# Patient Record
Sex: FEMALE | Race: WHITE | NOT HISPANIC OR LATINO | Employment: FULL TIME | URBAN - METROPOLITAN AREA
[De-identification: names, ages, dates, MRNs, and addresses within clinical notes are randomized per-mention and may not be internally consistent; named-entity substitution may affect disease eponyms.]

---

## 2017-02-08 ENCOUNTER — OFFICE VISIT (OUTPATIENT)
Dept: PEDIATRICS | Facility: CLINIC | Age: 22
End: 2017-02-08
Payer: COMMERCIAL

## 2017-02-08 VITALS
SYSTOLIC BLOOD PRESSURE: 128 MMHG | HEART RATE: 99 BPM | OXYGEN SATURATION: 99 % | TEMPERATURE: 99 F | DIASTOLIC BLOOD PRESSURE: 70 MMHG | RESPIRATION RATE: 14 BRPM | WEIGHT: 188.06 LBS | BODY MASS INDEX: 30.22 KG/M2 | HEIGHT: 66 IN

## 2017-02-08 DIAGNOSIS — G47.9 SLEEP DISTURBANCE: ICD-10-CM

## 2017-02-08 DIAGNOSIS — G44.229 CHRONIC TENSION-TYPE HEADACHE, NOT INTRACTABLE: ICD-10-CM

## 2017-02-08 DIAGNOSIS — F43.23 ADJUSTMENT DISORDER WITH MIXED ANXIETY AND DEPRESSED MOOD: ICD-10-CM

## 2017-02-08 DIAGNOSIS — F90.2 ATTENTION DEFICIT HYPERACTIVITY DISORDER (ADHD), COMBINED TYPE: Primary | ICD-10-CM

## 2017-02-08 DIAGNOSIS — G43.909 MIGRAINE WITHOUT STATUS MIGRAINOSUS, NOT INTRACTABLE, UNSPECIFIED MIGRAINE TYPE: ICD-10-CM

## 2017-02-08 PROCEDURE — 99214 OFFICE O/P EST MOD 30 MIN: CPT | Performed by: SPECIALIST

## 2017-02-08 RX ORDER — DEXTROAMPHETAMINE SACCHARATE, AMPHETAMINE ASPARTATE MONOHYDRATE, DEXTROAMPHETAMINE SULFATE AND AMPHETAMINE SULFATE 3.75; 3.75; 3.75; 3.75 MG/1; MG/1; MG/1; MG/1
15 CAPSULE, EXTENDED RELEASE ORAL DAILY
Qty: 30 CAPSULE | Refills: 0 | Status: SHIPPED | OUTPATIENT
Start: 2017-03-11 | End: 2017-04-10

## 2017-02-08 RX ORDER — ONDANSETRON 4 MG/1
4 TABLET, FILM COATED ORAL EVERY 8 HOURS PRN
Qty: 20 TABLET | Refills: 3 | Status: SHIPPED | OUTPATIENT
Start: 2017-02-08 | End: 2017-05-30

## 2017-02-08 RX ORDER — DEXTROAMPHETAMINE SACCHARATE, AMPHETAMINE ASPARTATE MONOHYDRATE, DEXTROAMPHETAMINE SULFATE AND AMPHETAMINE SULFATE 3.75; 3.75; 3.75; 3.75 MG/1; MG/1; MG/1; MG/1
15 CAPSULE, EXTENDED RELEASE ORAL DAILY
Qty: 30 CAPSULE | Refills: 0 | Status: SHIPPED | OUTPATIENT
Start: 2017-02-08 | End: 2017-03-10

## 2017-02-08 RX ORDER — DEXTROAMPHETAMINE SACCHARATE, AMPHETAMINE ASPARTATE MONOHYDRATE, DEXTROAMPHETAMINE SULFATE AND AMPHETAMINE SULFATE 3.75; 3.75; 3.75; 3.75 MG/1; MG/1; MG/1; MG/1
15 CAPSULE, EXTENDED RELEASE ORAL DAILY
Qty: 30 CAPSULE | Refills: 0 | Status: SHIPPED | OUTPATIENT
Start: 2017-04-11 | End: 2017-05-11

## 2017-02-08 RX ORDER — TRAZODONE HYDROCHLORIDE 50 MG/1
50 TABLET, FILM COATED ORAL
Qty: 30 TABLET | Refills: 5 | Status: SHIPPED | OUTPATIENT
Start: 2017-02-08 | End: 2017-10-24

## 2017-02-08 RX ORDER — SUMATRIPTAN 25 MG/1
25-50 TABLET, FILM COATED ORAL
Qty: 9 TABLET | Refills: 1 | Status: SHIPPED | OUTPATIENT
Start: 2017-02-08 | End: 2018-11-21

## 2017-02-08 NOTE — NURSING NOTE
"Chief Complaint   Patient presents with     Recheck Medication     A.D.H.D       Initial /70 mmHg  Pulse 99  Temp(Src) 99  F (37.2  C) (Tympanic)  Resp 14  Ht 5' 5.75\" (1.67 m)  Wt 188 lb 1 oz (85.305 kg)  BMI 30.59 kg/m2  SpO2 99% Estimated body mass index is 30.59 kg/(m^2) as calculated from the following:    Height as of this encounter: 5' 5.75\" (1.67 m).    Weight as of this encounter: 188 lb 1 oz (85.305 kg).  Medication Reconciliation: complete     Myranda Fraser CMA      "

## 2017-02-08 NOTE — PATIENT INSTRUCTIONS
Imitrex- can try taking one at onset of migraine. Ok to take with Zofran. If headache not improving, can take 2. If 2 is better, when need refill can increase dosage to 50 mg.   If headaches persisting, might consider other meds to help prevent them.   Might be helpful to keep a log of headache, meds and time of day to see if any relation.

## 2017-02-08 NOTE — PROGRESS NOTES
SUBJECTIVE:                                                    Lucero Collier is a 21 year old female who presents to clinic today with herself because of:    Chief Complaint   Patient presents with     Recheck Medication     A.D.H.D        HPI:  ADHD/ medication Follow-Up    Date of last ADHD office visit: 7/6/2016  Status since last visit: Stable  Taking controlled (daily) medications as prescribed: Yes      Current Outpatient Prescriptions   Medication     ranitidine (ZANTAC) 150 MG tablet     meloxicam (MOBIC) 7.5 MG tablet     traZODone (DESYREL) 50 MG tablet     pimecrolimus (ELIDEL) 1 % cream     fluocinonide (LIDEX) 0.05 % cream     ondansetron (ZOFRAN) 4 MG tablet     No current facility-administered medications for this visit.     Social History:                                                                        Patient is no longer pursuing school (did not pass test to apply to MA program) and is now working at [employer] in [place]. She states she enjoys her job. She has been engaged since Oct 2016 but her fiance is currently living in [place] for flight school and will be moving back to [place] in April. Probably will not  for awhile.     Abdominal Pain:   She was seen in clinic for abdominal pain in July 2016. The CT scan at the time revealed a 1 mm kidney stone and she states she was able to pass it. She still occasionally experiences abdominal pain but is not concerned about this.     Menstrual Cycle:  IUD was placed June 2016. She occasionally will have menses but otherwise has been doing okay with no concerns. Her STD screening is due in June.     ADHD:  Continuing to take Adderall XR 15 mg. She states that she still needs it to help her concentrate at work. She reports feeling very hyper if she misses a dose. We had increased this and she feels this dosage is working better.     GERD:   Continuing to take Zantac for reflux. She reports that she experiences reflux after eating  spicy foods but otherwise is well controlled.     Migraines:  Patient is still experiencing 2-3 migraines/month typically in the afternoon. She states her migraines make her feel nauseous, light sensitive and needs to lie down and take Zofran. She has not been drinking caffeine often and reports she increased her water intake.   No exercise    Mood:   Patient states she is still struggling with depression and also gets anxious at times.  Her mood has been stable but she has been stressed since she was recently informed she and her parents will need to move since her landlord is selling the current property they are living on. Would like a note stating she needs dog for companionship. She is not currently in counseling but thinks that might be helpful.     School:  Name of SCHOOL: None  Grade: None   School Concerns/Teacher Feedback: None  School services/Modifications: none  Homework: None  Grades: None    Sleep: continuing to take Trazodone a few times a week for sleep. States Trazodone has been effective. Does not always need it.   Home/Family Concerns: Stable  Peer Concerns: None    Co-Morbid Diagnosis: Depressed mood    Currently in counseling: No but expresses interest in seeing a therapist.     ROS:  Negative for constitutional, eye, ear, nose, throat, skin, respiratory, cardiac, and gastrointestinal other than those outlined in the HPI.    PROBLEM LIST:  Patient Active Problem List    Diagnosis Date Noted     Sleep disturbance 05/18/2016     Priority: Medium     Trazodone 5/16       Overweight 05/15/2015     Learning problem 05/07/2013     Normal Chromosomes, amino acids, organic acids 1/99; repeat metabolic labs normal 2004; Neuropsych eval 11/04, 1/05, 4/06-mild expressive/ receptive lang impairments; Verbal IQ 83, Performance IQ 79         Care Plan- Health Care Home 06/06/2012     See Care Plan with complete history -encounter 11/10/14       ADHD (attention deficit hyperactivity disorder) 11/08/2011      Followed by Kathleen Nash CNP at [facility]; 11/01; Concerta (summer2009- present), Intuniv (3/11) Metadate, Tenex, Addrall, Ritalin, Vyvanse, Clonidine, Daytrana (11/06-7/09)   5/17/16- switch to Adderall due to cost of Concerta       Constipation 11/08/2011     Since 2002- Colace       Myopia      Eyeglasses; Dr. Gregoria Wynn       Spells      Paroxysmal spells felt not to be seizures; have not been able to capture with EEG monitoring       Chronic headaches      Felt to be mixed tension HA and some migraine variants; Zofran for nausea       GERD (gastroesophageal reflux disease)      Numerous antacids; Nissen fundoplication 6/07; Omeprazole 4/09; Zantac at  4/10; Followed by Dr. Torres [facility]; UGI 3/07; Esophogram & UGI normal 7/10       Adverse reaction to pertussis vaccine      Complex partial seizures (H)      Occasionally seizures would generalize, last 2006; Did not respond to numerous meds. Multiple hospitalizations, 4/00, 11/00, 7/01- several days of EEG monitoring could not capture seizure; has seen several neurologists: Dr. Gonzales at [facility], Elle Parra/ Elyssa Concepcion at [facility], Dr. Elizabeth Juarez at [facility]; PET scan 2004, MRI Brain 6/99, 7/01, 2/06- cerebellar tonsillar ectopia at foramen magnum but no Chiari 1; EEG normal 4/09; Ambulatory EEG for one week normal 4/10        MEDICATIONS:  Current Outpatient Prescriptions   Medication Sig Dispense Refill     ranitidine (ZANTAC) 150 MG tablet Take 1 tablet (150 mg) by mouth At Bedtime 90 tablet 1     meloxicam (MOBIC) 7.5 MG tablet Take 1 tablet (7.5 mg) by mouth daily 30 tablet 0     traZODone (DESYREL) 50 MG tablet Take 1 tablet (50 mg) by mouth nightly as needed for sleep 30 tablet 5     pimecrolimus (ELIDEL) 1 % cream Apply topically 2 times daily Ok to use on face 60 g 3     fluocinonide (LIDEX) 0.05 % cream Apply sparingly to affected area twice daily as needed.  Do not apply to face. 60 g 0     ondansetron (ZOFRAN) 4 MG  "tablet Take 1 tablet by mouth every 8 hours as needed. As needed for headache 20 tablet 3      ALLERGIES:  No Known Allergies    Problem list and histories reviewed & adjusted, as indicated.    This document serves as a record of the services and decisions personally performed and made by Yakeiln Sen MD. It was created on his/her behalf by Aster Azevedo, a trained medical scribe. The creation of this document is based the provider's statements to the medical scribe.  Scribe Aster Azevedo 4:21 PM, 2017    OBJECTIVE:                                                      /70 mmHg  Pulse 99  Temp(Src) 99  F (37.2  C) (Tympanic)  Resp 14  Ht 5' 5.75\" (1.67 m)  Wt 188 lb 1 oz (85.305 kg)  BMI 30.59 kg/m2  SpO2 99%   Blood pressure percentiles are 95% systolic and 71% diastolic based on 2000 NHANES data. Blood pressure percentile targets: 90: 124/78, 95: 128/82, 99 + 5 mmH/95.    GENERAL:  Alert and interactive  EYES:  Normal extra-ocular movements.  PERRLA  EARS: Normal  PHARYNX: Normal  NECK: No adenopathy, no thyroid enlargement.   LUNGS:  Clear  HEART:  Normal rate and rhythm.  Normal S1 and S2.  No murmurs.  NEURO:  No tics or tremor.  Normal tone and strength. Normal gait and balance.    DIAGNOSTICS: None    ASSESSMENT/PLAN:                                                    1. Attention deficit hyperactivity disorder (ADHD), combined type  Doing ok on current dosing.   - amphetamine-dextroamphetamine (ADDERALL XR) 15 MG per 24 hr capsule; Take 1 capsule (15 mg) by mouth daily  Dispense: 30 capsule; Refill: 0  - amphetamine-dextroamphetamine (ADDERALL XR) 15 MG per 24 hr capsule; Take 1 capsule (15 mg) by mouth daily  Dispense: 30 capsule; Refill: 0  - amphetamine-dextroamphetamine (ADDERALL XR) 15 MG per 24 hr capsule; Take 1 capsule (15 mg) by mouth daily  Dispense: 30 capsule; Refill: 0    2. Sleep disturbance  - traZODone (DESYREL) 50 MG tablet; Take 1 tablet (50 mg) by " mouth nightly as needed for sleep  Dispense: 30 tablet; Refill: 5    3. Chronic tension-type headache, not intractable  Use when having nausea with headaches  - ondansetron (ZOFRAN) 4 MG tablet; Take 1 tablet (4 mg) by mouth every 8 hours as needed As needed for headache  Dispense: 20 tablet; Refill: 3    4. Migraine without status migrainosus, not intractable, unspecified migraine type  Discussed migraines. Not clear trigger but suggested tracking them to see if might be related to when she does or does not take meds (both Adderall and Trazodone). Has had long history of neuro symptoms and neuro evals.   Suggested trial of Imitrex for abortive therapy. Might consider something like Amitriptyline if persisting this frequently.    - SUMAtriptan (IMITREX) 25 MG tablet; Take 1-2 tablets (25-50 mg) by mouth at onset of headache for migraine May repeat in 2 hours. Max 8 tablets/24 hours.  Dispense: 9 tablet; Refill: 1    5. Adjustment disorder with mixed anxiety and depressed mood  Would be helpful to see counselor and address via CBT first. Note written for  dog.   - MENTAL HEALTH REFERRAL    FOLLOW UP: 6 mos needs f/u visit/ annual check up/ STD screening. Discussed transition to adult provider Has seen Cristal previously.     The information in this document, created by the medical scribe for me, accurately reflects the services I personally performed and the decisions made by me. I have reviewed and approved this document for accuracy prior to leaving the patient care area.  Yakelin Sen MD  4:21 PM, 02/08/2017    Yakelin Sen MD

## 2017-02-08 NOTE — MR AVS SNAPSHOT
After Visit Summary   2/8/2017    Lucero Collier    MRN: [id number]           Patient Information     Date Of Birth          1995        Visit Information        Provider Department      2/8/2017 4:00 PM Yakelin Combs MD [facility]        Today's Diagnoses     Attention deficit hyperactivity disorder (ADHD), combined type    -  1     Sleep disturbance         Chronic tension-type headache, not intractable         Migraine without status migrainosus, not intractable, unspecified migraine type           Care Instructions    Imitrex- can try taking one at onset of migraine. Ok to take with Zofran. If headache not improving, can take 2. If 2 is better, when need refill can increase dosage to 50 mg.   If headaches persisting, might consider other meds to help prevent them.   Might be helpful to keep a log of headache, meds and time of day to see if any relation.         Follow-ups after your visit        Who to contact     If you have questions or need follow up information about today's clinic visit or your schedule please contact [facility] directly at [phone].  Normal or non-critical lab and imaging results will be communicated to you by [employer], letter or phone within 4 business days after the clinic has received the results. If you do not hear from us within 7 days, please contact the clinic through [employer] or phone. If you have a critical or abnormal lab result, we will notify you by phone as soon as possible.  Submit refill requests through [employer] or call your pharmacy and they will forward the refill request to us. Please allow 3 business days for your refill to be completed.          Additional Information About Your Visit        MyChart Information     [employer] gives you secure access to your electronic health record. If you see a primary care provider, you can also send messages to your care team and make appointments. If you have questions,  "please call your primary care clinic.  If you do not have a primary care provider, please call [phone] and they will assist you.        Care EveryWhere ID     This is your Care EveryWhere ID. This could be used by other organizations to access your [place] medical records  [id number]        Your Vitals Were     Pulse Temperature Respirations Height BMI (Body Mass Index) Pulse Oximetry    99 99  F (37.2  C) (Tympanic) 14 5' 5.75\" (1.67 m) 30.59 kg/m2 99%       Blood Pressure from Last 3 Encounters:   02/08/17 128/70   07/22/16 110/68   07/11/16 102/70    Weight from Last 3 Encounters:   02/08/17 188 lb 1 oz (85.305 kg) (96.00 %*)   07/22/16 197 lb 1.6 oz (89.404 kg) (97.20 %*)   07/11/16 196 lb 6.4 oz (89.086 kg) (97.14 %*)     * Growth percentiles are based on [facility] 2-20 Years data.              Today, you had the following     No orders found for display         Today's Medication Changes          These changes are accurate as of: 2/8/17  4:35 PM.  If you have any questions, ask your nurse or doctor.               Start taking these medicines.        Dose/Directions    * amphetamine-dextroamphetamine 15 MG per 24 hr capsule   Commonly known as:  ADDERALL XR   Used for:  Attention deficit hyperactivity disorder (ADHD), combined type   Started by:  Yakelin Combs MD        Dose:  15 mg   Take 1 capsule (15 mg) by mouth daily   Quantity:  30 capsule   Refills:  0       * amphetamine-dextroamphetamine 15 MG per 24 hr capsule   Commonly known as:  ADDERALL XR   Used for:  Attention deficit hyperactivity disorder (ADHD), combined type   Started by:  Yakelin Combs MD        Dose:  15 mg   Start taking on:  3/11/2017   Take 1 capsule (15 mg) by mouth daily   Quantity:  30 capsule   Refills:  0       * amphetamine-dextroamphetamine 15 MG per 24 hr capsule   Commonly known as:  ADDERALL XR   Used for:  Attention deficit hyperactivity disorder (ADHD), combined type   Started by:  Yakelin Combs" MD Amy        Dose:  15 mg   Start taking on:  4/11/2017   Take 1 capsule (15 mg) by mouth daily   Quantity:  30 capsule   Refills:  0       SUMAtriptan 25 MG tablet   Commonly known as:  IMITREX   Used for:  Migraine without status migrainosus, not intractable, unspecified migraine type   Started by:  Yakelin Combs MD        Dose:  25-50 mg   Take 1-2 tablets (25-50 mg) by mouth at onset of headache for migraine May repeat in 2 hours. Max 8 tablets/24 hours.   Quantity:  9 tablet   Refills:  1       * Notice:  This list has 3 medication(s) that are the same as other medications prescribed for you. Read the directions carefully, and ask your doctor or other care provider to review them with you.         Where to get your medicines      These medications were sent to [facility]/pharmacy #[id number] - [place] Zarate - [address]  [address] [place] [address]     Phone:  [phone]    - ondansetron 4 MG tablet  - SUMAtriptan 25 MG tablet  - traZODone 50 MG tablet      Some of these will need a paper prescription and others can be bought over the counter.  Ask your nurse if you have questions.     Bring a paper prescription for each of these medications    - amphetamine-dextroamphetamine 15 MG per 24 hr capsule  - amphetamine-dextroamphetamine 15 MG per 24 hr capsule  - amphetamine-dextroamphetamine 15 MG per 24 hr capsule             Primary Care Provider Office Phone # Fax #    Yakelin Sen [phone]       [facility] [address]  [facility] [address]        Thank you!     Thank you for choosing [facility]  for your care. Our goal is always to provide you with excellent care. Hearing back from our patients is one way we can continue to improve our services. Please take a few minutes to complete the written survey that you may receive in the mail after your visit with us. Thank you!             Your Updated Medication List - Protect others  around you: Learn how to safely use, store and throw away your medicines at www.disposemymeds.org.          This list is accurate as of: 2/8/17  4:35 PM.  Always use your most recent med list.                   Brand Name Dispense Instructions for use    * amphetamine-dextroamphetamine 15 MG per 24 hr capsule    ADDERALL XR    30 capsule    Take 1 capsule (15 mg) by mouth daily       * amphetamine-dextroamphetamine 15 MG per 24 hr capsule   Start taking on:  3/11/2017    ADDERALL XR    30 capsule    Take 1 capsule (15 mg) by mouth daily       * amphetamine-dextroamphetamine 15 MG per 24 hr capsule   Start taking on:  4/11/2017    ADDERALL XR    30 capsule    Take 1 capsule (15 mg) by mouth daily       fluocinonide 0.05 % cream    LIDEX    60 g    Apply sparingly to affected area twice daily as needed.  Do not apply to face.       meloxicam 7.5 MG tablet    MOBIC    30 tablet    Take 1 tablet (7.5 mg) by mouth daily       ondansetron 4 MG tablet    ZOFRAN    20 tablet    Take 1 tablet (4 mg) by mouth every 8 hours as needed As needed for headache       pimecrolimus 1 % cream    ELIDEL    60 g    Apply topically 2 times daily Ok to use on face       ranitidine 150 MG tablet    ZANTAC    90 tablet    Take 1 tablet (150 mg) by mouth At Bedtime       SUMAtriptan 25 MG tablet    IMITREX    9 tablet    Take 1-2 tablets (25-50 mg) by mouth at onset of headache for migraine May repeat in 2 hours. Max 8 tablets/24 hours.       traZODone 50 MG tablet    DESYREL    30 tablet    Take 1 tablet (50 mg) by mouth nightly as needed for sleep       * Notice:  This list has 3 medication(s) that are the same as other medications prescribed for you. Read the directions carefully, and ask your doctor or other care provider to review them with you.

## 2017-02-08 NOTE — Clinical Note
[facility]  [address] [address] [phone]      February 8, 2017      Lucero Collier  [address]  [place] [place] [facility] [place] [address]        To Whom it May Concern:    It is medically necessary for Lucero Collier to have a  dog to help with her depression and anxiety. Please allow her to have dog live with her.     If further information is needed to let me know.       Sincerely,      Yakelin Sen MD

## 2017-02-09 PROBLEM — F43.23 ADJUSTMENT DISORDER WITH MIXED ANXIETY AND DEPRESSED MOOD: Status: ACTIVE | Noted: 2017-02-09

## 2017-02-13 PROBLEM — G43.909 MIGRAINE WITHOUT STATUS MIGRAINOSUS, NOT INTRACTABLE, UNSPECIFIED MIGRAINE TYPE: Status: ACTIVE | Noted: 2017-02-08

## 2017-02-13 PROBLEM — G43.909 MIGRAINE WITHOUT STATUS MIGRAINOSUS, NOT INTRACTABLE, UNSPECIFIED MIGRAINE TYPE: Status: ACTIVE | Noted: 2017-02-13

## 2017-02-16 ENCOUNTER — TELEPHONE (OUTPATIENT)
Dept: PEDIATRICS | Facility: CLINIC | Age: 22
End: 2017-02-16

## 2017-02-16 NOTE — TELEPHONE ENCOUNTER
This is first I have seen about needing PA. Please initiate. Request for ADDERALL XR 15 #30. Needs long acting formulation.   Med history:   Concerta (summer 2009- 5/16), Intuniv (3/11) Metadate CD, Guanfacine short acting and ER forms, Adderall short acting,  Ritalin, Vyvanse, Clonidine, Daytrana (11/06-7/09)   5/17/16- switch to Adderall XR  due to cost of Concerta- doing well currently and requesting continuation of care.

## 2017-02-16 NOTE — TELEPHONE ENCOUNTER
Needs PA for the Adderall prescription, [facility] pharmacy is calling to see if this has been done yet? Says they sent a fax to us for it, no message in chart noted.   Ok to begin this process?  Myrna Presley, RN  Triage Nurse

## 2017-02-24 NOTE — TELEPHONE ENCOUNTER
Called insurance to check the status of PA. Informed that they did not see a PA on file, initiated PA over the phone. PA was approved during phone call, informed that an approval letter will be faxed to the clinic, once received, will send to be abstracted.     Called pt and LM informing that PA has been approved.   Lidia Queen MA

## 2017-04-17 ENCOUNTER — OFFICE VISIT (OUTPATIENT)
Dept: PSYCHOLOGY | Facility: CLINIC | Age: 22
End: 2017-04-17
Attending: SPECIALIST
Payer: COMMERCIAL

## 2017-04-17 DIAGNOSIS — F32.A DEPRESSION: Primary | ICD-10-CM

## 2017-04-17 PROCEDURE — 90791 PSYCH DIAGNOSTIC EVALUATION: CPT | Performed by: COUNSELOR

## 2017-04-17 NOTE — Clinical Note
Hello - I am routing the initial diagnostic assessment completed on M. Stone for depressive and anxiety symptoms she reports as currently experiencing.  Please let me know if you have any questions.

## 2017-04-24 ENCOUNTER — OFFICE VISIT (OUTPATIENT)
Dept: PSYCHOLOGY | Facility: CLINIC | Age: 22
End: 2017-04-24
Attending: SPECIALIST
Payer: COMMERCIAL

## 2017-04-24 DIAGNOSIS — F32.A DEPRESSION: Primary | ICD-10-CM

## 2017-04-24 PROCEDURE — 90837 PSYTX W PT 60 MINUTES: CPT | Performed by: COUNSELOR

## 2017-04-24 ASSESSMENT — ANXIETY QUESTIONNAIRES
5. BEING SO RESTLESS THAT IT IS HARD TO SIT STILL: MORE THAN HALF THE DAYS
1. FEELING NERVOUS, ANXIOUS, OR ON EDGE: NEARLY EVERY DAY
3. WORRYING TOO MUCH ABOUT DIFFERENT THINGS: MORE THAN HALF THE DAYS
2. NOT BEING ABLE TO STOP OR CONTROL WORRYING: NEARLY EVERY DAY
6. BECOMING EASILY ANNOYED OR IRRITABLE: NEARLY EVERY DAY
7. FEELING AFRAID AS IF SOMETHING AWFUL MIGHT HAPPEN: NEARLY EVERY DAY
GAD7 TOTAL SCORE: 19
IF YOU CHECKED OFF ANY PROBLEMS ON THIS QUESTIONNAIRE, HOW DIFFICULT HAVE THESE PROBLEMS MADE IT FOR YOU TO DO YOUR WORK, TAKE CARE OF THINGS AT HOME, OR GET ALONG WITH OTHER PEOPLE: EXTREMELY DIFFICULT

## 2017-04-24 ASSESSMENT — PATIENT HEALTH QUESTIONNAIRE - PHQ9: 5. POOR APPETITE OR OVEREATING: NEARLY EVERY DAY
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
## 2017-04-25 ASSESSMENT — ANXIETY QUESTIONNAIRES: GAD7 TOTAL SCORE: 19

## 2017-04-25 ASSESSMENT — PATIENT HEALTH QUESTIONNAIRE - PHQ9: SUM OF ALL RESPONSES TO PHQ QUESTIONS 1-9: 27

## 2017-05-13 ASSESSMENT — ANXIETY QUESTIONNAIRES
GAD7 TOTAL SCORE: 19
6. BECOMING EASILY ANNOYED OR IRRITABLE: NEARLY EVERY DAY
5. BEING SO RESTLESS THAT IT IS HARD TO SIT STILL: MORE THAN HALF THE DAYS
7. FEELING AFRAID AS IF SOMETHING AWFUL MIGHT HAPPEN: NEARLY EVERY DAY
3. WORRYING TOO MUCH ABOUT DIFFERENT THINGS: MORE THAN HALF THE DAYS
2. NOT BEING ABLE TO STOP OR CONTROL WORRYING: NEARLY EVERY DAY
1. FEELING NERVOUS, ANXIOUS, OR ON EDGE: NEARLY EVERY DAY
IF YOU CHECKED OFF ANY PROBLEMS ON THIS QUESTIONNAIRE, HOW DIFFICULT HAVE THESE PROBLEMS MADE IT FOR YOU TO DO YOUR WORK, TAKE CARE OF THINGS AT HOME, OR GET ALONG WITH OTHER PEOPLE: EXTREMELY DIFFICULT

## 2017-05-13 ASSESSMENT — PATIENT HEALTH QUESTIONNAIRE - PHQ9: 5. POOR APPETITE OR OVEREATING: NEARLY EVERY DAY

## 2017-05-30 ENCOUNTER — MYC REFILL (OUTPATIENT)
Dept: PEDIATRICS | Facility: CLINIC | Age: 22
End: 2017-05-30

## 2017-05-30 DIAGNOSIS — G44.229 CHRONIC TENSION-TYPE HEADACHE, NOT INTRACTABLE: ICD-10-CM

## 2017-05-31 ENCOUNTER — OFFICE VISIT (OUTPATIENT)
Dept: PSYCHOLOGY | Facility: CLINIC | Age: 22
End: 2017-05-31
Payer: COMMERCIAL

## 2017-05-31 DIAGNOSIS — F32.A DEPRESSION: Primary | ICD-10-CM

## 2017-05-31 PROCEDURE — 90837 PSYTX W PT 60 MINUTES: CPT | Performed by: COUNSELOR

## 2017-05-31 RX ORDER — ONDANSETRON 4 MG/1
4 TABLET, FILM COATED ORAL EVERY 8 HOURS PRN
Qty: 20 TABLET | Refills: 3 | Status: SHIPPED | OUTPATIENT
Start: 2017-05-31 | End: 2019-06-10

## 2017-05-31 ASSESSMENT — ANXIETY QUESTIONNAIRES
IF YOU CHECKED OFF ANY PROBLEMS ON THIS QUESTIONNAIRE, HOW DIFFICULT HAVE THESE PROBLEMS MADE IT FOR YOU TO DO YOUR WORK, TAKE CARE OF THINGS AT HOME, OR GET ALONG WITH OTHER PEOPLE: VERY DIFFICULT
GAD7 TOTAL SCORE: 20
6. BECOMING EASILY ANNOYED OR IRRITABLE: NEARLY EVERY DAY
1. FEELING NERVOUS, ANXIOUS, OR ON EDGE: NEARLY EVERY DAY
2. NOT BEING ABLE TO STOP OR CONTROL WORRYING: NEARLY EVERY DAY
7. FEELING AFRAID AS IF SOMETHING AWFUL MIGHT HAPPEN: NEARLY EVERY DAY
3. WORRYING TOO MUCH ABOUT DIFFERENT THINGS: NEARLY EVERY DAY
5. BEING SO RESTLESS THAT IT IS HARD TO SIT STILL: MORE THAN HALF THE DAYS

## 2017-05-31 ASSESSMENT — PATIENT HEALTH QUESTIONNAIRE - PHQ9: 5. POOR APPETITE OR OVEREATING: NEARLY EVERY DAY

## 2017-05-31 NOTE — TELEPHONE ENCOUNTER
This was sent to a different pharmacy at last request, so resent to [facility] today.  Myrna Presley, RN  Triage Nurse
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
## 2017-06-16 ENCOUNTER — OFFICE VISIT (OUTPATIENT)
Dept: PSYCHOLOGY | Facility: CLINIC | Age: 22
End: 2017-06-16
Payer: COMMERCIAL

## 2017-06-16 DIAGNOSIS — F32.A DEPRESSION: Primary | ICD-10-CM

## 2017-06-16 PROCEDURE — 90837 PSYTX W PT 60 MINUTES: CPT | Performed by: COUNSELOR

## 2017-06-16 ASSESSMENT — ANXIETY QUESTIONNAIRES
GAD7 TOTAL SCORE: 19
7. FEELING AFRAID AS IF SOMETHING AWFUL MIGHT HAPPEN: MORE THAN HALF THE DAYS
2. NOT BEING ABLE TO STOP OR CONTROL WORRYING: NEARLY EVERY DAY
IF YOU CHECKED OFF ANY PROBLEMS ON THIS QUESTIONNAIRE, HOW DIFFICULT HAVE THESE PROBLEMS MADE IT FOR YOU TO DO YOUR WORK, TAKE CARE OF THINGS AT HOME, OR GET ALONG WITH OTHER PEOPLE: VERY DIFFICULT
3. WORRYING TOO MUCH ABOUT DIFFERENT THINGS: NEARLY EVERY DAY
5. BEING SO RESTLESS THAT IT IS HARD TO SIT STILL: NEARLY EVERY DAY
1. FEELING NERVOUS, ANXIOUS, OR ON EDGE: NEARLY EVERY DAY
6. BECOMING EASILY ANNOYED OR IRRITABLE: NEARLY EVERY DAY

## 2017-06-16 ASSESSMENT — PATIENT HEALTH QUESTIONNAIRE - PHQ9: 5. POOR APPETITE OR OVEREATING: MORE THAN HALF THE DAYS

## 2017-06-23 ENCOUNTER — OFFICE VISIT (OUTPATIENT)
Dept: PSYCHOLOGY | Facility: CLINIC | Age: 22
End: 2017-06-23
Payer: COMMERCIAL

## 2017-06-23 DIAGNOSIS — F41.9 ANXIETY: Primary | ICD-10-CM

## 2017-06-23 DIAGNOSIS — F32.A DEPRESSION: ICD-10-CM

## 2017-06-23 PROCEDURE — 90837 PSYTX W PT 60 MINUTES: CPT | Performed by: COUNSELOR

## 2017-06-23 ASSESSMENT — ANXIETY QUESTIONNAIRES
3. WORRYING TOO MUCH ABOUT DIFFERENT THINGS: NEARLY EVERY DAY
2. NOT BEING ABLE TO STOP OR CONTROL WORRYING: NEARLY EVERY DAY
IF YOU CHECKED OFF ANY PROBLEMS ON THIS QUESTIONNAIRE, HOW DIFFICULT HAVE THESE PROBLEMS MADE IT FOR YOU TO DO YOUR WORK, TAKE CARE OF THINGS AT HOME, OR GET ALONG WITH OTHER PEOPLE: EXTREMELY DIFFICULT
1. FEELING NERVOUS, ANXIOUS, OR ON EDGE: NEARLY EVERY DAY
5. BEING SO RESTLESS THAT IT IS HARD TO SIT STILL: NEARLY EVERY DAY
6. BECOMING EASILY ANNOYED OR IRRITABLE: NEARLY EVERY DAY
GAD7 TOTAL SCORE: 21
7. FEELING AFRAID AS IF SOMETHING AWFUL MIGHT HAPPEN: NEARLY EVERY DAY

## 2017-06-23 ASSESSMENT — PATIENT HEALTH QUESTIONNAIRE - PHQ9: 5. POOR APPETITE OR OVEREATING: NEARLY EVERY DAY

## 2017-06-26 ASSESSMENT — PATIENT HEALTH QUESTIONNAIRE - PHQ9: SUM OF ALL RESPONSES TO PHQ QUESTIONS 1-9: 23

## 2017-06-26 ASSESSMENT — ANXIETY QUESTIONNAIRES: GAD7 TOTAL SCORE: 20
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
## 2017-07-01 ASSESSMENT — PATIENT HEALTH QUESTIONNAIRE - PHQ9
SUM OF ALL RESPONSES TO PHQ QUESTIONS 1-9: 24
SUM OF ALL RESPONSES TO PHQ QUESTIONS 1-9: 24

## 2017-07-01 ASSESSMENT — ANXIETY QUESTIONNAIRES
GAD7 TOTAL SCORE: 19
GAD7 TOTAL SCORE: 21

## 2017-07-03 ENCOUNTER — TELEPHONE (OUTPATIENT)
Dept: PSYCHOLOGY | Facility: CLINIC | Age: 22
End: 2017-07-03
Payer: COMMERCIAL

## 2017-07-03 PROCEDURE — 99207 ZZC NO CHARGE LOS: CPT | Performed by: COUNSELOR

## 2017-07-03 NOTE — TELEPHONE ENCOUNTER
Left message for client on 6/30/2017 regarding missed appointment. Requested call back to ascertain if client remains interested in receiving therapy service due to no further appointments scheduled at this time.

## 2017-07-12 ENCOUNTER — MYC MEDICAL ADVICE (OUTPATIENT)
Dept: FAMILY MEDICINE | Facility: CLINIC | Age: 22
End: 2017-07-12

## 2017-07-12 DIAGNOSIS — Z11.3 SCREEN FOR STD (SEXUALLY TRANSMITTED DISEASE): Primary | ICD-10-CM

## 2017-07-12 NOTE — TELEPHONE ENCOUNTER
Pediatric Panel Management Review      Patient has the following on her problem list: None    Summary:    Patient is due/failing the following:   GC/Chlamydia.    Action needed:   Needs a lab only appointment.    Type of outreach:    Sent [email or web address] message    Questions for provider review:    None.                                                                                                                                    Myranda Fraser CMA     Chart routed to Care Team .

## 2017-07-18 DIAGNOSIS — Z11.3 SCREEN FOR STD (SEXUALLY TRANSMITTED DISEASE): ICD-10-CM

## 2017-07-18 PROCEDURE — 87591 N.GONORRHOEAE DNA AMP PROB: CPT | Performed by: SPECIALIST

## 2017-07-18 PROCEDURE — 87491 CHLMYD TRACH DNA AMP PROBE: CPT | Performed by: SPECIALIST

## 2017-07-20 ENCOUNTER — TELEPHONE (OUTPATIENT)
Dept: PEDIATRICS | Facility: CLINIC | Age: 22
End: 2017-07-20

## 2017-07-20 DIAGNOSIS — A74.9 CHLAMYDIA INFECTION: Primary | ICD-10-CM

## 2017-07-20 LAB
C TRACH DNA SPEC QL NAA+PROBE: ABNORMAL
N GONORRHOEA DNA SPEC QL NAA+PROBE: NORMAL
SPECIMEN SOURCE: ABNORMAL
SPECIMEN SOURCE: NORMAL

## 2017-07-20 RX ORDER — AZITHROMYCIN 500 MG/1
1000 TABLET, FILM COATED ORAL ONCE
Qty: 2 TABLET | Refills: 0 | Status: SHIPPED | OUTPATIENT
Start: 2017-07-20 | End: 2017-07-20

## 2017-07-20 NOTE — TELEPHONE ENCOUNTER
Patient had lab done per panel management encounter 7/12/2017.   Positive for Chlamydia.    Huddled with GLADYS Rosario. Verbal order for prescription zithromax 1 gm x 1 to be sent to pharmacy.    Advised patient of positive result. No sexual contact for 1 week after treatment. Inform partners of result and need for tx.     Prescription sent to pharmacy.    Patient is changing to [facility] for pcp. Discussed need to follow up with [facility] as LOV with provider was 7/2016.    Patient will call to schedule.    Regina Gerardo RN

## 2017-07-21 ENCOUNTER — TELEPHONE (OUTPATIENT)
Dept: PEDIATRICS | Facility: CLINIC | Age: 22
End: 2017-07-21

## 2017-07-21 DIAGNOSIS — A74.9 CHLAMYDIA: Primary | ICD-10-CM

## 2017-07-21 RX ORDER — AZITHROMYCIN 1 G/1
1 POWDER, FOR SUSPENSION ORAL ONCE
Qty: 1 EACH | Refills: 0 | Status: CANCELLED | OUTPATIENT
Start: 2017-07-21 | End: 2017-07-21

## 2017-07-21 NOTE — TELEPHONE ENCOUNTER
"I sent Nina note- need to know which pharmacy and I need to speak with her more about this.  \"You have a positive test for Chlamydia which is a sexually transmitted disease. You need to take a medication (Zithromax) and any sexual contacts also need to be treated. You should not have sexual intercourse for one week after treatment and until your partner is also treated. Would recommend we recheck this in 3 mos to be sure you are clear and have not been re-infected. Please contact me to discuss this further at [phone]. \"  "

## 2017-08-08 ENCOUNTER — OFFICE VISIT (OUTPATIENT)
Dept: PSYCHOLOGY | Facility: CLINIC | Age: 22
End: 2017-08-08
Payer: COMMERCIAL

## 2017-08-08 DIAGNOSIS — F41.9 ANXIETY: ICD-10-CM

## 2017-08-08 DIAGNOSIS — F32.A DEPRESSION: Primary | ICD-10-CM

## 2017-08-08 PROCEDURE — 90837 PSYTX W PT 60 MINUTES: CPT | Performed by: COUNSELOR

## 2017-08-08 ASSESSMENT — ANXIETY QUESTIONNAIRES
2. NOT BEING ABLE TO STOP OR CONTROL WORRYING: SEVERAL DAYS
5. BEING SO RESTLESS THAT IT IS HARD TO SIT STILL: SEVERAL DAYS
6. BECOMING EASILY ANNOYED OR IRRITABLE: SEVERAL DAYS
1. FEELING NERVOUS, ANXIOUS, OR ON EDGE: MORE THAN HALF THE DAYS
GAD7 TOTAL SCORE: 10
7. FEELING AFRAID AS IF SOMETHING AWFUL MIGHT HAPPEN: SEVERAL DAYS
IF YOU CHECKED OFF ANY PROBLEMS ON THIS QUESTIONNAIRE, HOW DIFFICULT HAVE THESE PROBLEMS MADE IT FOR YOU TO DO YOUR WORK, TAKE CARE OF THINGS AT HOME, OR GET ALONG WITH OTHER PEOPLE: SOMEWHAT DIFFICULT
3. WORRYING TOO MUCH ABOUT DIFFERENT THINGS: MORE THAN HALF THE DAYS

## 2017-08-08 ASSESSMENT — PATIENT HEALTH QUESTIONNAIRE - PHQ9
5. POOR APPETITE OR OVEREATING: MORE THAN HALF THE DAYS
SUM OF ALL RESPONSES TO PHQ QUESTIONS 1-9: 14

## 2017-08-09 ASSESSMENT — ANXIETY QUESTIONNAIRES: GAD7 TOTAL SCORE: 10
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
## 2017-09-13 ENCOUNTER — OFFICE VISIT (OUTPATIENT)
Dept: FAMILY MEDICINE | Facility: CLINIC | Age: 22
End: 2017-09-13
Payer: COMMERCIAL

## 2017-09-13 VITALS
TEMPERATURE: 97.9 F | OXYGEN SATURATION: 98 % | WEIGHT: 201.3 LBS | DIASTOLIC BLOOD PRESSURE: 70 MMHG | HEART RATE: 72 BPM | BODY MASS INDEX: 32.74 KG/M2 | SYSTOLIC BLOOD PRESSURE: 102 MMHG

## 2017-09-13 DIAGNOSIS — R10.2 PELVIC PAIN IN FEMALE: Primary | ICD-10-CM

## 2017-09-13 LAB
SPECIMEN SOURCE: NORMAL
WET PREP SPEC: NORMAL

## 2017-09-13 PROCEDURE — 87491 CHLMYD TRACH DNA AMP PROBE: CPT | Performed by: NURSE PRACTITIONER

## 2017-09-13 PROCEDURE — 87591 N.GONORRHOEAE DNA AMP PROB: CPT | Performed by: NURSE PRACTITIONER

## 2017-09-13 PROCEDURE — 99213 OFFICE O/P EST LOW 20 MIN: CPT | Performed by: NURSE PRACTITIONER

## 2017-09-13 PROCEDURE — 87210 SMEAR WET MOUNT SALINE/INK: CPT | Performed by: NURSE PRACTITIONER

## 2017-09-13 NOTE — MR AVS SNAPSHOT
After Visit Summary   9/13/2017    Lucero Collier    MRN: [id number]           Patient Information     Date Of Birth          1995        Visit Information        Provider Department      9/13/2017 10:40 AM Cristal Beltran Ra, APRN CNP [facility]        Today's Diagnoses     Pelvic pain in female    -  1       Follow-ups after your visit        Who to contact     If you have questions or need follow up information about today's clinic visit or your schedule please contact [facility] directly at [phone].  Normal or non-critical lab and imaging results will be communicated to you by [employer], letter or phone within 4 business days after the clinic has received the results. If you do not hear from us within 7 days, please contact the clinic through Floor64t or phone. If you have a critical or abnormal lab result, we will notify you by phone as soon as possible.  Submit refill requests through Texifter or call your pharmacy and they will forward the refill request to us. Please allow 3 business days for your refill to be completed.          Additional Information About Your Visit        MyChart Information     Texifter gives you secure access to your electronic health record. If you see a primary care provider, you can also send messages to your care team and make appointments. If you have questions, please call your primary care clinic.  If you do not have a primary care provider, please call [phone] and they will assist you.        Care EveryWhere ID     This is your Care EveryWhere ID. This could be used by other organizations to access your [facility] medical records  [id number]        Your Vitals Were     Pulse Temperature Pulse Oximetry BMI (Body Mass Index)          72 97.9  F (36.6  C) (Oral) 98% 32.74 kg/m2         Blood Pressure from Last 3 Encounters:   09/13/17 102/70   02/08/17 128/70   07/22/16 110/68    Weight from Last 3 Encounters:   09/13/17  201 lb 4.8 oz (91.3 kg)   02/08/17 188 lb 1 oz (85.3 kg) (96 %)*   07/22/16 197 lb 1.6 oz (89.4 kg) (97 %)*     * Growth percentiles are based on [facility] 2-20 Years data.              We Performed the Following     Chlamydia trachomatis PCR     Neisseria gonorrhoeae PCR     Wet prep        Primary Care Provider Office Phone # Fax #    Yakelin Reyes [place] Sen [phone] [address] [facility] [address]        Equal Access to Services     [facility]: Hadii aad ku hadasho Soomaali, waaxda luqadaha, qaybta kaalmada adeegyaaarti, gomez cole . So [facility] [phone].    ATENCIÓN: Si habla español, tiene a joy disposición servicios gratuitos de asistencia lingüística. [facility] [phone].    We comply with applicable federal civil rights laws and [place] laws. We do not discriminate on the basis of race, color, national origin, age, disability sex, sexual orientation or gender identity.            Thank you!     Thank you for choosing [facility]  for your care. Our goal is always to provide you with excellent care. Hearing back from our patients is one way we can continue to improve our services. Please take a few minutes to complete the written survey that you may receive in the mail after your visit with us. Thank you!             Your Updated Medication List - Protect others around you: Learn how to safely use, store and throw away your medicines at www.disposemymeds.org.          This list is accurate as of: 9/13/17 12:28 PM.  Always use your most recent med list.                   Brand Name Dispense Instructions for use Diagnosis    fluocinonide 0.05 % cream    LIDEX    60 g    Apply sparingly to affected area twice daily as needed.  Do not apply to face.    Atopic dermatitis       meloxicam 7.5 MG tablet    MOBIC    30 tablet    Take 1 tablet (7.5 mg) by mouth daily    Calculus of kidney       ondansetron 4 MG tablet    ZOFRAN    20 tablet     Take 1 tablet (4 mg) by mouth every 8 hours as needed As needed for headache    Chronic tension-type headache, not intractable       pimecrolimus 1 % cream    ELIDEL    60 g    Apply topically 2 times daily Ok to use on face    Atopic dermatitis       ranitidine 150 MG tablet    ZANTAC    90 tablet    Take 1 tablet (150 mg) by mouth At Bedtime    Gastroesophageal reflux disease without esophagitis       SUMAtriptan 25 MG tablet    IMITREX    9 tablet    Take 1-2 tablets (25-50 mg) by mouth at onset of headache for migraine May repeat in 2 hours. Max 8 tablets/24 hours.    Migraine without status migrainosus, not intractable, unspecified migraine type       traZODone 50 MG tablet    DESYREL    30 tablet    Take 1 tablet (50 mg) by mouth nightly as needed for sleep    Sleep disturbance

## 2017-09-13 NOTE — PROGRESS NOTES
SUBJECTIVE:   Lucero Collier is a 22 year old female who presents to clinic today for the following health issues:      Abdominal Pain      Duration: 1 month    Description (location/character/radiation): Upper and lower pain with intercourse, wondering if from IUD       Associated flank pain: Sometimes    Intensity:  moderate    Accompanying signs and symptoms:        Fever/Chills: no        Gas/Bloating: no        Nausea/vomitting: YES-nausea       Diarrhea: no        Dysuria or Hematuria: no     History (previous similar pain/trauma/previous testing): None    Precipitating or alleviating factors:       Pain worse with eating/BM/urination: NA       Pain relieved by BM: no     Therapies tried and outcome: None    LMP:  not applicable      Pt presents with c/o pelvic pain x1 month.  No change in discharge.  Has IUD in place.  Pain is with intercourse, sharp.  At times she will have a very brief discomfort without intercourse, but not common.  She denies fever.  Normal intake, no abdominal pain.  Normal urinary and bowel habits.  She did have an US and CT in July, symptoms were not present at this time, but both of these were normal showing normal IUD placement.    Problem list and histories reviewed & adjusted, as indicated.  Additional history: as documented    Patient Active Problem List   Diagnosis     ADHD (attention deficit hyperactivity disorder)     Constipation     Myopia     Spells     Chronic headaches     GERD (gastroesophageal reflux disease)     Adverse reaction to pertussis vaccine     Complex partial seizures (H)     Care Plan- Health Care Home     Learning problem     Overweight     Sleep disturbance     Adjustment disorder with mixed anxiety and depressed mood     Migraine without status migrainosus, not intractable, unspecified migraine type     Past Surgical History:   Procedure Laterality Date     ENDOSCOPY UPPER WITH PANCREATIC STIMULATION  4/06    Esophagitis 4/06     NISSEN FUNDOPLICATION   6/07    Dr. Meneses     PE TUBES  3/98     TONSILLECTOMY & ADENOIDECTOMY  4/02       Social History   Substance Use Topics     Smoking status: Never Smoker     Smokeless tobacco: Never Used     Alcohol use No     Family History   Problem Relation Age of Onset     DIABETES Maternal Grandmother      Hypertension Maternal Grandmother      DIABETES Paternal Grandmother      Cardiovascular Paternal Grandmother      Breast Cancer Paternal Grandmother 61     right side breast cancer     Genetic Disorder Other      nephew.-genetic defects      Obesity Mother      Family History Negative Father              Reviewed and updated as needed this visit by clinical staff     Reviewed and updated as needed this visit by Provider         ROS:  SEE HPI.    OBJECTIVE:     /70  Pulse 72  Temp 97.9  F (36.6  C) (Oral)  Wt 201 lb 4.8 oz (91.3 kg)  SpO2 98%  BMI 32.74 kg/m2  Body mass index is 32.74 kg/(m^2).  GENERAL: healthy, alert and no distress  RESP: lungs clear to auscultation - no rales, rhonchi or wheezes  CV: regular rate and rhythm, normal S1 S2, no S3 or S4, no murmur, click or rub, no peripheral edema and peripheral pulses strong  ABDOMEN: soft, nontender, no hepatosplenomegaly, no masses and bowel sounds normal   (female): normal female external genitalia, normal urethral meatus , vaginal mucosa pink, moist, well rugated, vaginal discharge - brown and thick and normal cervix, adnexae, and uterus without masses.  No CMT, some adnexal discomfort bilaterally.  PSYCH: mentation appears normal, affect normal/bright    Diagnostic Test Results:  Results for orders placed or performed in visit on 09/13/17 (from the past 24 hour(s))   Wet prep   Result Value Ref Range    Specimen Description Vagina     Wet Prep No yeast seen     Wet Prep No clue cells seen     Wet Prep No Trichomonas seen        ASSESSMENT/PLAN:   1. Pelvic pain in female  22 y.o. Female, here today with pain with intercourse.  Wet prep  negative.  Gonorrhea and chlamydia screening pending.  No CMT.  Symptoms stable.  IUD showing appropriate placement 2 months prior and strings visualized on exam.  Will wait for additional lab results, if symptoms persist she will call and we will repeat the US.  Pt agrees with plan and verbalized understanding.  - Wet prep  - Neisseria gonorrhoeae PCR  - Chlamydia trachomatis PCR    SHELBY Summers Ra, CNP  [facility]

## 2017-09-13 NOTE — NURSING NOTE
"Chief Complaint   Patient presents with     Abdominal Pain       Initial /70  Pulse 72  Temp 97.9  F (36.6  C) (Oral)  Wt 201 lb 4.8 oz (91.3 kg)  SpO2 98%  BMI 32.74 kg/m2 Estimated body mass index is 32.74 kg/(m^2) as calculated from the following:    Height as of 2/8/17: 5' 5.75\" (1.67 m).    Weight as of this encounter: 201 lb 4.8 oz (91.3 kg).  Medication Reconciliation: complete   Alissa VALENZUELA M.A.      "

## 2017-09-14 ENCOUNTER — MYC MEDICAL ADVICE (OUTPATIENT)
Dept: FAMILY MEDICINE | Facility: CLINIC | Age: 22
End: 2017-09-14

## 2017-09-14 DIAGNOSIS — R10.2 PELVIC PAIN IN FEMALE: Primary | ICD-10-CM

## 2017-09-14 LAB
C TRACH DNA SPEC QL NAA+PROBE: NEGATIVE
N GONORRHOEA DNA SPEC QL NAA+PROBE: NEGATIVE
SPECIMEN SOURCE: NORMAL
SPECIMEN SOURCE: NORMAL

## 2017-10-24 ENCOUNTER — OFFICE VISIT (OUTPATIENT)
Dept: PSYCHOLOGY | Facility: CLINIC | Age: 22
End: 2017-10-24
Payer: COMMERCIAL

## 2017-10-24 ENCOUNTER — OFFICE VISIT (OUTPATIENT)
Dept: FAMILY MEDICINE | Facility: CLINIC | Age: 22
End: 2017-10-24
Payer: COMMERCIAL

## 2017-10-24 VITALS
TEMPERATURE: 98.2 F | OXYGEN SATURATION: 98 % | HEART RATE: 94 BPM | BODY MASS INDEX: 32.92 KG/M2 | HEIGHT: 66 IN | DIASTOLIC BLOOD PRESSURE: 70 MMHG | RESPIRATION RATE: 18 BRPM | SYSTOLIC BLOOD PRESSURE: 112 MMHG | WEIGHT: 204.8 LBS

## 2017-10-24 DIAGNOSIS — F43.23 ADJUSTMENT DISORDER WITH MIXED ANXIETY AND DEPRESSED MOOD: Primary | ICD-10-CM

## 2017-10-24 DIAGNOSIS — G47.9 SLEEP DISTURBANCE: ICD-10-CM

## 2017-10-24 DIAGNOSIS — R51.9 CHRONIC NONINTRACTABLE HEADACHE, UNSPECIFIED HEADACHE TYPE: ICD-10-CM

## 2017-10-24 DIAGNOSIS — Z30.432 ENCOUNTER FOR IUD REMOVAL: Primary | ICD-10-CM

## 2017-10-24 DIAGNOSIS — Z30.011 ENCOUNTER FOR INITIAL PRESCRIPTION OF CONTRACEPTIVE PILLS: ICD-10-CM

## 2017-10-24 DIAGNOSIS — G89.29 CHRONIC NONINTRACTABLE HEADACHE, UNSPECIFIED HEADACHE TYPE: ICD-10-CM

## 2017-10-24 PROCEDURE — 99213 OFFICE O/P EST LOW 20 MIN: CPT | Mod: 25 | Performed by: PHYSICIAN ASSISTANT

## 2017-10-24 PROCEDURE — 58301 REMOVE INTRAUTERINE DEVICE: CPT | Performed by: PHYSICIAN ASSISTANT

## 2017-10-24 PROCEDURE — 90834 PSYTX W PT 45 MINUTES: CPT | Performed by: COUNSELOR

## 2017-10-24 RX ORDER — FLUCONAZOLE 150 MG/1
150 TABLET ORAL
Refills: 0 | COMMUNITY
Start: 2017-09-22 | End: 2017-12-18

## 2017-10-24 RX ORDER — NORETHINDRONE ACETATE AND ETHINYL ESTRADIOL 1MG-20(21)
1 KIT ORAL DAILY
Qty: 84 TABLET | Refills: 3 | Status: SHIPPED | OUTPATIENT
Start: 2017-10-24 | End: 2018-01-17

## 2017-10-24 RX ORDER — TRAZODONE HYDROCHLORIDE 50 MG/1
50 TABLET, FILM COATED ORAL
Qty: 30 TABLET | Refills: 5 | Status: SHIPPED | OUTPATIENT
Start: 2017-10-24 | End: 2017-12-18

## 2017-10-24 ASSESSMENT — ANXIETY QUESTIONNAIRES
6. BECOMING EASILY ANNOYED OR IRRITABLE: NEARLY EVERY DAY
GAD7 TOTAL SCORE: 18
5. BEING SO RESTLESS THAT IT IS HARD TO SIT STILL: MORE THAN HALF THE DAYS
2. NOT BEING ABLE TO STOP OR CONTROL WORRYING: NEARLY EVERY DAY
1. FEELING NERVOUS, ANXIOUS, OR ON EDGE: MORE THAN HALF THE DAYS
7. FEELING AFRAID AS IF SOMETHING AWFUL MIGHT HAPPEN: MORE THAN HALF THE DAYS
IF YOU CHECKED OFF ANY PROBLEMS ON THIS QUESTIONNAIRE, HOW DIFFICULT HAVE THESE PROBLEMS MADE IT FOR YOU TO DO YOUR WORK, TAKE CARE OF THINGS AT HOME, OR GET ALONG WITH OTHER PEOPLE: VERY DIFFICULT
3. WORRYING TOO MUCH ABOUT DIFFERENT THINGS: NEARLY EVERY DAY

## 2017-10-24 ASSESSMENT — PATIENT HEALTH QUESTIONNAIRE - PHQ9: 5. POOR APPETITE OR OVEREATING: NEARLY EVERY DAY

## 2017-10-24 NOTE — MR AVS SNAPSHOT
After Visit Summary   10/24/2017    Lucero Collier    MRN: [id number]           Patient Information     Date Of Birth          1995        Visit Information        Provider Department      10/24/2017 10:30 AM Iris Rand PA-C [facility]        Today's Diagnoses     Encounter for IUD removal    -  1    Encounter for initial prescription of contraceptive pills        Sleep disturbance        Chronic nonintractable headache, unspecified headache type           Follow-ups after your visit        Your next 10 appointments already scheduled     Oct 24, 2017  1:00 PM CDT   Return Visit with Gina Gao [facility] ([facility])    [address] [place] [address] [phone]            Nov 07, 2017  1:00 PM CST   Return Visit with Gina Gao [facility] ([facility]) [address] [place] [id number] [phone]              Who to contact     If you have questions or need follow up information about today's clinic visit or your schedule please contact [facility] directly at [phone].  Normal or non-critical lab and imaging results will be communicated to you by MyChart, letter or phone within 4 business days after the clinic has received the results. If you do not hear from us within 7 days, please contact the clinic through MyChart or phone. If you have a critical or abnormal lab result, we will notify you by phone as soon as possible.  Submit refill requests through Kannact or call your pharmacy and they will forward the refill request to us. Please allow 3 business days for your refill to be completed.          Additional Information About Your Visit        MyChart Information     Kann[employer] gives you secure access to your electronic health record. If you see a primary care provider, you can also send messages to your care team and  "make appointments. If you have questions, please call your primary care clinic.  If you do not have a primary care provider, please call [phone] and they will assist you.        Care EveryWhere ID     This is your Care EveryWhere ID. This could be used by other organizations to access your [facility] medical records  [id number]        Your Vitals Were     Pulse Temperature Respirations Height Last Period Pulse Oximetry    94 98.2  F (36.8  C) (Oral) 18 5' 5.75\" (1.67 m) (LMP Unknown) 98%    Breastfeeding? BMI (Body Mass Index)                No 33.31 kg/m2           Blood Pressure from Last 3 Encounters:   10/24/17 112/70   09/13/17 102/70   02/08/17 128/70    Weight from Last 3 Encounters:   10/24/17 204 lb 12.8 oz (92.9 kg)   09/13/17 201 lb 4.8 oz (91.3 kg)   02/08/17 188 lb 1 oz (85.3 kg) (96 %)*     * Growth percentiles are based on [facility] 2-20 Years data.              We Performed the Following     MIGRAINE ACTION PLAN          Today's Medication Changes          These changes are accurate as of: 10/24/17 10:36 AM.  If you have any questions, ask your nurse or doctor.               Start taking these medicines.        Dose/Directions    norethindrone-ethinyl estradiol 1-20 MG-MCG per tablet   Commonly known as:  JUNEL FE 1/20   Used for:  Encounter for initial prescription of contraceptive pills, Encounter for IUD removal   Started by:  Iris Rand PA-C        Dose:  1 tablet   Take 1 tablet by mouth daily   Quantity:  84 tablet   Refills:  3            Where to get your medicines      These medications [facility] to [facility] [address]  [address] [facility] [address]     Phone:  [phone]     norethindrone-ethinyl estradiol 1-20 MG-MCG per tablet    traZODone 50 MG tablet                Primary Care Provider Office Phone # Fax #    SHELBY Summers Ra, [phone]       [address] [place] [address]        Equal Access to Services     [facility] JAMIE : Hadii cecelia ku hadlore Socharisseali, waaxda luqadaha, qaybta karosyaarti reddralphaarti, gomez lomaxbrijanelle orozco. So [facility] [phone].    ATENCIÓN: Si habla español, tiene a joy disposición servicios gratuitos de asistencia lingüística. [facility] [phone].    We comply with applicable federal civil rights laws and [place] laws. We do not discriminate on the basis of race, color, national origin, age, disability, sex, sexual orientation, or gender identity.            Thank you!     Thank you for choosing [facility]  for your care. Our goal is always to provide you with excellent care. Hearing back from our patients is one way we can continue to improve our services. Please take a few minutes to complete the written survey that you may receive in the mail after your visit with us. Thank you!             Your Updated Medication List - Protect others around you: Learn how to safely use, store and throw away your medicines at www.disposemymeds.org.          This list is accurate as of: 10/24/17 10:36 AM.  Always use your most recent med list.                   Brand Name Dispense Instructions for use Diagnosis    fluconazole 150 MG tablet    DIFLUCAN     150 mg        fluocinonide 0.05 % cream    LIDEX    60 g    Apply sparingly to affected area twice daily as needed.  Do not apply to face.    Atopic dermatitis       meloxicam 7.5 MG tablet    MOBIC    30 tablet    Take 1 tablet (7.5 mg) by mouth daily    Calculus of kidney       norethindrone-ethinyl estradiol 1-20 MG-MCG per tablet    JUNEL FE 1/20    84 tablet    Take 1 tablet by mouth daily    Encounter for initial prescription of contraceptive pills, Encounter for IUD removal       ondansetron 4 MG tablet    ZOFRAN    20 tablet    Take 1 tablet (4 mg) by mouth every 8 hours as needed As needed for headache    Chronic tension-type headache, not intractable        pimecrolimus 1 % cream    ELIDEL    60 g    Apply topically 2 times daily Ok to use on face    Atopic dermatitis       ranitidine 150 MG tablet    ZANTAC    90 tablet    Take 1 tablet (150 mg) by mouth At Bedtime    Gastroesophageal reflux disease without esophagitis       SUMAtriptan 25 MG tablet    IMITREX    9 tablet    Take 1-2 tablets (25-50 mg) by mouth at onset of headache for migraine May repeat in 2 hours. Max 8 tablets/24 hours.    Migraine without status migrainosus, not intractable, unspecified migraine type       traZODone 50 MG tablet    DESYREL    30 tablet    Take 1 tablet (50 mg) by mouth nightly as needed for sleep    Sleep disturbance

## 2017-10-24 NOTE — LETTER
My Migraine Action Plan      Date: 10/24/2017     My Name: Lucero Collier   YOB: 1995  My Pharmacy:    [facility] [place] [facility] [place] - [address]  [id number] - [place], [place] - [address]  WRITTEN PRESCRIPTION REQUESTED       My (Preventative) Control Medicine: ***        My Rescue Medicine: ***   My Doctor: Cristal Beltran Ra     My Clinic: [address] [address] [phone]        GREEN ZONE = Good Control    My headache plan is working.   I can do what I need to do.           I WILL:     ? Keep managing my triggers.  ? Write in my migraine diary each time I have a headache.  ? Keep taking my preventive (controller) medicine daily.  ? Take my relief and rescue medicine as needed.             YELLOW ZONE = Not Enough Control    My headache plan isn t always working.   My headaches keep me from doing   some of the things I need to do.       I WILL:     ? Set goals to control my triggers and act on them.  ? Write in my migraine diary each time I have a headache and review it for                      patterns or new triggers.  ? Keep taking my preventive (controller) medicine daily.  ? Take my relief and rescue medicine as needed.  ? Call my doctor or clinic at if I stay in the Yellow Zone.             RED ZONE = Poor or No Control    My headache plan has  failed. I can t do anything  when I have one. My  medicines aren t working.           I WILL:   ? Set goals to control my triggers and act on them.  ? Write in my migraine diary each time I have a headache and review it for                      patterns or new triggers.  ? Keep taking my preventive (controller) medicine daily.  ? Take my relief and rescue medicine as needed.  ? Call my doctor or clinic or go to urgent care or an ER if I m having the worst                  headache of my life.  ? Call my doctor or clinic or go to urgent care or an ER if my  medicine doesn t work.  ? Let my doctor or clinic know within 2 weeks if I have gone to an urgent care or             emergency department.          Provider specific instructions:  ***

## 2017-10-24 NOTE — NURSING NOTE
"Chief Complaint   Patient presents with     IUD     removal       Initial /70 (BP Location: Right arm, Patient Position: Chair, Cuff Size: Adult Regular)  Pulse 94  Temp 98.2  F (36.8  C) (Oral)  Resp 18  Ht 5' 5.75\" (1.67 m)  Wt 204 lb 12.8 oz (92.9 kg)  LMP  (LMP Unknown)  SpO2 98%  Breastfeeding? No  BMI 33.31 kg/m2 Estimated body mass index is 33.31 kg/(m^2) as calculated from the following:    Height as of this encounter: 5' 5.75\" (1.67 m).    Weight as of this encounter: 204 lb 12.8 oz (92.9 kg).  Medication Reconciliation: complete   Sundeep Givens MA      "

## 2017-10-24 NOTE — PROGRESS NOTES
SUBJECTIVE:   Lucero Collier is a 22 year old female who presents to clinic today for the following health issues:      Pt is here for IUD removal  She states she has a Theresa in, has had this in place for about 1 year  Continues to have pain with the IUD  No menses on IUD    Is interested in other form of birth control  Would like to trial the pill  Non smoker, no personal history of blood clot- unsure, thinks her grandma may have had one, also no history of migraine with aura  Is sexually active  G/C up to date.    Lastly, patient is requesting refill of Trazodone  Having an issue with sleep, ongoing for years  Taking 1 tab at night without issue.    Problem list and histories reviewed & adjusted, as indicated.  Additional history: as documented    Patient Active Problem List   Diagnosis     ADHD (attention deficit hyperactivity disorder)     Constipation     Myopia     Spells     Chronic headaches     GERD (gastroesophageal reflux disease)     Adverse reaction to pertussis vaccine     Complex partial seizures (H)     Care Plan- Health Care Home     Learning problem     Overweight     Sleep disturbance     Adjustment disorder with mixed anxiety and depressed mood     Migraine without status migrainosus, not intractable, unspecified migraine type     Past Surgical History:   Procedure Laterality Date     ENDOSCOPY UPPER WITH PANCREATIC STIMULATION  4/06    Esophagitis 4/06     NISSEN FUNDOPLICATION  6/07    Dr. Meneses     PE TUBES  3/98     TONSILLECTOMY & ADENOIDECTOMY  4/02       Social History   Substance Use Topics     Smoking status: Never Smoker     Smokeless tobacco: Never Used     Alcohol use No     Family History   Problem Relation Age of Onset     DIABETES Maternal Grandmother      Hypertension Maternal Grandmother      DIABETES Paternal Grandmother      Cardiovascular Paternal Grandmother      Breast Cancer Paternal Grandmother 61     right side breast cancer     Genetic Disorder Other       "nephew.-genetic defects      Obesity Mother      Family History Negative Father          Current Outpatient Prescriptions   Medication Sig Dispense Refill     norethindrone-ethinyl estradiol (JUNEL FE 1/20) 1-20 MG-MCG per tablet Take 1 tablet by mouth daily 84 tablet 3     traZODone (DESYREL) 50 MG tablet Take 1 tablet (50 mg) by mouth nightly as needed for sleep 30 tablet 5     fluconazole (DIFLUCAN) 150 MG tablet 150 mg  0     ondansetron (ZOFRAN) 4 MG tablet Take 1 tablet (4 mg) by mouth every 8 hours as needed As needed for headache 20 tablet 3     SUMAtriptan (IMITREX) 25 MG tablet Take 1-2 tablets (25-50 mg) by mouth at onset of headache for migraine May repeat in 2 hours. Max 8 tablets/24 hours. 9 tablet 1     [DISCONTINUED] traZODone (DESYREL) 50 MG tablet Take 1 tablet (50 mg) by mouth nightly as needed for sleep 30 tablet 5     ranitidine (ZANTAC) 150 MG tablet Take 1 tablet (150 mg) by mouth At Bedtime 90 tablet 1     meloxicam (MOBIC) 7.5 MG tablet Take 1 tablet (7.5 mg) by mouth daily 30 tablet 0     pimecrolimus (ELIDEL) 1 % cream Apply topically 2 times daily Ok to use on face 60 g 3     fluocinonide (LIDEX) 0.05 % cream Apply sparingly to affected area twice daily as needed.  Do not apply to face. 60 g 0     No Known Allergies      Reviewed and updated as needed this visit by clinical staffTobacco  Allergies  Med Hx  Surg Hx  Fam Hx  Soc Hx      Reviewed and updated as needed this visit by Provider         ROS:  Constitutional, HEENT, cardiovascular, pulmonary, gi and gu systems are negative, except as otherwise noted.      OBJECTIVE:   /70 (BP Location: Right arm, Patient Position: Chair, Cuff Size: Adult Regular)  Pulse 94  Temp 98.2  F (36.8  C) (Oral)  Resp 18  Ht 5' 5.75\" (1.67 m)  Wt 204 lb 12.8 oz (92.9 kg)  LMP  (LMP Unknown)  SpO2 98%  Breastfeeding? No  BMI 33.31 kg/m2  Body mass index is 33.31 kg/(m^2).  GENERAL: healthy, alert and no distress  NECK: no adenopathy, no " asymmetry, masses, or scars and thyroid normal to palpation  RESP: lungs clear to auscultation - no rales, rhonchi or wheezes  CV: regular rate and rhythm, normal S1 S2, no S3 or S4, no murmur, click or rub, no peripheral edema and peripheral pulses strong   (female): normal female external genitalia, normal urethral meatus , vaginal mucosa pink, moist, well rugated and IUD strings visible. IUD strings were grasped with forceps, patient took deep breath in and IUD was pulled out of uterus without complication.  MS: no gross musculoskeletal defects noted, no edema    Diagnostic Test Results:  none     ASSESSMENT/PLAN:     1. Encounter for IUD removal  New concern, IUD removed without complication.  New OCP prescribed, recommend back up method for at least 1 week.  - norethindrone-ethinyl estradiol (JUNEL FE 1/20) 1-20 MG-MCG per tablet; Take 1 tablet by mouth daily  Dispense: 84 tablet; Refill: 3  - REMOVE INTRAUTERINE DEVICE    2. Encounter for initial prescription of contraceptive pills  New problem, will initiate new OCP today, Risks, benefits and alternatives were discussed with patient. Agreeable to the plan of care.  Discussed use of back up method for 1 week after starting OCP, recommend that she take at same time every day, do not skip medication for best effectiveness. Discussed OCP does not protect against STDs.   - norethindrone-ethinyl estradiol (JUNEL FE 1/20) 1-20 MG-MCG per tablet; Take 1 tablet by mouth daily  Dispense: 84 tablet; Refill: 3    3. Sleep disturbance  Chronic issue, stable, meds refilled.  - traZODone (DESYREL) 50 MG tablet; Take 1 tablet (50 mg) by mouth nightly as needed for sleep  Dispense: 30 tablet; Refill: 5    4. Chronic nonintractable headache, unspecified headache type  - MIGRAINE ACTION PLAN    Risks, benefits and alternatives were discussed with patient. Agreeable to the plan of care.      Iris Rand PA-C  [facility]

## 2017-10-29 ASSESSMENT — PATIENT HEALTH QUESTIONNAIRE - PHQ9: SUM OF ALL RESPONSES TO PHQ QUESTIONS 1-9: 22
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
## 2017-10-30 ASSESSMENT — ANXIETY QUESTIONNAIRES: GAD7 TOTAL SCORE: 18

## 2017-11-07 ENCOUNTER — OFFICE VISIT (OUTPATIENT)
Dept: PSYCHOLOGY | Facility: CLINIC | Age: 22
End: 2017-11-07
Payer: COMMERCIAL

## 2017-11-07 DIAGNOSIS — F32.A DEPRESSION: Primary | ICD-10-CM

## 2017-11-07 DIAGNOSIS — F41.9 ANXIETY: ICD-10-CM

## 2017-11-07 PROCEDURE — 90834 PSYTX W PT 45 MINUTES: CPT | Performed by: COUNSELOR

## 2017-11-07 ASSESSMENT — PATIENT HEALTH QUESTIONNAIRE - PHQ9: 5. POOR APPETITE OR OVEREATING: MORE THAN HALF THE DAYS

## 2017-11-07 ASSESSMENT — ANXIETY QUESTIONNAIRES
3. WORRYING TOO MUCH ABOUT DIFFERENT THINGS: NEARLY EVERY DAY
6. BECOMING EASILY ANNOYED OR IRRITABLE: NEARLY EVERY DAY
1. FEELING NERVOUS, ANXIOUS, OR ON EDGE: MORE THAN HALF THE DAYS
IF YOU CHECKED OFF ANY PROBLEMS ON THIS QUESTIONNAIRE, HOW DIFFICULT HAVE THESE PROBLEMS MADE IT FOR YOU TO DO YOUR WORK, TAKE CARE OF THINGS AT HOME, OR GET ALONG WITH OTHER PEOPLE: VERY DIFFICULT
GAD7 TOTAL SCORE: 18
7. FEELING AFRAID AS IF SOMETHING AWFUL MIGHT HAPPEN: NEARLY EVERY DAY
2. NOT BEING ABLE TO STOP OR CONTROL WORRYING: NEARLY EVERY DAY
5. BEING SO RESTLESS THAT IT IS HARD TO SIT STILL: MORE THAN HALF THE DAYS

## 2017-11-13 DIAGNOSIS — K21.9 GASTROESOPHAGEAL REFLUX DISEASE WITHOUT ESOPHAGITIS: ICD-10-CM
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
## 2017-11-14 ASSESSMENT — PATIENT HEALTH QUESTIONNAIRE - PHQ9: SUM OF ALL RESPONSES TO PHQ QUESTIONS 1-9: 22

## 2017-11-15 ASSESSMENT — ANXIETY QUESTIONNAIRES: GAD7 TOTAL SCORE: 18

## 2017-11-16 NOTE — TELEPHONE ENCOUNTER
Requested Prescriptions   Pending Prescriptions Disp Refills     ranitidine (ZANTAC) 150 MG tablet 90 tablet 1     Sig: Take 1 tablet (150 mg) by mouth At Bedtime    H2 Blockers Protocol Passed    11/14/2017  2:06 PM       Passed - Patient is age 12 or older       Passed - Recent or future visit with authorizing provider's specialty    Patient had office visit in the last year or has a visit in the next 30 days with authorizing provider.  See chart review.           Routing refill request to provider for review/approval because:  New PCP, would you be ok ordering this for her now?  Myrna Presley, RN  Triage Nurse

## 2017-11-30 ENCOUNTER — OFFICE VISIT (OUTPATIENT)
Dept: FAMILY MEDICINE | Facility: CLINIC | Age: 22
End: 2017-11-30
Payer: COMMERCIAL

## 2017-11-30 VITALS
HEIGHT: 66 IN | RESPIRATION RATE: 18 BRPM | SYSTOLIC BLOOD PRESSURE: 108 MMHG | DIASTOLIC BLOOD PRESSURE: 60 MMHG | HEART RATE: 102 BPM | BODY MASS INDEX: 33.35 KG/M2 | OXYGEN SATURATION: 98 % | TEMPERATURE: 97.9 F | WEIGHT: 207.5 LBS

## 2017-11-30 DIAGNOSIS — F43.23 ADJUSTMENT DISORDER WITH MIXED ANXIETY AND DEPRESSED MOOD: ICD-10-CM

## 2017-11-30 DIAGNOSIS — G43.009 MIGRAINE WITHOUT AURA AND WITHOUT STATUS MIGRAINOSUS, NOT INTRACTABLE: Primary | ICD-10-CM

## 2017-11-30 PROCEDURE — 99213 OFFICE O/P EST LOW 20 MIN: CPT | Performed by: NURSE PRACTITIONER

## 2017-11-30 RX ORDER — PREDNISONE 20 MG/1
TABLET ORAL
Qty: 11 TABLET | Refills: 0 | Status: SHIPPED | OUTPATIENT
Start: 2017-11-30 | End: 2017-12-18

## 2017-11-30 NOTE — LETTER
My Depression Action Plan  Name: Lucero Collier   Date of Birth 1995  Date: 11/30/2017    My doctor: Cristal Beltran Ra   My clinic: [facility]  [id number] [place] [address] [phone]          GREEN    ZONE   Good Control    What it looks like:     Things are going generally well. You have normal up s and down s. You may even feel depressed from time to time, but bad moods usually last less than a day.   What you need to do:  1. Continue to care for yourself (see self care plan)  2. Check your depression survival kit and update it as needed  3. Follow your physician s recommendations including any medication.  4. Do not stop taking medication unless you consult with your physician first.           YELLOW         ZONE Getting Worse    What it looks like:     Depression is starting to interfere with your life.     It may be hard to get out of bed; you may be starting to isolate yourself from others.    Symptoms of depression are starting to last most all day and this has happened for several days.     You may have suicidal thoughts but they are not constant.   What you need to do:     1. Call your care team, your response to treatment will improve if you keep your care team informed of your progress. Yellow periods are signs an adjustment may need to be made.     2. Continue your self-care, even if you have to fake it!    3. Talk to someone in your support network    4. Open up your depression survival kit           RED    ZONE Medical Alert - Get Help    What it looks like:     Depression is seriously interfering with your life.     You may experience these or other symptoms: You can t get out of bed most days, can t work or engage in other necessary activities, you have trouble taking care of basic hygiene, or basic responsibilities, thoughts of suicide or death that will not go away, self-injurious behavior.     What you need to do:  1. Call your care team  and request a same-day appointment. If they are not available (weekends or after hours) call your local crisis line, emergency room or 911.      Electronically signed by: Lidia Queen, November 30, 2017    Depression Self Care Plan / Survival Kit    Self-Care for Depression  Here s the deal. Your body and mind are really not as separate as most people think.  What you do and think affects how you feel and how you feel influences what you do and think. This means if you do things that people who feel good do, it will help you feel better.  Sometimes this is all it takes.  There is also a place for medication and therapy depending on how severe your depression is, so be sure to consult with your medical provider and/ or Behavioral Health Consultant if your symptoms are worsening or not improving.     In order to better manage my stress, I will:    Exercise  Get some form of exercise, every day. This will help reduce pain and release endorphins, the  feel good  chemicals in your brain. This is almost as good as taking antidepressants!  This is not the same as joining a gym and then never going! (they count on that by the way ) It can be as simple as just going for a walk or doing some gardening, anything that will get you moving.      Hygiene   Maintain good hygiene (Get out of bed in the morning, Make your bed, Brush your teeth, Take a shower, and Get dressed like you were going to work, even if you are unemployed).  If your clothes don't fit try to get ones that do.    Diet  I will strive to eat foods that are good for me, drink plenty of water, and avoid excessive sugar, caffeine, alcohol, and other mood-altering substances.  Some foods that are helpful in depression are: complex carbohydrates, B vitamins, flaxseed, fish or fish oil, fresh fruits and vegetables.    Psychotherapy  I agree to participate in Individual Therapy (if recommended).    Medication  If prescribed medications, I agree to take them.   Missing doses can result in serious side effects.  I understand that drinking alcohol, or other illicit drug use, may cause potential side effects.  I will not stop my medication abruptly without first discussing it with my provider.    Staying Connected With Others  I will stay in touch with my friends, family members, and my primary care provider/team.    Use your imagination  Be creative.  We all have a creative side; it doesn t matter if it s oil painting, sand castles, or mud pies! This will also kick up the endorphins.    Witness Beauty  (AKA stop and smell the roses) Take a look outside, even in mid-winter. Notice colors, textures. Watch the squirrels and birds.     Service to others  Be of service to others.  There is always someone else in need.  By helping others we can  get out of ourselves  and remember the really important things.  This also provides opportunities for practicing all the other parts of the program.    Humor  Laugh and be silly!  Adjust your TV habits for less news and crime-drama and more comedy.    Control your stress  Try breathing deep, massage therapy, biofeedback, and meditation. Find time to relax each day.     My support system    Clinic Contact:  Phone number:    Contact 1:  Phone number:    Contact 2:  Phone number:    Druze/:  Phone number:    Therapist:  Phone number:    Local crisis center:    Phone number:    Other community support:  Phone number:

## 2017-11-30 NOTE — PATIENT INSTRUCTIONS
Monitor your symptoms closely.  Start the prednisone and restart the ibuprofen.  If you have any change or worsening please seek care.

## 2017-11-30 NOTE — MR AVS SNAPSHOT
After Visit Summary   11/30/2017    Lucero Collier    MRN: [id number]           Patient Information     Date Of Birth          1995        Visit Information        Provider Department      11/30/2017 1:40 PM Cristal Beltran Ra, APRN CNP [facility]        Today's Diagnoses     Adjustment disorder with mixed anxiety and depressed mood    -  1    Migraine without aura and without status migrainosus, not intractable          Care Instructions    Monitor your symptoms closely.  Start the prednisone and restart the ibuprofen.  If you have any change or worsening please seek care.          Follow-ups after your visit        Who to contact     If you have questions or need follow up information about today's clinic visit or your schedule please contact [facility] directly at [phone].  Normal or non-critical lab and imaging results will be communicated to you by [employer], letter or phone within 4 business days after the clinic has received the results. If you do not hear from us within 7 days, please contact the clinic through [employer] or phone. If you have a critical or abnormal lab result, we will notify you by phone as soon as possible.  Submit refill requests through [employer] or call your pharmacy and they will forward the refill request to us. Please allow 3 business days for your refill to be completed.          Additional Information About Your Visit        [employer] Information     [employer] gives you secure access to your electronic health record. If you see a primary care provider, you can also send messages to your care team and make appointments. If you have questions, please call your primary care clinic.  If you do not have a primary care provider, please call [phone] and they will assist you.        Care EveryWhere ID     This is your Care EveryWhere ID. This could be used by other organizations to access your [place] medical records  [id number]       "  Your Vitals Were     Pulse Temperature Respirations Height Pulse Oximetry BMI (Body Mass Index)    102 97.9  F (36.6  C) (Oral) 18 5' 5.75\" (1.67 m) 98% 33.75 kg/m2       Blood Pressure from Last 3 Encounters:   11/30/17 108/60   10/24/17 112/70   09/13/17 102/70    Weight from Last 3 Encounters:   11/30/17 207 lb 8 oz (94.1 kg)   10/24/17 204 lb 12.8 oz (92.9 kg)   09/13/17 201 lb 4.8 oz (91.3 kg)              We Performed the Following     DEPRESSION ACTION PLAN (DAP)          Today's Medication Changes          These changes are accurate as of: 11/30/17  2:18 PM.  If you have any questions, ask your nurse or doctor.               Start taking these medicines.        Dose/Directions    predniSONE 20 MG tablet   Commonly known as:  DELTASONE   Used for:  Migraine without aura and without status migrainosus, not intractable   Started by:  Cristal Beltran Ra, SHELBY CNP        Take 2 tablets by mouth for 3 days, then 1 tablet by mouth for 3 days, then 1/2 tablet by mouth for 3 days.   Quantity:  11 tablet   Refills:  0            Where to get your medicines      These medications were sent to [facility] [address] [place] [address] AT [facility] [address]  [facility] [facility] [address]     Phone:  [phone]     predniSONE 20 MG tablet                Primary Care Provider Office Phone # Fax #    SHELBY Summers Ra [facility] [phone]       [address] [facility] [address]        Equal Access to Services     [facility] Hospital[facility]: Hadii cecelia madrigal Somarquita, waaxda luqadaha, qaybta kaalmada adestephanie, gomez orozco. So [facility] [phone].    ATENCIÓN: Si habla español, tiene a joy disposición servicios gratuitos de asistencia lingüística. Brooke al [phone].    We comply with applicable federal civil rights laws and [place] laws. We do not discriminate on the basis of race, color, national origin, age, disability, sex, sexual " orientation, or gender identity.            Thank you!     Thank you for choosing [facility]3] [facility]  for your [facility]. Our goal is always to provide you with excellent care. Hearing back from our patients is one way we can continue to improve our services. Please take a few minutes to complete the written survey that you may receive in the mail after your visit with us. Thank you!             Your Updated Medication List - Protect others around you: Learn how to safely use, store and throw away your medicines at www.disposemymeds.org.          This list is accurate as of: 11/30/17  2:18 PM.  Always use your most recent med list.                   Brand Name Dispense Instructions for use Diagnosis    fluconazole 150 MG tablet    DIFLUCAN     150 mg        fluocinonide 0.05 % cream    LIDEX    60 g    Apply sparingly to affected area twice daily as needed.  Do not apply to face.    Atopic dermatitis       norethindrone-ethinyl estradiol 1-20 MG-MCG per tablet    JUNEL FE 1/20    84 tablet    Take 1 tablet by mouth daily    Encounter for initial prescription of contraceptive pills, Encounter for IUD removal       ondansetron 4 MG tablet    ZOFRAN    20 tablet    Take 1 tablet (4 mg) by mouth every 8 hours as needed As needed for headache    Chronic tension-type headache, not intractable       pimecrolimus 1 % cream    ELIDEL    60 g    Apply topically 2 times daily Ok to use on face    Atopic dermatitis       predniSONE 20 MG tablet    DELTASONE    11 tablet    Take 2 tablets by mouth for 3 days, then 1 tablet by mouth for 3 days, then 1/2 tablet by mouth for 3 days.    Migraine without aura and without status migrainosus, not intractable       ranitidine 150 MG tablet    ZANTAC    90 tablet    Take 1 tablet (150 mg) by mouth At Bedtime    Gastroesophageal reflux disease without esophagitis       SUMAtriptan 25 MG tablet    IMITREX    9 tablet    Take 1-2 tablets (25-50 mg) by mouth at onset of headache for  migraine May repeat in 2 hours. Max 8 tablets/24 hours.    Migraine without status migrainosus, not intractable, unspecified migraine type       traZODone 50 MG tablet    DESYREL    30 tablet    Take 1 tablet (50 mg) by mouth nightly as needed for sleep    Sleep disturbance

## 2017-11-30 NOTE — NURSING NOTE
"Chief Complaint   Patient presents with     Headache       Initial /60 (BP Location: Right arm, Patient Position: Chair, Cuff Size: Adult Large)  Pulse 102  Temp 97.9  F (36.6  C) (Oral)  Resp 18  Ht 5' 5.75\" (1.67 m)  Wt 207 lb 8 oz (94.1 kg)  SpO2 98%  BMI 33.75 kg/m2 Estimated body mass index is 33.75 kg/(m^2) as calculated from the following:    Height as of this encounter: 5' 5.75\" (1.67 m).    Weight as of this encounter: 207 lb 8 oz (94.1 kg).  Medication Reconciliation: complete   Lidia Queen MA       "

## 2017-11-30 NOTE — PROGRESS NOTES
SUBJECTIVE:   Lucero Collier is a 22 year old female who presents to clinic today for the following health issues:    Headaches      Duration: Migraines for 5 days     Description  Location: bilateral in the temporal area   Character: throbbing pain, sharp pain  Frequency:  5 days   Duration:  5 days, all day     Intensity:  moderate, severe    Accompanying signs and symptoms:    Precipitating or Alleviating factors:  Nausea/vomiting: Nausea when eating   Dizziness: occasionally  Weakness or numbness: no  Visual changes: blurry vision   Fever: no   Sinus or URI symptoms no     History  Head trauma: YES- concussion in the past  Family history of migraines: no   Previous tests for headaches: no   Neurologist evaluations: no   Able to do daily activities when headache present: Has been difficult   Wake with headaches: YES- has not went away in 5 days   Daily pain medication use: no   Any changes in: No     Precipitating or Alleviating factors (light/sound/sleep/caffeine): None     Therapies tried and outcome: Decongestant, Ibuprofen (Advil, Motrin) and Tylenol    Outcome - not effective  Frequent/daily pain medication use: no     Pt presents with concerns regarding headache x5 days.  Has a long hx of migraines and headaches, but reports that after visit in February 2017, her migraines stopped without intervention.  She did not use the imitrex.  Would have occasional mild headaches but not migraines.  5 days ago she noted a headache, bilateral temporal.  At times seems a bit better, but then worsens again.  Tried ibuprofen and tylenol briefly without success.  No aura.  Nausea but no vomiting.  She is taking in food and fluids.  Normal output.  No chest pain, palpitations, sob.    Problem list and histories reviewed & adjusted, as indicated.  Additional history: as documented    Patient Active Problem List   Diagnosis     ADHD (attention deficit hyperactivity disorder)     Constipation     Myopia     Spells      "Chronic headaches     GERD (gastroesophageal reflux disease)     Adverse reaction to pertussis vaccine     Complex partial seizures (H)     Care Plan- Health Care Home     Learning problem     Overweight     Sleep disturbance     Adjustment disorder with mixed anxiety and depressed mood     Migraine without status migrainosus, not intractable, unspecified migraine type     Past Surgical History:   Procedure Laterality Date     ENDOSCOPY UPPER WITH PANCREATIC STIMULATION  4/06    Esophagitis 4/06     NISSEN FUNDOPLICATION  6/07    Dr. Meneses     PE TUBES  3/98     TONSILLECTOMY & ADENOIDECTOMY  4/02       Social History   Substance Use Topics     Smoking status: Never Smoker     Smokeless tobacco: Never Used     Alcohol use No     Family History   Problem Relation Age of Onset     DIABETES Maternal Grandmother      Hypertension Maternal Grandmother      DIABETES Paternal Grandmother      Cardiovascular Paternal Grandmother      Breast Cancer Paternal Grandmother 61     right side breast cancer     Genetic Disorder Other      nephew.-genetic defects      Obesity Mother      Family History Negative Father              Reviewed and updated as needed this visit by clinical staff       Reviewed and updated as needed this visit by Provider         ROS:  SEE HPI.    OBJECTIVE:     /60 (BP Location: Right arm, Patient Position: Chair, Cuff Size: Adult Large)  Pulse 102  Temp 97.9  F (36.6  C) (Oral)  Resp 18  Ht 5' 5.75\" (1.67 m)  Wt 207 lb 8 oz (94.1 kg)  SpO2 98%  BMI 33.75 kg/m2  Body mass index is 33.75 kg/(m^2).  GENERAL: healthy, alert and no distress  EYES: Eyes grossly normal to inspection, PERRL and conjunctivae and sclerae normal  HENT: ear canals and TM's normal, nose and mouth without ulcers or lesions  NECK: no adenopathy, no asymmetry, masses, or scars and thyroid normal to palpation  RESP: lungs clear to auscultation - no rales, rhonchi or wheezes  CV: regular rate and rhythm, normal S1 S2, no S3 " or S4, no murmur, click or rub, no peripheral edema and peripheral pulses strong  NEURO: Normal strength and tone, sensory exam grossly normal, mentation intact, cranial nerves 2-12 intact and rapid alternating movements normal  PSYCH: mentation appears normal, affect normal/bright    Diagnostic Test Results:  none     ASSESSMENT/PLAN:   1. Migraine without aura and without status migrainosus, not intractable  22 y.o. Female, hx of headaches, migraine headaches, here with concerns regarding recent onset headache/migraine.  Imitrex not likely helpful now.  Will trial a short course of prednisone, restart ibuprofen.  Return to clinic if symptoms persist or worsen.    Pt agrees with plan and verbalized understanding.  - predniSONE (DELTASONE) 20 MG tablet; Take 2 tablets by mouth for 3 days, then 1 tablet by mouth for 3 days, then 1/2 tablet by mouth for 3 days.  Dispense: 11 tablet; Refill: 0    2. Adjustment disorder with mixed anxiety and depressed mood  - DEPRESSION ACTION PLAN (DAP)    SHELBY Summers Ra, CNP  [facility]

## 2017-12-18 ENCOUNTER — OFFICE VISIT (OUTPATIENT)
Dept: FAMILY MEDICINE | Facility: CLINIC | Age: 22
End: 2017-12-18
Payer: COMMERCIAL

## 2017-12-18 VITALS
HEART RATE: 90 BPM | BODY MASS INDEX: 33.96 KG/M2 | WEIGHT: 208.8 LBS | TEMPERATURE: 98.1 F | DIASTOLIC BLOOD PRESSURE: 68 MMHG | SYSTOLIC BLOOD PRESSURE: 102 MMHG | OXYGEN SATURATION: 97 %

## 2017-12-18 DIAGNOSIS — G47.9 SLEEP DISTURBANCE: ICD-10-CM

## 2017-12-18 DIAGNOSIS — Z11.3 SCREEN FOR STD (SEXUALLY TRANSMITTED DISEASE): ICD-10-CM

## 2017-12-18 DIAGNOSIS — Z30.09 ENCOUNTER FOR OTHER GENERAL COUNSELING OR ADVICE ON CONTRACEPTION: Primary | ICD-10-CM

## 2017-12-18 PROCEDURE — 87491 CHLMYD TRACH DNA AMP PROBE: CPT | Performed by: NURSE PRACTITIONER

## 2017-12-18 PROCEDURE — 99213 OFFICE O/P EST LOW 20 MIN: CPT | Performed by: NURSE PRACTITIONER

## 2017-12-18 PROCEDURE — 87591 N.GONORRHOEAE DNA AMP PROB: CPT | Performed by: NURSE PRACTITIONER

## 2017-12-18 RX ORDER — MISOPROSTOL 200 UG/1
200 TABLET ORAL ONCE
Qty: 1 TABLET | Refills: 0 | Status: SHIPPED | OUTPATIENT
Start: 2017-12-18 | End: 2017-12-18

## 2017-12-18 RX ORDER — TRAZODONE HYDROCHLORIDE 100 MG/1
100 TABLET ORAL
Qty: 90 TABLET | Refills: 0 | Status: SHIPPED | OUTPATIENT
Start: 2017-12-18 | End: 2018-07-09

## 2017-12-18 NOTE — PATIENT INSTRUCTIONS
Take the pill I have sent in the night before your appointment on Thursday.    Take some ibuprofen before you come.

## 2017-12-18 NOTE — NURSING NOTE
"Chief Complaint   Patient presents with     Consult       Initial /68  Pulse 90  Temp 98.1  F (36.7  C) (Oral)  Wt 208 lb 12.8 oz (94.7 kg)  SpO2 97%  BMI 33.96 kg/m2 Estimated body mass index is 33.96 kg/(m^2) as calculated from the following:    Height as of 11/30/17: 5' 5.75\" (1.67 m).    Weight as of this encounter: 208 lb 12.8 oz (94.7 kg).  Medication Reconciliation: complete   Alissa VALENZUELA M.A.      "

## 2017-12-18 NOTE — PROGRESS NOTES
SUBJECTIVE:   Lucero Collier is a 22 year old female who presents to clinic today for the following health issues:      Consult for IUD    Pt had trinh placed 7/2016.  Then removed 10/2017 due to continued pelvic pain despite imaging that confirmed IUD in good placement.  She has had trouble remembering to take her OCP.  She has also had the pain continued, she had hoped it would stop once the IUD was removed.  Also has had additional pain with cramping.  Denies chance of pregnancy.  Menstruating now.    Problem list and histories reviewed & adjusted, as indicated.  Additional history: as documented    Patient Active Problem List   Diagnosis     ADHD (attention deficit hyperactivity disorder)     Constipation     Myopia     Spells     Chronic headaches     GERD (gastroesophageal reflux disease)     Adverse reaction to pertussis vaccine     Complex partial seizures (H)     Care Plan- Health Care Home     Learning problem     Overweight     Sleep disturbance     Adjustment disorder with mixed anxiety and depressed mood     Migraine without status migrainosus, not intractable, unspecified migraine type     Past Surgical History:   Procedure Laterality Date     ENDOSCOPY UPPER WITH PANCREATIC STIMULATION  4/06    Esophagitis 4/06     NISSEN FUNDOPLICATION  6/07    Dr. Meneses     PE TUBES  3/98     TONSILLECTOMY & ADENOIDECTOMY  4/02       Social History   Substance Use Topics     Smoking status: Never Smoker     Smokeless tobacco: Never Used     Alcohol use No     Family History   Problem Relation Age of Onset     DIABETES Maternal Grandmother      Hypertension Maternal Grandmother      DIABETES Paternal Grandmother      Cardiovascular Paternal Grandmother      Breast Cancer Paternal Grandmother 61     right side breast cancer     Genetic Disorder Other      nephew.-genetic defects      Obesity Mother      Family History Negative Father              Reviewed and updated as needed this visit by clinical staffTobaccobdulio   Allergies  Meds  Med Hx  Surg Hx  Fam Hx  Soc Hx      Reviewed and updated as needed this visit by Provider         ROS:  SEE HPI.    OBJECTIVE:     /68  Pulse 90  Temp 98.1  F (36.7  C) (Oral)  Wt 208 lb 12.8 oz (94.7 kg)  SpO2 97%  BMI 33.96 kg/m2  Body mass index is 33.96 kg/(m^2).  GENERAL: healthy, alert and no distress  PSYCH: mentation appears normal, affect normal/bright    Diagnostic Test Results:  none     ASSESSMENT/PLAN:   1. Encounter for other general counseling or advice on contraception  We discussed benefits, risks, side effects of mirena IUD and placement including pain, cramping, expulsion, uterine perforation and increased risk of PID with chlamydia/gonorrhea exposure. We discussed risk of PID with insertion if she does have an active chlamydia/gonorrhea infection and the risk of miscarriage if she should be pregnant. I have sent over a prescription for cytotec to take the night before a morning appointment.    She WILL have G/C screening prior to IUD placement appointment and she WILL have pregnancy test day of placement.- misoprostol   - misoprostol (CYTOTEC) 200 MCG tablet; Take 1 tablet (200 mcg) by mouth once for 1 dose  Dispense: 1 tablet; Refill: 0    2. Sleep disturbance  Requests higher dose.  50mg without change.  - traZODone (DESYREL) 100 MG tablet; Take 1 tablet (100 mg) by mouth nightly as needed for sleep  Dispense: 90 tablet; Refill: 0    3. Screen for STD (sexually transmitted disease)  - Neisseria gonorrhoeae PCR  - Chlamydia trachomatis PCR    SHELBY Summers Ra, CNP  [facility]

## 2017-12-21 ENCOUNTER — OFFICE VISIT (OUTPATIENT)
Dept: FAMILY MEDICINE | Facility: CLINIC | Age: 22
End: 2017-12-21
Payer: COMMERCIAL

## 2017-12-21 VITALS
SYSTOLIC BLOOD PRESSURE: 102 MMHG | WEIGHT: 208 LBS | TEMPERATURE: 98.1 F | HEART RATE: 92 BPM | BODY MASS INDEX: 33.83 KG/M2 | OXYGEN SATURATION: 98 % | DIASTOLIC BLOOD PRESSURE: 70 MMHG

## 2017-12-21 DIAGNOSIS — Z30.430 ENCOUNTER FOR IUD INSERTION: Primary | ICD-10-CM

## 2017-12-21 LAB — BETA HCG QUAL IFA URINE: NEGATIVE

## 2017-12-21 PROCEDURE — 58300 INSERT INTRAUTERINE DEVICE: CPT | Performed by: NURSE PRACTITIONER

## 2017-12-21 PROCEDURE — 84703 CHORIONIC GONADOTROPIN ASSAY: CPT | Performed by: NURSE PRACTITIONER

## 2017-12-21 RX ORDER — TRAZODONE HYDROCHLORIDE 50 MG/1
TABLET, FILM COATED ORAL
COMMUNITY
Start: 2017-11-25 | End: 2017-12-21

## 2017-12-21 NOTE — PROGRESS NOTES
SUBJECTIVE:   Lucero Collier is a 22 year old female who presents to clinic today for the following health issues:      IUD insert  Lot-:[id number]  Exp: 06/18  NDC: 89802-924-13    Previous consult.  No questions or concerns.  Would like to proceed.    Problem list and histories reviewed & adjusted, as indicated.  Additional history: as documented    Patient Active Problem List   Diagnosis     ADHD (attention deficit hyperactivity disorder)     Constipation     Myopia     Spells     Chronic headaches     GERD (gastroesophageal reflux disease)     Adverse reaction to pertussis vaccine     Complex partial seizures (H)     Care Plan- Health Care Home     Learning problem     Overweight     Sleep disturbance     Adjustment disorder with mixed anxiety and depressed mood     Migraine without status migrainosus, not intractable, unspecified migraine type     Past Surgical History:   Procedure Laterality Date     ENDOSCOPY UPPER WITH PANCREATIC STIMULATION  4/06    Esophagitis 4/06     NISSEN FUNDOPLICATION  6/07    Dr. Meneses     PE TUBES  3/98     TONSILLECTOMY & ADENOIDECTOMY  4/02       Social History   Substance Use Topics     Smoking status: Never Smoker     Smokeless tobacco: Never Used     Alcohol use No     Family History   Problem Relation Age of Onset     DIABETES Maternal Grandmother      Hypertension Maternal Grandmother      DIABETES Paternal Grandmother      Cardiovascular Paternal Grandmother      Breast Cancer Paternal Grandmother 61     right side breast cancer     Genetic Disorder Other      nephew.-genetic defects      Obesity Mother      Family History Negative Father              Reviewed and updated as needed this visit by clinical staffTobacco  Allergies  Meds  Med Hx  Surg Hx  Fam Hx  Soc Hx      Reviewed and updated as needed this visit by Provider         ROS:  SEE HPI.    OBJECTIVE:     /70  Pulse 92  Temp 98.1  F (36.7  C) (Oral)  Wt 208 lb (94.3 kg)  LMP 12/15/2017  SpO2 98%   BMI 33.83 kg/m2  Body mass index is 33.83 kg/(m^2).  GENERAL: healthy, alert and no distress  PSYCH: mentation appears normal, affect normal/bright    Diagnostic Test Results:  Results for orders placed or performed in visit on 12/21/17 (from the past 24 hour(s))   Beta HCG qual IFA urine   Result Value Ref Range    Beta HCG Qual IFA Urine Negative NEG^Negative          ASSESSMENT/PLAN:   1. Encounter for IUD insertion  Tolerated well.  See below.  - Beta HCG qual IFA urine  - INSERTION INTRAUTERINE DEVICE  - LEVONORGESTREL-RELEASE INTRAUTERINE CONTRACEPTIVE (AUGUSTO), 13.5 MG  - Levonorgestrel 13.5 MG IUD; 1 each (13.5 mg) by Intrauterine route once for 1 dose  Dispense: 1 each; Refill: 0    Lucero Collier, 22 year old, female  who presents today requesting placement of an intrauterine device.    The patient meets and is agreeable to the following conditions:  She is not interested in conception in the near future.  There is no previous history of pelvic inflammatory disease.  There is no previous history of ectopic pregnancy.  She is willing to check monthly for the IUD string.  She is at least 8 weeks post-partum.  There is no history of unresolved abnormal uterine bleeding.  There is no history of an unresolved abnormal PAP smear.  She has no history of Beka's disease or an allergy to copper (for ParaGard).  She has no history of diabetes, AIDS, leukemia, IV drug use or chronic steroid use.  She denies the possibility of pregnancy.  Pregnancy test today is negative.    The following risks were discussed with the patient:  Possibility of pregnancy and ectopic pregnancy.  Possibility of pelvic inflammatory disease, particularly with new partners.  Risk of uterine perforation or IUD expulsion.  Possibility of difficult removal.  Spotting or heavy bleeding.  Cramping, pain or infection during or after insertion.     The patient has given consent to proceed with placement of the IUD.  A written consent form is  present in the chart.  She wishes to proceed.    PROCEDURE:    Type of IUD: trinh    She has been pre-treated with misoprostol.   She is placed in a dorsal lithotomy position.  The cervix is identified and cleaned with betadine three times.  A single tooth tenaculum is applied to the anterior lip of the cervix for stabilization.  The uterus is sounded to 6.0 cm and removed. (Target sound depth is 6.5 cm to 8.5 cm.)  The IUD insertion tube is prepared to manufacturers recommendations and inserted into the uterus under sterile conditions to the sounding depth.   The arms of the IUD are then opened by withdrawing the insertion tube 2.0 cm while stabilizing the solid insertion michelle without difficulty.  The IUD string is then cut to 4.0 cm.    The patient tolerated this procedure without immediate complication.  The patient is to return or call immediately for any unexplained fever, abdominal or pelvic pain, excessive bleeding, possibility of pregnancy, foul-smelling discharge, sense that the IUD has been expelled.    SHELBY Summers Ra, CNP  [facility]

## 2017-12-21 NOTE — NURSING NOTE
"Chief Complaint   Patient presents with     IUD     IUD insert       Initial /70  Pulse 92  Temp 98.1  F (36.7  C) (Oral)  Wt 208 lb (94.3 kg)  LMP 12/15/2017  SpO2 98%  BMI 33.83 kg/m2 Estimated body mass index is 33.83 kg/(m^2) as calculated from the following:    Height as of 11/30/17: 5' 5.75\" (1.67 m).    Weight as of this encounter: 208 lb (94.3 kg).  Medication Reconciliation: complete   Alissa VALENZUELA M.A.      "

## 2017-12-21 NOTE — MR AVS SNAPSHOT
After Visit Summary   12/21/2017    Lucero Collier    MRN: [id number]           Patient Information     Date Of Birth          1995        Visit Information        Provider Department      12/21/2017 8:40 AM Cristal Beltran Ra, APRN CNP [facility]        Today's Diagnoses     Contraception    -  1      Care Instructions    Return or call immediately for any unexplained fever, abdominal or pelvic pain, excessive bleeding, possibility of pregnancy, foul-smelling discharge, sense that the IUD has been expelled.                Follow-ups after your visit        Who to contact     If you have questions or need follow up information about today's clinic visit or your schedule please contact [facility] directly at [phone].  Normal or non-critical lab and imaging results will be communicated to you by MyChart, letter or phone within 4 business days after the clinic has received the results. If you do not hear from us within 7 days, please contact the clinic through [facility] or phone. If you have a critical or abnormal lab result, we will notify you by phone as soon as possible.  Submit refill requests through [facility] or call your pharmacy and they will forward the refill request to us. Please allow 3 business days for your refill to be completed.          Additional Information About Your Visit        MyChart Information     [facility] gives you secure access to your electronic health record. If you see a primary care provider, you can also send messages to your care team and make appointments. If you have questions, please call your primary care clinic.  If you do not have a primary care provider, please call [phone] and they will assist you.        Care EveryWhere ID     This is your Care EveryWhere ID. This could be used by other organizations to access your [facility] medical records  [id number]        Your Vitals Were     Pulse Temperature Last Period Pulse  Oximetry BMI (Body Mass Index)       92 98.1  F (36.7  C) (Oral) 12/15/2017 98% 33.83 kg/m2        Blood Pressure from Last 3 Encounters:   12/21/17 102/70   12/18/17 102/68   11/30/17 108/60    Weight from Last 3 Encounters:   12/21/17 208 lb (94.3 kg)   12/18/17 208 lb 12.8 oz (94.7 kg)   11/30/17 207 lb 8 oz (94.1 kg)              We Performed the Following     Beta HCG qual IFA urine        Primary Care Provider Office Phone # Fax #    Cristal SHELBY [facility] [phone] [address] [facility] [address]        Equal Access to Services     SILVIANO AYALA : Hadii cecelia ku hadasho Soomaali, waaxda luqadaha, qaybta kaalmada adeegyada, waxkarina sellersin iker cole . So [facility] [phone].    ATENCIÓN: Si habla español, tiene a joy disposición servicios gratuitos de asistencia lingüística. Llame al [phone].    We comply with applicable federal civil rights laws and [place] laws. We do not discriminate on the basis of race, color, national origin, age, disability, sex, sexual orientation, or gender identity.            Thank you!     Thank you for choosing [facility]  for your care. Our goal is always to provide you with excellent care. Hearing back from our patients is one way we can continue to improve our services. Please take a few minutes to complete the written survey that you may receive in the mail after your visit with us. Thank you!             Your Updated Medication List - Protect others around you: Learn how to safely use, store and throw away your medicines at www.disposemymeds.org.          This list is accurate as of: 12/21/17  9:13 AM.  Always use your most recent med list.                   Brand Name Dispense Instructions for use Diagnosis    fluocinonide 0.05 % cream    LIDEX    60 g    Apply sparingly to affected area twice daily as needed.  Do not apply to face.    Atopic dermatitis       norethindrone-ethinyl estradiol 1-20 MG-MCG per  tablet    JUNEL FE 1/20    84 tablet    Take 1 tablet by mouth daily    Encounter for initial prescription of contraceptive pills, Encounter for IUD removal       ondansetron 4 MG tablet    ZOFRAN    20 tablet    Take 1 tablet (4 mg) by mouth every 8 hours as needed As needed for headache    Chronic tension-type headache, not intractable       pimecrolimus 1 % cream    ELIDEL    60 g    Apply topically 2 times daily Ok to use on face    Atopic dermatitis       ranitidine 150 MG tablet    ZANTAC    90 tablet    Take 1 tablet (150 mg) by mouth At Bedtime    Gastroesophageal reflux disease without esophagitis       SUMAtriptan 25 MG tablet    IMITREX    9 tablet    Take 1-2 tablets (25-50 mg) by mouth at onset of headache for migraine May repeat in 2 hours. Max 8 tablets/24 hours.    Migraine without status migrainosus, not intractable, unspecified migraine type       traZODone 100 MG tablet    DESYREL    90 tablet    Take 1 tablet (100 mg) by mouth nightly as needed for sleep    Sleep disturbance

## 2018-01-16 ENCOUNTER — OFFICE VISIT (OUTPATIENT)
Dept: PSYCHOLOGY | Facility: CLINIC | Age: 23
End: 2018-01-16
Payer: COMMERCIAL

## 2018-01-16 DIAGNOSIS — F32.A DEPRESSION: Primary | ICD-10-CM

## 2018-01-16 DIAGNOSIS — F41.9 ANXIETY: ICD-10-CM

## 2018-01-16 PROCEDURE — 90834 PSYTX W PT 45 MINUTES: CPT | Performed by: COUNSELOR

## 2018-01-16 ASSESSMENT — PATIENT HEALTH QUESTIONNAIRE - PHQ9: 5. POOR APPETITE OR OVEREATING: NEARLY EVERY DAY

## 2018-01-16 ASSESSMENT — ANXIETY QUESTIONNAIRES
2. NOT BEING ABLE TO STOP OR CONTROL WORRYING: NEARLY EVERY DAY
3. WORRYING TOO MUCH ABOUT DIFFERENT THINGS: NEARLY EVERY DAY
IF YOU CHECKED OFF ANY PROBLEMS ON THIS QUESTIONNAIRE, HOW DIFFICULT HAVE THESE PROBLEMS MADE IT FOR YOU TO DO YOUR WORK, TAKE CARE OF THINGS AT HOME, OR GET ALONG WITH OTHER PEOPLE: EXTREMELY DIFFICULT
6. BECOMING EASILY ANNOYED OR IRRITABLE: NEARLY EVERY DAY
5. BEING SO RESTLESS THAT IT IS HARD TO SIT STILL: NEARLY EVERY DAY
1. FEELING NERVOUS, ANXIOUS, OR ON EDGE: NEARLY EVERY DAY
GAD7 TOTAL SCORE: 21
7. FEELING AFRAID AS IF SOMETHING AWFUL MIGHT HAPPEN: NEARLY EVERY DAY

## 2018-01-17 ENCOUNTER — OFFICE VISIT (OUTPATIENT)
Dept: FAMILY MEDICINE | Facility: CLINIC | Age: 23
End: 2018-01-17
Payer: COMMERCIAL

## 2018-01-17 VITALS
SYSTOLIC BLOOD PRESSURE: 108 MMHG | OXYGEN SATURATION: 98 % | HEART RATE: 99 BPM | TEMPERATURE: 97.9 F | WEIGHT: 204.6 LBS | BODY MASS INDEX: 32.88 KG/M2 | DIASTOLIC BLOOD PRESSURE: 78 MMHG | HEIGHT: 66 IN

## 2018-01-17 DIAGNOSIS — F43.23 ADJUSTMENT DISORDER WITH MIXED ANXIETY AND DEPRESSED MOOD: Primary | ICD-10-CM

## 2018-01-17 DIAGNOSIS — Z30.431 IUD CHECK UP: ICD-10-CM

## 2018-01-17 PROCEDURE — 99213 OFFICE O/P EST LOW 20 MIN: CPT | Performed by: NURSE PRACTITIONER

## 2018-01-17 NOTE — PATIENT INSTRUCTIONS
Start the prozac in the morning.  Remember, if you have some mild side effects, these should resolve after the first week.  You should start to feel some sort of an improvement over the first 2 weeks.  Let me know if you do not.  See me in 6-8 weeks for a recheck, sooner if necessary.

## 2018-01-17 NOTE — MR AVS SNAPSHOT
After Visit Summary   1/17/2018    Lucero Collier    MRN: [id number]           Patient Information     Date Of Birth          1995        Visit Information        Provider Department      1/17/2018 3:20 PM Cristal Beltran Ra, APRN CNP [facility]        Today's Diagnoses     Adjustment disorder with mixed anxiety and depressed mood    -  1      Care Instructions    Start the prozac in the morning.  Remember, if you have some mild side effects, these should resolve after the first week.  You should start to feel some sort of an improvement over the first 2 weeks.  Let me know if you do not.  See me in 6-8 weeks for a recheck, sooner if necessary.            Follow-ups after your visit        Who to contact     If you have questions or need follow up information about today's clinic visit or your schedule please contact [facility] directly at [phone].  Normal or non-critical lab and imaging results will be communicated to you by MyChart, letter or phone within 4 business days after the clinic has received the results. If you do not hear from us within 7 days, please contact the clinic through [employer] or phone. If you have a critical or abnormal lab result, we will notify you by phone as soon as possible.  Submit refill requests through [employer] or call your pharmacy and they will forward the refill request to us. Please allow 3 business days for your refill to be completed.          Additional Information About Your Visit        MyChart Information     [employer] gives you secure access to your electronic health record. If you see a primary care provider, you can also send messages to your care team and make appointments. If you have questions, please call your primary care clinic.  If you do not have a primary care provider, please call [phone] and they will assist you.        Care EveryWhere ID     This is your Care EveryWhere ID. This could be used by other  "organizations to access your [place] medical records  [id number]        Your Vitals Were     Pulse Temperature Height Last Period Pulse Oximetry BMI (Body Mass Index)    99 97.9  F (36.6  C) (Oral) 5' 5.75\" (1.67 m) 12/15/2017 98% 33.27 kg/m2       Blood Pressure from Last 3 Encounters:   01/17/18 108/78   12/21/17 102/70   12/18/17 102/68    Weight from Last 3 Encounters:   01/17/18 204 lb 9.6 oz (92.8 kg)   12/21/17 208 lb (94.3 kg)   12/18/17 208 lb 12.8 oz (94.7 kg)              Today, you had the following     No orders found for display         Today's Medication Changes          These changes are accurate as of: 1/17/18  3:41 PM.  If you have any questions, ask your nurse or doctor.               Start taking these medicines.        Dose/Directions    FLUoxetine 20 MG capsule   Commonly known as:  PROzac   Used for:  Adjustment disorder with mixed anxiety and depressed mood   Started by:  Cristal Beltran Ra, SHELBY SONG        Dose:  20 mg   Take 1 capsule (20 mg) by mouth daily   Quantity:  30 capsule   Refills:  1            Where to get your medicines      These medications were sent to [facility] [address] [place] [address] AT [facility] [address]  [facility] [address]     Phone:  [phone]     FLUoxetine 20 MG capsule                Primary Care Provider Office Phone # Fax #    SHELBY Summers Ra [phone]       [address] [facility] [address]        Equal Access to Services     EMANUEL AYALA AH: Hadii cecelia don hadasho Soomaali, waaxda luqadaha, qaybta kaalmada adeegyada, gomez orozco. So [facility] [phone].    ATENCIÓN: Si habla español, tiene a joy disposición servicios gratuitos de asistencia lingüística. Llame al [phone].    We comply with applicable federal civil rights laws and [place] laws. We do not discriminate on the basis of race, color, national origin, age, disability, sex, " sexual orientation, or gender identity.            Thank you!     Thank you for choosing [facility] [place]  for your care. Our goal is always to provide you with excellent care. Hearing back from our patients is one way we can continue to improve our services. Please take a few minutes to complete the written survey that you may receive in the mail after your visit with us. Thank you!             Your Updated Medication List - Protect others around you: Learn how to safely use, store and throw away your medicines at www.disposemymeds.org.          This list is accurate as of: 1/17/18  3:41 PM.  Always use your most recent med list.                   Brand Name Dispense Instructions for use Diagnosis    fluocinonide 0.05 % cream    LIDEX    60 g    Apply sparingly to affected area twice daily as needed.  Do not apply to face.    Atopic dermatitis       FLUoxetine 20 MG capsule    PROzac    30 capsule    Take 1 capsule (20 mg) by mouth daily    Adjustment disorder with mixed anxiety and depressed mood       ondansetron 4 MG tablet    ZOFRAN    20 tablet    Take 1 tablet (4 mg) by mouth every 8 hours as needed As needed for headache    Chronic tension-type headache, not intractable       pimecrolimus 1 % cream    ELIDEL    60 g    Apply topically 2 times daily Ok to use on face    Atopic dermatitis       ranitidine 150 MG tablet    ZANTAC    90 tablet    Take 1 tablet (150 mg) by mouth At Bedtime    Gastroesophageal reflux disease without esophagitis       SUMAtriptan 25 MG tablet    IMITREX    9 tablet    Take 1-2 tablets (25-50 mg) by mouth at onset of headache for migraine May repeat in 2 hours. Max 8 tablets/24 hours.    Migraine without status migrainosus, not intractable, unspecified migraine type       traZODone 100 MG tablet    DESYREL    90 tablet    Take 1 tablet (100 mg) by mouth nightly as needed for sleep    Sleep disturbance

## 2018-01-17 NOTE — PROGRESS NOTES
SUBJECTIVE:   Lucero Collier is a 22 year old female who presents to clinic today for the following health issues:      IUD check    Pt presents with requests for IUD check.  She has noticed the strings, would like them trimmed.  Had some bleeding after placement but minimal and now resolved.  No cramping.  Tolerating well.    Also has concerns with anxiety and depression.  Low mood, low motivation.  No self harm.  Seeing counselor.  Interested in starting med.  Other than trazodone for sleep has not taken anything for mood in the past.    Problem list and histories reviewed & adjusted, as indicated.  Additional history: as documented    Patient Active Problem List   Diagnosis     ADHD (attention deficit hyperactivity disorder)     Constipation     Myopia     Spells     Chronic headaches     GERD (gastroesophageal reflux disease)     Adverse reaction to pertussis vaccine     Complex partial seizures (H)     Care Plan- Health Care Home     Learning problem     Overweight     Sleep disturbance     Adjustment disorder with mixed anxiety and depressed mood     Migraine without status migrainosus, not intractable, unspecified migraine type     Past Surgical History:   Procedure Laterality Date     ENDOSCOPY UPPER WITH PANCREATIC STIMULATION  4/06    Esophagitis 4/06     NISSEN FUNDOPLICATION  6/07    Dr. Meneses     PE TUBES  3/98     TONSILLECTOMY & ADENOIDECTOMY  4/02       Social History   Substance Use Topics     Smoking status: Current Every Day Smoker     Types: Cigarettes     Smokeless tobacco: Never Used      Comment: 5 qd     Alcohol use No     Family History   Problem Relation Age of Onset     DIABETES Maternal Grandmother      Hypertension Maternal Grandmother      DIABETES Paternal Grandmother      Cardiovascular Paternal Grandmother      Breast Cancer Paternal Grandmother 61     right side breast cancer     Genetic Disorder Other      nephew.-genetic defects      Obesity Mother      Family History  "Negative Father              Reviewed and updated as needed this visit by clinical staffTobacco  Allergies  Meds  Med Hx  Surg Hx  Fam Hx  Soc Hx      Reviewed and updated as needed this visit by Provider         ROS:  SEE HPI.    OBJECTIVE:     /78  Pulse 99  Temp 97.9  F (36.6  C) (Oral)  Ht 5' 5.75\" (1.67 m)  Wt 204 lb 9.6 oz (92.8 kg)  LMP 12/15/2017  SpO2 98%  BMI 33.27 kg/m2  Body mass index is 33.27 kg/(m^2).  GENERAL: healthy, alert and no distress   (female): normal female external genitalia, normal urethral meatus , vaginal mucosa pink, moist, well rugated and normal cervix, IUD strings trimmed to 3cm.    PSYCH: mentation appears normal, affect normal/bright    Diagnostic Test Results:  none     ASSESSMENT/PLAN:   1. Adjustment disorder with mixed anxiety and depressed mood  Discussed options.  Continue counseling.  Add prozac.  Reviewed r/b/se.  Return to clinic in 6-8 weeks.  Pt agrees with plan and verbalized understanding.  - FLUoxetine (PROZAC) 20 MG capsule; Take 1 capsule (20 mg) by mouth daily  Dispense: 30 capsule; Refill: 1    2. IUD check up  Strings trimmed.  Tolerating well.  Monitor.    SHELBY Summers Ra [facility] [place]  "

## 2018-01-19 ASSESSMENT — PATIENT HEALTH QUESTIONNAIRE - PHQ9: SUM OF ALL RESPONSES TO PHQ QUESTIONS 1-9: 26
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
## 2018-01-23 ASSESSMENT — ANXIETY QUESTIONNAIRES: GAD7 TOTAL SCORE: 21

## 2018-02-28 ENCOUNTER — OFFICE VISIT (OUTPATIENT)
Dept: FAMILY MEDICINE | Facility: CLINIC | Age: 23
End: 2018-02-28
Payer: COMMERCIAL

## 2018-02-28 VITALS
DIASTOLIC BLOOD PRESSURE: 60 MMHG | WEIGHT: 206.2 LBS | RESPIRATION RATE: 16 BRPM | OXYGEN SATURATION: 98 % | SYSTOLIC BLOOD PRESSURE: 102 MMHG | TEMPERATURE: 98.3 F | BODY MASS INDEX: 33.54 KG/M2 | HEART RATE: 105 BPM

## 2018-02-28 DIAGNOSIS — R11.2 NAUSEA AND VOMITING, INTRACTABILITY OF VOMITING NOT SPECIFIED, UNSPECIFIED VOMITING TYPE: Primary | ICD-10-CM

## 2018-02-28 LAB
BASOPHILS # BLD AUTO: 0 10E9/L (ref 0–0.2)
BASOPHILS NFR BLD AUTO: 0.2 %
DIFFERENTIAL METHOD BLD: ABNORMAL
EOSINOPHIL # BLD AUTO: 0.1 10E9/L (ref 0–0.7)
EOSINOPHIL NFR BLD AUTO: 0.4 %
ERYTHROCYTE [DISTWIDTH] IN BLOOD BY AUTOMATED COUNT: 12.5 % (ref 10–15)
HCT VFR BLD AUTO: 41.6 % (ref 35–47)
HGB BLD-MCNC: 13.9 G/DL (ref 11.7–15.7)
LYMPHOCYTES # BLD AUTO: 0.9 10E9/L (ref 0.8–5.3)
LYMPHOCYTES NFR BLD AUTO: 7.1 %
MCH RBC QN AUTO: 28.2 PG (ref 26.5–33)
MCHC RBC AUTO-ENTMCNC: 33.4 G/DL (ref 31.5–36.5)
MCV RBC AUTO: 84 FL (ref 78–100)
MONOCYTES # BLD AUTO: 0.9 10E9/L (ref 0–1.3)
MONOCYTES NFR BLD AUTO: 7.2 %
NEUTROPHILS # BLD AUTO: 10.9 10E9/L (ref 1.6–8.3)
NEUTROPHILS NFR BLD AUTO: 85.1 %
PLATELET # BLD AUTO: 312 10E9/L (ref 150–450)
RBC # BLD AUTO: 4.93 10E12/L (ref 3.8–5.2)
WBC # BLD AUTO: 12.8 10E9/L (ref 4–11)

## 2018-02-28 PROCEDURE — 80048 BASIC METABOLIC PNL TOTAL CA: CPT | Performed by: INTERNAL MEDICINE

## 2018-02-28 PROCEDURE — 99213 OFFICE O/P EST LOW 20 MIN: CPT | Performed by: INTERNAL MEDICINE

## 2018-02-28 PROCEDURE — 85025 COMPLETE CBC W/AUTO DIFF WBC: CPT | Performed by: INTERNAL MEDICINE

## 2018-02-28 PROCEDURE — 36415 COLL VENOUS BLD VENIPUNCTURE: CPT | Performed by: INTERNAL MEDICINE

## 2018-02-28 RX ORDER — PROCHLORPERAZINE MALEATE 10 MG
10 TABLET ORAL EVERY 6 HOURS PRN
Qty: 10 TABLET | Refills: 1 | Status: SHIPPED | OUTPATIENT
Start: 2018-02-28 | End: 2018-11-21

## 2018-02-28 NOTE — PROGRESS NOTES
SUBJECTIVE:   Lucero Collier is a 22 year old female who presents to clinic today for the following health issues:      Patient presents with:  Vomiting: for the past 24 hours.  unable to keep food and fluids down since 3 yesterday afternoon.    Chills: body aches.      Patient reports that for about the past 24 hours she has been experiencing nausea, vomiting, chills, diarrhea, and decreased appetite. She expresses that she hasn't been able to keep any food or fluids down since 3 PM yesterday. She notes that the last time she vomited was 7 AM this morning. Patient states that she is prescribed Zofran for her migraines which she has been taking for her nausea but has found no relief. Patient denies trying Compazine in the past.     Duration: since yesterday afternoon    Description (location/character/radiation): decreased appetite, nausea, vomiting, chills, diarrhea. Slight cough and sinus congestion     Intensity:  moderate, severe    Accompanying signs and symptoms: as noted    History (similar episodes/previous evaluation): None    Precipitating or alleviating factors: None    Therapies tried and outcome: has been taking Zofran that she has for headaches but it is not helping much.      Problem list and histories reviewed & adjusted, as indicated.  Additional history: as documented    Labs reviewed in EPIC    Reviewed and updated as needed this visit by clinical staff  Tobacco  Allergies  Meds  Med Hx  Surg Hx  Fam Hx  Soc Hx      Reviewed and updated as needed this visit by Provider       ROS:  CONSTITUTIONAL: POSITIVE for chills; NEGATIVE for fever or change in weight  INTEGUMENTARY/SKIN: Hx of atopic dermatitis - use of pimecrolimus cream and fluocinonide cream; NEGATIVE for worrisome moles or lesions  RESP: NEGATIVE for significant cough or SOB  CV: NEGATIVE for chest pain, palpitations or peripheral edema  GI: POSITIVE for nausea, vomiting, and diarrhea; Hx of gastroesophageal reflux disease, s/p  nissen fundoplication (6/2007) - use of zantac; Obesity, BMI ~33; NEGATIVE for abdominal pain   NEURO: Hx of migraine - use of imitrex and zofran; NEGATIVE for weakness, dizziness or paresthesias  PSYCHIATRIC: Anxiety/depression - use of prozac; Hx of sleep disturbance - use of trazodone; Tobacco use disorder; NEGATIVE for changes in mood or affect    This document serves as a record of the services and decisions personally performed and made by Taylor Rosario MD. It was created on her behalf by Ana Lilia Driscoll, a trained medical scribe. The creation of this document is based on the provider's statements to the medical scribe.   Ana Lilia Driscoll, 11:18 AM, February 28, 2018    OBJECTIVE:     /60  Pulse 105  Temp 98.3  F (36.8  C) (Oral)  Resp 16  Wt 206 lb 3.2 oz (93.5 kg)  LMP 02/27/2018 (Exact Date)  SpO2 98%  BMI 33.54 kg/m2  Body mass index is 33.54 kg/(m^2).     EXAM:  GENERAL: healthy, alert and no distress  NECK: no adenopathy, no asymmetry, masses, or scars, thyroid normal to palpation and no carotid bruits  RESP: lungs clear to auscultation - no rales, rhonchi or wheezes  CV: regular rates and rhythm, normal S1 S2, no murmur, peripheral pulses strong and no peripheral edema  ABDOMEN: soft, nontender, without hepatosplenomegaly or masses and bowel sounds normal  MS: extremities normal- no gross deformities noted  SKIN: no suspicious lesions or rashes  NEURO: Normal strength and tone, sensory exam grossly normal and mentation intact  PSYCH: mentation appears normal and affect normal/bright    Diagnostic Test Results:  none     ASSESSMENT/PLAN:     (R11.2) Nausea and vomiting, intractability of vomiting not specified, unspecified vomiting type  (primary encounter diagnosis)  Comment: pt reports nausea and vomiting for the past 24 hours; not able to keep food or fluids down; has been taking Zofran with no relief; no signs of dehydration noted during exam; prescribed Compazine today  Plan: CBC  with platelets differential, Basic         metabolic panel, prochlorperazine (COMPAZINE)         10 MG tablet            MEDICATIONS:   Orders Placed This Encounter   Medications     prochlorperazine (COMPAZINE) 10 MG tablet     Sig: Take 1 tablet (10 mg) by mouth every 6 hours as needed for nausea or vomiting     Dispense:  10 tablet     Refill:  1     There are no discontinued medications.    Taylor Rosario MD  Internal Medicine  [facility] [place]    The information in this document, created by a medical scribe for me, accurately reflects the services I personally performed and the decisions made by me. I have reviewed and approved this document for accuracy.  Dr. Taylor Rosario, 11:38 PM, February 28, 2018

## 2018-02-28 NOTE — MR AVS SNAPSHOT
After Visit Summary   2/28/2018    Lucero Collier    MRN: [id number]           Patient Information     Date Of Birth          1995        Visit Information        Provider Department      2/28/2018 10:00 AM Taylor Rosario MD [facility]        Today's Diagnoses     Nausea and vomiting, intractability of vomiting not specified, unspecified vomiting type    -  1       Follow-ups after your visit        Your next 10 appointments already scheduled     Mar 27, 2018  3:00 PM CDT   Return Visit with Gian Gao [facility] ([facility]) [address] [place] [address]   [phone]            Apr 10, 2018  4:00 PM CDT   Return Visit with Gina Gao [facility] ([facility]) [address] [place] [address]   [phone]            Apr 25, 2018  4:00 PM CDT   Return Visit with Gina Gao [facility] ([facility]) [address] [place] [address]   [phone]              Who to contact     If you have questions or need follow up information about today's clinic visit or your schedule please contact [facility] directly at [phone].  Normal or non-critical lab and imaging results will be communicated to you by MyChart, letter or phone within 4 business days after the clinic has received the results. If you do not hear from us within 7 days, please contact the clinic through MyChart or phone. If you have a critical or abnormal lab result, we will notify you by phone as soon as possible.  Submit refill requests through [email or web address] or call your pharmacy and they will forward the refill request to us. Please allow 3 business days for your refill to be completed.          Additional Information About Your Visit        [employer] Information     [email or web address] gives you secure access to your  electronic health record. If you see a primary care provider, you can also send messages to your care team and make appointments. If you have questions, please call your primary care clinic.  If you do not have a primary care provider, please call [phone] and they will assist you.        Care EveryWhere ID     This is your Care EveryWhere ID. This could be used by other organizations to access your [place] medical records  [id number]        Your Vitals Were     Pulse Temperature Respirations Last Period Pulse Oximetry BMI (Body Mass Index)    105 98.3  F (36.8  C) (Oral) 16 02/27/2018 (Exact Date) 98% 33.54 kg/m2       Blood Pressure from Last 3 Encounters:   02/28/18 102/60   01/17/18 108/78   12/21/17 102/70    Weight from Last 3 Encounters:   02/28/18 206 lb 3.2 oz (93.5 kg)   01/17/18 204 lb 9.6 oz (92.8 kg)   12/21/17 208 lb (94.3 kg)              We Performed the Following     Basic metabolic panel     CBC with platelets differential          Today's Medication Changes          These changes are accurate as of 2/28/18 11:28 AM.  If you have any questions, ask your nurse or doctor.               Start taking these medicines.        Dose/Directions    prochlorperazine 10 MG tablet   Commonly known as:  COMPAZINE   Used for:  Nausea and vomiting, intractability of vomiting not specified, unspecified vomiting type   Started by:  Taylor Rosario MD        Dose:  10 mg   Take 1 tablet (10 mg) by mouth every 6 hours as needed for nausea or vomiting   Quantity:  10 tablet   Refills:  1            Where to get your medicines      These medications were sent to [facility] [address] [address] AT [facility] [address]  [address] [facility] [address]     Phone:  [phone]     prochlorperazine 10 MG tablet                Primary Care Provider Office Phone # Fax #    Cristal SHELBY Walters [facility] [phone]       [address] [place] [place]  [address]        Equal Access to Services     : Hadii cecelia don kaitlin Tello, warainada luqbailee, qaybta dwaynerosyaarti salgado, gomez lomaxbrijanelle orozco. So [facility] [phone].    ATENCIÓN: Si habla español, tiene a joy disposición servicios gratuitos de asistencia lingüística. Brooke al [phone].    We comply with applicable federal civil rights laws and [place] laws. We do not discriminate on the basis of race, color, national origin, age, disability, sex, sexual orientation, or gender identity.            Thank you!     Thank you for choosing [facility]  for your care. Our goal is always to provide you with excellent care. Hearing back from our patients is one way we can continue to improve our services. Please take a few minutes to complete the written survey that you may receive in the mail after your visit with us. Thank you!             Your Updated Medication List - Protect others around you: Learn how to safely use, store and throw away your medicines at www.disposemymeds.org.          This list is accurate as of 2/28/18 11:28 AM.  Always use your most recent med list.                   Brand Name Dispense Instructions for use Diagnosis    fluocinonide 0.05 % cream    LIDEX    60 g    Apply sparingly to affected area twice daily as needed.  Do not apply to face.    Atopic dermatitis       FLUoxetine 20 MG capsule    PROzac    30 capsule    Take 1 capsule (20 mg) by mouth daily    Adjustment disorder with mixed anxiety and depressed mood       ondansetron 4 MG tablet    ZOFRAN    20 tablet    Take 1 tablet (4 mg) by mouth every 8 hours as needed As needed for headache    Chronic tension-type headache, not intractable       pimecrolimus 1 % cream    ELIDEL    60 g    Apply topically 2 times daily Ok to use on face    Atopic dermatitis       prochlorperazine 10 MG tablet    COMPAZINE    10 tablet    Take 1 tablet (10 mg) by mouth every 6 hours as needed for nausea or  vomiting    Nausea and vomiting, intractability of vomiting not specified, unspecified vomiting type       ranitidine 150 MG tablet    ZANTAC    90 tablet    Take 1 tablet (150 mg) by mouth At Bedtime    Gastroesophageal reflux disease without esophagitis       SUMAtriptan 25 MG tablet    IMITREX    9 tablet    Take 1-2 tablets (25-50 mg) by mouth at onset of headache for migraine May repeat in 2 hours. Max 8 tablets/24 hours.    Migraine without status migrainosus, not intractable, unspecified migraine type       traZODone 100 MG tablet    DESYREL    90 tablet    Take 1 tablet (100 mg) by mouth nightly as needed for sleep    Sleep disturbance

## 2018-02-28 NOTE — LETTER
February 28, 2018        Lucero Su Stone  [address]  [facility] [address]          To whom it may concern:    RE: Lucero Su Stone    Patient was seen and treated today at our clinic and missed work. Treatment for next 48 hours anticipated.    Please contact me for questions or concerns.      Sincerely,        Taylor Rosario MD  Internal Medicine

## 2018-03-01 LAB
ANION GAP SERPL CALCULATED.3IONS-SCNC: 9 MMOL/L (ref 3–14)
BUN SERPL-MCNC: 14 MG/DL (ref 7–30)
CALCIUM SERPL-MCNC: 8.7 MG/DL (ref 8.5–10.1)
CHLORIDE SERPL-SCNC: 109 MMOL/L (ref 94–109)
CO2 SERPL-SCNC: 22 MMOL/L (ref 20–32)
CREAT SERPL-MCNC: 0.62 MG/DL (ref 0.52–1.04)
GFR SERPL CREATININE-BSD FRML MDRD: >90 ML/MIN/1.7M2
GLUCOSE SERPL-MCNC: 89 MG/DL (ref 70–99)
POTASSIUM SERPL-SCNC: 3.7 MMOL/L (ref 3.4–5.3)
SODIUM SERPL-SCNC: 140 MMOL/L (ref 133–144)

## 2018-03-01 NOTE — PROCEDURES
Partial labs resulted. WBC slightly elevated. Can see with illness and after several bouts of vomiting ( demargination); anticipate you will feel better soon, if you continue to not feel well, then call clinic back.  Additional results should be back tomorrow; will keep you posted.    Taylor Rosario MD  Internal Medicine  electronically signed

## 2018-03-01 NOTE — PROGRESS NOTES
Partial labs resulted. WBC slightly elevated. Can see with illness and after several bouts of vomiting ( demargination); anticipate you will feel better soon, if you continue to not feel well, then call clinic back.  Additional results should be back tomorrow; will keep you posted.    Taylor Rosario MD  Internal Medicine  electronically signed  Pt advised via My Chart.

## 2018-03-02 NOTE — PROGRESS NOTES
Given your history of vomiting, you are keeping well hydrated. Anticipate you well be feeling better in a few days; follow up if not feeling better.  Pt advised via My Chart.

## 2018-03-21 ENCOUNTER — OFFICE VISIT (OUTPATIENT)
Dept: FAMILY MEDICINE | Facility: CLINIC | Age: 23
End: 2018-03-21
Payer: COMMERCIAL

## 2018-03-21 VITALS
WEIGHT: 202.6 LBS | OXYGEN SATURATION: 99 % | SYSTOLIC BLOOD PRESSURE: 102 MMHG | TEMPERATURE: 98.1 F | HEART RATE: 92 BPM | RESPIRATION RATE: 18 BRPM | BODY MASS INDEX: 32.56 KG/M2 | HEIGHT: 66 IN | DIASTOLIC BLOOD PRESSURE: 68 MMHG

## 2018-03-21 DIAGNOSIS — L08.9 LOCAL INFECTION OF SKIN AND SUBCUTANEOUS TISSUE: Primary | ICD-10-CM

## 2018-03-21 DIAGNOSIS — F43.23 ADJUSTMENT DISORDER WITH MIXED ANXIETY AND DEPRESSED MOOD: ICD-10-CM

## 2018-03-21 PROCEDURE — 99214 OFFICE O/P EST MOD 30 MIN: CPT | Performed by: PHYSICIAN ASSISTANT

## 2018-03-21 RX ORDER — CEPHALEXIN 500 MG/1
500 CAPSULE ORAL 2 TIMES DAILY
Qty: 20 CAPSULE | Refills: 0 | Status: SHIPPED | OUTPATIENT
Start: 2018-03-21 | End: 2018-04-16

## 2018-03-21 RX ORDER — FLUOXETINE 40 MG/1
40 CAPSULE ORAL DAILY
Qty: 30 CAPSULE | Refills: 1 | Status: SHIPPED | OUTPATIENT
Start: 2018-03-21 | End: 2018-04-16

## 2018-03-21 ASSESSMENT — ANXIETY QUESTIONNAIRES
1. FEELING NERVOUS, ANXIOUS, OR ON EDGE: SEVERAL DAYS
6. BECOMING EASILY ANNOYED OR IRRITABLE: NEARLY EVERY DAY
3. WORRYING TOO MUCH ABOUT DIFFERENT THINGS: MORE THAN HALF THE DAYS
GAD7 TOTAL SCORE: 16
IF YOU CHECKED OFF ANY PROBLEMS ON THIS QUESTIONNAIRE, HOW DIFFICULT HAVE THESE PROBLEMS MADE IT FOR YOU TO DO YOUR WORK, TAKE CARE OF THINGS AT HOME, OR GET ALONG WITH OTHER PEOPLE: SOMEWHAT DIFFICULT
7. FEELING AFRAID AS IF SOMETHING AWFUL MIGHT HAPPEN: MORE THAN HALF THE DAYS
5. BEING SO RESTLESS THAT IT IS HARD TO SIT STILL: NEARLY EVERY DAY
2. NOT BEING ABLE TO STOP OR CONTROL WORRYING: MORE THAN HALF THE DAYS

## 2018-03-21 ASSESSMENT — PATIENT HEALTH QUESTIONNAIRE - PHQ9: 5. POOR APPETITE OR OVEREATING: NEARLY EVERY DAY

## 2018-03-21 NOTE — PROGRESS NOTES
SUBJECTIVE:   Lucero Collier is a 22 year old female who presents to clinic today for the following health issues:      Skin infection      Duration: 1 month    Description (location/character/radiation): Belly button infection    Intensity:  mild    Accompanying signs and symptoms: bleeds intermittently     History (similar episodes/previous evaluation): Has happened in the past- due to the piercing     Precipitating or alleviating factors: None    Therapies tried and outcome: Over the counter antibiotics     Patient is here today complaining of infected belly button  Has been bleeding and itchy for 1 month  She has been treating with H202 without relief  She also has tried OTC topical creams- numerous without relief  Is red as well  No fevers    She also was due for mood recheck  Started on Prozac about 2 months ago  Continues to struggle with lack of self worth, intermittent fleeting thoughts of suicide  No plan for suicide  No drug or alcohol use  She complains of trouble sleeping because she can't calm down, she often fidgets      Problem list and histories reviewed & adjusted, as indicated.  Additional history: as documented    Patient Active Problem List   Diagnosis     ADHD (attention deficit hyperactivity disorder)     Constipation     Myopia     Spells     Chronic headaches     GERD (gastroesophageal reflux disease)     Adverse reaction to pertussis vaccine     Complex partial seizures (H)     Care Plan- Health Care Home     Learning problem     Overweight     Sleep disturbance     Adjustment disorder with mixed anxiety and depressed mood     Migraine without status migrainosus, not intractable, unspecified migraine type     Past Surgical History:   Procedure Laterality Date     ENDOSCOPY UPPER WITH PANCREATIC STIMULATION  4/06    Esophagitis 4/06     NISSEN FUNDOPLICATION  6/07    Dr. Meneses     PE TUBES  3/98     TONSILLECTOMY & ADENOIDECTOMY  4/02       Social History   Substance Use Topics      Smoking status: Current Every Day Smoker     Types: Cigarettes     Smokeless tobacco: Never Used      Comment: 5 qd     Alcohol use No     Family History   Problem Relation Age of Onset     DIABETES Maternal Grandmother      Hypertension Maternal Grandmother      DIABETES Paternal Grandmother      Cardiovascular Paternal Grandmother      Breast Cancer Paternal Grandmother 61     right side breast cancer     Genetic Disorder Other      nephew.-genetic defects      Obesity Mother      Family History Negative Father          Current Outpatient Prescriptions   Medication Sig Dispense Refill     cephALEXin (KEFLEX) 500 MG capsule Take 1 capsule (500 mg) by mouth 2 times daily 20 capsule 0     FLUoxetine (PROZAC) 40 MG capsule Take 1 capsule (40 mg) by mouth daily 30 capsule 1     prochlorperazine (COMPAZINE) 10 MG tablet Take 1 tablet (10 mg) by mouth every 6 hours as needed for nausea or vomiting 10 tablet 1     traZODone (DESYREL) 100 MG tablet Take 1 tablet (100 mg) by mouth nightly as needed for sleep 90 tablet 0     ranitidine (ZANTAC) 150 MG tablet Take 1 tablet (150 mg) by mouth At Bedtime 90 tablet 1     ondansetron (ZOFRAN) 4 MG tablet Take 1 tablet (4 mg) by mouth every 8 hours as needed As needed for headache 20 tablet 3     SUMAtriptan (IMITREX) 25 MG tablet Take 1-2 tablets (25-50 mg) by mouth at onset of headache for migraine May repeat in 2 hours. Max 8 tablets/24 hours. 9 tablet 1     pimecrolimus (ELIDEL) 1 % cream Apply topically 2 times daily Ok to use on face 60 g 3     fluocinonide (LIDEX) 0.05 % cream Apply sparingly to affected area twice daily as needed.  Do not apply to face. 60 g 0     [DISCONTINUED] FLUoxetine (PROZAC) 20 MG capsule Take 1 capsule (20 mg) by mouth daily 30 capsule 1     No Known Allergies    Reviewed and updated as needed this visit by clinical staff       Reviewed and updated as needed this visit by Provider         ROS:  Constitutional, HEENT, cardiovascular, pulmonary, gi  "and gu systems are negative, except as otherwise noted.    OBJECTIVE:     /68 (BP Location: Right arm, Patient Position: Chair, Cuff Size: Adult Large)  Pulse 92  Temp 98.1  F (36.7  C) (Tympanic)  Resp 18  Ht 5' 5.75\" (1.67 m)  Wt 202 lb 9.6 oz (91.9 kg)  LMP 02/27/2018 (Exact Date)  SpO2 99%  BMI 32.95 kg/m2  Body mass index is 32.95 kg/(m^2).  GENERAL: healthy, alert and no distress  NECK: no adenopathy, no asymmetry, masses, or scars and thyroid normal to palpation  RESP: lungs clear to auscultation - no rales, rhonchi or wheezes  CV: regular rate and rhythm, normal S1 S2, no S3 or S4, no murmur, click or rub, no peripheral edema and peripheral pulses strong  MS: no gross musculoskeletal defects noted, no edema  SKIN: above umbilicus there is enlarged reddened tissue from previous piercing with a scabbed area    Diagnostic Test Results:  none     ASSESSMENT/PLAN:             1. Local infection of skin and subcutaneous tissue  Ongoing issue, recommend treatment with Keflex orally, also recommend cleaning area with warm soapy water, apply abx ointment and bandage. Recheck if symptoms worsen or do not improve.  - cephALEXin (KEFLEX) 500 MG capsule; Take 1 capsule (500 mg) by mouth 2 times daily  Dispense: 20 capsule; Refill: 0    2. Adjustment disorder with mixed anxiety and depressed mood  Chronic issue, PHQ9/GAD7 updated today, will plan to increase Prozac to 40mg daily.  Recheck in 1 month.  - FLUoxetine (PROZAC) 40 MG capsule; Take 1 capsule (40 mg) by mouth daily  Dispense: 30 capsule; Refill: 1    Risks, benefits and alternatives were discussed with patient. Agreeable to the plan of care.      Iris Rand PA-C  [facility]"

## 2018-03-21 NOTE — MR AVS SNAPSHOT
After Visit Summary   3/21/2018    Lucero Collier    MRN: [id number]           Patient Information     Date Of Birth          1995        Visit Information        Provider Department      3/21/2018 9:10 AM Iris Rand PA-C [facility]        Today's Diagnoses     Local infection of skin and subcutaneous tissue    -  1    Adjustment disorder with mixed anxiety and depressed mood           Follow-ups after your visit        Follow-up notes from your care team     Return in about 4 weeks (around 4/18/2018) for Mood Recheck.      Your next 10 appointments already scheduled     Mar 27, 2018  3:00 PM CDT   Return Visit with Gina Gao [facility] ([facility]) [address] [place] [address] [phone]            Apr 10, 2018  4:00 PM CDT   Return Visit with Gina Gao [facility] ([facility]) [address] [place] [address] [phone]            Apr 16, 2018  3:50 PM CDT   Office Visit with Iris Rand PA-C   [facility] ([facility])    [address] [facility] [address] [phone]           Bring a current list of meds and any records pertaining to this visit. For Physicals, please bring immunization records and any forms needing to be filled out. Please arrive 10 minutes early to complete paperwork.            Apr 25, 2018  4:00 PM CDT   Return Visit with Gina Gao [facility] ([facility])    [address] [place] [address] [phone]              Who to contact     If you have questions or need follow up information about today's clinic visit or your schedule please contact [facility] directly at [phone].  Normal or non-critical lab and imaging results will be communicated to you by Nina  "letter or phone within 4 business days after the clinic has received the results. If you do not hear from us within 7 days, please contact the clinic through Tribzi or phone. If you have a critical or abnormal lab result, we will notify you by phone as soon as possible.  Submit refill requests through Tribzi or call your pharmacy and they will forward the refill request to us. Please allow 3 business days for your refill to be completed.          Additional Information About Your Visit        [employer] Information     [employer]zi gives you secure access to your electronic health record. If you see a primary care provider, you can also send messages to your care team and make appointments. If you have questions, please call your primary care clinic.  If you do not have a primary care provider, please call [phone] and they will assist you.        Care EveryWhere ID     This is your Care EveryWhere ID. This could be used by other organizations to access your [place] medical records  [id number]        Your Vitals Were     Pulse Temperature Respirations Height Last Period Pulse Oximetry    92 98.1  F (36.7  C) (Tympanic) 18 5' 5.75\" (1.67 m) 02/27/2018 (Exact Date) 99%    BMI (Body Mass Index)                   32.95 kg/m2            Blood Pressure from Last 3 Encounters:   03/21/18 102/68   02/28/18 102/60   01/17/18 108/78    Weight from Last 3 Encounters:   03/21/18 202 lb 9.6 oz (91.9 kg)   02/28/18 206 lb 3.2 oz (93.5 kg)   01/17/18 204 lb 9.6 oz (92.8 kg)              Today, you had the following     No orders found for display         Today's Medication Changes          These changes are accurate as of 3/21/18  9:14 AM.  If you have any questions, ask your nurse or doctor.               Start taking these medicines.        Dose/Directions    cephALEXin 500 MG capsule   Commonly known as:  KEFLEX   Used for:  Local infection of skin and subcutaneous tissue   Started by:  Iris Rand PA-C        " Dose:  500 mg   Take 1 capsule (500 mg) by mouth 2 times daily   Quantity:  20 capsule   Refills:  0         These medicines have changed or have updated prescriptions.        Dose/Directions    FLUoxetine 40 MG capsule   Commonly known as:  PROzac   This may have changed:    - medication strength  - how much to take   Used for:  Adjustment disorder with mixed anxiety and depressed mood   Changed by:  Iris Rand PA-C        Dose:  40 mg   Take 1 capsule (40 mg) by mouth daily   Quantity:  30 capsule   Refills:  1            Where to get your medicines      These medications were sent to [facility] [address] [address]  [address] [facility] [address]     Phone:  [phone]     cephALEXin 500 MG capsule    FLUoxetine 40 MG capsule                Primary Care Provider Office Phone # Fax #    Cristal Mckeon Devin, APRN [facility] [phone] [address] [facility] [address]        Equal Access to Services     [facility] : Hadii aad ku hadasho Soomaali, waaxda luqadaha, qaybta kaalmada adeegyada, waxay idiin hayaan adeeg kharajanelle la'shaun . So [facility] [phone].    ATENCIÓN: Si habla español, tiene a joy disposición servicios gratuitos de asistencia lingüística. [facility] [phone].    We comply with applicable federal civil rights laws and [place] laws. We do not discriminate on the basis of race, color, national origin, age, disability, sex, sexual orientation, or gender identity.            Thank you!     Thank you for choosing [facility]  for your care. Our goal is always to provide you with excellent care. Hearing back from our patients is one way we can continue to improve our services. Please take a few minutes to complete the written survey that you may receive in the mail after your visit with us. Thank you!             Your Updated Medication List - Protect others around you: Learn how to  safely use, store and throw away your medicines at www.disposemymeds.org.          This list is accurate as of 3/21/18  9:14 AM.  Always use your most recent med list.                   Brand Name Dispense Instructions for use Diagnosis    cephALEXin 500 MG capsule    KEFLEX    20 capsule    Take 1 capsule (500 mg) by mouth 2 times daily    Local infection of skin and subcutaneous tissue       fluocinonide 0.05 % cream    LIDEX    60 g    Apply sparingly to affected area twice daily as needed.  Do not apply to face.    Atopic dermatitis       FLUoxetine 40 MG capsule    PROzac    30 capsule    Take 1 capsule (40 mg) by mouth daily    Adjustment disorder with mixed anxiety and depressed mood       ondansetron 4 MG tablet    ZOFRAN    20 tablet    Take 1 tablet (4 mg) by mouth every 8 hours as needed As needed for headache    Chronic tension-type headache, not intractable       pimecrolimus 1 % cream    ELIDEL    60 g    Apply topically 2 times daily Ok to use on face    Atopic dermatitis       prochlorperazine 10 MG tablet    COMPAZINE    10 tablet    Take 1 tablet (10 mg) by mouth every 6 hours as needed for nausea or vomiting    Nausea and vomiting, intractability of vomiting not specified, unspecified vomiting type       ranitidine 150 MG tablet    ZANTAC    90 tablet    Take 1 tablet (150 mg) by mouth At Bedtime    Gastroesophageal reflux disease without esophagitis       SUMAtriptan 25 MG tablet    IMITREX    9 tablet    Take 1-2 tablets (25-50 mg) by mouth at onset of headache for migraine May repeat in 2 hours. Max 8 tablets/24 hours.    Migraine without status migrainosus, not intractable, unspecified migraine type       traZODone 100 MG tablet    DESYREL    90 tablet    Take 1 tablet (100 mg) by mouth nightly as needed for sleep    Sleep disturbance

## 2018-03-22 ASSESSMENT — PATIENT HEALTH QUESTIONNAIRE - PHQ9: SUM OF ALL RESPONSES TO PHQ QUESTIONS 1-9: 18

## 2018-03-22 ASSESSMENT — ANXIETY QUESTIONNAIRES: GAD7 TOTAL SCORE: 16

## 2018-04-10 ENCOUNTER — OFFICE VISIT (OUTPATIENT)
Dept: PSYCHOLOGY | Facility: CLINIC | Age: 23
End: 2018-04-10
Payer: COMMERCIAL

## 2018-04-10 DIAGNOSIS — F32.1 DEPRESSION, MAJOR, SINGLE EPISODE, MODERATE (H): Primary | ICD-10-CM

## 2018-04-10 PROCEDURE — 90834 PSYTX W PT 45 MINUTES: CPT | Performed by: COUNSELOR

## 2018-04-10 ASSESSMENT — ANXIETY QUESTIONNAIRES
5. BEING SO RESTLESS THAT IT IS HARD TO SIT STILL: NEARLY EVERY DAY
3. WORRYING TOO MUCH ABOUT DIFFERENT THINGS: NEARLY EVERY DAY
GAD7 TOTAL SCORE: 21
2. NOT BEING ABLE TO STOP OR CONTROL WORRYING: NEARLY EVERY DAY
IF YOU CHECKED OFF ANY PROBLEMS ON THIS QUESTIONNAIRE, HOW DIFFICULT HAVE THESE PROBLEMS MADE IT FOR YOU TO DO YOUR WORK, TAKE CARE OF THINGS AT HOME, OR GET ALONG WITH OTHER PEOPLE: EXTREMELY DIFFICULT
6. BECOMING EASILY ANNOYED OR IRRITABLE: NEARLY EVERY DAY
1. FEELING NERVOUS, ANXIOUS, OR ON EDGE: NEARLY EVERY DAY
7. FEELING AFRAID AS IF SOMETHING AWFUL MIGHT HAPPEN: NEARLY EVERY DAY

## 2018-04-10 ASSESSMENT — PATIENT HEALTH QUESTIONNAIRE - PHQ9: 5. POOR APPETITE OR OVEREATING: NEARLY EVERY DAY

## 2018-04-16 ENCOUNTER — OFFICE VISIT (OUTPATIENT)
Dept: FAMILY MEDICINE | Facility: CLINIC | Age: 23
End: 2018-04-16
Payer: COMMERCIAL

## 2018-04-16 VITALS
HEIGHT: 66 IN | RESPIRATION RATE: 18 BRPM | DIASTOLIC BLOOD PRESSURE: 72 MMHG | OXYGEN SATURATION: 99 % | HEART RATE: 95 BPM | BODY MASS INDEX: 32.92 KG/M2 | WEIGHT: 204.8 LBS | SYSTOLIC BLOOD PRESSURE: 124 MMHG | TEMPERATURE: 98.3 F

## 2018-04-16 DIAGNOSIS — F43.23 ADJUSTMENT DISORDER WITH MIXED ANXIETY AND DEPRESSED MOOD: Primary | ICD-10-CM

## 2018-04-16 PROCEDURE — 99213 OFFICE O/P EST LOW 20 MIN: CPT | Performed by: PHYSICIAN ASSISTANT

## 2018-04-16 ASSESSMENT — ANXIETY QUESTIONNAIRES
GAD7 TOTAL SCORE: 21
IF YOU CHECKED OFF ANY PROBLEMS ON THIS QUESTIONNAIRE, HOW DIFFICULT HAVE THESE PROBLEMS MADE IT FOR YOU TO DO YOUR WORK, TAKE CARE OF THINGS AT HOME, OR GET ALONG WITH OTHER PEOPLE: VERY DIFFICULT
7. FEELING AFRAID AS IF SOMETHING AWFUL MIGHT HAPPEN: NEARLY EVERY DAY
5. BEING SO RESTLESS THAT IT IS HARD TO SIT STILL: NEARLY EVERY DAY
2. NOT BEING ABLE TO STOP OR CONTROL WORRYING: NEARLY EVERY DAY
1. FEELING NERVOUS, ANXIOUS, OR ON EDGE: NEARLY EVERY DAY
6. BECOMING EASILY ANNOYED OR IRRITABLE: NEARLY EVERY DAY
3. WORRYING TOO MUCH ABOUT DIFFERENT THINGS: NEARLY EVERY DAY

## 2018-04-16 ASSESSMENT — PATIENT HEALTH QUESTIONNAIRE - PHQ9: 5. POOR APPETITE OR OVEREATING: NEARLY EVERY DAY

## 2018-04-16 NOTE — PROGRESS NOTES
SUBJECTIVE:   Lucero Collier is a 22 year old female who presents to clinic today for the following health issues:    Depression and Anxiety Follow-Up    Status since last visit: Worsened     Other associated symptoms:None    Complicating factors: none    Significant life event: Yes-  Would like to talk to Iris about it     Current substance abuse: None    PHQ-9 11/7/2017 1/16/2018 3/21/2018   Total Score 22 26 18   Q9: Suicide Ideation Several days More than half the days Several days     CLARY-7 SCORE 11/7/2017 1/16/2018 3/21/2018   Total Score 18 21 16       PHQ-9  English  PHQ-9   Any Language  CLARY-7  Suicide Assessment Five-step Evaluation and Treatment (SAFE-T)    Amount of exercise or physical activity: 2-3 days/week for an average of 45-60 minutes    Problems taking medications regularly: No    Medication side effects: none    Diet: regular (no restrictions)    Patient is here today to follow up on mood  She states she is still really tired and still having issues with not wanting to do anything and depressed mood  Still wanting to sleep all the time  She admits that things with her parents are really stressful  + suicidal thoughts at times, no plan  She denies homicidal thoughts  No drug or alcohol use  No hallucinations or delusions  She is in counseling, going twice per month  Admits that her boyfriend is very supportive of her  No s/e from medication    Problem list and histories reviewed & adjusted, as indicated.  Additional history: as documented    Patient Active Problem List   Diagnosis     ADHD (attention deficit hyperactivity disorder)     Constipation     Myopia     Spells     Chronic headaches     GERD (gastroesophageal reflux disease)     Adverse reaction to pertussis vaccine     Complex partial seizures (H)     Care Plan- Health Care Home     Learning problem     Overweight     Sleep disturbance     Adjustment disorder with mixed anxiety and depressed mood     Migraine without status  migrainosus, not intractable, unspecified migraine type     Past Surgical History:   Procedure Laterality Date     ENDOSCOPY UPPER WITH PANCREATIC STIMULATION  4/06    Esophagitis 4/06     NISSEN FUNDOPLICATION  6/07    Dr. Meneses     PE TUBES  3/98     TONSILLECTOMY & ADENOIDECTOMY  4/02       Social History   Substance Use Topics     Smoking status: Current Every Day Smoker     Types: Cigarettes     Smokeless tobacco: Never Used      Comment: 5 qd     Alcohol use No     Family History   Problem Relation Age of Onset     DIABETES Maternal Grandmother      Hypertension Maternal Grandmother      DIABETES Paternal Grandmother      Cardiovascular Paternal Grandmother      Breast Cancer Paternal Grandmother 61     right side breast cancer     Genetic Disorder Other      nephew.-genetic defects      Obesity Mother      Family History Negative Father          Current Outpatient Prescriptions   Medication Sig Dispense Refill     FLUoxetine (PROZAC) 20 MG capsule Take 3 capsules (60 mg) by mouth daily 30 capsule 1     prochlorperazine (COMPAZINE) 10 MG tablet Take 1 tablet (10 mg) by mouth every 6 hours as needed for nausea or vomiting 10 tablet 1     traZODone (DESYREL) 100 MG tablet Take 1 tablet (100 mg) by mouth nightly as needed for sleep 90 tablet 0     ranitidine (ZANTAC) 150 MG tablet Take 1 tablet (150 mg) by mouth At Bedtime 90 tablet 1     ondansetron (ZOFRAN) 4 MG tablet Take 1 tablet (4 mg) by mouth every 8 hours as needed As needed for headache 20 tablet 3     SUMAtriptan (IMITREX) 25 MG tablet Take 1-2 tablets (25-50 mg) by mouth at onset of headache for migraine May repeat in 2 hours. Max 8 tablets/24 hours. 9 tablet 1     pimecrolimus (ELIDEL) 1 % cream Apply topically 2 times daily Ok to use on face 60 g 3     fluocinonide (LIDEX) 0.05 % cream Apply sparingly to affected area twice daily as needed.  Do not apply to face. 60 g 0     No Known Allergies    Reviewed and updated as needed this visit by clinical  "staff  Tobacco  Allergies  Meds  Med Hx  Surg Hx  Fam Hx  Soc Hx      Reviewed and updated as needed this visit by Provider         ROS:  Constitutional, HEENT, cardiovascular, pulmonary, gi and gu systems are negative, except as otherwise noted.    OBJECTIVE:     /72 (BP Location: Right arm, Patient Position: Chair, Cuff Size: Adult Regular)  Pulse 95  Temp 98.3  F (36.8  C) (Oral)  Resp 18  Ht 5' 5.75\" (1.67 m)  Wt 204 lb 12.8 oz (92.9 kg)  LMP 04/14/2018 (Exact Date)  SpO2 99%  Breastfeeding? No  BMI 33.31 kg/m2  Body mass index is 33.31 kg/(m^2).  GENERAL: healthy, alert and no distress  NECK: no adenopathy, no asymmetry, masses, or scars and thyroid normal to palpation  RESP: lungs clear to auscultation - no rales, rhonchi or wheezes  CV: regular rate and rhythm, normal S1 S2, no S3 or S4, no murmur, click or rub, no peripheral edema and peripheral pulses strong  MS: no gross musculoskeletal defects noted, no edema  PSYCH: mentation appears normal and agitated    Diagnostic Test Results:  none     ASSESSMENT/PLAN:             1. Adjustment disorder with mixed anxiety and depressed mood  Chronic issue, will increase Prozac to 60mg daily.  Recheck in 1 month in office.  If not improving, may consider increased dose or changing medication.  - FLUoxetine (PROZAC) 20 MG capsule; Take 3 capsules (60 mg) by mouth daily  Dispense: 30 capsule; Refill: 1    Risks, benefits and alternatives were discussed with patient. Agreeable to the plan of care.      Iris Rand PA-C  [facility]  "

## 2018-04-16 NOTE — MR AVS SNAPSHOT
After Visit Summary   4/16/2018    Lucero Collier    MRN: [id number]           Patient Information     Date Of Birth          1995        Visit Information        Provider Department      4/16/2018 3:50 PM Iris Rand PA-C [facility]        Today's Diagnoses     Adjustment disorder with mixed anxiety and depressed mood    -  1       Follow-ups after your visit        Follow-up notes from your care team     Return in about 1 month (around 5/16/2018) for Mood Recheck.      Your next 10 appointments already scheduled     Apr 25, 2018  4:00 PM CDT   Return Visit with Gina Gao [facility] ([facility])    [address] [place] [address] [phone]            May 16, 2018  2:50 PM CDT   SHORT with Iris Rand PA-C   [facility] ([facility]    [address] [facility] [address] [phone]              Who to contact     If you have questions or need follow up information about today's clinic visit or your schedule please contact [facility] directly at [phone].  Normal or non-critical lab and imaging results will be communicated to you by MyChart, letter or phone within 4 business days after the clinic has received the results. If you do not hear from us within 7 days, please contact the clinic through [employer] or phone. If you have a critical or abnormal lab result, we will notify you by phone as soon as possible.  Submit refill requests through [employer] or call your pharmacy and they will forward the refill request to us. Please allow 3 business days for your refill to be completed.          Additional Information About Your Visit        MyChart Information     [employer] gives you secure access to your electronic health record. If you see a primary care provider, you can also send messages to your care team and make appointments.  "If you have questions, please call your primary care clinic.  If you do not have a primary care provider, please call [phone] and they will assist you.        Care EveryWhere ID     This is your Care EveryWhere ID. This could be used by other organizations to access your [facility] medical records  [id number]        Your Vitals Were     Pulse Temperature Respirations Height Last Period Pulse Oximetry    95 98.3  F (36.8  C) (Oral) 18 5' 5.75\" (1.67 m) 04/14/2018 (Exact Date) 99%    Breastfeeding? BMI (Body Mass Index)                No 33.31 kg/m2           Blood Pressure from Last 3 Encounters:   04/16/18 124/72   03/21/18 102/68   02/28/18 102/60    Weight from Last 3 Encounters:   04/16/18 204 lb 12.8 oz (92.9 kg)   03/21/18 202 lb 9.6 oz (91.9 kg)   02/28/18 206 lb 3.2 oz (93.5 kg)              Today, you had the following     No orders found for display         Today's Medication Changes          These changes are accurate as of 4/16/18  4:22 PM.  If you have any questions, ask your nurse or doctor.               These medicines have changed or have updated prescriptions.        Dose/Directions    FLUoxetine 20 MG capsule   Commonly known as:  PROzac   This may have changed:    - medication strength  - how much to take   Used for:  Adjustment disorder with mixed anxiety and depressed mood   Changed by:  Iris Rand PA-C        Dose:  60 mg   Take 3 capsules (60 mg) by mouth daily   Quantity:  30 capsule   Refills:  1            Where to get your medicines      These medications were [facility] to [facility] [address] [address]  [facility] [address]     Phone:  [phone]     FLUoxetine 20 MG capsule                Primary Care Provider Office Phone # Fax #    Iris Rand PA-C [phone] [address]  [facility] [address]        Equal Access to Services     SILVIANO AYALA AH: Ned aad " arian Tello, warainada luqadaha, qaybta kaaltyrell salgado, gomez marloin hayaanitish reddjacques damiissac laamandanitish pam. So [facility] [phone].    ATENCIÓN: Si leslyla jose, tiene a joy disposición servicios gratuitos de asistencia lingüística. Brooke al [phone].    We comply with applicable federal civil rights laws and [place] laws. We do not discriminate on the basis of race, color, national origin, age, disability, sex, sexual orientation, or gender identity.            Thank you!     Thank you for choosing [facility]] [facility]  for your care. Our goal is always to provide you with excellent care. Hearing back from our patients is one way we can continue to improve our services. Please take a few minutes to complete the written survey that you may receive in the mail after your visit with us. Thank you!             Your Updated Medication List - Protect others around you: Learn how to safely use, store and throw away your medicines at www.disposemymeds.org.          This list is accurate as of 4/16/18  4:22 PM.  Always use your most recent med list.                   Brand Name Dispense Instructions for use Diagnosis    fluocinonide 0.05 % cream    LIDEX    60 g    Apply sparingly to affected area twice daily as needed.  Do not apply to face.    Atopic dermatitis       FLUoxetine 20 MG capsule    PROzac    30 capsule    Take 3 capsules (60 mg) by mouth daily    Adjustment disorder with mixed anxiety and depressed mood       ondansetron 4 MG tablet    ZOFRAN    20 tablet    Take 1 tablet (4 mg) by mouth every 8 hours as needed As needed for headache    Chronic tension-type headache, not intractable       pimecrolimus 1 % cream    ELIDEL    60 g    Apply topically 2 times daily Ok to use on face    Atopic dermatitis       prochlorperazine 10 MG tablet    COMPAZINE    10 tablet    Take 1 tablet (10 mg) by mouth every 6 hours as needed for nausea or vomiting    Nausea and vomiting, intractability of vomiting not  specified, unspecified vomiting type       ranitidine 150 MG tablet    ZANTAC    90 tablet    Take 1 tablet (150 mg) by mouth At Bedtime    Gastroesophageal reflux disease without esophagitis       SUMAtriptan 25 MG tablet    IMITREX    9 tablet    Take 1-2 tablets (25-50 mg) by mouth at onset of headache for migraine May repeat in 2 hours. Max 8 tablets/24 hours.    Migraine without status migrainosus, not intractable, unspecified migraine type       traZODone 100 MG tablet    DESYREL    90 tablet    Take 1 tablet (100 mg) by mouth nightly as needed for sleep    Sleep disturbance

## 2018-04-17 ASSESSMENT — PATIENT HEALTH QUESTIONNAIRE - PHQ9: SUM OF ALL RESPONSES TO PHQ QUESTIONS 1-9: 25

## 2018-04-17 ASSESSMENT — ANXIETY QUESTIONNAIRES: GAD7 TOTAL SCORE: 21

## 2018-04-21 ENCOUNTER — TRANSFERRED RECORDS (OUTPATIENT)
Dept: HEALTH INFORMATION MANAGEMENT | Facility: CLINIC | Age: 23
End: 2018-04-21

## 2018-04-23 ENCOUNTER — TELEPHONE (OUTPATIENT)
Dept: FAMILY MEDICINE | Facility: CLINIC | Age: 23
End: 2018-04-23

## 2018-04-23 ASSESSMENT — ANXIETY QUESTIONNAIRES: GAD7 TOTAL SCORE: 21

## 2018-04-23 ASSESSMENT — PATIENT HEALTH QUESTIONNAIRE - PHQ9: SUM OF ALL RESPONSES TO PHQ QUESTIONS 1-9: 26
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
## 2018-04-23 NOTE — TELEPHONE ENCOUNTER
"Patient called regarding vomiting episode. Requesting letter for work.    Patient reports emesis occurred last night around midnight. Emesis x3 within the last 4 hours. No diarrhea. No fever. Took 1 compazine around 2am. No new foods or recent consumption at local restaurants that she can recall. Not able to keep down water at this time. LMP- \"a couple of days ago\".     Advised ok to take another Compazine (per medication instructions to take q 6 hours PRN), slowly advised fluids; ie: ice chips, sips of water, than advance to bland fluids.    Informed patient she will need to be seen in order to obtain letter for work. Hx of recurrent from 2/28/2018 LOV. Patient has prescription in place to assist with sx at this time.  Advised to f/u in clinic if fever, diarrhea or dizziness present. Not able to keep down water >8-12hours ( would need to be seen in ER for possible IV fluids).  Sx may be r/t mild case of gastroenteritis, continue to monitor.    Pt expressed understanding and acceptance of the plan.  Pt had no further questions at this time.  Advised can call back to clinic at any time with concerns.     Abi SCHNEIDER, RN, BSN, PHN  Es Gonzalez RN      "

## 2018-04-25 ENCOUNTER — OFFICE VISIT (OUTPATIENT)
Dept: PSYCHOLOGY | Facility: CLINIC | Age: 23
End: 2018-04-25
Payer: COMMERCIAL

## 2018-04-25 DIAGNOSIS — F32.1 MAJOR DEPRESSIVE DISORDER, SINGLE EPISODE, MODERATE (H): Primary | ICD-10-CM

## 2018-04-25 PROCEDURE — 90834 PSYTX W PT 45 MINUTES: CPT | Performed by: COUNSELOR

## 2018-04-25 ASSESSMENT — ANXIETY QUESTIONNAIRES
5. BEING SO RESTLESS THAT IT IS HARD TO SIT STILL: NEARLY EVERY DAY
1. FEELING NERVOUS, ANXIOUS, OR ON EDGE: NEARLY EVERY DAY
2. NOT BEING ABLE TO STOP OR CONTROL WORRYING: NEARLY EVERY DAY
IF YOU CHECKED OFF ANY PROBLEMS ON THIS QUESTIONNAIRE, HOW DIFFICULT HAVE THESE PROBLEMS MADE IT FOR YOU TO DO YOUR WORK, TAKE CARE OF THINGS AT HOME, OR GET ALONG WITH OTHER PEOPLE: EXTREMELY DIFFICULT
6. BECOMING EASILY ANNOYED OR IRRITABLE: NEARLY EVERY DAY
3. WORRYING TOO MUCH ABOUT DIFFERENT THINGS: NEARLY EVERY DAY
7. FEELING AFRAID AS IF SOMETHING AWFUL MIGHT HAPPEN: NEARLY EVERY DAY
GAD7 TOTAL SCORE: 21

## 2018-04-25 ASSESSMENT — PATIENT HEALTH QUESTIONNAIRE - PHQ9: 5. POOR APPETITE OR OVEREATING: NEARLY EVERY DAY

## 2018-05-01 ASSESSMENT — PATIENT HEALTH QUESTIONNAIRE - PHQ9: SUM OF ALL RESPONSES TO PHQ QUESTIONS 1-9: 25

## 2018-05-01 ASSESSMENT — ANXIETY QUESTIONNAIRES: GAD7 TOTAL SCORE: 21

## 2018-05-02 ENCOUNTER — OFFICE VISIT (OUTPATIENT)
Dept: FAMILY MEDICINE | Facility: CLINIC | Age: 23
End: 2018-05-02
Payer: COMMERCIAL

## 2018-05-02 VITALS
WEIGHT: 202.8 LBS | SYSTOLIC BLOOD PRESSURE: 108 MMHG | DIASTOLIC BLOOD PRESSURE: 68 MMHG | HEART RATE: 93 BPM | BODY MASS INDEX: 32.59 KG/M2 | HEIGHT: 66 IN | RESPIRATION RATE: 18 BRPM | TEMPERATURE: 98.4 F | OXYGEN SATURATION: 98 %

## 2018-05-02 DIAGNOSIS — L20.9 ATOPIC DERMATITIS, UNSPECIFIED TYPE: ICD-10-CM

## 2018-05-02 DIAGNOSIS — F43.23 ADJUSTMENT DISORDER WITH MIXED ANXIETY AND DEPRESSED MOOD: Primary | ICD-10-CM

## 2018-05-02 PROCEDURE — 99213 OFFICE O/P EST LOW 20 MIN: CPT | Performed by: PHYSICIAN ASSISTANT

## 2018-05-02 RX ORDER — FLUOCINONIDE 0.5 MG/G
CREAM TOPICAL
Qty: 60 G | Refills: 0 | Status: SHIPPED | OUTPATIENT
Start: 2018-05-02 | End: 2019-06-10

## 2018-05-02 RX ORDER — BUSPIRONE HYDROCHLORIDE 5 MG/1
TABLET ORAL
Qty: 150 TABLET | Refills: 0 | Status: SHIPPED | OUTPATIENT
Start: 2018-05-02 | End: 2018-05-21

## 2018-05-02 RX ORDER — FLUOXETINE 40 MG/1
80 CAPSULE ORAL DAILY
Qty: 60 CAPSULE | Refills: 1 | Status: SHIPPED | OUTPATIENT
Start: 2018-05-02 | End: 2018-05-21

## 2018-05-02 ASSESSMENT — ANXIETY QUESTIONNAIRES
6. BECOMING EASILY ANNOYED OR IRRITABLE: NEARLY EVERY DAY
7. FEELING AFRAID AS IF SOMETHING AWFUL MIGHT HAPPEN: NEARLY EVERY DAY
2. NOT BEING ABLE TO STOP OR CONTROL WORRYING: NEARLY EVERY DAY
3. WORRYING TOO MUCH ABOUT DIFFERENT THINGS: NEARLY EVERY DAY
1. FEELING NERVOUS, ANXIOUS, OR ON EDGE: NEARLY EVERY DAY
IF YOU CHECKED OFF ANY PROBLEMS ON THIS QUESTIONNAIRE, HOW DIFFICULT HAVE THESE PROBLEMS MADE IT FOR YOU TO DO YOUR WORK, TAKE CARE OF THINGS AT HOME, OR GET ALONG WITH OTHER PEOPLE: EXTREMELY DIFFICULT
GAD7 TOTAL SCORE: 21
5. BEING SO RESTLESS THAT IT IS HARD TO SIT STILL: NEARLY EVERY DAY

## 2018-05-02 ASSESSMENT — PATIENT HEALTH QUESTIONNAIRE - PHQ9: 5. POOR APPETITE OR OVEREATING: NEARLY EVERY DAY

## 2018-05-02 NOTE — PROGRESS NOTES
SUBJECTIVE:   Lucero Collier is a 22 year old female who presents to clinic today for the following health issues:      Medication Followup of Prozac    Taking Medication as prescribed: yes    Side Effects:  Feeling tired, fatigued    Medication Helping Symptoms:  NO, only helps some days     Patient is here today to follow up on her mood  She feels a little bit better than last visit, however she feels like the medication makes her really tired  Is taking in AM  She admits to poor sleep, difficulty with appetite and also has trouble with mood swings and a lot of anxiety  No SI/HI  No change to home life or work life    Patient also is interested in refill of Lidex cream   Has itchy rash on elbows (inside) and neck  Uses cream periodically which helps    Problem list and histories reviewed & adjusted, as indicated.  Additional history: as documented    Patient Active Problem List   Diagnosis     ADHD (attention deficit hyperactivity disorder)     Constipation     Myopia     Spells     Chronic headaches     GERD (gastroesophageal reflux disease)     Adverse reaction to pertussis vaccine     Complex partial seizures (H)     Care Plan- Health Care Home     Learning problem     Overweight     Sleep disturbance     Adjustment disorder with mixed anxiety and depressed mood     Migraine without status migrainosus, not intractable, unspecified migraine type     Past Surgical History:   Procedure Laterality Date     ENDOSCOPY UPPER WITH PANCREATIC STIMULATION  4/06    Esophagitis 4/06     NISSEN FUNDOPLICATION  6/07    Dr. Meneses     PE TUBES  3/98     TONSILLECTOMY & ADENOIDECTOMY  4/02       Social History   Substance Use Topics     Smoking status: Current Every Day Smoker     Types: Cigarettes     Smokeless tobacco: Never Used      Comment: 5 qd     Alcohol use No     Family History   Problem Relation Age of Onset     DIABETES Maternal Grandmother      Hypertension Maternal Grandmother      DIABETES Paternal  "Grandmother      Cardiovascular Paternal Grandmother      Breast Cancer Paternal Grandmother 61     right side breast cancer     Genetic Disorder Other      nephew.-genetic defects      Obesity Mother      Family History Negative Father          Current Outpatient Prescriptions   Medication Sig Dispense Refill     fluocinonide (LIDEX) 0.05 % cream Apply sparingly to affected area twice daily as needed.  Do not apply to face. 60 g 0     ondansetron (ZOFRAN) 4 MG tablet Take 1 tablet (4 mg) by mouth every 8 hours as needed As needed for headache 20 tablet 3     pimecrolimus (ELIDEL) 1 % cream Apply topically 2 times daily Ok to use on face 60 g 3     prochlorperazine (COMPAZINE) 10 MG tablet Take 1 tablet (10 mg) by mouth every 6 hours as needed for nausea or vomiting 10 tablet 1     ranitidine (ZANTAC) 150 MG tablet Take 1 tablet (150 mg) by mouth At Bedtime 90 tablet 1     SUMAtriptan (IMITREX) 25 MG tablet Take 1-2 tablets (25-50 mg) by mouth at onset of headache for migraine May repeat in 2 hours. Max 8 tablets/24 hours. 9 tablet 1     traZODone (DESYREL) 100 MG tablet Take 1 tablet (100 mg) by mouth nightly as needed for sleep 90 tablet 0     No Known Allergies    Reviewed and updated as needed this visit by clinical staff  Tobacco  Allergies  Meds  Med Hx  Surg Hx  Fam Hx  Soc Hx      Reviewed and updated as needed this visit by Provider         ROS:  Constitutional, HEENT, cardiovascular, pulmonary, gi and gu systems are negative, except as otherwise noted.    OBJECTIVE:     /68 (BP Location: Right arm, Patient Position: Chair, Cuff Size: Adult Regular)  Pulse 93  Temp 98.4  F (36.9  C) (Oral)  Resp 18  Ht 5' 5.75\" (1.67 m)  Wt 202 lb 12.8 oz (92 kg)  LMP 04/14/2018 (Exact Date)  SpO2 98%  Breastfeeding? No  BMI 32.98 kg/m2  Body mass index is 32.98 kg/(m^2).  GENERAL: healthy, alert and no distress  NECK: no adenopathy, no asymmetry, masses, or scars and thyroid normal to palpation  RESP: " lungs clear to auscultation - no rales, rhonchi or wheezes  CV: regular rate and rhythm, normal S1 S2, no S3 or S4, no murmur, click or rub, no peripheral edema and peripheral pulses strong  MS: no gross musculoskeletal defects noted, no edema  PSYCH: mentation appears normal, affect normal/bright  SKIN: faint red, dry rash on inner elbows and small spot on neck  Diagnostic Test Results:  none     ASSESSMENT/PLAN:             1. Adjustment disorder with mixed anxiety and depressed mood  Chronic issue, PHQ9/GAD7 updated today.  Will plan to increase Prozac to max dose, will also add Buspar to see if improves anxiety.  F/U in 1 month to see if symptoms improve.  Discussed if worsening symptoms- be seen sooner.  - FLUoxetine (PROZAC) 40 MG capsule; Take 2 capsules (80 mg) by mouth daily  Dispense: 60 capsule; Refill: 1  - busPIRone (BUSPAR) 5 MG tablet; Start at 5 mg twice daily for 3 days, then 7.5 mg (1.5 tabs) twice daily for 3 days, then 10 mg (2 tabs) twice daily for 3 days, then 12.5 mg (2.5 tabs) twice daily for 3 days, then 15 mg (3 tabs) twice daily and stay at that dose  Dispense: 150 tablet; Refill: 0    2. Atopic dermatitis, unspecified type  Chronic issue, with recent flare, meds refilled.  - fluocinonide (LIDEX) 0.05 % cream; Apply sparingly to affected area twice daily as needed.  Do not apply to face.  Dispense: 60 g; Refill: 0    Risks, benefits and alternatives were discussed with patient. Agreeable to the plan of care.      Iris Rand PA-C  [facility]

## 2018-05-02 NOTE — MR AVS SNAPSHOT
After Visit Summary   5/2/2018    Lucero Collier    MRN: [id number]           Patient Information     Date Of Birth          1995        Visit Information        Provider Department      5/2/2018 2:30 PM Iris Rand PA-C [facility]        Today's Diagnoses     Adjustment disorder with mixed anxiety and depressed mood    -  1    Atopic dermatitis, unspecified type           Follow-ups after your visit        Follow-up notes from your care team     Return in about 4 weeks (around 5/30/2018) for Mood Recheck.      Your next 10 appointments already scheduled     May 30, 2018  2:50 PM CDT   SHORT with Iris Rand PA-C   [facility] ([facility]    [address] [facility] [address] [phone]              Who to contact     If you have questions or need follow up information about today's clinic visit or your schedule please contact [facility] directly at [phone].  Normal or non-critical lab and imaging results will be communicated to you by [employer], letter or phone within 4 business days after the clinic has received the results. If you do not hear from us within 7 days, please contact the clinic through [employer] or phone. If you have a critical or abnormal lab result, we will notify you by phone as soon as possible.  Submit refill requests through [employer] or call your pharmacy and they will forward the refill request to us. Please allow 3 business days for your refill to be completed.          Additional Information About Your Visit        [employer] Information     [employer] gives you secure access to your electronic health record. If you see a primary care provider, you can also send messages to your care team and make appointments. If you have questions, please call your primary care clinic.  If you do not have a primary care provider, please call [phone] and they will assist you.       "  Care EveryWhere ID     This is your Care EveryWhere ID. This could be used by other organizations to access your [place] medical records  [id number]        Your Vitals Were     Pulse Temperature Respirations Height Last Period Pulse Oximetry    93 98.4  F (36.9  C) (Oral) 18 5' 5.75\" (1.67 m) 04/14/2018 (Exact Date) 98%    Breastfeeding? BMI (Body Mass Index)                No 32.98 kg/m2           Blood Pressure from Last 3 Encounters:   05/02/18 108/68   04/16/18 124/72   03/21/18 102/68    Weight from Last 3 Encounters:   05/02/18 202 lb 12.8 oz (92 kg)   04/16/18 204 lb 12.8 oz (92.9 kg)   03/21/18 202 lb 9.6 oz (91.9 kg)              Today, you had the following     No orders found for display         Today's Medication Changes          These changes are accurate as of 5/2/18  2:32 PM.  If you have any questions, ask your nurse or doctor.               Stop taking these medicines if you haven't already. Please contact your care team if you have questions.     FLUoxetine 20 MG capsule   Commonly known as:  PROzac   Stopped by:  Iris Rand PA-C                    Primary Care Provider Office Phone # Fax #    Iris Rand PA-C [phone] [address] [facility] [address]        Equal Access to Services     [facility] Center[facility]UBINA AH: Hadii aad ku hadasho Socharisseali, waaxda luqadaha, qaybta kaalmada adeegyada, gomez cole . So [facility] [phone].    ATENCIÓN: Si habla español, tiene a joy disposición servicios gratuitos de asistencia lingüística. Llame al [phone].    We comply with applicable federal civil rights laws and [place] laws. We do not discriminate on the basis of race, color, national origin, age, disability, sex, sexual orientation, or gender identity.            Thank you!     Thank you for choosing [facility]  for your care. Our goal is always to provide you with excellent care. Hearing back from our patients " is one way we can continue to improve our services. Please take a few minutes to complete the written survey that you may receive in the mail after your visit with us. Thank you!             Your Updated Medication List - Protect others around you: Learn how to safely use, store and throw away your medicines at www.disposemymeds.org.          This list is accurate as of 5/2/18  2:32 PM.  Always use your most recent med list.                   Brand Name Dispense Instructions for use Diagnosis    fluocinonide 0.05 % cream    LIDEX    60 g    Apply sparingly to affected area twice daily as needed.  Do not apply to face.    Atopic dermatitis       ondansetron 4 MG tablet    ZOFRAN    20 tablet    Take 1 tablet (4 mg) by mouth every 8 hours as needed As needed for headache    Chronic tension-type headache, not intractable       pimecrolimus 1 % cream    ELIDEL    60 g    Apply topically 2 times daily Ok to use on face    Atopic dermatitis       prochlorperazine 10 MG tablet    COMPAZINE    10 tablet    Take 1 tablet (10 mg) by mouth every 6 hours as needed for nausea or vomiting    Nausea and vomiting, intractability of vomiting not specified, unspecified vomiting type       ranitidine 150 MG tablet    ZANTAC    90 tablet    Take 1 tablet (150 mg) by mouth At Bedtime    Gastroesophageal reflux disease without esophagitis       SUMAtriptan 25 MG tablet    IMITREX    9 tablet    Take 1-2 tablets (25-50 mg) by mouth at onset of headache for migraine May repeat in 2 hours. Max 8 tablets/24 hours.    Migraine without status migrainosus, not intractable, unspecified migraine type       traZODone 100 MG tablet    DESYREL    90 tablet    Take 1 tablet (100 mg) by mouth nightly as needed for sleep    Sleep disturbance

## 2018-05-03 ASSESSMENT — ANXIETY QUESTIONNAIRES: GAD7 TOTAL SCORE: 21

## 2018-05-03 ASSESSMENT — PATIENT HEALTH QUESTIONNAIRE - PHQ9: SUM OF ALL RESPONSES TO PHQ QUESTIONS 1-9: 21

## 2018-05-16 ENCOUNTER — TRANSFERRED RECORDS (OUTPATIENT)
Dept: HEALTH INFORMATION MANAGEMENT | Facility: CLINIC | Age: 23
End: 2018-05-16

## 2018-05-16 LAB
ALT SERPL-CCNC: 10 U/L (ref 0–45)
AST SERPL-CCNC: 14 U/L (ref 0–40)
CREAT SERPL-MCNC: 0.67 MG/DL (ref 0.6–1.1)
GFR SERPL CREATININE-BSD FRML MDRD: >60 ML/MIN/1.73M2
GLUCOSE SERPL-MCNC: 93 MG/DL (ref 70–125)
POTASSIUM SERPL-SCNC: 3.6 MMOL/L (ref 3.5–5)
TSH SERPL-ACNC: 0.14 UIU/ML (ref 0.3–5)

## 2018-05-21 ENCOUNTER — OFFICE VISIT (OUTPATIENT)
Dept: FAMILY MEDICINE | Facility: CLINIC | Age: 23
End: 2018-05-21
Payer: COMMERCIAL

## 2018-05-21 VITALS
DIASTOLIC BLOOD PRESSURE: 72 MMHG | RESPIRATION RATE: 16 BRPM | SYSTOLIC BLOOD PRESSURE: 120 MMHG | HEART RATE: 85 BPM | HEIGHT: 66 IN | WEIGHT: 197.2 LBS | BODY MASS INDEX: 31.69 KG/M2 | OXYGEN SATURATION: 98 % | TEMPERATURE: 97.9 F

## 2018-05-21 DIAGNOSIS — F32.2 SEVERE SINGLE CURRENT EPISODE OF MAJOR DEPRESSIVE DISORDER, WITHOUT PSYCHOTIC FEATURES (H): Primary | ICD-10-CM

## 2018-05-21 DIAGNOSIS — F41.9 ANXIETY: ICD-10-CM

## 2018-05-21 DIAGNOSIS — G40.209 PARTIAL SYMPTOMATIC EPILEPSY WITH COMPLEX PARTIAL SEIZURES, NOT INTRACTABLE, WITHOUT STATUS EPILEPTICUS (H): ICD-10-CM

## 2018-05-21 PROCEDURE — 99214 OFFICE O/P EST MOD 30 MIN: CPT | Performed by: PHYSICIAN ASSISTANT

## 2018-05-21 RX ORDER — LEVETIRACETAM 500 MG/1
500 TABLET ORAL 2 TIMES DAILY
COMMUNITY
Start: 2018-05-16 | End: 2018-05-30

## 2018-05-21 ASSESSMENT — ANXIETY QUESTIONNAIRES
GAD7 TOTAL SCORE: 14
5. BEING SO RESTLESS THAT IT IS HARD TO SIT STILL: MORE THAN HALF THE DAYS
3. WORRYING TOO MUCH ABOUT DIFFERENT THINGS: MORE THAN HALF THE DAYS
1. FEELING NERVOUS, ANXIOUS, OR ON EDGE: MORE THAN HALF THE DAYS
7. FEELING AFRAID AS IF SOMETHING AWFUL MIGHT HAPPEN: MORE THAN HALF THE DAYS
IF YOU CHECKED OFF ANY PROBLEMS ON THIS QUESTIONNAIRE, HOW DIFFICULT HAVE THESE PROBLEMS MADE IT FOR YOU TO DO YOUR WORK, TAKE CARE OF THINGS AT HOME, OR GET ALONG WITH OTHER PEOPLE: VERY DIFFICULT
2. NOT BEING ABLE TO STOP OR CONTROL WORRYING: MORE THAN HALF THE DAYS
6. BECOMING EASILY ANNOYED OR IRRITABLE: MORE THAN HALF THE DAYS

## 2018-05-21 ASSESSMENT — PATIENT HEALTH QUESTIONNAIRE - PHQ9: 5. POOR APPETITE OR OVEREATING: MORE THAN HALF THE DAYS

## 2018-05-21 NOTE — MR AVS SNAPSHOT
After Visit Summary   5/21/2018    Lucero Collier    MRN: [id number]           Patient Information     Date Of Birth          1995        Visit Information        Provider Department      5/21/2018 3:50 PM Iris Rand PA-C [facility]        Today's Diagnoses     Severe single current episode of major depressive disorder, without psychotic features (H)    -  1    Anxiety           Follow-ups after your visit        Additional Services     MENTAL HEALTH REFERRAL  - Adult; Outpatient Treatment; Individual/Couples/Family/Group Therapy/Health Psychology; [facility] [facility] [phone]; We will contact you to schedule the appointment or please call with any questions       All scheduling is subject to the client's specific insurance plan & benefits, provider/location availability, and provider clinical specialities.  Please arrive 15 minutes early for your first appointment and bring your completed paperwork.    Please be aware that coverage of these services is subject to the terms and limitations of your health insurance plan.  Call member services at your health plan with any benefit or coverage questions.                      MENTAL HEALTH REFERRAL  - Adult; Psychiatry and Medication Management; Psychiatry; [facility] [facility] [phone].  Medication management & future refills will be returned to [facility] PCP upon completion of evaluation; We james...       All scheduling is subject to the client's specific insurance plan & benefits, provider/location availability, and provider clinical specialities.  Please arrive 15 minutes early for your first appointment and bring your completed paperwork.    Please be aware that coverage of these services is subject to the terms and limitations of your health insurance plan.  Call member services at your health plan with any benefit or coverage questions.                            Follow-up  notes from your care team     Return in about 9 days (around 5/30/2018).      Your next 10 appointments already scheduled     May 30, 2018  2:50 PM CDT   SHORT with Iris Rand PA-C   [facility]    [address] [facility] [address] [phone]            Jun 11, 2018  3:00 PM CDT   Return Visit with Gian Gao, [facility] ([facility])    [address] [place] [address] [phone]              Who to contact     If you have questions or need follow up information about today's clinic visit or your schedule please contact [facility] directly at [phone].  Normal or non-critical lab and imaging results will be communicated to you by MyChart, letter or phone within 4 business days after the clinic has received the results. If you do not hear from us within 7 days, please contact the clinic through MyChart or phone. If you have a critical or abnormal lab result, we will notify you by phone as soon as possible.  Submit refill requests through [employer] or call your pharmacy and they will forward the refill request to us. Please allow 3 business days for your refill to be completed.          Additional Information About Your Visit        [employer] Information     [employer] gives you secure access to your electronic health record. If you see a primary care provider, you can also send messages to your care team and make appointments. If you have questions, please call your primary care clinic.  If you do not have a primary care provider, please call [phone] and they will assist you.        Care EveryWhere ID     This is your Care EveryWhere ID. This could be used by other organizations to access your [place] medical records  [id number]        Your Vitals Were     Pulse Temperature Respirations Height Last Period Pulse Oximetry    85 97.9  F (36.6  C) (Oral) 16 5'  "5.75\" (1.67 m) 05/14/2018 (Approximate) 98%    Breastfeeding? BMI (Body Mass Index)                No 32.07 kg/m2           Blood Pressure from Last 3 Encounters:   05/21/18 120/72   05/02/18 108/68   04/16/18 124/72    Weight from Last 3 Encounters:   05/21/18 197 lb 3.2 oz (89.4 kg)   05/02/18 202 lb 12.8 oz (92 kg)   04/16/18 204 lb 12.8 oz (92.9 kg)              We Performed the Following     MENTAL HEALTH REFERRAL  - Adult; Outpatient Treatment; Individual/Couples/Family/Group Therapy/Health Psychology; [facility]: [facility] [phone]; We will contact you to schedule the appointment or please call with any questions     MENTAL HEALTH REFERRAL  - Adult; Psychiatry and Medication Management; Psychiatry; [facility]: [facility] [phone].  Medication management & future refills will be returned to [facility] PCP upon completion of evaluation; We james...          Today's Medication Changes          These changes are accurate as of 5/21/18  4:09 PM.  If you have any questions, ask your nurse or doctor.               Start taking these medicines.        Dose/Directions    sertraline 50 MG tablet   Commonly known as:  ZOLOFT   Used for:  Severe single current episode of major depressive disorder, without psychotic features (H), Anxiety   Started by:  Iris Rand PA-C        Take 1/2 tablet (25 mg) for 1-2 weeks, then increase to 1 tablet orally daily   Quantity:  30 tablet   Refills:  0         Stop taking these medicines if you haven't already. Please contact your care team if you have questions.     busPIRone 5 MG tablet   Commonly known as:  BUSPAR   Stopped by:  Iris Rand PA-C           FLUoxetine 40 MG capsule   Commonly known as:  PROzac   Stopped by:  Iris Rand PA-C                Where to get your medicines      These medications were sent to [facility]/pharmacy #[id number] - [place], [place] - [address] [place] [address]     " Phone:  [phone]     sertraline 50 MG tablet                Primary Care Provider Office Phone # Fax #    Iris Rand PA-C [phone]       [address]  [facility] [address]        Equal Access to Services     SILVIANO AYALA : Hadii aad ku hadasho Soomaali, waaxda luqadaha, qaybta kaalmada adeegyada, waxay marloin hayaan adeeg isaacjanelle labalta . So [facility] [phone].    ATENCIÓN: Si habla español, tiene a joy disposición servicios gratuitos de asistencia lingüística. Llame al [phone].    We comply with applicable federal civil rights laws and [place] laws. We do not discriminate on the basis of race, color, national origin, age, disability, sex, sexual orientation, or gender identity.            Thank you!     Thank you for choosing [facility]  for your care. Our goal is always to provide you with excellent care. Hearing back from our patients is one way we can continue to improve our services. Please take a few minutes to complete the written survey that you may receive in the mail after your visit with us. Thank you!             Your Updated Medication List - Protect others around you: Learn how to safely use, store and throw away your medicines at www.disposemymeds.org.          This list is accurate as of 5/21/18  4:09 PM.  Always use your most recent med list.                   Brand Name Dispense Instructions for use Diagnosis    fluocinonide 0.05 % cream    LIDEX    60 g    Apply sparingly to affected area twice daily as needed.  Do not apply to face.    Atopic dermatitis, unspecified type       levETIRAcetam 500 MG tablet    KEPPRA     Take 500 mg by mouth 2 times daily        ondansetron 4 MG tablet    ZOFRAN    20 tablet    Take 1 tablet (4 mg) by mouth every 8 hours as needed As needed for headache    Chronic tension-type headache, not intractable       pimecrolimus 1 % cream    ELIDEL    60 g    Apply topically 2 times daily Ok to use on face     Atopic dermatitis       prochlorperazine 10 MG tablet    COMPAZINE    10 tablet    Take 1 tablet (10 mg) by mouth every 6 hours as needed for nausea or vomiting    Nausea and vomiting, intractability of vomiting not specified, unspecified vomiting type       ranitidine 150 MG tablet    ZANTAC    90 tablet    Take 1 tablet (150 mg) by mouth At Bedtime    Gastroesophageal reflux disease without esophagitis       sertraline 50 MG tablet    ZOLOFT    30 tablet    Take 1/2 tablet (25 mg) for 1-2 weeks, then increase to 1 tablet orally daily    Severe single current episode of major depressive disorder, without psychotic features (H), Anxiety       SUMAtriptan 25 MG tablet    IMITREX    9 tablet    Take 1-2 tablets (25-50 mg) by mouth at onset of headache for migraine May repeat in 2 hours. Max 8 tablets/24 hours.    Migraine without status migrainosus, not intractable, unspecified migraine type       traZODone 100 MG tablet    DESYREL    90 tablet    Take 1 tablet (100 mg) by mouth nightly as needed for sleep    Sleep disturbance
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
## 2018-05-22 ASSESSMENT — PATIENT HEALTH QUESTIONNAIRE - PHQ9: SUM OF ALL RESPONSES TO PHQ QUESTIONS 1-9: 26

## 2018-05-22 ASSESSMENT — ANXIETY QUESTIONNAIRES: GAD7 TOTAL SCORE: 14

## 2018-05-29 ENCOUNTER — OFFICE VISIT (OUTPATIENT)
Dept: PSYCHOLOGY | Facility: CLINIC | Age: 23
End: 2018-05-29
Payer: COMMERCIAL

## 2018-05-29 DIAGNOSIS — F33.3 SEVERE RECURRENT MAJOR DEPRESSION W/PSYCHOTIC FEATURES, MOOD-CONGRUENT (H): Primary | ICD-10-CM

## 2018-05-29 PROBLEM — F32.A DEPRESSION: Status: ACTIVE | Noted: 2018-05-29

## 2018-05-29 PROBLEM — F41.9 ANXIETY: Status: ACTIVE | Noted: 2018-05-29

## 2018-05-29 PROBLEM — F32.2 SEVERE SINGLE CURRENT EPISODE OF MAJOR DEPRESSIVE DISORDER, WITHOUT PSYCHOTIC FEATURES (H): Status: ACTIVE | Noted: 2018-05-29

## 2018-05-29 PROBLEM — I10 HTN (HYPERTENSION): Status: ACTIVE | Noted: 2018-05-29

## 2018-05-29 PROCEDURE — 90847 FAMILY PSYTX W/PT 50 MIN: CPT | Performed by: COUNSELOR

## 2018-05-29 ASSESSMENT — ANXIETY QUESTIONNAIRES
6. BECOMING EASILY ANNOYED OR IRRITABLE: MORE THAN HALF THE DAYS
7. FEELING AFRAID AS IF SOMETHING AWFUL MIGHT HAPPEN: MORE THAN HALF THE DAYS
3. WORRYING TOO MUCH ABOUT DIFFERENT THINGS: MORE THAN HALF THE DAYS
5. BEING SO RESTLESS THAT IT IS HARD TO SIT STILL: MORE THAN HALF THE DAYS
2. NOT BEING ABLE TO STOP OR CONTROL WORRYING: MORE THAN HALF THE DAYS
IF YOU CHECKED OFF ANY PROBLEMS ON THIS QUESTIONNAIRE, HOW DIFFICULT HAVE THESE PROBLEMS MADE IT FOR YOU TO DO YOUR WORK, TAKE CARE OF THINGS AT HOME, OR GET ALONG WITH OTHER PEOPLE: SOMEWHAT DIFFICULT
GAD7 TOTAL SCORE: 14
1. FEELING NERVOUS, ANXIOUS, OR ON EDGE: MORE THAN HALF THE DAYS

## 2018-05-29 ASSESSMENT — PATIENT HEALTH QUESTIONNAIRE - PHQ9: 5. POOR APPETITE OR OVEREATING: MORE THAN HALF THE DAYS

## 2018-05-30 ENCOUNTER — OFFICE VISIT (OUTPATIENT)
Dept: FAMILY MEDICINE | Facility: CLINIC | Age: 23
End: 2018-05-30
Payer: COMMERCIAL

## 2018-05-30 VITALS
BODY MASS INDEX: 31.66 KG/M2 | HEIGHT: 66 IN | TEMPERATURE: 98.2 F | WEIGHT: 197 LBS | OXYGEN SATURATION: 98 % | HEART RATE: 93 BPM | SYSTOLIC BLOOD PRESSURE: 122 MMHG | RESPIRATION RATE: 16 BRPM | DIASTOLIC BLOOD PRESSURE: 60 MMHG

## 2018-05-30 DIAGNOSIS — G40.209 PARTIAL SYMPTOMATIC EPILEPSY WITH COMPLEX PARTIAL SEIZURES, NOT INTRACTABLE, WITHOUT STATUS EPILEPTICUS (H): ICD-10-CM

## 2018-05-30 DIAGNOSIS — F41.9 ANXIETY: ICD-10-CM

## 2018-05-30 DIAGNOSIS — F32.2 SEVERE SINGLE CURRENT EPISODE OF MAJOR DEPRESSIVE DISORDER, WITHOUT PSYCHOTIC FEATURES (H): Primary | ICD-10-CM

## 2018-05-30 PROCEDURE — 99213 OFFICE O/P EST LOW 20 MIN: CPT | Performed by: PHYSICIAN ASSISTANT

## 2018-05-30 NOTE — PROGRESS NOTES
SUBJECTIVE:   Lucero Collier is a 22 year old female who presents to clinic today for the following health issues:      Medication Followup of Zoloft and Neuro Appt    Taking Medication as prescribed: yes    Side Effects:  None    Medication Helping Symptoms:  yes     Patient is here today to follow up on addition of Zoloft  Feels like the medication is helpful for her mood  She is feeling much better  No s/e from the medication  No SI/HI  No recent drug use, did drink 3 alcoholic beverages over the weekend  She is still attending counseling, upcoming appt June 11th    She also notes she saw the Neurologist this morning  Has a brain MRI tomorrow and then 48 hour EEG next week  She was taken off the Keppra      Problem list and histories reviewed & adjusted, as indicated.  Additional history: as documented    Patient Active Problem List   Diagnosis     ADHD (attention deficit hyperactivity disorder)     Constipation     Myopia     Spells     Chronic headaches     GERD (gastroesophageal reflux disease)     Adverse reaction to pertussis vaccine     Complex partial seizures (H)     Care Plan- Health Care Home     Learning problem     Overweight     Sleep disturbance     Adjustment disorder with mixed anxiety and depressed mood     Migraine without status migrainosus, not intractable, unspecified migraine type     Anxiety     Severe single current episode of major depressive disorder, without psychotic features (H)     Depression     HTN (hypertension)     Past Surgical History:   Procedure Laterality Date     ENDOSCOPY UPPER WITH PANCREATIC STIMULATION  4/06    Esophagitis 4/06     NISSEN FUNDOPLICATION  6/07    Dr. Meneses     PE TUBES  3/98     TONSILLECTOMY & ADENOIDECTOMY  4/02       Social History   Substance Use Topics     Smoking status: Current Every Day Smoker     Types: Cigarettes     Smokeless tobacco: Never Used      Comment: 5 qd     Alcohol use No     Family History   Problem Relation Age of Onset      "DIABETES Maternal Grandmother      Hypertension Maternal Grandmother      DIABETES Paternal Grandmother      Cardiovascular Paternal Grandmother      Breast Cancer Paternal Grandmother 61     right side breast cancer     Genetic Disorder Other      nephew.-genetic defects      Obesity Mother      Family History Negative Father          Current Outpatient Prescriptions   Medication Sig Dispense Refill     fluocinonide (LIDEX) 0.05 % cream Apply sparingly to affected area twice daily as needed.  Do not apply to face. 60 g 0     ondansetron (ZOFRAN) 4 MG tablet Take 1 tablet (4 mg) by mouth every 8 hours as needed As needed for headache 20 tablet 3     pimecrolimus (ELIDEL) 1 % cream Apply topically 2 times daily Ok to use on face 60 g 3     prochlorperazine (COMPAZINE) 10 MG tablet Take 1 tablet (10 mg) by mouth every 6 hours as needed for nausea or vomiting 10 tablet 1     ranitidine (ZANTAC) 150 MG tablet Take 1 tablet (150 mg) by mouth At Bedtime 90 tablet 1     sertraline (ZOLOFT) 50 MG tablet Take 1/2 tablet (25 mg) for 1-2 weeks, then increase to 1 tablet orally daily 30 tablet 0     SUMAtriptan (IMITREX) 25 MG tablet Take 1-2 tablets (25-50 mg) by mouth at onset of headache for migraine May repeat in 2 hours. Max 8 tablets/24 hours. 9 tablet 1     traZODone (DESYREL) 100 MG tablet Take 1 tablet (100 mg) by mouth nightly as needed for sleep 90 tablet 0     No Known Allergies    Reviewed and updated as needed this visit by clinical staff  Tobacco  Allergies  Meds  Med Hx  Surg Hx  Fam Hx  Soc Hx      Reviewed and updated as needed this visit by Provider         ROS:  Constitutional, HEENT, cardiovascular, pulmonary, gi and gu systems are negative, except as otherwise noted.    OBJECTIVE:     /60 (BP Location: Right arm, Patient Position: Chair, Cuff Size: Adult Regular)  Pulse 93  Temp 98.2  F (36.8  C) (Oral)  Resp 16  Ht 5' 5.75\" (1.67 m)  Wt 197 lb (89.4 kg)  LMP 05/14/2018 (Approximate)  " SpO2 98%  Breastfeeding? No  BMI 32.04 kg/m2  Body mass index is 32.04 kg/(m^2).  GENERAL: healthy, alert and no distress  NECK: no adenopathy, no asymmetry, masses, or scars and thyroid normal to palpation  RESP: lungs clear to auscultation - no rales, rhonchi or wheezes  CV: regular rate and rhythm, normal S1 S2, no S3 or S4, no murmur, click or rub, no peripheral edema and peripheral pulses strong  MS: no gross musculoskeletal defects noted, no edema  NEURO: Normal strength and tone, mentation intact and speech normal    Diagnostic Test Results:  none     ASSESSMENT/PLAN:             1. Severe single current episode of major depressive disorder, without psychotic features (H)  2. Anxiety  Chronic issue, improved with addition of Zoloft.  Will continue to take medication, recheck in 1 month.  Continue counseling.  F/U sooner if issues.    3. Partial symptomatic epilepsy with complex partial seizures, not intractable, without status epilepticus  Will being following with Neuro, has MRI and EEG set up.      Risks, benefits and alternatives were discussed with patient. Agreeable to the plan of care.      Iris Rand PA-C  [facility]

## 2018-05-30 NOTE — MR AVS SNAPSHOT
After Visit Summary   5/30/2018    Lucero Collier    MRN: [id number]           Patient Information     Date Of Birth          1995        Visit Information        Provider Department      5/30/2018 2:50 PM Iris Rand PA-C [facility]        Today's Diagnoses     Severe single current episode of major depressive disorder, without psychotic features (H)    -  1    Anxiety        Partial symptomatic epilepsy with complex partial seizures, not intractable, without status epilepticus (H)           Follow-ups after your visit        Follow-up notes from your care team     Return in about 4 weeks (around 6/27/2018) for Mood Recheck.      Your next 10 appointments already scheduled     Jun 11, 2018  3:00 PM CDT   [facility] [address] [place] [address] [phone]            Jun 29, 2018  4:10 PM CDT   SHORT with Iris Rand PA-C   [facility] ([facility]    [address] [facility] [address] [phone]            Jul 05, 2018  1:00 PM CDT   Return Visit with Gina Gao [facility] ([facility]) [address] [place] [address] [phone]            Jul 12, 2018 10:00 AM CDT   Return Visit with Gina [facility]ao [facility] ([facility] [address] [place] [address] [phone]              Who to contact     If you have questions or need follow up information about today's clinic visit or your schedule please contact [facility] directly at [phone].  Normal or non-critical lab and imaging results will be communicated to you by MyChart, letter or phone within 4 business days after the clinic has received the results. If you do not hear from us within 7 days,  "please contact the clinic through [employer] or phone. If you have a critical or abnormal lab result, we will notify you by phone as soon as possible.  Submit refill requests through [employer] or call your pharmacy and they will forward the refill request to us. Please allow 3 business days for your refill to be completed.          Additional Information About Your Visit        [employer] Information     [employer] gives you secure access to your electronic health record. If you see a primary care provider, you can also send messages to your care team and make appointments. If you have questions, please call your primary care clinic.  If you do not have a primary care provider, please call [phone] and they will assist you.        Care EveryWhere ID     This is your Care EveryWhere ID. This could be used by other organizations to access your [place] medical records  [id number]        Your Vitals Were     Pulse Temperature Respirations Height Last Period Pulse Oximetry    93 98.2  F (36.8  C) (Oral) 16 5' 5.75\" (1.67 m) 05/14/2018 (Approximate) 98%    Breastfeeding? BMI (Body Mass Index)                No 32.04 kg/m2           Blood Pressure from Last 3 Encounters:   05/30/18 122/60   05/21/18 120/72   05/02/18 108/68    Weight from Last 3 Encounters:   05/30/18 197 lb (89.4 kg)   05/21/18 197 lb 3.2 oz (89.4 kg)   05/02/18 202 lb 12.8 oz (92 kg)              Today, you had the following     No orders found for display       Primary Care Provider Office Phone # Fax #    Iris Rand PA-C [phone]       [address] [facility] [address]        Equal Access to Services     [facility] JAMIE : Hadii cecelia madrigal Somarquita, waaxda luqadaha, qaybta kaalmagomez hill . So [facility] [phone].    ATENCIÓN: Si habla español, tiene a joy disposición servicios gratuitos de asistencia lingüística. Llame al [phone].    We comply with applicable federal civil " rights laws and [place] laws. We do not discriminate on the basis of race, color, national origin, age, disability, sex, sexual orientation, or gender identity.            Thank you!     Thank you for choosing [facility]  for your care. Our goal is always to provide you with excellent care. Hearing back from our patients is one way we can continue to improve our services. Please take a few minutes to complete the written survey that you may receive in the mail after your visit with us. Thank you!             Your Updated Medication List - Protect others around you: Learn how to safely use, store and throw away your medicines at www.disposemymeds.org.          This list is accurate as of 5/30/18  3:00 PM.  Always use your most recent med list.                   Brand Name Dispense Instructions for use Diagnosis    fluocinonide 0.05 % cream    LIDEX    60 g    Apply sparingly to affected area twice daily as needed.  Do not apply to face.    Atopic dermatitis, unspecified type       ondansetron 4 MG tablet    ZOFRAN    20 tablet    Take 1 tablet (4 mg) by mouth every 8 hours as needed As needed for headache    Chronic tension-type headache, not intractable       pimecrolimus 1 % cream    ELIDEL    60 g    Apply topically 2 times daily Ok to use on face    Atopic dermatitis       prochlorperazine 10 MG tablet    COMPAZINE    10 tablet    Take 1 tablet (10 mg) by mouth every 6 hours as needed for nausea or vomiting    Nausea and vomiting, intractability of vomiting not specified, unspecified vomiting type       ranitidine 150 MG tablet    ZANTAC    90 tablet    Take 1 tablet (150 mg) by mouth At Bedtime    Gastroesophageal reflux disease without esophagitis       sertraline 50 MG tablet    ZOLOFT    30 tablet    Take 1/2 tablet (25 mg) for 1-2 weeks, then increase to 1 tablet orally daily    Severe single current episode of major depressive disorder, without psychotic features (H), Anxiety        SUMAtriptan 25 MG tablet    IMITREX    9 tablet    Take 1-2 tablets (25-50 mg) by mouth at onset of headache for migraine May repeat in 2 hours. Max 8 tablets/24 hours.    Migraine without status migrainosus, not intractable, unspecified migraine type       traZODone 100 MG tablet    DESYREL    90 tablet    Take 1 tablet (100 mg) by mouth nightly as needed for sleep    Sleep disturbance

## 2018-06-01 ENCOUNTER — TRANSFERRED RECORDS (OUTPATIENT)
Dept: HEALTH INFORMATION MANAGEMENT | Facility: CLINIC | Age: 23
End: 2018-06-01

## 2018-06-04 ENCOUNTER — TRANSFERRED RECORDS (OUTPATIENT)
Dept: HEALTH INFORMATION MANAGEMENT | Facility: CLINIC | Age: 23
End: 2018-06-04
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
## 2018-06-05 ASSESSMENT — ANXIETY QUESTIONNAIRES: GAD7 TOTAL SCORE: 14

## 2018-06-05 ASSESSMENT — PATIENT HEALTH QUESTIONNAIRE - PHQ9: SUM OF ALL RESPONSES TO PHQ QUESTIONS 1-9: 20

## 2018-07-05 ENCOUNTER — OFFICE VISIT (OUTPATIENT)
Dept: PSYCHOLOGY | Facility: CLINIC | Age: 23
End: 2018-07-05
Payer: COMMERCIAL

## 2018-07-05 DIAGNOSIS — F33.1 DEPRESSION, MAJOR, RECURRENT, MODERATE (H): Primary | ICD-10-CM

## 2018-07-05 PROCEDURE — 90834 PSYTX W PT 45 MINUTES: CPT | Performed by: COUNSELOR

## 2018-07-05 ASSESSMENT — ANXIETY QUESTIONNAIRES
2. NOT BEING ABLE TO STOP OR CONTROL WORRYING: SEVERAL DAYS
7. FEELING AFRAID AS IF SOMETHING AWFUL MIGHT HAPPEN: NOT AT ALL
1. FEELING NERVOUS, ANXIOUS, OR ON EDGE: NOT AT ALL
3. WORRYING TOO MUCH ABOUT DIFFERENT THINGS: SEVERAL DAYS
6. BECOMING EASILY ANNOYED OR IRRITABLE: SEVERAL DAYS
5. BEING SO RESTLESS THAT IT IS HARD TO SIT STILL: SEVERAL DAYS
GAD7 TOTAL SCORE: 4
IF YOU CHECKED OFF ANY PROBLEMS ON THIS QUESTIONNAIRE, HOW DIFFICULT HAVE THESE PROBLEMS MADE IT FOR YOU TO DO YOUR WORK, TAKE CARE OF THINGS AT HOME, OR GET ALONG WITH OTHER PEOPLE: NOT DIFFICULT AT ALL

## 2018-07-05 ASSESSMENT — PATIENT HEALTH QUESTIONNAIRE - PHQ9: 5. POOR APPETITE OR OVEREATING: NOT AT ALL

## 2018-07-09 ENCOUNTER — OFFICE VISIT (OUTPATIENT)
Dept: FAMILY MEDICINE | Facility: CLINIC | Age: 23
End: 2018-07-09
Payer: COMMERCIAL

## 2018-07-09 VITALS
WEIGHT: 200.3 LBS | RESPIRATION RATE: 16 BRPM | DIASTOLIC BLOOD PRESSURE: 82 MMHG | HEIGHT: 66 IN | BODY MASS INDEX: 32.19 KG/M2 | HEART RATE: 100 BPM | TEMPERATURE: 98.8 F | OXYGEN SATURATION: 98 % | SYSTOLIC BLOOD PRESSURE: 124 MMHG

## 2018-07-09 DIAGNOSIS — Z97.5 IUD (INTRAUTERINE DEVICE) IN PLACE: ICD-10-CM

## 2018-07-09 DIAGNOSIS — F32.2 SEVERE SINGLE CURRENT EPISODE OF MAJOR DEPRESSIVE DISORDER, WITHOUT PSYCHOTIC FEATURES (H): Primary | ICD-10-CM

## 2018-07-09 DIAGNOSIS — F41.9 ANXIETY: ICD-10-CM

## 2018-07-09 DIAGNOSIS — R79.89 ABNORMAL THYROID STIMULATING HORMONE LEVEL: ICD-10-CM

## 2018-07-09 DIAGNOSIS — G47.9 SLEEP DISTURBANCE: ICD-10-CM

## 2018-07-09 DIAGNOSIS — Z12.4 SCREENING FOR MALIGNANT NEOPLASM OF CERVIX: ICD-10-CM

## 2018-07-09 PROCEDURE — 84443 ASSAY THYROID STIM HORMONE: CPT | Performed by: PHYSICIAN ASSISTANT

## 2018-07-09 PROCEDURE — 36415 COLL VENOUS BLD VENIPUNCTURE: CPT | Performed by: PHYSICIAN ASSISTANT

## 2018-07-09 PROCEDURE — G0145 SCR C/V CYTO,THINLAYER,RESCR: HCPCS | Performed by: PHYSICIAN ASSISTANT

## 2018-07-09 PROCEDURE — 99214 OFFICE O/P EST MOD 30 MIN: CPT | Performed by: PHYSICIAN ASSISTANT

## 2018-07-09 RX ORDER — TRAZODONE HYDROCHLORIDE 100 MG/1
100 TABLET ORAL
Qty: 90 TABLET | Refills: 1 | Status: SHIPPED | OUTPATIENT
Start: 2018-07-09 | End: 2018-11-21

## 2018-07-09 ASSESSMENT — ANXIETY QUESTIONNAIRES
GAD7 TOTAL SCORE: 7
6. BECOMING EASILY ANNOYED OR IRRITABLE: SEVERAL DAYS
2. NOT BEING ABLE TO STOP OR CONTROL WORRYING: MORE THAN HALF THE DAYS
IF YOU CHECKED OFF ANY PROBLEMS ON THIS QUESTIONNAIRE, HOW DIFFICULT HAVE THESE PROBLEMS MADE IT FOR YOU TO DO YOUR WORK, TAKE CARE OF THINGS AT HOME, OR GET ALONG WITH OTHER PEOPLE: SOMEWHAT DIFFICULT
5. BEING SO RESTLESS THAT IT IS HARD TO SIT STILL: NOT AT ALL
1. FEELING NERVOUS, ANXIOUS, OR ON EDGE: SEVERAL DAYS
7. FEELING AFRAID AS IF SOMETHING AWFUL MIGHT HAPPEN: MORE THAN HALF THE DAYS
3. WORRYING TOO MUCH ABOUT DIFFERENT THINGS: SEVERAL DAYS

## 2018-07-09 ASSESSMENT — PATIENT HEALTH QUESTIONNAIRE - PHQ9: 5. POOR APPETITE OR OVEREATING: NOT AT ALL

## 2018-07-09 NOTE — MR AVS SNAPSHOT
After Visit Summary   7/9/2018    Lucero Collier    MRN: [id number]           Patient Information     Date Of Birth          1995        Visit Information        Provider Department      7/9/2018 1:30 PM Iris Rand PA-C [facility]        Today's Diagnoses     Severe single current episode of major depressive disorder, without psychotic features (H)    -  1    Anxiety        Abnormal thyroid stimulating hormone level        Sleep disturbance        Screening for malignant neoplasm of cervix        IUD (intrauterine device) in place           Follow-ups after your visit        Follow-up notes from your care team     Return in about 6 months (around 1/9/2019) for Mood Recheck.      Your next 10 appointments already scheduled     Jul 12, 2018 10:00 AM CDT   Return Visit with Gina Gao, [facility] [address] [place] [address] [phone]              Who to contact     If you have questions or need follow up information about today's clinic visit or your schedule please contact [facility] directly at [phone].  Normal or non-critical lab and imaging results will be communicated to you by [employer], letter or phone within 4 business days after the clinic has received the results. If you do not hear from us within 7 days, please contact the clinic through [employer] or phone. If you have a critical or abnormal lab result, we will notify you by phone as soon as possible.  Submit refill requests through [employer] or call your pharmacy and they will forward the refill request to us. Please allow 3 business days for your refill to be completed.          Additional Information About Your Visit        MyChart Information     [employer] gives you secure access to your electronic health record. If you see a primary care provider, you can also send messages to your care team and make  "appointments. If you have questions, please call your primary care clinic.  If you do not have a primary care provider, please call [phone] and they will assist you.        Care EveryWhere ID     This is your Care EveryWhere ID. This could be used by other organizations to access your [facility] medical records  [id number]        Your Vitals Were     Pulse Temperature Respirations Height Last Period Pulse Oximetry    100 98.8  F (37.1  C) (Oral) 16 5' 5.75\" (1.67 m) (LMP Unknown) 98%    Breastfeeding? BMI (Body Mass Index)                No 32.58 kg/m2           Blood Pressure from Last 3 Encounters:   07/09/18 124/82   05/30/18 122/60   05/21/18 120/72    Weight from Last 3 Encounters:   07/09/18 200 lb 4.8 oz (90.9 kg)   05/30/18 197 lb (89.4 kg)   05/21/18 197 lb 3.2 oz (89.4 kg)              We Performed the Following     Pap imaged thin layer screen only - recommended age 21 - 24 years     TSH with free T4 reflex          Today's Medication Changes          These changes are accurate as of 7/9/18  2:09 PM.  If you have any questions, ask your nurse or doctor.               These medicines have changed or have updated prescriptions.        Dose/Directions    sertraline 50 MG tablet   Commonly known as:  ZOLOFT   This may have changed:    - how much to take  - how to take this  - when to take this   Used for:  Severe single current episode of major depressive disorder, without psychotic features (H), Anxiety   Changed by:  Iris Rand PA-C        Dose:  50 mg   Take 1 tablet (50 mg) by mouth daily Take 1/2 tablet (25 mg) for 1-2 weeks, then increase to 1 tablet orally daily   Quantity:  90 tablet   Refills:  1            Where to get your medicines      [facility] [address] [place] [address] AT [facility] [address]  [facility] [address]     Phone:  [phone]     sertraline 50 MG tablet    traZODone 100 MG tablet "                Primary Care Provider Office Phone # Fax #    Iris Rand PA-C [phone]       [address] [facility] [address]        Equal Access to Services     SILVIANO AYALA : Hadii aad ku hadkeshao Soomaali, waaxda luqadaha, qaybta kaalmada adeegyada, gomez migueln mauricio chappell laEmilianoshaun orozco. So [facility] [phone].    ATENCIÓN: Si habla español, tiene a joy disposición servicios gratuitos de asistencia lingüística. Llame al [phone].    We comply with applicable federal civil rights laws and [place] laws. We do not discriminate on the basis of race, color, national origin, age, disability, sex, sexual orientation, or gender identity.            Thank you!     Thank you for choosing [facility]  for your care. Our goal is always to provide you with excellent care. Hearing back from our patients is one way we can continue to improve our services. Please take a few minutes to complete the written survey that you may receive in the mail after your visit with us. Thank you!             Your Updated Medication List - Protect others around you: Learn how to safely use, store and throw away your medicines at www.disposemymeds.org.          This list is accurate as of 7/9/18  2:09 PM.  Always use your most recent med list.                   Brand Name Dispense Instructions for use Diagnosis    fluocinonide 0.05 % cream    LIDEX    60 g    Apply sparingly to affected area twice daily as needed.  Do not apply to face.    Atopic dermatitis, unspecified type       ondansetron 4 MG tablet    ZOFRAN    20 tablet    Take 1 tablet (4 mg) by mouth every 8 hours as needed As needed for headache    Chronic tension-type headache, not intractable       pimecrolimus 1 % cream    ELIDEL    60 g    Apply topically 2 times daily Ok to use on face    Atopic dermatitis       prochlorperazine 10 MG tablet    COMPAZINE    10 tablet    Take 1 tablet (10 mg) by mouth every 6 hours as needed  for nausea or vomiting    Nausea and vomiting, intractability of vomiting not specified, unspecified vomiting type       ranitidine 150 MG tablet    ZANTAC    90 tablet    Take 1 tablet (150 mg) by mouth At Bedtime    Gastroesophageal reflux disease without esophagitis       sertraline 50 MG tablet    ZOLOFT    90 tablet    Take 1 tablet (50 mg) by mouth daily Take 1/2 tablet (25 mg) for 1-2 weeks, then increase to 1 tablet orally daily    Severe single current episode of major depressive disorder, without psychotic features (H), Anxiety       AUGUSTO 13.5 MG Iud   Generic drug:  Levonorgestrel           SUMAtriptan 25 MG tablet    IMITREX    9 tablet    Take 1-2 tablets (25-50 mg) by mouth at onset of headache for migraine May repeat in 2 hours. Max 8 tablets/24 hours.    Migraine without status migrainosus, not intractable, unspecified migraine type       traZODone 100 MG tablet    DESYREL    90 tablet    Take 1 tablet (100 mg) by mouth nightly as needed for sleep    Sleep disturbance
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
## 2018-07-10 LAB — TSH SERPL DL<=0.005 MIU/L-ACNC: 2.21 MU/L (ref 0.4–4)

## 2018-07-10 ASSESSMENT — PATIENT HEALTH QUESTIONNAIRE - PHQ9: SUM OF ALL RESPONSES TO PHQ QUESTIONS 1-9: 9

## 2018-07-10 ASSESSMENT — ANXIETY QUESTIONNAIRES: GAD7 TOTAL SCORE: 7

## 2018-07-11 LAB
COPATH REPORT: NORMAL
PAP: NORMAL

## 2018-07-12 ENCOUNTER — OFFICE VISIT (OUTPATIENT)
Dept: PSYCHOLOGY | Facility: CLINIC | Age: 23
End: 2018-07-12
Payer: COMMERCIAL

## 2018-07-12 DIAGNOSIS — F33.1 DEPRESSION, MAJOR, RECURRENT, MODERATE (H): Primary | ICD-10-CM

## 2018-07-12 DIAGNOSIS — F41.9 ANXIETY: ICD-10-CM

## 2018-07-12 PROCEDURE — 90834 PSYTX W PT 45 MINUTES: CPT | Performed by: COUNSELOR

## 2018-07-12 ASSESSMENT — ANXIETY QUESTIONNAIRES
IF YOU CHECKED OFF ANY PROBLEMS ON THIS QUESTIONNAIRE, HOW DIFFICULT HAVE THESE PROBLEMS MADE IT FOR YOU TO DO YOUR WORK, TAKE CARE OF THINGS AT HOME, OR GET ALONG WITH OTHER PEOPLE: VERY DIFFICULT
5. BEING SO RESTLESS THAT IT IS HARD TO SIT STILL: NEARLY EVERY DAY
1. FEELING NERVOUS, ANXIOUS, OR ON EDGE: MORE THAN HALF THE DAYS
3. WORRYING TOO MUCH ABOUT DIFFERENT THINGS: MORE THAN HALF THE DAYS
GAD7 TOTAL SCORE: 18
6. BECOMING EASILY ANNOYED OR IRRITABLE: MORE THAN HALF THE DAYS
2. NOT BEING ABLE TO STOP OR CONTROL WORRYING: NEARLY EVERY DAY
7. FEELING AFRAID AS IF SOMETHING AWFUL MIGHT HAPPEN: NEARLY EVERY DAY

## 2018-07-12 ASSESSMENT — PATIENT HEALTH QUESTIONNAIRE - PHQ9: 5. POOR APPETITE OR OVEREATING: NEARLY EVERY DAY
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
## 2018-07-14 ASSESSMENT — PATIENT HEALTH QUESTIONNAIRE - PHQ9: SUM OF ALL RESPONSES TO PHQ QUESTIONS 1-9: 2

## 2018-07-14 ASSESSMENT — ANXIETY QUESTIONNAIRES: GAD7 TOTAL SCORE: 4

## 2018-07-17 ENCOUNTER — TRANSFERRED RECORDS (OUTPATIENT)
Dept: HEALTH INFORMATION MANAGEMENT | Facility: CLINIC | Age: 23
End: 2018-07-17
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
## 2018-07-20 ASSESSMENT — ANXIETY QUESTIONNAIRES: GAD7 TOTAL SCORE: 18

## 2018-07-20 ASSESSMENT — PATIENT HEALTH QUESTIONNAIRE - PHQ9: SUM OF ALL RESPONSES TO PHQ QUESTIONS 1-9: 19

## 2018-07-24 ENCOUNTER — OFFICE VISIT (OUTPATIENT)
Dept: FAMILY MEDICINE | Facility: CLINIC | Age: 23
End: 2018-07-24
Payer: COMMERCIAL

## 2018-07-24 VITALS
RESPIRATION RATE: 16 BRPM | HEART RATE: 93 BPM | OXYGEN SATURATION: 98 % | DIASTOLIC BLOOD PRESSURE: 84 MMHG | BODY MASS INDEX: 32.26 KG/M2 | TEMPERATURE: 99.1 F | WEIGHT: 200.7 LBS | HEIGHT: 66 IN | SYSTOLIC BLOOD PRESSURE: 118 MMHG

## 2018-07-24 DIAGNOSIS — Z30.432 ENCOUNTER FOR IUD REMOVAL: Primary | ICD-10-CM

## 2018-07-24 PROCEDURE — 58301 REMOVE INTRAUTERINE DEVICE: CPT | Performed by: PHYSICIAN ASSISTANT

## 2018-07-24 NOTE — MR AVS SNAPSHOT
"              After Visit Summary   7/24/2018    Lucero Collier    MRN: [id number]           Patient Information     Date Of Birth          1995        Visit Information        Provider Department      7/24/2018 10:50 AM Iris Rand PA-C [facility]        Today's Diagnoses     Encounter for IUD removal    -  1       Follow-ups after your visit        Follow-up notes from your care team     Return in about 6 months (around 1/24/2019) for Mood Recheck.      Who to contact     If you have questions or need follow up information about today's clinic visit or your schedule please contact [facility] directly at [phone].  Normal or non-critical lab and imaging results will be communicated to you by Packbackhart, letter or phone within 4 business days after the clinic has received the results. If you do not hear from us within 7 days, please contact the clinic through Packbackhart or phone. If you have a critical or abnormal lab result, we will notify you by phone as soon as possible.  Submit refill requests through Playlore or call your pharmacy and they will forward the refill request to us. Please allow 3 business days for your refill to be completed.          Additional Information About Your Visit        MyChart Information     Playlore gives you secure access to your electronic health record. If you see a primary care provider, you can also send messages to your care team and make appointments. If you have questions, please call your primary care clinic.  If you do not have a primary care provider, please call [phone] and they will assist you.        Care EveryWhere ID     This is your Care EveryWhere ID. This could be used by other organizations to access your [facility] medical records  [id number]        Your Vitals Were     Pulse Temperature Respirations Height Last Period Pulse Oximetry    93 99.1  F (37.3  C) (Oral) 16 5' 5.75\" (1.67 m) (LMP Unknown) 98%    " Breastfeeding? BMI (Body Mass Index)                No 32.64 kg/m2           Blood Pressure from Last 3 Encounters:   07/24/18 118/84   07/09/18 124/82   05/30/18 122/60    Weight from Last 3 Encounters:   07/24/18 200 lb 11.2 oz (91 kg)   07/09/18 200 lb 4.8 oz (90.9 kg)   05/30/18 197 lb (89.4 kg)              Today, you had the following     No orders found for display       Primary Care Provider Office Phone # Fax #    Iris Rand PA-C [phone] [address] [facility] [address]        Equal Access to Services     SILVIANO AYALA : Hadii cecelia pereso Somarquita, waaxda luqadaha, qaybta kaalmada adejacquesyada, gomez cole . So [facility] [phone].    ATENCIÓN: Si habla español, tiene a joy disposición servicios gratuitos de asistencia lingüística. Llame al [phone].    We comply with applicable federal civil rights laws and [place] laws. We do not discriminate on the basis of race, color, national origin, age, disability, sex, sexual orientation, or gender identity.            Thank you!     Thank you for choosing [facility]  for your care. Our goal is always to provide you with excellent care. Hearing back from our patients is one way we can continue to improve our services. Please take a few minutes to complete the written survey that you may receive in the mail after your visit with us. Thank you!             Your Updated Medication List - Protect others around you: Learn how to safely use, store and throw away your medicines at www.disposemymeds.org.          This list is accurate as of 7/24/18 11:06 AM.  Always use your most recent med list.                   Brand Name Dispense Instructions for use Diagnosis    fluocinonide 0.05 % cream    LIDEX    60 g    Apply sparingly to affected area twice daily as needed.  Do not apply to face.    Atopic dermatitis, unspecified type       ondansetron 4 MG tablet    ZOFRAN    20 tablet     Take 1 tablet (4 mg) by mouth every 8 hours as needed As needed for headache    Chronic tension-type headache, not intractable       pimecrolimus 1 % cream    ELIDEL    60 g    Apply topically 2 times daily Ok to use on face    Atopic dermatitis       prochlorperazine 10 MG tablet    COMPAZINE    10 tablet    Take 1 tablet (10 mg) by mouth every 6 hours as needed for nausea or vomiting    Nausea and vomiting, intractability of vomiting not specified, unspecified vomiting type       ranitidine 150 MG tablet    ZANTAC    90 tablet    Take 1 tablet (150 mg) by mouth At Bedtime    Gastroesophageal reflux disease without esophagitis       sertraline 50 MG tablet    ZOLOFT    90 tablet    Take 1 tablet (50 mg) by mouth daily Take 1/2 tablet (25 mg) for 1-2 weeks, then increase to 1 tablet orally daily    Severe single current episode of major depressive disorder, without psychotic features (H), Anxiety       AUGUSTO 13.5 MG Iud   Generic drug:  Levonorgestrel           SUMAtriptan 25 MG tablet    IMITREX    9 tablet    Take 1-2 tablets (25-50 mg) by mouth at onset of headache for migraine May repeat in 2 hours. Max 8 tablets/24 hours.    Migraine without status migrainosus, not intractable, unspecified migraine type       traZODone 100 MG tablet    DESYREL    90 tablet    Take 1 tablet (100 mg) by mouth nightly as needed for sleep    Sleep disturbance

## 2018-07-24 NOTE — PROGRESS NOTES
SUBJECTIVE:   Lucero Collier is a 23 year old female who presents to clinic today for the following health issues:      IUD removal  Patient is here today requesting removal of her IUD  She notes she is not having any difficulty with it, but wants to let her body regulate itself  Currently not sexually active, has no plans to be in the future  Planning on using condoms in future for pregnancy prevention.    Problem list and histories reviewed & adjusted, as indicated.  Additional history: as documented    Patient Active Problem List   Diagnosis     ADHD (attention deficit hyperactivity disorder)     Constipation     Myopia     Spells     Chronic headaches     GERD (gastroesophageal reflux disease)     Adverse reaction to pertussis vaccine     Complex partial seizures (H)     Care Plan- Health Care Home     Learning problem     Overweight     Sleep disturbance     Adjustment disorder with mixed anxiety and depressed mood     Migraine without status migrainosus, not intractable, unspecified migraine type     Anxiety     Severe single current episode of major depressive disorder, without psychotic features (H)     Depression     HTN (hypertension)     Past Surgical History:   Procedure Laterality Date     ENDOSCOPY UPPER WITH PANCREATIC STIMULATION  4/06    Esophagitis 4/06     NISSEN FUNDOPLICATION  6/07    Dr. Meneses     PE TUBES  3/98     TONSILLECTOMY & ADENOIDECTOMY  4/02       Social History   Substance Use Topics     Smoking status: Current Every Day Smoker     Types: Cigarettes     Smokeless tobacco: Never Used      Comment: 5 qd     Alcohol use No     Family History   Problem Relation Age of Onset     Diabetes Maternal Grandmother      Hypertension Maternal Grandmother      Diabetes Paternal Grandmother      Cardiovascular Paternal Grandmother      Breast Cancer Paternal Grandmother 61     right side breast cancer     Genetic Disorder Other      nephew.-genetic defects      Obesity Mother      Family  "History Negative Father          Current Outpatient Prescriptions   Medication Sig Dispense Refill     fluocinonide (LIDEX) 0.05 % cream Apply sparingly to affected area twice daily as needed.  Do not apply to face. 60 g 0     ondansetron (ZOFRAN) 4 MG tablet Take 1 tablet (4 mg) by mouth every 8 hours as needed As needed for headache 20 tablet 3     pimecrolimus (ELIDEL) 1 % cream Apply topically 2 times daily Ok to use on face 60 g 3     prochlorperazine (COMPAZINE) 10 MG tablet Take 1 tablet (10 mg) by mouth every 6 hours as needed for nausea or vomiting 10 tablet 1     ranitidine (ZANTAC) 150 MG tablet Take 1 tablet (150 mg) by mouth At Bedtime 90 tablet 1     sertraline (ZOLOFT) 50 MG tablet Take 1 tablet (50 mg) by mouth daily Take 1/2 tablet (25 mg) for 1-2 weeks, then increase to 1 tablet orally daily 90 tablet 1     SUMAtriptan (IMITREX) 25 MG tablet Take 1-2 tablets (25-50 mg) by mouth at onset of headache for migraine May repeat in 2 hours. Max 8 tablets/24 hours. 9 tablet 1     traZODone (DESYREL) 100 MG tablet Take 1 tablet (100 mg) by mouth nightly as needed for sleep 90 tablet 1     No Known Allergies    Reviewed and updated as needed this visit by clinical staff  Tobacco  Allergies  Meds  Med Hx  Surg Hx  Fam Hx  Soc Hx      Reviewed and updated as needed this visit by Provider         ROS:  Constitutional, HEENT, cardiovascular, pulmonary, gi and gu systems are negative, except as otherwise noted.    OBJECTIVE:     /84 (BP Location: Right arm, Patient Position: Chair, Cuff Size: Adult Regular)  Pulse 93  Temp 99.1  F (37.3  C) (Oral)  Resp 16  Ht 5' 5.75\" (1.67 m)  Wt 200 lb 11.2 oz (91 kg)  LMP  (LMP Unknown)  SpO2 98%  Breastfeeding? No  BMI 32.64 kg/m2  Body mass index is 32.64 kg/(m^2).  GENERAL: healthy, alert and no distress  NECK: no adenopathy, no asymmetry, masses, or scars and thyroid normal to palpation  RESP: lungs clear to auscultation - no rales, rhonchi or " wheezes  CV: regular rate and rhythm, normal S1 S2, no S3 or S4, no murmur, click or rub, no peripheral edema and peripheral pulses strong   (female): normal female external genitalia, normal urethral meatus, vaginal mucosa, normal cervix with IUD strings visible. IUD strings were clamped using ringed forceps and IUD successfully removed without complication.  MS: no gross musculoskeletal defects noted, no edema    Diagnostic Test Results:  none     ASSESSMENT/PLAN:             1. Encounter for IUD removal  See above. Patient advised to use condoms for birth control.  F/U at any time is she wishes to be put back on birth control.  - REMOVE INTRAUTERINE DEVICE    Risks, benefits and alternatives were discussed with patient. Agreeable to the plan of care.      Iris Rand PA-C  [facility]

## 2018-09-06 ENCOUNTER — VIRTUAL VISIT (OUTPATIENT)
Dept: FAMILY MEDICINE | Facility: CLINIC | Age: 23
End: 2018-09-06
Payer: COMMERCIAL

## 2018-09-06 DIAGNOSIS — Z72.51 UNPROTECTED SEXUAL INTERCOURSE: ICD-10-CM

## 2018-09-06 DIAGNOSIS — Z11.3 SCREEN FOR STD (SEXUALLY TRANSMITTED DISEASE): ICD-10-CM

## 2018-09-06 DIAGNOSIS — R30.0 DYSURIA: Primary | ICD-10-CM

## 2018-09-06 DIAGNOSIS — R31.0 GROSS HEMATURIA: ICD-10-CM

## 2018-09-06 LAB
ALBUMIN UR-MCNC: 30 MG/DL
APPEARANCE UR: ABNORMAL
BACTERIA #/AREA URNS HPF: ABNORMAL /HPF
BETA HCG QUAL IFA URINE: NEGATIVE
BILIRUB UR QL STRIP: NEGATIVE
COLOR UR AUTO: YELLOW
GLUCOSE UR STRIP-MCNC: NEGATIVE MG/DL
HGB UR QL STRIP: NEGATIVE
KETONES UR STRIP-MCNC: NEGATIVE MG/DL
LEUKOCYTE ESTERASE UR QL STRIP: ABNORMAL
NITRATE UR QL: NEGATIVE
NON-SQ EPI CELLS #/AREA URNS LPF: ABNORMAL /LPF
PH UR STRIP: 7 PH (ref 5–7)
RBC #/AREA URNS AUTO: ABNORMAL /HPF
SOURCE: ABNORMAL
SP GR UR STRIP: 1.02 (ref 1–1.03)
SPECIMEN SOURCE: NORMAL
UROBILINOGEN UR STRIP-ACNC: 1 EU/DL (ref 0.2–1)
WBC #/AREA URNS AUTO: ABNORMAL /HPF
WET PREP SPEC: NORMAL

## 2018-09-06 PROCEDURE — 87591 N.GONORRHOEAE DNA AMP PROB: CPT | Performed by: NURSE PRACTITIONER

## 2018-09-06 PROCEDURE — 84703 CHORIONIC GONADOTROPIN ASSAY: CPT | Performed by: NURSE PRACTITIONER

## 2018-09-06 PROCEDURE — 87086 URINE CULTURE/COLONY COUNT: CPT | Performed by: NURSE PRACTITIONER

## 2018-09-06 PROCEDURE — 81001 URINALYSIS AUTO W/SCOPE: CPT | Performed by: NURSE PRACTITIONER

## 2018-09-06 PROCEDURE — 87210 SMEAR WET MOUNT SALINE/INK: CPT | Performed by: NURSE PRACTITIONER

## 2018-09-06 PROCEDURE — 98966 PH1 ASSMT&MGMT NQHP 5-10: CPT | Performed by: NURSE PRACTITIONER

## 2018-09-06 PROCEDURE — 87491 CHLMYD TRACH DNA AMP PROBE: CPT | Performed by: NURSE PRACTITIONER

## 2018-09-06 NOTE — PROGRESS NOTES
"Lucero Collier is a 23 year old female who is being evaluated via a telephone visit.      The patient has been notified of following:     \"This telephone visit will be conducted via a call between you and your physician/provider. We have found that certain health care needs can be provided without the need for a physical exam.  This service lets us provide the care you need with a short phone conversation.  If a prescription is necessary we can send it directly to your pharmacy.  If lab work is needed we can place an order for that and you can then stop by our lab to have the test done at a later time.    We will bill your insurance company for this service.  Please check with your medical insurance if this type of visit is covered. You may be responsible for the cost of this type of visit if insurance coverage is denied.  The typical cost is $30 (10min), $59 (11-20min) and $85 (21-30min).  Most often these visits are shorter than 10 minutes.    If during the course of the call the physician/provider feels a telephone visit is not appropriate, you will not be charged for this service.\"       Consent has been obtained for this service by care team member: yes.   See the scanned image in the medical record.    Lucero Collier complains of  No chief complaint on file.      I have reviewed and updated the patient's Past Medical History, Social History, Family History and Medication List.    ALLERGIES  Review of patient's allergies indicates no known allergies.    Myranda Sharma CMA   (MA signature)    Additional provider notes:     Symptoms started yesterday.  Burning with urination as well as some blood on the tissue.  No fever.  No vomiting.  She does note some abdominal pain but is able to do all of her regularly scheduled activities as planned.  She is eating and drinking as usual.  She had her IUD removed 7/24/18.  She has been sexually active and has not been using protection.  Describes the abdominal pain as " cramping.  Reports symptoms are mild.  She is also having vaginal discharge.  She has had a change in partner.  Not monogamous.      Assessment/Plan:  ASSESSMENT/PLAN:  (R30.0) Dysuria  (primary encounter diagnosis)  Comment: Pt reports mild symptoms.    Plan: *UA reflex to Microscopic and Culture ([facility] (except [place] and         [place])        Stop in for labs today.    (R31.0) Gross hematuria  Plan: *UA reflex to Microscopic and Culture ([facility] (except [place] and         [place])        See above.    (Z11.3) Screen for STD (sexually transmitted disease)  Comment: See below.  Plan: Neisseria gonorrhoeae PCR, Chlamydia         trachomatis PCR    (Z72.51) Unprotected sexual intercourse  Comment: Unprotected intercourse, new partner  Plan: Wet prep, Beta HCG Qual, Urine - [facility] and [place] ([id number]), Chlamydia trachomatis PCR        Pt plans on using condoms moving forward.  I expressed my concern and explained that she would get pregnant if this continued.  I offered to schedule her with her pcp to discuss further.  She was okay with this.        I have reviewed the note as documented above.  This accurately captures the substance of my conversation with the patient,  Cristal Beltran PARVIZ    Total time of call between patient and provider was 10 minutes

## 2018-09-06 NOTE — MR AVS SNAPSHOT
After Visit Summary   9/6/2018    Lucero Collier    MRN: [id number]           Patient Information     Date Of Birth          1995        Visit Information        Provider Department      9/6/2018 1:40 PM Cristal Beltran Ra, APRN CNP [facility]        Today's Diagnoses     Dysuria    -  1    Gross hematuria        Screen for STD (sexually transmitted disease)        Unprotected sexual intercourse           Follow-ups after your visit        Who to contact     If you have questions or need follow up information about today's clinic visit or your schedule please contact [facility] directly at [phone].  Normal or non-critical lab and imaging results will be communicated to you by [employer]hart, letter or phone within 4 business days after the clinic has received the results. If you do not hear from us within 7 days, please contact the clinic through [employer]hart or phone. If you have a critical or abnormal lab result, we will notify you by phone as soon as possible.  Submit refill requests through [employer] or call your pharmacy and they will forward the refill request to us. Please allow 3 business days for your refill to be completed.          Additional Information About Your Visit        MyChart Information     [employer] gives you secure access to your electronic health record. If you see a primary care provider, you can also send messages to your care team and make appointments. If you have questions, please call your primary care clinic.  If you do not have a primary care provider, please call [phone] and they will assist you.        Care EveryWhere ID     This is your Care EveryWhere ID. This could be used by other organizations to access your [place] medical records  [id number]         Blood Pressure from Last 3 Encounters:   07/24/18 118/84   07/09/18 124/82   05/30/18 122/60    Weight from Last 3 Encounters:   07/24/18 200 lb 11.2 oz (91 kg)   07/09/18 200 lb  4.8 oz (90.9 kg)   05/30/18 197 lb (89.4 kg)              We Performed the Following     *UA reflex to Microscopic and Culture ([place] and [facility] (except [place] and [place])     Beta HCG Qual, Urine - [facility] and [place] ([id number])     Chlamydia trachomatis PCR     Neisseria gonorrhoeae PCR     Wet prep        Primary Care Provider Office Phone # Fax #    Iris Rand PA-C [phone]       [address] [facility] [address]        Equal Access to Services     [facility]: Hadii aad ku hadasho Soomaali, waaxda luqadaha, qaybta kaalmada adeegyada, waxay marloin hayaan adejacques cole . So [facility] [phone].    ATENCIÓN: Si habla español, tiene a joy disposición servicios gratuitos de asistencia lingüística. [facility] [phone].    We comply with applicable federal civil rights laws and [place] laws. We do not discriminate on the basis of race, color, national origin, age, disability, sex, sexual orientation, or gender identity.            Thank you!     Thank you for choosing [facility]  for your care. Our goal is always to provide you with excellent care. Hearing back from our patients is one way we can continue to improve our services. Please take a few minutes to complete the written survey that you may receive in the mail after your visit with us. Thank you!             Your Updated Medication List - Protect others around you: Learn how to safely use, store and throw away your medicines at www.disposemymeds.org.          This list is accurate as of 9/6/18  2:50 PM.  Always use your most recent med list.                   Brand Name Dispense Instructions for use Diagnosis    fluocinonide 0.05 % cream    LIDEX    60 g    Apply sparingly to affected area twice daily as needed.  Do not apply to face.    Atopic dermatitis, unspecified type       ondansetron 4 MG tablet    ZOFRAN    20 tablet    Take 1 tablet (4 mg) by mouth every 8 hours as needed As  needed for headache    Chronic tension-type headache, not intractable       pimecrolimus 1 % cream    ELIDEL    60 g    Apply topically 2 times daily Ok to use on face    Atopic dermatitis       prochlorperazine 10 MG tablet    COMPAZINE    10 tablet    Take 1 tablet (10 mg) by mouth every 6 hours as needed for nausea or vomiting    Nausea and vomiting, intractability of vomiting not specified, unspecified vomiting type       ranitidine 150 MG tablet    ZANTAC    90 tablet    Take 1 tablet (150 mg) by mouth At Bedtime    Gastroesophageal reflux disease without esophagitis       sertraline 50 MG tablet    ZOLOFT    90 tablet    Take 1 tablet (50 mg) by mouth daily Take 1/2 tablet (25 mg) for 1-2 weeks, then increase to 1 tablet orally daily    Severe single current episode of major depressive disorder, without psychotic features (H), Anxiety       SUMAtriptan 25 MG tablet    IMITREX    9 tablet    Take 1-2 tablets (25-50 mg) by mouth at onset of headache for migraine May repeat in 2 hours. Max 8 tablets/24 hours.    Migraine without status migrainosus, not intractable, unspecified migraine type       traZODone 100 MG tablet    DESYREL    90 tablet    Take 1 tablet (100 mg) by mouth nightly as needed for sleep    Sleep disturbance

## 2018-09-07 ENCOUNTER — OFFICE VISIT (OUTPATIENT)
Dept: FAMILY MEDICINE | Facility: CLINIC | Age: 23
End: 2018-09-07
Payer: COMMERCIAL

## 2018-09-07 VITALS
TEMPERATURE: 98.3 F | WEIGHT: 199.1 LBS | OXYGEN SATURATION: 99 % | HEART RATE: 100 BPM | RESPIRATION RATE: 16 BRPM | BODY MASS INDEX: 32.38 KG/M2 | DIASTOLIC BLOOD PRESSURE: 78 MMHG | SYSTOLIC BLOOD PRESSURE: 115 MMHG

## 2018-09-07 DIAGNOSIS — Z30.017 NEXPLANON INSERTION: Primary | ICD-10-CM

## 2018-09-07 DIAGNOSIS — Z23 NEED FOR PROPHYLACTIC VACCINATION AND INOCULATION AGAINST INFLUENZA: ICD-10-CM

## 2018-09-07 LAB
BACTERIA SPEC CULT: NORMAL
BACTERIA SPEC CULT: NORMAL
BETA HCG QUAL IFA URINE: NEGATIVE
SPECIMEN SOURCE: NORMAL

## 2018-09-07 PROCEDURE — 90686 IIV4 VACC NO PRSV 0.5 ML IM: CPT | Performed by: PHYSICIAN ASSISTANT

## 2018-09-07 PROCEDURE — 90471 IMMUNIZATION ADMIN: CPT | Performed by: PHYSICIAN ASSISTANT

## 2018-09-07 PROCEDURE — 11981 INSERTION DRUG DLVR IMPLANT: CPT | Performed by: PHYSICIAN ASSISTANT

## 2018-09-07 PROCEDURE — 84703 CHORIONIC GONADOTROPIN ASSAY: CPT | Performed by: PHYSICIAN ASSISTANT

## 2018-09-07 PROCEDURE — 99207 ZZC NO CHARGE NURSE ONLY: CPT | Mod: 25 | Performed by: PHYSICIAN ASSISTANT

## 2018-09-07 NOTE — PROCEDURES
Lucero Collier  23 year old  female   who presents today requesting placement of a Nexplanon.      NEXPLANON PLACEMENT:    The patient meets and is agreeable to the following conditions:  She is not interested in conception in the near future.  There is no previous history of pelvic inflammatory disease.  There is no previous history of ectopic pregnancy.  There is no history of unresolved abnormal uterine bleeding.  There is no history of an unresolved abnormal PAP smear.  She has no history of diabetes, AIDS, leukemia, IV drug use or chronic steroid use.  She denies the possibility of pregnancy. Patient's last menstrual period was 09/06/2018 (exact date).  Pregnancy test today is negative.      The patient was given patient information on the Nexplanon and the patient education brochure from the . The patient has given consent to proceed with placement of the Nexplanon.  A written consent form is present in the chart.  She wishes to proceed.    PROCEDURE:  Type of Device: Nexplanon  The patient lied in supine with the full length of her left arm exposed and supported by the pillow and table.   The positioning the upper inner aspect of her nondominant arm was bent at her elbow to 90 degrees and rotated upward and outward opposite her head.  The insertion site was Identified approximately four finger breadths/ 8cm superior and lateral to the medial epicondyle of the humerus.  Skin was joelle the skin to help guide insertion. One joelle is made where the michelle will be inserted and a second joelle is made a few centimeters proximal to the first joelle to serve as a direction guide during insertion.  A sterile drape was placed under the arm and cleansed the insertion site with an antiseptic solution.  One joelle was made where the michelle will be inserted and a second joelle was made a few centimeters proximal to the first joelle to serve as a direction guide during insertion.   Anesthesia using Lidocaine 1% was injected  into the dermis to raise a wheal along the planned track of the michelle insertion needle.   The clear plastic needle cover was removed. The needle was placed against the insertion site holding the applicator at an angle 30 degrees to the skin. While applying counter traction to the skin around the insertion site, the skin was punctured with the needle tip. The applicator was the lowered so that it is parallel to the skin and then the needle was advanced in the subdermal connective tissue while lifting the skin with the tip of the needle. The needle was  Advanced to its full length under the skin. I Unlocked the slider with downward finger pressure on the lever, then moved the slider fully backward (distally) and removed the applicator.  Immediately after insertion, I palpated the skin to verify correct placement of the michelle; both ends were palpable. Patient was also asked to feel her implant and confirmed placement.  An adhesive closure was placed on the insertion puncture and the site was wrapped with a pressure bandage.   Patient's User Card was completed and given to patient.     The patient tolerated this procedure without immediate complication.  The patient is to return or call immediately for any unexplained fever, redness, pain and swelling at the insertion site. Recommended to take Tylenol or NSAID for mild to moderate pain.    Patient was instructed may remove the pressure bandage in 24 hours and the small bandage over the insertion site after 3-5 days.   A completed the User Card was given to the patient to keep.     Information on Nexplanon was given to patient. She was instructed to watch for signs of infection such as redness, swelling, discharge, watch for increased bleeding, worsening pain and fever and to RTC ASAP. Questions and concerns were addressed.    Advised use of back up method for 7 days after placement. Instructed does not protect against STDs, advised use of condoms.    Iris Rand  SHAHID

## 2018-09-07 NOTE — MR AVS SNAPSHOT
After Visit Summary   9/7/2018    Lucero Collier    MRN: [id number]           Patient Information     Date Of Birth          1995        Visit Information        Provider Department      9/7/2018 1:30 PM Iris Rand PA-C [facility]        Today's Diagnoses     Nexplanon insertion    -  1    Need for prophylactic vaccination and inoculation against influenza          Care Instructions    Nexplanon Insertion Aftercare Instructions    - Use a back up contraception method for the next seven days    -The Nexplanon does NOT protect against sexually transmitted infections (STD'S)      - Leave wrap on arm for 24 Hours    -Leave Bandaid on for 4-5 Days after compression wrapping is removed    -Call our clinic at [phone] or go to urgent care if you experience discharge from insertion site, severe arm pain, red streaking from insertion site or fever    -Remember that this is a medication and you need to notify other providers that you may see that you have the Nexplanon in place           Follow-ups after your visit        Follow-up notes from your care team     Return in about 1 week (around 9/14/2018) for signs or symptoms of infection.      Your next 10 appointments already scheduled     Sep 10, 2018  8:30 AM CDT   PROCED[facility] with Iris Rand [facility]-C   [facility]    [address] [address]   [phone]              Who to contact     If you have questions or need follow up information about today's clinic visit or your schedule please contact [facility] directly at [phone].  Normal or non-critical lab and imaging results will be communicated to you by MyChart, letter or phone within 4 business days after the clinic has received the results. If you do not hear from us within 7 days, please contact the clinic through MyChart or phone. If you have a critical or abnormal lab  result, we will notify you by phone as soon as possible.  Submit refill requests through [employer] or call your pharmacy and they will forward the refill request to us. Please allow 3 business days for your refill to be completed.          Additional Information About Your Visit        [employer] Information     [employer] gives you secure access to your electronic health record. If you see a primary care provider, you can also send messages to your care team and make appointments. If you have questions, please call your primary care clinic.  If you do not have a primary care provider, please call [phone] and they will assist you.        Care EveryWhere ID     This is your Care EveryWhere ID. This could be used by other organizations to access your [place] medical records  [id number]        Your Vitals Were     Pulse Temperature Respirations Last Period Pulse Oximetry Breastfeeding?    100 98.3  F (36.8  C) (Oral) 16 09/06/2018 (Exact Date) 99% No    BMI (Body Mass Index)                   32.38 kg/m2            Blood Pressure from Last 3 Encounters:   09/07/18 115/78   07/24/18 118/84   07/09/18 124/82    Weight from Last 3 Encounters:   09/07/18 199 lb 1.6 oz (90.3 kg)   07/24/18 200 lb 11.2 oz (91 kg)   07/09/18 200 lb 4.8 oz (90.9 kg)              We Performed the Following     Beta HCG Qual, Urine - [facility] and [place] ([id number])     ETONOGESTREL IMPLANT SYSTEM     FLU VACCINE, SPLIT VIRUS, IM (QUADRIVALENT) [46729]- >3 YRS     INSERTION NON-BIODEGRADABLE DRUG DELIVERY IMPLANT     Vaccine Administration, Initial [85002]          Today's Medication Changes          These changes are accurate as of 9/7/18  1:39 PM.  If you have any questions, ask your nurse or doctor.               Start taking these medicines.        Dose/Directions    etonogestrel 68 MG Impl   Commonly known as:  NEXPLANON   Used for:  Nexplanon insertion   Started by:  Iris Rand PA-C        Dose:  1 each   1 each (68 mg) by  Subdermal route once for 1 dose   Quantity:  1 each   Refills:  0            Where to get your medicines      Some of these will need a paper prescription and others can be bought over the counter.  Ask your nurse if you have questions.     You don't need a prescription for these medications     etonogestrel 68 MG Impl                Primary Care Provider Office Phone # Fax #    Iris Rand PA-C [phone]       [address] [facility] [address]        Equal Access to Services     [facility] JAMIE : Hadii aad ku hadasho Soomaali, waaxda luqadaha, qaybta kaalmada adeegyada, waxay idiin hayaan adeeg kharash labalta . So [facility] [phone].    ATENCIÓN: Si habla español, tiene a joy disposición servicios gratuitos de asistencia lingüística. [facility] [phone].    We comply with applicable federal civil rights laws and [place] laws. We do not discriminate on the basis of race, color, national origin, age, disability, sex, sexual orientation, or gender identity.            Thank you!     Thank you for choosing [facility]  for your care. Our goal is always to provide you with excellent care. Hearing back from our patients is one way we can continue to improve our services. Please take a few minutes to complete the written survey that you may receive in the mail after your visit with us. Thank you!             Your Updated Medication List - Protect others around you: Learn how to safely use, store and throw away your medicines at www.disposemymeds.org.          This list is accurate as of 9/7/18  1:39 PM.  Always use your most recent med list.                   Brand Name Dispense Instructions for use Diagnosis    etonogestrel 68 MG Impl    NEXPLANON    1 each    1 each (68 mg) by Subdermal route once for 1 dose    Nexplanon insertion       fluocinonide 0.05 % cream    LIDEX    60 g    Apply sparingly to affected area twice daily as needed.  Do not apply to face.    Atopic  dermatitis, unspecified type       ondansetron 4 MG tablet    ZOFRAN    20 tablet    Take 1 tablet (4 mg) by mouth every 8 hours as needed As needed for headache    Chronic tension-type headache, not intractable       pimecrolimus 1 % cream    ELIDEL    60 g    Apply topically 2 times daily Ok to use on face    Atopic dermatitis       prochlorperazine 10 MG tablet    COMPAZINE    10 tablet    Take 1 tablet (10 mg) by mouth every 6 hours as needed for nausea or vomiting    Nausea and vomiting, intractability of vomiting not specified, unspecified vomiting type       ranitidine 150 MG tablet    ZANTAC    90 tablet    Take 1 tablet (150 mg) by mouth At Bedtime    Gastroesophageal reflux disease without esophagitis       sertraline 50 MG tablet    ZOLOFT    90 tablet    Take 1 tablet (50 mg) by mouth daily Take 1/2 tablet (25 mg) for 1-2 weeks, then increase to 1 tablet orally daily    Severe single current episode of major depressive disorder, without psychotic features (H), Anxiety       SUMAtriptan 25 MG tablet    IMITREX    9 tablet    Take 1-2 tablets (25-50 mg) by mouth at onset of headache for migraine May repeat in 2 hours. Max 8 tablets/24 hours.    Migraine without status migrainosus, not intractable, unspecified migraine type       traZODone 100 MG tablet    DESYREL    90 tablet    Take 1 tablet (100 mg) by mouth nightly as needed for sleep    Sleep disturbance

## 2018-09-07 NOTE — PATIENT INSTRUCTIONS
Nexplanon Insertion Aftercare Instructions    - Use a back up contraception method for the next seven days    -The Nexplanon does NOT protect against sexually transmitted infections (STD'S)      - Leave wrap on arm for 24 Hours    -Leave Bandaid on for 4-5 Days after compression wrapping is removed    -Call our clinic at [phone] or go to urgent care if you experience discharge from insertion site, severe arm pain, red streaking from insertion site or fever    -Remember that this is a medication and you need to notify other providers that you may see that you have the Nexplanon in place

## 2018-09-07 NOTE — PROGRESS NOTES
SUBJECTIVE:   Lucero Collier is a 23 year old female who presents to clinic today for the following health issues:      Patient is here for Nexplanon consult   Previously had an IUD but removed as she wanted to have more natural periods  She notes she is sexually active again, with multiple partners and does not desire to get pregnant  Is currently not using birth control or condoms  She notes her most recent intercourse was 1-2 weeks ago  Her LMP was 9/6/2018  She has been recently tested for gonorrhea and chlamydia  She has been on Nexplanon in past and liked it, would like placed today if possible.      Problem list and histories reviewed & adjusted, as indicated.  Additional history: as documented    Patient Active Problem List   Diagnosis     ADHD (attention deficit hyperactivity disorder)     Constipation     Myopia     Spells     Chronic headaches     GERD (gastroesophageal reflux disease)     Adverse reaction to pertussis vaccine     Complex partial seizures (H)     Care Plan- Health Care Home     Learning problem     Overweight     Sleep disturbance     Adjustment disorder with mixed anxiety and depressed mood     Migraine without status migrainosus, not intractable, unspecified migraine type     Anxiety     Severe single current episode of major depressive disorder, without psychotic features (H)     Depression     HTN (hypertension)     Past Surgical History:   Procedure Laterality Date     ENDOSCOPY UPPER WITH PANCREATIC STIMULATION  4/06    Esophagitis 4/06     NISSEN FUNDOPLICATION  6/07    Dr. Meneses     PE TUBES  3/98     TONSILLECTOMY & ADENOIDECTOMY  4/02       Social History   Substance Use Topics     Smoking status: Current Every Day Smoker     Types: Cigarettes     Smokeless tobacco: Never Used      Comment: 5 qd     Alcohol use No     Family History   Problem Relation Age of Onset     Diabetes Maternal Grandmother      Hypertension Maternal Grandmother      Diabetes Paternal Grandmother       Cardiovascular Paternal Grandmother      Breast Cancer Paternal Grandmother 61     right side breast cancer     Genetic Disorder Other      nephew.-genetic defects      Obesity Mother      Family History Negative Father          Current Outpatient Prescriptions   Medication Sig Dispense Refill     etonogestrel (NEXPLANON) 68 MG IMPL 1 each (68 mg) by Subdermal route once for 1 dose 1 each 0     fluocinonide (LIDEX) 0.05 % cream Apply sparingly to affected area twice daily as needed.  Do not apply to face. 60 g 0     ondansetron (ZOFRAN) 4 MG tablet Take 1 tablet (4 mg) by mouth every 8 hours as needed As needed for headache 20 tablet 3     pimecrolimus (ELIDEL) 1 % cream Apply topically 2 times daily Ok to use on face 60 g 3     prochlorperazine (COMPAZINE) 10 MG tablet Take 1 tablet (10 mg) by mouth every 6 hours as needed for nausea or vomiting 10 tablet 1     ranitidine (ZANTAC) 150 MG tablet Take 1 tablet (150 mg) by mouth At Bedtime 90 tablet 1     sertraline (ZOLOFT) 50 MG tablet Take 1 tablet (50 mg) by mouth daily Take 1/2 tablet (25 mg) for 1-2 weeks, then increase to 1 tablet orally daily 90 tablet 1     SUMAtriptan (IMITREX) 25 MG tablet Take 1-2 tablets (25-50 mg) by mouth at onset of headache for migraine May repeat in 2 hours. Max 8 tablets/24 hours. 9 tablet 1     traZODone (DESYREL) 100 MG tablet Take 1 tablet (100 mg) by mouth nightly as needed for sleep 90 tablet 1     No Known Allergies    Reviewed and updated as needed this visit by clinical staff       Reviewed and updated as needed this visit by Provider         ROS:  Constitutional, HEENT, cardiovascular, pulmonary, gi and gu systems are negative, except as otherwise noted.    OBJECTIVE:     /78 (BP Location: Right arm, Patient Position: Chair, Cuff Size: Adult Large)  Pulse 100  Temp 98.3  F (36.8  C) (Oral)  Resp 16  Wt 199 lb 1.6 oz (90.3 kg)  LMP 09/06/2018 (Exact Date)  SpO2 99%  Breastfeeding? No  BMI 32.38 kg/m2  Body  mass index is 32.38 kg/(m^2).  GENERAL: healthy, alert and no distress  SKIN: no suspicious lesions or rashes    Diagnostic Test Results:  Results for orders placed or performed in visit on 09/07/18 (from the past 24 hour(s))   Beta HCG Qual, Urine - [facility] [place] ([id number])   Result Value Ref Range    Beta HCG Qual IFA Urine Negative NEG^Negative          ASSESSMENT/PLAN:             1. Nexplanon insertion  See procedure note.  - Beta HCG Qual, Urine - [facility] [place] ([id number])  - etonogestrel (NEXPLANON) 68 MG IMPL; 1 each (68 mg) by Subdermal route once for 1 dose  Dispense: 1 each; Refill: 0  - INSERTION NON-BIODEGRADABLE DRUG DELIVERY IMPLANT  - ETONOGESTREL IMPLANT SYSTEM    2. Need for prophylactic vaccination and inoculation against influenza  - FLU VACCINE, SPLIT VIRUS, IM (QUADRIVALENT) [27769]- >3 YRS  - Vaccine Administration, Initial [36493]    Risks, benefits and alternatives were discussed with patient. Agreeable to the plan of care.      Iris Rand PA-C  [facility]    Injectable Influenza Immunization Documentation    1.  Is the person to be vaccinated sick today?   No    2. Does the person to be vaccinated have an allergy to a component   of the vaccine?   No  Egg Allergy Algorithm Link    3. Has the person to be vaccinated ever had a serious reaction   to influenza vaccine in the past?   No    4. Has the person to be vaccinated ever had Guillain-Barré syndrome?   No    Form completed by SMA Maki

## 2018-09-12 ENCOUNTER — OFFICE VISIT (OUTPATIENT)
Dept: FAMILY MEDICINE | Facility: CLINIC | Age: 23
End: 2018-09-12
Payer: COMMERCIAL

## 2018-09-12 VITALS
HEART RATE: 97 BPM | OXYGEN SATURATION: 97 % | TEMPERATURE: 98.1 F | SYSTOLIC BLOOD PRESSURE: 106 MMHG | BODY MASS INDEX: 32.36 KG/M2 | DIASTOLIC BLOOD PRESSURE: 68 MMHG | RESPIRATION RATE: 16 BRPM | WEIGHT: 199 LBS

## 2018-09-12 DIAGNOSIS — R07.0 THROAT PAIN: ICD-10-CM

## 2018-09-12 DIAGNOSIS — J06.9 UPPER RESPIRATORY TRACT INFECTION, UNSPECIFIED TYPE: Primary | ICD-10-CM

## 2018-09-12 LAB
DEPRECATED S PYO AG THROAT QL EIA: NORMAL
SPECIMEN SOURCE: NORMAL

## 2018-09-12 PROCEDURE — 99213 OFFICE O/P EST LOW 20 MIN: CPT | Performed by: PHYSICIAN ASSISTANT

## 2018-09-12 PROCEDURE — 87880 STREP A ASSAY W/OPTIC: CPT | Performed by: PHYSICIAN ASSISTANT

## 2018-09-12 PROCEDURE — 87081 CULTURE SCREEN ONLY: CPT | Performed by: PHYSICIAN ASSISTANT

## 2018-09-12 ASSESSMENT — ANXIETY QUESTIONNAIRES
6. BECOMING EASILY ANNOYED OR IRRITABLE: SEVERAL DAYS
GAD7 TOTAL SCORE: 8
5. BEING SO RESTLESS THAT IT IS HARD TO SIT STILL: SEVERAL DAYS
3. WORRYING TOO MUCH ABOUT DIFFERENT THINGS: MORE THAN HALF THE DAYS
2. NOT BEING ABLE TO STOP OR CONTROL WORRYING: SEVERAL DAYS
IF YOU CHECKED OFF ANY PROBLEMS ON THIS QUESTIONNAIRE, HOW DIFFICULT HAVE THESE PROBLEMS MADE IT FOR YOU TO DO YOUR WORK, TAKE CARE OF THINGS AT HOME, OR GET ALONG WITH OTHER PEOPLE: SOMEWHAT DIFFICULT
1. FEELING NERVOUS, ANXIOUS, OR ON EDGE: SEVERAL DAYS
7. FEELING AFRAID AS IF SOMETHING AWFUL MIGHT HAPPEN: SEVERAL DAYS

## 2018-09-12 ASSESSMENT — PATIENT HEALTH QUESTIONNAIRE - PHQ9: 5. POOR APPETITE OR OVEREATING: SEVERAL DAYS

## 2018-09-12 NOTE — MR AVS SNAPSHOT
After Visit Summary   9/12/2018    Lucero Collier    MRN: [id number]           Patient Information     Date Of Birth          1995        Visit Information        Provider Department      9/12/2018 8:30 AM Iris Rand PA-C [facility]        Today's Diagnoses     Upper respiratory tract infection, unspecified type    -  1       Follow-ups after your visit        Follow-up notes from your care team     Return in about 1 week (around 9/19/2018) for If symptoms worsen or fail to improve.      Who to contact     If you have questions or need follow up information about today's clinic visit or your schedule please contact [facility] directly at [phone].  Normal or non-critical lab and imaging results will be communicated to you by MyChart, letter or phone within 4 business days after the clinic has received the results. If you do not hear from us within 7 days, please contact the clinic through Kartelahart or phone. If you have a critical or abnormal lab result, we will notify you by phone as soon as possible.  Submit refill requests through [email or web address] or call your pharmacy and they will forward the refill request to us. Please allow 3 business days for your refill to be completed.          Additional Information About Your Visit        MyChart Information     [email or web address] gives you secure access to your electronic health record. If you see a primary care provider, you can also send messages to your care team and make appointments. If you have questions, please call your primary care clinic.  If you do not have a primary care provider, please call [phone] and they will assist you.        Care EveryWhere ID     This is your Care EveryWhere ID. This could be used by other organizations to access your [place] medical records  [id number]        Your Vitals Were     Pulse Temperature Respirations Last Period Pulse Oximetry Breastfeeding?    97 98.1  F (36.7   C) (Oral) 16 09/06/2018 (Exact Date) 97% No    BMI (Body Mass Index)                   32.36 kg/m2            Blood Pressure from Last 3 Encounters:   09/12/18 106/68   09/07/18 115/78   07/24/18 118/84    Weight from Last 3 Encounters:   09/12/18 199 lb (90.3 kg)   09/07/18 199 lb 1.6 oz (90.3 kg)   07/24/18 200 lb 11.2 oz (91 kg)              Today, you had the following     No orders found for display       Primary Care Provider Office Phone # Fax #    Iris Rand PA-C [phone] [address] [facility] [address]        Equal Access to Services     EMANUEL AYALA : Hadii aad ku hadasho Soomaali, waaxda luqadaha, qaybta kaalmada adeegyada, waxkarina sellersin iker cole . So [facility] [phone].    ATENCIÓN: Si habla español, tiene a joy disposición servicios gratuitos de asistencia lingüística. Llame al [phone].    We comply with applicable federal civil rights laws and [place] laws. We do not discriminate on the basis of race, color, national origin, age, disability, sex, sexual orientation, or gender identity.            Thank you!     Thank you for choosing [facility]  for your care. Our goal is always to provide you with excellent care. Hearing back from our patients is one way we can continue to improve our services. Please take a few minutes to complete the written survey that you may receive in the mail after your visit with us. Thank you!             Your Updated Medication List - Protect others around you: Learn how to safely use, store and throw away your medicines at www.disposemymeds.org.          This list is accurate as of 9/12/18  8:37 AM.  Always use your most recent med list.                   Brand Name Dispense Instructions for use Diagnosis    etonogestrel 68 MG Impl    NEXPLANON    1 each    1 each (68 mg) by Subdermal route once for 1 dose    Nexplanon insertion       fluocinonide 0.05 % cream    LIDEX    60 g    Apply  sparingly to affected area twice daily as needed.  Do not apply to face.    Atopic dermatitis, unspecified type       ondansetron 4 MG tablet    ZOFRAN    20 tablet    Take 1 tablet (4 mg) by mouth every 8 hours as needed As needed for headache    Chronic tension-type headache, not intractable       pimecrolimus 1 % cream    ELIDEL    60 g    Apply topically 2 times daily Ok to use on face    Atopic dermatitis       prochlorperazine 10 MG tablet    COMPAZINE    10 tablet    Take 1 tablet (10 mg) by mouth every 6 hours as needed for nausea or vomiting    Nausea and vomiting, intractability of vomiting not specified, unspecified vomiting type       ranitidine 150 MG tablet    ZANTAC    90 tablet    Take 1 tablet (150 mg) by mouth At Bedtime    Gastroesophageal reflux disease without esophagitis       sertraline 50 MG tablet    ZOLOFT    90 tablet    Take 1 tablet (50 mg) by mouth daily Take 1/2 tablet (25 mg) for 1-2 weeks, then increase to 1 tablet orally daily    Severe single current episode of major depressive disorder, without psychotic features (H), Anxiety       SUMAtriptan 25 MG tablet    IMITREX    9 tablet    Take 1-2 tablets (25-50 mg) by mouth at onset of headache for migraine May repeat in 2 hours. Max 8 tablets/24 hours.    Migraine without status migrainosus, not intractable, unspecified migraine type       traZODone 100 MG tablet    DESYREL    90 tablet    Take 1 tablet (100 mg) by mouth nightly as needed for sleep    Sleep disturbance

## 2018-09-12 NOTE — PROGRESS NOTES
SUBJECTIVE:   Lucero Collier is a 23 year old female who presents to clinic today for the following health issues:      RESPIRATORY SYMPTOMS      Duration: 5 days    Description  nasal congestion, rhinorrhea, sore throat, facial pain/pressure, cough, chills, headache, fatigue/malaise and nausea    Severity: moderate    Accompanying signs and symptoms: None    History (predisposing factors):  none    Precipitating or alleviating factors: None    Therapies tried and outcome:  Cough drops, sinus medication and vics      Patient is here today complaining sore throat, sinus congestion, headache and chills ongoing for about 5 days  No fever, no ear pain, slight cough, some nausea without vomiting and no diarrhea  She notes no ill contacts  Taking OTC Sudafed without relief      Problem list and histories reviewed & adjusted, as indicated.  Additional history: as documented    Patient Active Problem List   Diagnosis     ADHD (attention deficit hyperactivity disorder)     Constipation     Myopia     Spells     Chronic headaches     GERD (gastroesophageal reflux disease)     Adverse reaction to pertussis vaccine     Complex partial seizures (H)     Care Plan- Health Care Home     Learning problem     Overweight     Sleep disturbance     Adjustment disorder with mixed anxiety and depressed mood     Migraine without status migrainosus, not intractable, unspecified migraine type     Anxiety     Severe single current episode of major depressive disorder, without psychotic features (H)     Depression     HTN (hypertension)     Past Surgical History:   Procedure Laterality Date     ENDOSCOPY UPPER WITH PANCREATIC STIMULATION  4/06    Esophagitis 4/06     NISSEN FUNDOPLICATION  6/07    Dr. Meneses     PE TUBES  3/98     TONSILLECTOMY & ADENOIDECTOMY  4/02       Social History   Substance Use Topics     Smoking status: Current Every Day Smoker     Types: Cigarettes     Smokeless tobacco: Never Used      Comment: 5 qd     Alcohol  use No     Family History   Problem Relation Age of Onset     Diabetes Maternal Grandmother      Hypertension Maternal Grandmother      Diabetes Paternal Grandmother      Cardiovascular Paternal Grandmother      Breast Cancer Paternal Grandmother 61     right side breast cancer     Genetic Disorder Other      nephew.-genetic defects      Obesity Mother      Family History Negative Father          Current Outpatient Prescriptions   Medication Sig Dispense Refill     fluocinonide (LIDEX) 0.05 % cream Apply sparingly to affected area twice daily as needed.  Do not apply to face. 60 g 0     ondansetron (ZOFRAN) 4 MG tablet Take 1 tablet (4 mg) by mouth every 8 hours as needed As needed for headache 20 tablet 3     pimecrolimus (ELIDEL) 1 % cream Apply topically 2 times daily Ok to use on face 60 g 3     prochlorperazine (COMPAZINE) 10 MG tablet Take 1 tablet (10 mg) by mouth every 6 hours as needed for nausea or vomiting 10 tablet 1     ranitidine (ZANTAC) 150 MG tablet Take 1 tablet (150 mg) by mouth At Bedtime 90 tablet 1     sertraline (ZOLOFT) 50 MG tablet Take 1 tablet (50 mg) by mouth daily Take 1/2 tablet (25 mg) for 1-2 weeks, then increase to 1 tablet orally daily 90 tablet 1     SUMAtriptan (IMITREX) 25 MG tablet Take 1-2 tablets (25-50 mg) by mouth at onset of headache for migraine May repeat in 2 hours. Max 8 tablets/24 hours. 9 tablet 1     traZODone (DESYREL) 100 MG tablet Take 1 tablet (100 mg) by mouth nightly as needed for sleep 90 tablet 1     etonogestrel (NEXPLANON) 68 MG IMPL 1 each (68 mg) by Subdermal route once for 1 dose 1 each 0     No Known Allergies    Reviewed and updated as needed this visit by clinical staff       Reviewed and updated as needed this visit by Provider         ROS:  Constitutional, HEENT, cardiovascular, pulmonary, gi and gu systems are negative, except as otherwise noted.    OBJECTIVE:     /68 (BP Location: Right arm, Patient Position: Chair, Cuff Size: Adult Large)   Pulse 97  Temp 98.1  F (36.7  C) (Oral)  Resp 16  Wt 199 lb (90.3 kg)  LMP 09/06/2018 (Exact Date)  SpO2 97%  Breastfeeding? No  BMI 32.36 kg/m2  Body mass index is 32.36 kg/(m^2).  GENERAL: healthy, alert and no distress  EYES: Eyes grossly normal to inspection, PERRL and conjunctivae and sclerae normal  HENT: ear canals and TM's normal, nose and mouth without ulcers or lesions  NECK: no adenopathy, no asymmetry, masses, or scars and thyroid normal to palpation  RESP: lungs clear to auscultation - no rales, rhonchi or wheezes  CV: regular rate and rhythm, normal S1 S2, no S3 or S4, no murmur, click or rub, no peripheral edema and peripheral pulses strong  MS: no gross musculoskeletal defects noted, no edema    Diagnostic Test Results:  none     ASSESSMENT/PLAN:             1. Upper respiratory tract infection, unspecified type  New problem, discussed with patient that illness is likely viral in etiology. RS is negative. Recommend rest, fluids and over the counter medications.  Advised follow up if symptoms worsen or do not alleviate or improve.      2. Throat pain  - Rapid strep screen    Risks, benefits and alternatives were discussed with patient. Agreeable to the plan of care.      Iris Rand PA-C  [facility]

## 2018-09-13 LAB
BACTERIA SPEC CULT: NORMAL
SPECIMEN SOURCE: NORMAL

## 2018-09-13 ASSESSMENT — ANXIETY QUESTIONNAIRES: GAD7 TOTAL SCORE: 8

## 2018-09-13 ASSESSMENT — PATIENT HEALTH QUESTIONNAIRE - PHQ9: SUM OF ALL RESPONSES TO PHQ QUESTIONS 1-9: 11

## 2018-09-26 ENCOUNTER — DOCUMENTATION ONLY (OUTPATIENT)
Dept: FAMILY MEDICINE | Facility: CLINIC | Age: 23
End: 2018-09-26

## 2018-09-26 ENCOUNTER — ALLIED HEALTH/NURSE VISIT (OUTPATIENT)
Dept: NURSING | Facility: CLINIC | Age: 23
End: 2018-09-26
Payer: COMMERCIAL

## 2018-09-26 DIAGNOSIS — Z11.1 SCREENING EXAMINATION FOR PULMONARY TUBERCULOSIS: Primary | ICD-10-CM

## 2018-09-26 PROCEDURE — 86580 TB INTRADERMAL TEST: CPT

## 2018-09-26 PROCEDURE — 99207 ZZC NO CHARGE NURSE ONLY: CPT

## 2018-09-26 NOTE — MR AVS SNAPSHOT
After Visit Summary   9/26/2018    Lucero Collier    MRN: [id number]           Patient Information     Date Of Birth          1995        Visit Information        Provider Department      9/26/2018 10:00 AM  NURSE [facility]        Today's Diagnoses     Screening examination for pulmonary tuberculosis    -  1       Follow-ups after your visit        Your next 10 appointments already scheduled     Sep 28, 2018 10:00 AM CDT   Nurse Only with  NURSE   [facility]    [address] [id number] [phone]            Oct 10, 2018  9:30 AM CDT   [facility]    [address] [address] [phone]            Oct 12, 2018  9:30 AM CDT   Nurse Only with  NURSE   [facility]    [address] [address] [phone]              Who to contact     If you have questions or need follow up information about today's clinic visit or your schedule please contact [facility] directly at [phone].  Normal or non-critical lab and imaging results will be communicated to you by MyChart, letter or phone within 4 business days after the clinic has received the results. If you do not hear from us within 7 days, please contact the clinic through Freeze Tag[employer] or phone. If you have a critical or abnormal lab result, we will notify you by phone as soon as possible.  Submit refill requests through [employer] or call your pharmacy and they will forward the refill request to us. Please allow 3 business days for your refill to be completed.          Additional Information About Your Visit        Freeze [employer] Information     [employer] gives you secure access to your electronic health record. If you see a primary care provider, you can also send messages to your care team and  make appointments. If you have questions, please call your primary care clinic.  If you do not have a primary care provider, please call [phone] and they will assist you.        Care EveryWhere ID     This is your Care EveryWhere ID. This could be used by other organizations to access your [place] medical records  [id number]        Your Vitals Were     Last Period                   09/06/2018 (Exact Date)            Blood Pressure from Last 3 Encounters:   09/12/18 106/68   09/07/18 115/78   07/24/18 118/84    Weight from Last 3 Encounters:   09/12/18 199 lb (90.3 kg)   09/07/18 199 lb 1.6 oz (90.3 kg)   07/24/18 200 lb 11.2 oz (91 kg)              We Performed the Following     TB INTRADERMAL TEST        Primary Care Provider Office Phone # Fax #    Iris Rand PA-C [phone]       [address] [facility] [address]        Equal Access to Services     [facility] : Hadii aad ku hadasho Soomaali, waaxda luqadaha, qaybta kaalmada adeegyada, waxay idiin hayaan adeeg kharajanelle la'sean . So [facility] [phone].    ATENCIÓN: Si habla español, tiene a joy disposición servicios gratuitos de asistencia lingüística. Llame al [phone].    We comply with applicable federal civil rights laws and [place] laws. We do not discriminate on the basis of race, color, national origin, age, disability, sex, sexual orientation, or gender identity.            Thank you!     Thank you for choosing [facility]  for your care. Our goal is always to provide you with excellent care. Hearing back from our patients is one way we can continue to improve our services. Please take a few minutes to complete the written survey that you may receive in the mail after your visit with us. Thank you!             Your Updated Medication List - Protect others around you: Learn how to safely use, store and throw away your medicines at www.disposemymeds.org.          This list is accurate as of 9/26/18  10:23 AM.  Always use your most recent med list.                   Brand Name Dispense Instructions for use Diagnosis    etonogestrel 68 MG Impl    NEXPLANON    1 each    1 each (68 mg) by Subdermal route once for 1 dose    Nexplanon insertion       fluocinonide 0.05 % cream    LIDEX    60 g    Apply sparingly to affected area twice daily as needed.  Do not apply to face.    Atopic dermatitis, unspecified type       ondansetron 4 MG tablet    ZOFRAN    20 tablet    Take 1 tablet (4 mg) by mouth every 8 hours as needed As needed for headache    Chronic tension-type headache, not intractable       pimecrolimus 1 % cream    ELIDEL    60 g    Apply topically 2 times daily Ok to use on face    Atopic dermatitis       prochlorperazine 10 MG tablet    COMPAZINE    10 tablet    Take 1 tablet (10 mg) by mouth every 6 hours as needed for nausea or vomiting    Nausea and vomiting, intractability of vomiting not specified, unspecified vomiting type       ranitidine 150 MG tablet    ZANTAC    90 tablet    Take 1 tablet (150 mg) by mouth At Bedtime    Gastroesophageal reflux disease without esophagitis       sertraline 50 MG tablet    ZOLOFT    90 tablet    Take 1 tablet (50 mg) by mouth daily Take 1/2 tablet (25 mg) for 1-2 weeks, then increase to 1 tablet orally daily    Severe single current episode of major depressive disorder, without psychotic features (H), Anxiety       SUMAtriptan 25 MG tablet    IMITREX    9 tablet    Take 1-2 tablets (25-50 mg) by mouth at onset of headache for migraine May repeat in 2 hours. Max 8 tablets/24 hours.    Migraine without status migrainosus, not intractable, unspecified migraine type       traZODone 100 MG tablet    DESYREL    90 tablet    Take 1 tablet (100 mg) by mouth nightly as needed for sleep    Sleep disturbance

## 2018-09-26 NOTE — NURSING NOTE
The patient is asked the following questions today and these are her answers:    -Have you had a mantoux administered in the past 30 days?    No  -Have you had a previous positive Mantoux.  No  -Have you received BCG in the past.  No  -Have you had a live vaccine  (MMR, Varicella, OPV, Yellow Fever) in the last 6 weeks.  No  -Have you had and active  viral or bacterial infection in the past 6 weeks.  No  -Have you received corticosteroids or immunosuppressive agents in the past 6 weeks.  No  -Have you been diagnosed with HIV?  No  -Do you have a maglinancy?  No     Randy Wilkinson CMA ([facility])

## 2018-09-28 ENCOUNTER — ALLIED HEALTH/NURSE VISIT (OUTPATIENT)
Dept: NURSING | Facility: CLINIC | Age: 23
End: 2018-09-28
Payer: COMMERCIAL

## 2018-09-28 DIAGNOSIS — Z11.1 SCREENING EXAMINATION FOR PULMONARY TUBERCULOSIS: Primary | ICD-10-CM

## 2018-09-28 LAB
PPDINDURATION: 0 MM (ref 0–5)
PPDREDNESS: 0 MM

## 2018-09-28 PROCEDURE — 99207 ZZC NO CHARGE NURSE ONLY: CPT

## 2018-10-10 ENCOUNTER — ALLIED HEALTH/NURSE VISIT (OUTPATIENT)
Dept: NURSING | Facility: CLINIC | Age: 23
End: 2018-10-10
Payer: COMMERCIAL

## 2018-10-10 DIAGNOSIS — Z23 NEED FOR VACCINATION: Primary | ICD-10-CM

## 2018-10-10 PROCEDURE — 86580 TB INTRADERMAL TEST: CPT

## 2018-10-10 NOTE — MR AVS SNAPSHOT
After Visit Summary   10/10/2018    Lucero Collier    MRN: [id number]           Patient Information     Date Of Birth          1995        Visit Information        Provider Department      10/10/2018 9:30 AM  NURSE [facility]        Today's Diagnoses     Need for vaccination    -  1       Follow-ups after your visit        Your next 10 appointments already scheduled     Oct 10, 2018  9:30 AM CDT   Nurse Only with  NURSE   [facility] ([facility])    [address] [address] [phone]            Oct 12, 2018  9:30 AM CDT   Nurse Only with  NURSE   [facility] ([facility])    [address] [facility] [address] [phone]              Who to contact     If you have questions or need follow up information about today's clinic visit or your schedule please contact [facility] directly at [phone].  Normal or non-critical lab and imaging results will be communicated to you by Smile Familyhart, letter or phone within 4 business days after the clinic has received the results. If you do not hear from us within 7 days, please contact the clinic through Glomerat or phone. If you have a critical or abnormal lab result, we will notify you by phone as soon as possible.  Submit refill requests through Heekya or call your pharmacy and they will forward the refill request to us. Please allow 3 business days for your refill to be completed.          Additional Information About Your Visit        Smile Familyhart Information     Heekya gives you secure access to your electronic health record. If you see a primary care provider, you can also send messages to your care team and make appointments. If you have questions, please call your primary care clinic.  If you do not have a primary care provider, please call [phone] and they will assist you.        Care EveryWhere ID     This is  your Care EveryWhere ID. This could be used by other organizations to access your [facility] medical records  [id number]         Blood Pressure from Last 3 Encounters:   09/12/18 106/68   09/07/18 115/78   07/24/18 118/84    Weight from Last 3 Encounters:   09/12/18 199 lb (90.3 kg)   09/07/18 199 lb 1.6 oz (90.3 kg)   07/24/18 200 lb 11.2 oz (91 kg)              We Performed the Following     TB INTRADERMAL TEST        Primary Care Provider Office Phone # Fax #    Iris Rand PA-C [phone]       [address] [facility] [address]        Equal Access to Services     SILVIANO AYALA : Hadii aad ku hadasho Soomaali, waaxda luqadaha, qaybta kaalmada adeegyada, waxay marloin haysean mauricio cole . So [facility] [phone].    ATENCIÓN: Si habla español, tiene a joy disposición servicios gratuitos de asistencia lingüística. Llame al [phone].    We comply with applicable federal civil rights laws and [place] laws. We do not discriminate on the basis of race, color, national origin, age, disability, sex, sexual orientation, or gender identity.            Thank you!     Thank you for choosing [facility]  for your care. Our goal is always to provide you with excellent care. Hearing back from our patients is one way we can continue to improve our services. Please take a few minutes to complete the written survey that you may receive in the mail after your visit with us. Thank you!             Your Updated Medication List - Protect others around you: Learn how to safely use, store and throw away your medicines at www.disposemymeds.org.          This list is accurate as of 10/10/18  9:27 AM.  Always use your most recent med list.                   Brand Name Dispense Instructions for use Diagnosis    etonogestrel 68 MG Impl    NEXPLANON    1 each    1 each (68 mg) by Subdermal route once for 1 dose    Nexplanon insertion       fluocinonide 0.05 % cream    LIDEX    60 g     Apply sparingly to affected area twice daily as needed.  Do not apply to face.    Atopic dermatitis, unspecified type       ondansetron 4 MG tablet    ZOFRAN    20 tablet    Take 1 tablet (4 mg) by mouth every 8 hours as needed As needed for headache    Chronic tension-type headache, not intractable       pimecrolimus 1 % cream    ELIDEL    60 g    Apply topically 2 times daily Ok to use on face    Atopic dermatitis       prochlorperazine 10 MG tablet    COMPAZINE    10 tablet    Take 1 tablet (10 mg) by mouth every 6 hours as needed for nausea or vomiting    Nausea and vomiting, intractability of vomiting not specified, unspecified vomiting type       ranitidine 150 MG tablet    ZANTAC    90 tablet    Take 1 tablet (150 mg) by mouth At Bedtime    Gastroesophageal reflux disease without esophagitis       sertraline 50 MG tablet    ZOLOFT    90 tablet    Take 1 tablet (50 mg) by mouth daily Take 1/2 tablet (25 mg) for 1-2 weeks, then increase to 1 tablet orally daily    Severe single current episode of major depressive disorder, without psychotic features (H), Anxiety       SUMAtriptan 25 MG tablet    IMITREX    9 tablet    Take 1-2 tablets (25-50 mg) by mouth at onset of headache for migraine May repeat in 2 hours. Max 8 tablets/24 hours.    Migraine without status migrainosus, not intractable, unspecified migraine type       traZODone 100 MG tablet    DESYREL    90 tablet    Take 1 tablet (100 mg) by mouth nightly as needed for sleep    Sleep disturbance

## 2018-10-10 NOTE — PROCEDURES
The patient is asked the following questions today and these are her answers:    -Have you had a mantoux administered in the past 30 days?    Yes  -Have you had a previous positive Mantoux.  No  -Have you received BCG in the past.  No  -Have you had a live vaccine  (MMR, Varicella, OPV, Yellow Fever) in the last 6 weeks.  No  -Have you had and active  viral or bacterial infection in the past 6 weeks.  No  -Have you received corticosteroids or immunosuppressive agents in the past 6 weeks.  No  -Have you been diagnosed with HIV?  No  -Do you have a maglinancy?  No   Alissa VALENZUELA M.A.

## 2018-10-12 ENCOUNTER — ALLIED HEALTH/NURSE VISIT (OUTPATIENT)
Dept: NURSING | Facility: CLINIC | Age: 23
End: 2018-10-12
Payer: COMMERCIAL

## 2018-10-12 DIAGNOSIS — Z11.1 VISIT FOR MANTOUX TEST: Primary | ICD-10-CM

## 2018-10-12 LAB
PPDINDURATION: 0 MM (ref 0–5)
PPDREDNESS: 0 MM

## 2018-10-12 PROCEDURE — 99207 ZZC NO CHARGE NURSE ONLY: CPT

## 2018-10-12 NOTE — PROGRESS NOTES
Mantoux result:  Lab Results   Component Value Date    PPDREDNESS 0 10/12/2018    PPDINDURATIO 0 10/12/2018     Regina Gerardo RN

## 2018-10-12 NOTE — MR AVS SNAPSHOT
After Visit Summary   10/12/2018    Lucero Collier    MRN: [id number]           Patient Information     Date Of Birth          1995        Visit Information        Provider Department      10/12/2018 9:30 AM  NURSE [facility]        Today's Diagnoses     Visit for Mantoux test    -  1       Follow-ups after your visit        Your next 10 appointments already scheduled     Oct 12, 2018  9:30 AM CDT   Nurse Only with  NURSE   [facility] ([facility])    [address] [facility] [address] [phone]              Who to contact     If you have questions or need follow up information about today's clinic visit or your schedule please contact [facility] directly at [phone].  Normal or non-critical lab and imaging results will be communicated to you by MyChart, letter or phone within 4 business days after the clinic has received the results. If you do not hear from us within 7 days, please contact the clinic through MyChart or phone. If you have a critical or abnormal lab result, we will notify you by phone as soon as possible.  Submit refill requests through [employer] or call your pharmacy and they will forward the refill request to us. Please allow 3 business days for your refill to be completed.          Additional Information About Your Visit        MyChart Information     [employer] gives you secure access to your electronic health record. If you see a primary care provider, you can also send messages to your care team and make appointments. If you have questions, please call your primary care clinic.  If you do not have a primary care provider, please call [phone] and they will assist you.        Care EveryWhere ID     This is your Care EveryWhere ID. This could be used by other organizations to access your [facility] medical records  [id number]         Blood Pressure from Last 3 Encounters:   09/12/18 106/68    09/07/18 115/78   07/24/18 118/84    Weight from Last 3 Encounters:   09/12/18 199 lb (90.3 kg)   09/07/18 199 lb 1.6 oz (90.3 kg)   07/24/18 200 lb 11.2 oz (91 kg)              Today, you had the following     No orders found for display       Primary Care Provider Office Phone # Fax #    Iris Rand PA-C [phone]       [address] [facility] [address]        Equal Access to Services     [facility]: Hadii aad ku hadasho Soomaali, waaxda luqadaha, qaybta kaalmada adeegyada, waxay marloin hayaan adejacques cole . So [facility] [phone].    ATENCIÓN: Si habla español, tiene a joy disposición servicios gratuitos de asistencia lingüística. [facility] [phone].    We comply with applicable federal civil rights laws and [place] laws. We do not discriminate on the basis of race, color, national origin, age, disability, sex, sexual orientation, or gender identity.            Thank you!     Thank you for choosing [facility]  for your care. Our goal is always to provide you with excellent care. Hearing back from our patients is one way we can continue to improve our services. Please take a few minutes to complete the written survey that you may receive in the mail after your visit with us. Thank you!             Your Updated Medication List - Protect others around you: Learn how to safely use, store and throw away your medicines at www.disposemymeds.org.          This list is accurate as of 10/12/18  9:27 AM.  Always use your most recent med list.                   Brand Name Dispense Instructions for use Diagnosis    etonogestrel 68 MG Impl    NEXPLANON    1 each    1 each (68 mg) by Subdermal route once for 1 dose    Nexplanon insertion       fluocinonide 0.05 % cream    LIDEX    60 g    Apply sparingly to affected area twice daily as needed.  Do not apply to face.    Atopic dermatitis, unspecified type       ondansetron 4 MG tablet    ZOFRAN    20 tablet    Take  1 tablet (4 mg) by mouth every 8 hours as needed As needed for headache    Chronic tension-type headache, not intractable       pimecrolimus 1 % cream    ELIDEL    60 g    Apply topically 2 times daily Ok to use on face    Atopic dermatitis       prochlorperazine 10 MG tablet    COMPAZINE    10 tablet    Take 1 tablet (10 mg) by mouth every 6 hours as needed for nausea or vomiting    Nausea and vomiting, intractability of vomiting not specified, unspecified vomiting type       ranitidine 150 MG tablet    ZANTAC    90 tablet    Take 1 tablet (150 mg) by mouth At Bedtime    Gastroesophageal reflux disease without esophagitis       sertraline 50 MG tablet    ZOLOFT    90 tablet    Take 1 tablet (50 mg) by mouth daily Take 1/2 tablet (25 mg) for 1-2 weeks, then increase to 1 tablet orally daily    Severe single current episode of major depressive disorder, without psychotic features (H), Anxiety       SUMAtriptan 25 MG tablet    IMITREX    9 tablet    Take 1-2 tablets (25-50 mg) by mouth at onset of headache for migraine May repeat in 2 hours. Max 8 tablets/24 hours.    Migraine without status migrainosus, not intractable, unspecified migraine type       traZODone 100 MG tablet    DESYREL    90 tablet    Take 1 tablet (100 mg) by mouth nightly as needed for sleep    Sleep disturbance

## 2018-10-22 ENCOUNTER — MYC MEDICAL ADVICE (OUTPATIENT)
Dept: FAMILY MEDICINE | Facility: CLINIC | Age: 23
End: 2018-10-22

## 2018-10-22 ENCOUNTER — TELEPHONE (OUTPATIENT)
Dept: FAMILY MEDICINE | Facility: CLINIC | Age: 23
End: 2018-10-22

## 2018-10-22 ASSESSMENT — PATIENT HEALTH QUESTIONNAIRE - PHQ9
SUM OF ALL RESPONSES TO PHQ QUESTIONS 1-9: 22
10. IF YOU CHECKED OFF ANY PROBLEMS, HOW DIFFICULT HAVE THESE PROBLEMS MADE IT FOR YOU TO DO YOUR WORK, TAKE CARE OF THINGS AT HOME, OR GET ALONG WITH OTHER PEOPLE: EXTREMELY DIFFICULT
SUM OF ALL RESPONSES TO PHQ QUESTIONS 1-9: 22

## 2018-10-22 ASSESSMENT — ANXIETY QUESTIONNAIRES
7. FEELING AFRAID AS IF SOMETHING AWFUL MIGHT HAPPEN: NEARLY EVERY DAY
GAD7 TOTAL SCORE: 20
7. FEELING AFRAID AS IF SOMETHING AWFUL MIGHT HAPPEN: NEARLY EVERY DAY
3. WORRYING TOO MUCH ABOUT DIFFERENT THINGS: NEARLY EVERY DAY
GAD7 TOTAL SCORE: 20
2. NOT BEING ABLE TO STOP OR CONTROL WORRYING: NEARLY EVERY DAY
1. FEELING NERVOUS, ANXIOUS, OR ON EDGE: NEARLY EVERY DAY
6. BECOMING EASILY ANNOYED OR IRRITABLE: MORE THAN HALF THE DAYS
GAD7 TOTAL SCORE: 20
5. BEING SO RESTLESS THAT IT IS HARD TO SIT STILL: NEARLY EVERY DAY
4. TROUBLE RELAXING: NEARLY EVERY DAY

## 2018-10-22 NOTE — TELEPHONE ENCOUNTER
Panel Management Review      Patient has the following on her problem list:     Depression / Dysthymia review    Measure:  Needs PHQ-9 score of 4 or less during index window.  Administer PHQ-9 and if score is 5 or more, send encounter to provider for next steps.    5 - 7 month window range: 4/2019    PHQ-9 SCORE 7/9/2018 7/12/2018 9/12/2018   Total Score 9 19 11       If PHQ-9 recheck is 5 or more, route to provider for next steps.    Patient is due for:  PHQ9      Composite cancer screening  Chart review shows that this patient is due/due soon for the following None  Summary:    Patient is due/failing the following:   PHQ9    Action needed:   Patient needs to do PHQ9.    Type of outreach:    Sent [employer] message.    Questions for provider review:    None                                                                                                                                 Sundeep Givens CMA (AAMA)     Chart routed to Care Team.

## 2018-10-23 ASSESSMENT — PATIENT HEALTH QUESTIONNAIRE - PHQ9: SUM OF ALL RESPONSES TO PHQ QUESTIONS 1-9: 22

## 2018-10-23 ASSESSMENT — ANXIETY QUESTIONNAIRES: GAD7 TOTAL SCORE: 20

## 2018-10-29 ENCOUNTER — MYC MEDICAL ADVICE (OUTPATIENT)
Dept: FAMILY MEDICINE | Facility: CLINIC | Age: 23
End: 2018-10-29

## 2018-10-29 NOTE — TELEPHONE ENCOUNTER
Patient no-showed, resent message.  Sundeep Givens CMA ([facility])

## 2018-11-11 ENCOUNTER — HEALTH MAINTENANCE LETTER (OUTPATIENT)
Age: 23
End: 2018-11-11

## 2018-11-21 ENCOUNTER — OFFICE VISIT (OUTPATIENT)
Dept: FAMILY MEDICINE | Facility: CLINIC | Age: 23
End: 2018-11-21
Payer: COMMERCIAL

## 2018-11-21 VITALS
OXYGEN SATURATION: 99 % | WEIGHT: 209.5 LBS | RESPIRATION RATE: 16 BRPM | HEIGHT: 66 IN | TEMPERATURE: 98.2 F | DIASTOLIC BLOOD PRESSURE: 64 MMHG | BODY MASS INDEX: 33.67 KG/M2 | SYSTOLIC BLOOD PRESSURE: 120 MMHG | HEART RATE: 114 BPM

## 2018-11-21 DIAGNOSIS — Z23 ENCOUNTER FOR IMMUNIZATION: ICD-10-CM

## 2018-11-21 DIAGNOSIS — R11.2 NAUSEA AND VOMITING, INTRACTABILITY OF VOMITING NOT SPECIFIED, UNSPECIFIED VOMITING TYPE: ICD-10-CM

## 2018-11-21 DIAGNOSIS — F32.2 SEVERE SINGLE CURRENT EPISODE OF MAJOR DEPRESSIVE DISORDER, WITHOUT PSYCHOTIC FEATURES (H): Primary | ICD-10-CM

## 2018-11-21 DIAGNOSIS — G43.909 MIGRAINE WITHOUT STATUS MIGRAINOSUS, NOT INTRACTABLE, UNSPECIFIED MIGRAINE TYPE: ICD-10-CM

## 2018-11-21 DIAGNOSIS — G47.9 SLEEP DISTURBANCE: ICD-10-CM

## 2018-11-21 PROCEDURE — 99214 OFFICE O/P EST MOD 30 MIN: CPT | Mod: 25 | Performed by: PHYSICIAN ASSISTANT

## 2018-11-21 PROCEDURE — 90471 IMMUNIZATION ADMIN: CPT | Performed by: PHYSICIAN ASSISTANT

## 2018-11-21 PROCEDURE — 90715 TDAP VACCINE 7 YRS/> IM: CPT | Performed by: PHYSICIAN ASSISTANT

## 2018-11-21 RX ORDER — TRAZODONE HYDROCHLORIDE 100 MG/1
100 TABLET ORAL
Qty: 90 TABLET | Refills: 1 | Status: SHIPPED | OUTPATIENT
Start: 2018-11-21 | End: 2019-06-10

## 2018-11-21 RX ORDER — PIMECROLIMUS 10 MG/G
CREAM TOPICAL 2 TIMES DAILY
Qty: 60 G | Refills: 3 | Status: CANCELLED | OUTPATIENT
Start: 2018-11-21

## 2018-11-21 RX ORDER — PROCHLORPERAZINE MALEATE 10 MG
10 TABLET ORAL EVERY 6 HOURS PRN
Qty: 10 TABLET | Refills: 1 | Status: SHIPPED | OUTPATIENT
Start: 2018-11-21 | End: 2019-05-11

## 2018-11-21 RX ORDER — SUMATRIPTAN 25 MG/1
25-50 TABLET, FILM COATED ORAL
Qty: 9 TABLET | Refills: 1 | Status: SHIPPED | OUTPATIENT
Start: 2018-11-21 | End: 2019-06-10

## 2018-11-21 ASSESSMENT — PATIENT HEALTH QUESTIONNAIRE - PHQ9
5. POOR APPETITE OR OVEREATING: MORE THAN HALF THE DAYS
SUM OF ALL RESPONSES TO PHQ QUESTIONS 1-9: 20

## 2018-11-21 ASSESSMENT — ANXIETY QUESTIONNAIRES
6. BECOMING EASILY ANNOYED OR IRRITABLE: MORE THAN HALF THE DAYS
7. FEELING AFRAID AS IF SOMETHING AWFUL MIGHT HAPPEN: MORE THAN HALF THE DAYS
3. WORRYING TOO MUCH ABOUT DIFFERENT THINGS: MORE THAN HALF THE DAYS
IF YOU CHECKED OFF ANY PROBLEMS ON THIS QUESTIONNAIRE, HOW DIFFICULT HAVE THESE PROBLEMS MADE IT FOR YOU TO DO YOUR WORK, TAKE CARE OF THINGS AT HOME, OR GET ALONG WITH OTHER PEOPLE: SOMEWHAT DIFFICULT
GAD7 TOTAL SCORE: 14
1. FEELING NERVOUS, ANXIOUS, OR ON EDGE: MORE THAN HALF THE DAYS
5. BEING SO RESTLESS THAT IT IS HARD TO SIT STILL: MORE THAN HALF THE DAYS
2. NOT BEING ABLE TO STOP OR CONTROL WORRYING: MORE THAN HALF THE DAYS
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
## 2018-11-21 NOTE — MR AVS SNAPSHOT
After Visit Summary   11/21/2018    Lucero Collier    MRN: [id number]           Patient Information     Date Of Birth          1995        Visit Information        Provider Department      11/21/2018 11:10 AM Iris Rand PA-C [facility]        Today's Diagnoses     Encounter for immunization    -  1    Severe single current episode of major depressive disorder, without psychotic features (H)        Sleep disturbance        Migraine without status migrainosus, not intractable, unspecified migraine type        Nausea and vomiting, intractability of vomiting not specified, unspecified vomiting type           Follow-ups after your visit        Additional Services     MENTAL HEALTH REFERRAL  - Adult; Psychiatry and Medication Management; Psychiatry; [facility]: Collaborative Care Psychiatry Service [phone].  Medication management & future refills will be returned to [facility] PCP upon completion of evaluation; We james...       All scheduling is subject to the client's specific insurance plan & benefits, provider/location availability, and provider clinical specialities.  Please arrive 15 minutes early for your first appointment and bring your completed paperwork.    Please be aware that coverage of these services is subject to the terms and limitations of your health insurance plan.  Call member services at your health plan with any benefit or coverage questions.                            Follow-up notes from your care team     Return in about 4 weeks (around 12/19/2018) for Mood Recheck.      Your next 10 appointments already scheduled     Dec 12, 2018  1:10 PM CST   SHORT with Iris Rand PA-C   [facility] ([facility]    [address] [facility] [address] [address] [phone]              Who to contact     If you have questions or need follow up information about today's clinic visit or your schedule please contact  "[facility] directly at [phone].  Normal or non-critical lab and imaging results will be communicated to you by MyChart, letter or phone within 4 business days after the clinic has received the results. If you do not hear from us within 7 days, please contact the clinic through [employer] or phone. If you have a critical or abnormal lab result, we will notify you by phone as soon as possible.  Submit refill requests through [employer] or call your pharmacy and they will forward the refill request to us. Please allow 3 business days for your refill to be completed.          Additional Information About Your Visit        [employer] Information     [employer] gives you secure access to your electronic health record. If you see a primary care provider, you can also send messages to your care team and make appointments. If you have questions, please call your primary care clinic.  If you do not have a primary care provider, please call [phone] and they will assist you.        Care EveryWhere ID     This is your Care EveryWhere ID. This could be used by other organizations to access your [place] medical records  [id number]        Your Vitals Were     Pulse Temperature Respirations Height Last Period Pulse Oximetry    114 98.2  F (36.8  C) (Oral) 16 5' 5.75\" (1.67 m) (LMP Unknown) 99%    Breastfeeding? BMI (Body Mass Index)                No 34.07 kg/m2           Blood Pressure from Last 3 Encounters:   11/21/18 120/64   09/12/18 106/68   09/07/18 115/78    Weight from Last 3 Encounters:   11/21/18 209 lb 8 oz (95 kg)   09/12/18 199 lb (90.3 kg)   09/07/18 199 lb 1.6 oz (90.3 kg)              We Performed the Following     ADMIN 1st VACCINE     MENTAL HEALTH REFERRAL  - Adult; Psychiatry and Medication Management; Psychiatry; [facility]: Collaborative Care Psychiatry Service [phone].  Medication management & future refills will be returned to [facility] PCP upon completion of evaluation; We james...     SCREENING " QUESTIONS FOR ADULT IMMUNIZATIONS     TDAP, IM (10 - 64 YRS) - Adacel          Today's Medication Changes          These changes are accurate as of 11/21/18 11:36 AM.  If you have any questions, ask your nurse or doctor.               These medicines have changed or have updated prescriptions.        Dose/Directions    * sertraline 50 MG tablet   Commonly known as:  ZOLOFT   This may have changed:  Another medication with the same name was added. Make sure you understand how and when to take each.   Used for:  Severe single current episode of major depressive disorder, without psychotic features (H), Anxiety   Changed by:  Iris Rand PA-C        Dose:  50 mg   Take 1 tablet (50 mg) by mouth daily Take 1/2 tablet (25 mg) for 1-2 weeks, then increase to 1 tablet orally daily   Quantity:  90 tablet   Refills:  1       * sertraline 50 MG tablet   Commonly known as:  ZOLOFT   This may have changed:  You were already taking a medication with the same name, and this prescription was added. Make sure you understand how and when to take each.   Used for:  Severe single current episode of major depressive disorder, without psychotic features (H)   Changed by:  Iris Rand PA-C        Take 1/2 tablet (25 mg) for 1-2 weeks, then increase to 1 tablet orally daily   Quantity:  30 tablet   Refills:  0       * Notice:  This list has 2 medication(s) that are the same as other medications prescribed for you. Read the directions carefully, and ask your doctor or other care provider to review them with you.         Where to get your medicines      These medications were sent to [facility] [id number] [place] [address] AT [facility] [address]  [address] [facility] [address] [facility] [address]     Phone:  [phone]     prochlorperazine 10 MG tablet    sertraline 50 MG tablet    SUMAtriptan 25 MG tablet    traZODone 100 MG tablet                Primary Care Provider Office Phone # Fax #     Iris Rand PA-C [phone]       [address]  [facility] [address]        Equal Access to Services     SILVIANO AYALA : Hadii cecelia don larisao Socharisseali, waaxda luqadaha, qaybta kaalmada adestephanie, gomez chappell laamandanitish orozco. So [facility] [phone].    ATENCIÓN: Si habla español, tiene a joy disposición servicios gratuitos de asistencia lingüística. Llame al [phone].    We comply with applicable federal civil rights laws and [place] laws. We do not discriminate on the basis of race, color, national origin, age, disability, sex, sexual orientation, or gender identity.            Thank you!     Thank you for choosing [facility]  for your care. Our goal is always to provide you with excellent care. Hearing back from our patients is one way we can continue to improve our services. Please take a few minutes to complete the written survey that you may receive in the mail after your visit with us. Thank you!             Your Updated Medication List - Protect others around you: Learn how to safely use, store and throw away your medicines at www.disposemymeds.org.          This list is accurate as of 11/21/18 11:36 AM.  Always use your most recent med list.                   Brand Name Dispense Instructions for use Diagnosis    etonogestrel 68 MG Impl    NEXPLANON    1 each    1 each (68 mg) by Subdermal route once for 1 dose    Nexplanon insertion       fluocinonide 0.05 % cream    LIDEX    60 g    Apply sparingly to affected area twice daily as needed.  Do not apply to face.    Atopic dermatitis, unspecified type       IBUPROFEN PO      Take 600 mg by mouth every 6 hours as needed for moderate pain        ondansetron 4 MG tablet    ZOFRAN    20 tablet    Take 1 tablet (4 mg) by mouth every 8 hours as needed As needed for headache    Chronic tension-type headache, not intractable       pimecrolimus 1 % cream    ELIDEL    60 g    Apply topically 2 times daily Ok  to use on face    Atopic dermatitis       prochlorperazine 10 MG tablet    COMPAZINE    10 tablet    Take 1 tablet (10 mg) by mouth every 6 hours as needed for nausea or vomiting    Nausea and vomiting, intractability of vomiting not specified, unspecified vomiting type       ranitidine 150 MG tablet    ZANTAC    90 tablet    Take 1 tablet (150 mg) by mouth At Bedtime    Gastroesophageal reflux disease without esophagitis       * sertraline 50 MG tablet    ZOLOFT    90 tablet    Take 1 tablet (50 mg) by mouth daily Take 1/2 tablet (25 mg) for 1-2 weeks, then increase to 1 tablet orally daily    Severe single current episode of major depressive disorder, without psychotic features (H), Anxiety       * sertraline 50 MG tablet    ZOLOFT    30 tablet    Take 1/2 tablet (25 mg) for 1-2 weeks, then increase to 1 tablet orally daily    Severe single current episode of major depressive disorder, without psychotic features (H)       SUMAtriptan 25 MG tablet    IMITREX    9 tablet    Take 1-2 tablets (25-50 mg) by mouth at onset of headache for migraine May repeat in 2 hours. Max 8 tablets/24 hours.    Migraine without status migrainosus, not intractable, unspecified migraine type       traZODone 100 MG tablet    DESYREL    90 tablet    Take 1 tablet (100 mg) by mouth nightly as needed for sleep    Sleep disturbance       * Notice:  This list has 2 medication(s) that are the same as other medications prescribed for you. Read the directions carefully, and ask your doctor or other care provider to review them with you.

## 2018-11-21 NOTE — LETTER
My Depression Action Plan  Name: Lucero Collier   Date of Birth 1995  Date: 11/21/2018    My doctor: Iris Rand   My clinic: [facility]  [address] [facility] [address] [phone]          GREEN    ZONE   Good Control    What it looks like:     Things are going generally well. You have normal up s and down s. You may even feel depressed from time to time, but bad moods usually last less than a day.   What you need to do:  1. Continue to care for yourself (see self care plan)  2. Check your depression survival kit and update it as needed  3. Follow your physician s recommendations including any medication.  4. Do not stop taking medication unless you consult with your physician first.           YELLOW         ZONE Getting Worse    What it looks like:     Depression is starting to interfere with your life.     It may be hard to get out of bed; you may be starting to isolate yourself from others.    Symptoms of depression are starting to last most all day and this has happened for several days.     You may have suicidal thoughts but they are not constant.   What you need to do:     1. Call your care team, your response to treatment will improve if you keep your care team informed of your progress. Yellow periods are signs an adjustment may need to be made.     2. Continue your self-care, even if you have to fake it!    3. Talk to someone in your support network    4. Open up your depression survival kit           RED    ZONE Medical Alert - Get Help    What it looks like:     Depression is seriously interfering with your life.     You may experience these or other symptoms: You can t get out of bed most days, can t work or engage in other necessary activities, you have trouble taking care of basic hygiene, or basic responsibilities, thoughts of suicide or death that will not go away, self-injurious behavior.     What you need to do:  1. Call your care  team and request a same-day appointment. If they are not available (weekends or after hours) call your local crisis line, emergency room or 911.            Depression Self Care Plan / Survival Kit    Self-Care for Depression  Here s the deal. Your body and mind are really not as separate as most people think.  What you do and think affects how you feel and how you feel influences what you do and think. This means if you do things that people who feel good do, it will help you feel better.  Sometimes this is all it takes.  There is also a place for medication and therapy depending on how severe your depression is, so be sure to consult with your medical provider and/ or Behavioral Health Consultant if your symptoms are worsening or not improving.     In order to better manage my stress, I will:    Exercise  Get some form of exercise, every day. This will help reduce pain and release endorphins, the  feel good  chemicals in your brain. This is almost as good as taking antidepressants!  This is not the same as joining a gym and then never going! (they count on that by the way ) It can be as simple as just going for a walk or doing some gardening, anything that will get you moving.      Hygiene   Maintain good hygiene (Get out of bed in the morning, Make your bed, Brush your teeth, Take a shower, and Get dressed like you were going to work, even if you are unemployed).  If your clothes don't fit try to get ones that do.    Diet  I will strive to eat foods that are good for me, drink plenty of water, and avoid excessive sugar, caffeine, alcohol, and other mood-altering substances.  Some foods that are helpful in depression are: complex carbohydrates, B vitamins, flaxseed, fish or fish oil, fresh fruits and vegetables.    Psychotherapy  I agree to participate in Individual Therapy (if recommended).    Medication  If prescribed medications, I agree to take them.  Missing doses can result in serious side effects.  I  understand that drinking alcohol, or other illicit drug use, may cause potential side effects.  I will not stop my medication abruptly without first discussing it with my provider.    Staying Connected With Others  I will stay in touch with my friends, family members, and my primary care provider/team.    Use your imagination  Be creative.  We all have a creative side; it doesn t matter if it s oil painting, sand castles, or mud pies! This will also kick up the endorphins.    Witness Beauty  (AKA stop and smell the roses) Take a look outside, even in mid-winter. Notice colors, textures. Watch the squirrels and birds.     Service to others  Be of service to others.  There is always someone else in need.  By helping others we can  get out of ourselves  and remember the really important things.  This also provides opportunities for practicing all the other parts of the program.    Humor  Laugh and be silly!  Adjust your TV habits for less news and crime-drama and more comedy.    Control your stress  Try breathing deep, massage therapy, biofeedback, and meditation. Find time to relax each day.     My support system    Clinic Contact:  Phone number:    Contact 1:  Phone number:    Contact 2:  Phone number:    [employer]/:  Phone number:    Therapist:  Phone number:    Local crisis center:    Phone number:    Other community support:  Phone number:

## 2018-11-22 ASSESSMENT — ANXIETY QUESTIONNAIRES: GAD7 TOTAL SCORE: 14

## 2018-11-30 ENCOUNTER — OFFICE VISIT (OUTPATIENT)
Dept: FAMILY MEDICINE | Facility: CLINIC | Age: 23
End: 2018-11-30
Payer: COMMERCIAL

## 2018-11-30 VITALS
HEART RATE: 95 BPM | WEIGHT: 214 LBS | TEMPERATURE: 98.7 F | OXYGEN SATURATION: 99 % | RESPIRATION RATE: 18 BRPM | BODY MASS INDEX: 34.8 KG/M2 | DIASTOLIC BLOOD PRESSURE: 76 MMHG | SYSTOLIC BLOOD PRESSURE: 114 MMHG

## 2018-11-30 DIAGNOSIS — Z30.09 FAMILY PLANNING COUNSELING: ICD-10-CM

## 2018-11-30 DIAGNOSIS — Z30.46 NEXPLANON REMOVAL: Primary | ICD-10-CM

## 2018-11-30 PROCEDURE — 99213 OFFICE O/P EST LOW 20 MIN: CPT | Mod: 25 | Performed by: NURSE PRACTITIONER

## 2018-11-30 PROCEDURE — 11976 REMOVE CONTRACEPTIVE CAPSULE: CPT | Performed by: NURSE PRACTITIONER

## 2018-11-30 NOTE — PROGRESS NOTES
"  SUBJECTIVE:   Lucero Collier is a 23 year old female who presents to clinic today for the following health issues:      Patient is coming in for nexplanon removal.    Initially she discussed concerns with headaches as her reason for removal.  Also mentioned \"needing a break from birth control.\"  When specifically asked, she admitted that she wanted to conceive.  She is living with her best friend.  Almost no contact with her parents.  No support system, believes her best friend is sufficient.  She is smoking.  She is not taking a prenatal vitamin.    Problem list and histories reviewed & adjusted, as indicated.  Additional history: as documented    Patient Active Problem List   Diagnosis     ADHD (attention deficit hyperactivity disorder)     Constipation     Myopia     Spells     Chronic headaches     GERD (gastroesophageal reflux disease)     Adverse reaction to pertussis vaccine     Complex partial seizures (H)     Care Plan- Health Care Home     Learning problem     Overweight     Sleep disturbance     Adjustment disorder with mixed anxiety and depressed mood     Migraine without status migrainosus, not intractable, unspecified migraine type     Anxiety     Severe single current episode of major depressive disorder, without psychotic features (H)     Depression     HTN (hypertension)     Past Surgical History:   Procedure Laterality Date     ENDOSCOPY UPPER WITH PANCREATIC STIMULATION  4/06    Esophagitis 4/06     NISSEN FUNDOPLICATION  6/07    Dr. Meneses     PE TUBES  3/98     TONSILLECTOMY & ADENOIDECTOMY  4/02       Social History   Substance Use Topics     Smoking status: Current Every Day Smoker     Types: Cigarettes     Smokeless tobacco: Never Used      Comment: 5 qd     Alcohol use No     Family History   Problem Relation Age of Onset     Diabetes Maternal Grandmother      Hypertension Maternal Grandmother      Diabetes Paternal Grandmother      Cardiovascular Paternal Grandmother      Breast Cancer " Paternal Grandmother 61     right side breast cancer     Genetic Disorder Other      nephew.-genetic defects      Obesity Mother      Family History Negative Father            Reviewed and updated as needed this visit by clinical staff       Reviewed and updated as needed this visit by Provider         ROS:  SEE HPI.    OBJECTIVE:     /76 (BP Location: Right arm, Patient Position: Chair, Cuff Size: Adult Large)  Pulse 95  Temp 98.7  F (37.1  C) (Tympanic)  Resp 18  Wt 214 lb (97.1 kg)  LMP  (LMP Unknown)  SpO2 99%  BMI 34.8 kg/m2  Body mass index is 34.8 kg/(m^2).  GENERAL: healthy, alert and no distress  PSYCH: mentation appears normal, affect normal/bright    Diagnostic Test Results:  none     ASSESSMENT/PLAN:   1. Nexplanon removal  Tolerated well.  See below.    2. Family planning counseling  Discussed in depth my concerns with her conceiving at this time.  I believe there are some serious social and financial constraints that would limit her ability to care for a child at this time.  She did not appear receptive to this discussion, was willing to talk with her previous PCP briefly at the end of the visit although it does appear that in the end she is going to try to conceive.  During the visit I did discuss the recommendation to quit smoking and start a prenatal if she truly was going to proceed.  Pt agrees with plan and verbalized understanding.        Reviewed r/b/se of procedure including risk for infection.   Explained procedure to pt.  Consent signed.    Procedure:  Removal of nexplanon    Pt was supine with L arm at 90 degree angle.  Arm was cleansed with alcohol pad and betadine swab x3.  Anesthesia injected, lidocaine 1%, just under the distal end of the nexplanon michelle.  2mm longitudinal incision at distal end of nexplanon michelle.  Pressure applied to proximal end of nexplanon michelle.  Nexplanon was removed without complication.  Measured to confirm 4cm in length.  Minimal bleeding.  Steri strips  applied.  Band-aid applied to be left on for 3-5 days.  Pressure bandage applied to be left on for 24 hours.  Pt tolerated procedure without problem.    Monitor for fever, pain, discharge, or redness.  Return to clinic if these symptoms or other symptoms arise.      SHELBY Summers [facility]

## 2018-11-30 NOTE — MR AVS SNAPSHOT
After Visit Summary   11/30/2018    Lucero Collier    MRN: [id number]           Patient Information     Date Of Birth          1995        Visit Information        Provider Department      11/30/2018 10:20 AM Cristal Beltran Ra, APRN CNP [facility]        Care Instructions    Watch for any signs of infection.  Redness, swelling, increased pain, fever.  Follow up if these occur.  Keep the outer larger bandage on for 1 day, the band aid on for 3-5 days.  Call with concerns.            Follow-ups after your visit        Your next 10 appointments already scheduled     Nov 30, 2018 10:20 AM CST   PROCEDURE with SHELBY Summers Ra, CNP   [facility]    [address] [facility] [address]   [phone]            Dec 03, 2018  8:20 AM CST   SHORT with SHELBY Summers Ra, CNP   [facility]    [address] [facility] [address]   [phone]            Dec 03, 2018  2:00 PM CST   Return Visit with Gina Gao [facility] ([facility]    [address] [place] [address]   [phone]            Dec 12, 2018  1:10 PM CST   SHORT with Iris Rand PA-C   [facility]    [address] [facility] [address]   [phone]            Dec 14, 2018 10:45 AM CST   (Arrive by 10:15 AM)   New Visit with Morteza Remy CNP   [facility] ([facility]    [address] [facility] [address]   [phone]            Jan 08, 2019 12:00 PM CST   [facility] ([facility]    [address] [place] [address]   [phone]            Jan 16, 2019  4:00 PM CST   [facility]   [facility]    [address] [place] [address] [phone]              Who to contact     If you have questions or need follow up information about today's clinic visit or your schedule please contact [facility] directly at [phone].  Normal or non-critical lab and imaging results will be communicated to you by MyChart, letter or phone within 4 business days after the clinic has received the results. If you do not hear from us within 7 days, please contact the clinic through MyChart or phone. If you have a critical or abnormal lab result, we will notify you by phone as soon as possible.  Submit refill requests through [employer] or call your pharmacy and they will forward the refill request to us. Please allow 3 business days for your refill to be completed.          Additional Information About Your Visit        MyChart Information     [employer] gives you secure access to your electronic health record. If you see a primary care provider, you can also send messages to your care team and make appointments. If you have questions, please call your primary care clinic.  If you do not have a primary care provider, please call [phone] and they will assist you.        Care EveryWhere ID     This is your Care EveryWhere ID. This could be used by other organizations to access your [facility] medical records  [id number]        Your Vitals Were     Pulse Temperature Respirations Last Period Pulse Oximetry BMI (Body Mass Index)    95 98.7  F (37.1  C) (Tympanic) 18 (LMP Unknown) 99% 34.8 kg/m2       Blood Pressure from Last 3 Encounters:   11/30/18 114/76   11/21/18 120/64   09/12/18 106/68    Weight from Last 3 Encounters:   11/30/18 214 lb (97.1 kg)   11/21/18 209 lb 8 oz (95 kg)   09/12/18 199 lb (90.3 kg)              Today, you had the following     No orders found for display       Primary Care Provider Office Phone # Fax #    Iris Rand PA-C  [phone]       [address]  [facility] [address]        Equal Access to Services     SILVIANO AYALA : Hadii cecelia don kaitlin Tello, warainada luqbailee, qacarlosta kaalmada damian, gomez de los santosnitish pam. So [facility] [phone].    ATENCIÓN: Si habla español, tiene a joy disposición servicios gratuitos de asistencia lingüística. Llame al [phone].    We comply with applicable federal civil rights laws and [place] laws. We do not discriminate on the basis of race, color, national origin, age, disability, sex, sexual orientation, or gender identity.            Thank you!     Thank you for choosing [facility]  for your care. Our goal is always to provide you with excellent care. Hearing back from our patients is one way we can continue to improve our services. Please take a few minutes to complete the written survey that you may receive in the mail after your visit with us. Thank you!             Your Updated Medication List - Protect others around you: Learn how to safely use, store and throw away your medicines at www.disposemymeds.org.          This list is accurate as of 11/30/18 10:18 AM.  Always use your most recent med list.                   Brand Name Dispense Instructions for use Diagnosis    etonogestrel 68 MG Impl    NEXPLANON    1 each    1 each (68 mg) by Subdermal route once for 1 dose    Nexplanon insertion       fluocinonide 0.05 % external cream    LIDEX    60 g    Apply sparingly to affected area twice daily as needed.  Do not apply to face.    Atopic dermatitis, unspecified type       IBUPROFEN PO      Take 600 mg by mouth every 6 hours as needed for moderate pain        ondansetron 4 MG tablet    ZOFRAN    20 tablet    Take 1 tablet (4 mg) by mouth every 8 hours as needed As needed for headache    Chronic tension-type headache, not intractable       pimecrolimus 1 % external cream    ELIDEL    60 g    Apply topically 2 times daily Ok to use on  face    Atopic dermatitis       prochlorperazine 10 MG tablet    COMPAZINE    10 tablet    Take 1 tablet (10 mg) by mouth every 6 hours as needed for nausea or vomiting    Nausea and vomiting, intractability of vomiting not specified, unspecified vomiting type       ranitidine 150 MG tablet    ZANTAC    90 tablet    Take 1 tablet (150 mg) by mouth At Bedtime    Gastroesophageal reflux disease without esophagitis       * sertraline 50 MG tablet    ZOLOFT    90 tablet    Take 1 tablet (50 mg) by mouth daily Take 1/2 tablet (25 mg) for 1-2 weeks, then increase to 1 tablet orally daily    Severe single current episode of major depressive disorder, without psychotic features (H), Anxiety       * sertraline 50 MG tablet    ZOLOFT    30 tablet    Take 1/2 tablet (25 mg) for 1-2 weeks, then increase to 1 tablet orally daily    Severe single current episode of major depressive disorder, without psychotic features (H)       SUMAtriptan 25 MG tablet    IMITREX    9 tablet    Take 1-2 tablets (25-50 mg) by mouth at onset of headache for migraine May repeat in 2 hours. Max 8 tablets/24 hours.    Migraine without status migrainosus, not intractable, unspecified migraine type       traZODone 100 MG tablet    DESYREL    90 tablet    Take 1 tablet (100 mg) by mouth nightly as needed for sleep    Sleep disturbance       * Notice:  This list has 2 medication(s) that are the same as other medications prescribed for you. Read the directions carefully, and ask your doctor or other care provider to review them with you.

## 2018-12-03 ENCOUNTER — OFFICE VISIT (OUTPATIENT)
Dept: PSYCHOLOGY | Facility: CLINIC | Age: 23
End: 2018-12-03
Payer: COMMERCIAL

## 2018-12-03 DIAGNOSIS — F41.9 ANXIETY: ICD-10-CM

## 2018-12-03 DIAGNOSIS — F33.1 DEPRESSION, MAJOR, RECURRENT, MODERATE (H): Primary | ICD-10-CM

## 2018-12-03 DIAGNOSIS — F43.10 PTSD (POST-TRAUMATIC STRESS DISORDER): ICD-10-CM

## 2018-12-03 PROCEDURE — 90834 PSYTX W PT 45 MINUTES: CPT | Performed by: COUNSELOR

## 2018-12-03 ASSESSMENT — PATIENT HEALTH QUESTIONNAIRE - PHQ9: 5. POOR APPETITE OR OVEREATING: MORE THAN HALF THE DAYS

## 2018-12-03 ASSESSMENT — ANXIETY QUESTIONNAIRES
6. BECOMING EASILY ANNOYED OR IRRITABLE: MORE THAN HALF THE DAYS
1. FEELING NERVOUS, ANXIOUS, OR ON EDGE: MORE THAN HALF THE DAYS
2. NOT BEING ABLE TO STOP OR CONTROL WORRYING: MORE THAN HALF THE DAYS
GAD7 TOTAL SCORE: 14
3. WORRYING TOO MUCH ABOUT DIFFERENT THINGS: MORE THAN HALF THE DAYS
IF YOU CHECKED OFF ANY PROBLEMS ON THIS QUESTIONNAIRE, HOW DIFFICULT HAVE THESE PROBLEMS MADE IT FOR YOU TO DO YOUR WORK, TAKE CARE OF THINGS AT HOME, OR GET ALONG WITH OTHER PEOPLE: SOMEWHAT DIFFICULT
5. BEING SO RESTLESS THAT IT IS HARD TO SIT STILL: MORE THAN HALF THE DAYS
7. FEELING AFRAID AS IF SOMETHING AWFUL MIGHT HAPPEN: MORE THAN HALF THE DAYS

## 2018-12-07 ASSESSMENT — PATIENT HEALTH QUESTIONNAIRE - PHQ9: SUM OF ALL RESPONSES TO PHQ QUESTIONS 1-9: 21
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
## 2018-12-08 ASSESSMENT — ANXIETY QUESTIONNAIRES: GAD7 TOTAL SCORE: 14

## 2019-02-19 DIAGNOSIS — K21.9 GASTROESOPHAGEAL REFLUX DISEASE WITHOUT ESOPHAGITIS: ICD-10-CM

## 2019-02-19 NOTE — TELEPHONE ENCOUNTER
"Requested Prescriptions   Pending Prescriptions Disp Refills     ranitidine (ZANTAC) 150 MG tablet  Last Written Prescription Date:  11/16/17  Last Fill Quantity: 90,  # refills: 1   Last office visit: 11/30/2018 with prescribing provider:  Cristal Beltran Ra, APRN CNP   Future Office Visit:     90 tablet 1     Sig: Take 1 tablet (150 mg) by mouth At Bedtime    H2 Blockers Protocol Passed - 2/19/2019  1:52 PM       Passed - Patient is age 12 or older       Passed - Recent (12 mo) or future (30 days) visit within the authorizing provider's specialty    Patient had office visit in the last 12 months or has a visit in the next 30 days with authorizing provider or within the authorizing provider's specialty.  See \"Patient Info\" tab in inbasket, or \"Choose Columns\" in Meds & Orders section of the refill encounter.             Passed - Medication is active on med list          "

## 2019-02-20 NOTE — TELEPHONE ENCOUNTER
Routing refill request to provider for review/approval because:  A break in medication therapy.  Last filled for 6 months 11/16/2017  Mishel GRAHAM RN - Triage  [facility]

## 2019-02-26 ASSESSMENT — PATIENT HEALTH QUESTIONNAIRE - PHQ9
10. IF YOU CHECKED OFF ANY PROBLEMS, HOW DIFFICULT HAVE THESE PROBLEMS MADE IT FOR YOU TO DO YOUR WORK, TAKE CARE OF THINGS AT HOME, OR GET ALONG WITH OTHER PEOPLE: VERY DIFFICULT
SUM OF ALL RESPONSES TO PHQ QUESTIONS 1-9: 19
SUM OF ALL RESPONSES TO PHQ QUESTIONS 1-9: 19

## 2019-02-26 ASSESSMENT — ENCOUNTER SYMPTOMS
DIARRHEA: 0
BREAST MASS: 0
SHORTNESS OF BREATH: 0
JOINT SWELLING: 0
HEARTBURN: 0
DYSURIA: 0
HEMATOCHEZIA: 0
ARTHRALGIAS: 0
CONSTIPATION: 0
HEMATURIA: 0
WEAKNESS: 1
NERVOUS/ANXIOUS: 1
DIZZINESS: 1
CHILLS: 1
ABDOMINAL PAIN: 0
PARESTHESIAS: 0
EYE PAIN: 0
COUGH: 0
MYALGIAS: 0
HEADACHES: 0
PALPITATIONS: 0
SORE THROAT: 0
FEVER: 0
NAUSEA: 1
FREQUENCY: 0

## 2019-02-27 ASSESSMENT — PATIENT HEALTH QUESTIONNAIRE - PHQ9: SUM OF ALL RESPONSES TO PHQ QUESTIONS 1-9: 19

## 2019-02-28 ENCOUNTER — OFFICE VISIT (OUTPATIENT)
Dept: FAMILY MEDICINE | Facility: CLINIC | Age: 24
End: 2019-02-28
Payer: COMMERCIAL

## 2019-02-28 VITALS
DIASTOLIC BLOOD PRESSURE: 68 MMHG | HEIGHT: 66 IN | HEART RATE: 105 BPM | BODY MASS INDEX: 35.92 KG/M2 | TEMPERATURE: 98.5 F | RESPIRATION RATE: 16 BRPM | OXYGEN SATURATION: 100 % | WEIGHT: 223.5 LBS | SYSTOLIC BLOOD PRESSURE: 114 MMHG

## 2019-02-28 DIAGNOSIS — Z30.09 ENCOUNTER FOR OTHER GENERAL COUNSELING OR ADVICE ON CONTRACEPTION: ICD-10-CM

## 2019-02-28 DIAGNOSIS — S06.0X0D CONCUSSION WITHOUT LOSS OF CONSCIOUSNESS, SUBSEQUENT ENCOUNTER: Primary | ICD-10-CM

## 2019-02-28 DIAGNOSIS — R55 SYNCOPE, UNSPECIFIED SYNCOPE TYPE: ICD-10-CM

## 2019-02-28 DIAGNOSIS — Z78.9 BODY PIERCING: ICD-10-CM

## 2019-02-28 DIAGNOSIS — Z11.3 SCREEN FOR STD (SEXUALLY TRANSMITTED DISEASE): ICD-10-CM

## 2019-02-28 LAB
ANION GAP SERPL CALCULATED.3IONS-SCNC: 6 MMOL/L (ref 3–14)
BUN SERPL-MCNC: 9 MG/DL (ref 7–30)
CALCIUM SERPL-MCNC: 8.8 MG/DL (ref 8.5–10.1)
CHLORIDE SERPL-SCNC: 108 MMOL/L (ref 94–109)
CO2 SERPL-SCNC: 26 MMOL/L (ref 20–32)
CREAT SERPL-MCNC: 0.6 MG/DL (ref 0.52–1.04)
ERYTHROCYTE [DISTWIDTH] IN BLOOD BY AUTOMATED COUNT: 12.3 % (ref 10–15)
GFR SERPL CREATININE-BSD FRML MDRD: >90 ML/MIN/{1.73_M2}
GLUCOSE SERPL-MCNC: 97 MG/DL (ref 70–99)
HCT VFR BLD AUTO: 40.4 % (ref 35–47)
HGB BLD-MCNC: 13.2 G/DL (ref 11.7–15.7)
MCH RBC QN AUTO: 28.1 PG (ref 26.5–33)
MCHC RBC AUTO-ENTMCNC: 32.7 G/DL (ref 31.5–36.5)
MCV RBC AUTO: 86 FL (ref 78–100)
PLATELET # BLD AUTO: 299 10E9/L (ref 150–450)
POTASSIUM SERPL-SCNC: 3.8 MMOL/L (ref 3.4–5.3)
RBC # BLD AUTO: 4.69 10E12/L (ref 3.8–5.2)
SODIUM SERPL-SCNC: 140 MMOL/L (ref 133–144)
TSH SERPL DL<=0.005 MIU/L-ACNC: 1.5 MU/L (ref 0.4–4)
WBC # BLD AUTO: 5.8 10E9/L (ref 4–11)

## 2019-02-28 PROCEDURE — 85027 COMPLETE CBC AUTOMATED: CPT | Performed by: NURSE PRACTITIONER

## 2019-02-28 PROCEDURE — 87591 N.GONORRHOEAE DNA AMP PROB: CPT | Performed by: NURSE PRACTITIONER

## 2019-02-28 PROCEDURE — 99215 OFFICE O/P EST HI 40 MIN: CPT | Performed by: NURSE PRACTITIONER

## 2019-02-28 PROCEDURE — 87491 CHLMYD TRACH DNA AMP PROBE: CPT | Performed by: NURSE PRACTITIONER

## 2019-02-28 PROCEDURE — 80048 BASIC METABOLIC PNL TOTAL CA: CPT | Performed by: NURSE PRACTITIONER

## 2019-02-28 PROCEDURE — 36415 COLL VENOUS BLD VENIPUNCTURE: CPT | Performed by: NURSE PRACTITIONER

## 2019-02-28 PROCEDURE — 84443 ASSAY THYROID STIM HORMONE: CPT | Performed by: NURSE PRACTITIONER

## 2019-02-28 PROCEDURE — 93000 ELECTROCARDIOGRAM COMPLETE: CPT | Performed by: NURSE PRACTITIONER

## 2019-02-28 RX ORDER — MISOPROSTOL 200 UG/1
200 TABLET ORAL ONCE
Qty: 1 TABLET | Refills: 0 | Status: SHIPPED | OUTPATIENT
Start: 2019-02-28 | End: 2019-05-06

## 2019-02-28 ASSESSMENT — MIFFLIN-ST. JEOR: SCORE: 1791.57

## 2019-02-28 NOTE — LETTER
[facility]  [address] [facility] [address] [phone]    2019    Re: Lucero Collier  [address] [facility] [address] [phone] (home)     : 1995      To Whom It May Concern:      Lucero Collier was seen in clinic 19.  I have recommended she work 4 hour shifts from 19-3/4/19 due to continued symptoms.      Sincerely,        Cristal DOBSONP

## 2019-02-28 NOTE — LETTER
[facility]  [address] [facility] [address] [facility] [address] [phone]    2019    Re: Lucero Collier  [address] [facility] [id number] [phone] (home)     : 1995      To Whom It May Concern:      Lucero Collier was seen in clinic 19.  Please excuse from work 19.      Sincerely,        Cristal DOBSONP

## 2019-02-28 NOTE — PROGRESS NOTES
SUBJECTIVE:   Lucero Collier is a 23 year old female who presents to clinic today for the following health issues:      ED/UC Followup:    Facility:  [facility]  Date of visit: 2/20/2019 and 2/22/2019  Reason for visit: head injury  Current Status: Patient states she feel like she has gotten worse. Stating she cannot stand for more then 10 min without getting light headed and dizzy.     Pt here for follow up.  She was seen twice in the ED for concussion symptoms after hitting her head two separate times.  She initially hit her head on the counter (2/20).  Close monitoring was recommended.  2/22 she reports fainting in the shower.  Hit her head.  Lost consciousness.  This has never occurred before.  She has some soreness on the R side of her forehead where her head was hit.  She vomited after this.  She was otherwise feeling well.  No fevers, or signs of illness.  She was eating and drinking as usual.  No chest pain, palpitations, sob.  Since that time she has been working 8 hours per day.  Reports this has been difficult as she has been intermittently dizzy and fatigued.  She is still having tenderness on her forehead.  She is using zofran but does feel like her vomiting is improving regardless of use.  Reports she has stopped drinking etoh.    Patient would like to discuss belly button, patient states her ring got pulled out.  She has noticed it bleeding slightly.  There is some discomfort.  No additional redness.    Patient would like to discuss contraception (IUD).  No longer with her partner.  Interested in having IUD replaced.  Feels she did well with this previously.  She is looking to schedule shortly.  She started her period 3 days ago.    Problem list and histories reviewed & adjusted, as indicated.  Additional history: as documented    Patient Active Problem List   Diagnosis     ADHD (attention deficit hyperactivity disorder)     Constipation     Myopia     Spells     Chronic headaches     GERD  "(gastroesophageal reflux disease)     Adverse reaction to pertussis vaccine     Complex partial seizures (H)     Care Plan- Health Care Home     Learning problem     Overweight     Sleep disturbance     Adjustment disorder with mixed anxiety and depressed mood     Migraine without status migrainosus, not intractable, unspecified migraine type     Anxiety     Severe single current episode of major depressive disorder, without psychotic features (H)     Depression     HTN (hypertension)     Past Surgical History:   Procedure Laterality Date     ENDOSCOPY UPPER WITH PANCREATIC STIMULATION  4/06    Esophagitis 4/06     NISSEN FUNDOPLICATION  6/07    Dr. Meneses     PE TUBES  3/98     TONSILLECTOMY & ADENOIDECTOMY  4/02       Social History     Tobacco Use     Smoking status: Current Every Day Smoker     Types: Cigarettes     Smokeless tobacco: Never Used     Tobacco comment: 5 qd   Substance Use Topics     Alcohol use: No     Alcohol/week: 0.0 oz     Family History   Problem Relation Age of Onset     Diabetes Maternal Grandmother      Hypertension Maternal Grandmother      Diabetes Paternal Grandmother      Cardiovascular Paternal Grandmother      Breast Cancer Paternal Grandmother 61        right side breast cancer     Genetic Disorder Other         nephew.-genetic defects      Obesity Mother      Family History Negative Father            Reviewed and updated as needed this visit by clinical staff  Tobacco  Allergies  Meds  Med Hx  Surg Hx  Fam Hx  Soc Hx      Reviewed and updated as needed this visit by Provider         ROS:  SEE HPI.    OBJECTIVE:     /68 (BP Location: Right arm, Patient Position: Chair, Cuff Size: Adult Large)   Pulse 105   Temp 98.5  F (36.9  C) (Tympanic)   Resp 16   Ht 1.67 m (5' 5.75\")   Wt 101.4 kg (223 lb 8 oz)   LMP 02/26/2019 (Exact Date)   SpO2 100%   BMI 36.35 kg/m    Body mass index is 36.35 kg/m .  GENERAL: healthy, alert and no distress  EYES: Eyes grossly normal to " inspection, PERRL and conjunctivae and sclerae normal  HENT: ear canals and TM's normal, nose and mouth without ulcers or lesions  NECK: no adenopathy, no asymmetry, masses, or scars and thyroid normal to palpation  RESP: lungs clear to auscultation - no rales, rhonchi or wheezes  CV: regular rate and rhythm, normal S1 S2, no S3 or S4, no murmur, click or rub, no peripheral edema and peripheral pulses strong  ABDOMEN: soft, nontender, no hepatosplenomegaly, no masses and bowel sounds normal  SKIN:  Umbilical piercing with some oozing blood.  Jewelry not present.  Appears some of the internal tissue was pulled through the original piercing.  No surrounding induration or erythema.  NEURO: Normal strength and tone, sensory exam grossly normal, mentation intact, cranial nerves 2-12 intact and rapid alternating movements normal  PSYCH: mentation appears normal, affect normal/bright    Diagnostic Test Results:  none     ASSESSMENT/PLAN:   1. Concussion without loss of consciousness, subsequent encounter  CT normal on 2/22/19.  Pt's symptoms are improving.  She is limiting exposure to screens.  Will have her rest, 4 hours shifts over the next 5 days.  Monitor symptoms.    2. Syncope, unspecified syncope type  First incident.  EKG in clinic wnl.  Labs today, zio patch ordered.  Consider cardiology consult after completed.  - EKG 12-lead complete w/read - Clinics  - CBC with platelets  - TSH with free T4 reflex  - Basic metabolic panel  - Zio Patch Holter Adult Pediatric Greater than 48 hrs; Future    3. Encounter for other general counseling or advice on contraception  Has done well with IUD in the past.  Requests this again.  Scheduled for appt.  cytotec sent.  - misoprostol (CYTOTEC) 200 MCG tablet; Take 1 tablet (200 mcg) by mouth once for 1 dose  Dispense: 1 tablet; Refill: 0    4. Body piercing  No infection seen on exam.  Okay to use bacitracin.  Likely will have some scarring from the trauma.    5. Screen for STD  (sexually transmitted disease)  - NEISSERIA GONORRHOEA PCR  - CHLAMYDIA TRACHOMATIS PCR    SHELBY Summers Ra, CNP  [facility]

## 2019-02-28 NOTE — PATIENT INSTRUCTIONS
Patient Education   Healing after a Concussion  Rest  Rest is the best treatment for a concussion. Avoid physical activity until you see your doctor. Avoid activities that cause your symptoms to get worse or make you feel tired. This includes physical activities, watching TV, texting or playing video games.  Don't nap during the day. If you do nap, make sure it is for less than an hour and before 3 p.m. If you find it is hard to fall asleep, talk to your doctor. You may need medicine to help you sleep.  If symptoms have not become worse, you do not need to be wakened and checked on at night.  School  You can rest your brain by staying at home for 1 to 2 days. It is best to get back into the school routine.   At school, you may have trouble taking tests or working on a computer. Symptoms may get worse in band, choir, busy classes or a noisy lunchroom. A doctor can work with the school if you need a plan to help you succeed.  Work  You may need to change your work routine as you recover. A doctor can help you create a plan for the conditions at your job.  Treat pain    It is best to avoid taking medicines, but if needed, take Tylenol (acetaminophen) for headaches and pain every 4 to 6 hours.    Do not take drugstore medicines such as ibuprofen, Advil, Motrin, Benadryl, Aleve, sleep aids or Tylenol PM. These drugs may cause new problems.    If you cannot manage your pain with Tylenol, call your doctor or go to the emergency department.  Watch symptoms closely  Each day, keep track of your symptoms. This will help your doctor see how well you are healing. Write down the symptom, how often it occurs, how long it lasts, and what makes it better or worse.  Possible symptoms: headache, stomach upset, feeling confused or dizzy, motion sickness, and personality changes.  Returning to activity  Take your time returning to activity. A doctor can help you decide what levels of activity are best for you. If you're returning to  a sport, you should see a health care provider before you do.  If you have questions, call  Your doctor, Concussion hotline: [phone], or Athletic medicine: [phone].   For informational purposes only. Not to replace the advice of your health care provider.  Copyright   2014 [employer]. All rights reserved. [employer] [id number] - Rev 12/16.    Warm compresses on the belly button.  Monitor.  We will recheck this on Monday.

## 2019-03-04 ENCOUNTER — OFFICE VISIT (OUTPATIENT)
Dept: FAMILY MEDICINE | Facility: CLINIC | Age: 24
End: 2019-03-04
Payer: COMMERCIAL

## 2019-03-04 VITALS
TEMPERATURE: 98.9 F | WEIGHT: 225.2 LBS | SYSTOLIC BLOOD PRESSURE: 118 MMHG | HEART RATE: 115 BPM | DIASTOLIC BLOOD PRESSURE: 74 MMHG | RESPIRATION RATE: 16 BRPM | OXYGEN SATURATION: 99 % | BODY MASS INDEX: 36.63 KG/M2

## 2019-03-04 DIAGNOSIS — Z30.430 ENCOUNTER FOR INSERTION OF MIRENA IUD: Primary | ICD-10-CM

## 2019-03-04 DIAGNOSIS — S06.0X0D CONCUSSION WITHOUT LOSS OF CONSCIOUSNESS, SUBSEQUENT ENCOUNTER: ICD-10-CM

## 2019-03-04 LAB — HCG UR QL: NEGATIVE

## 2019-03-04 PROCEDURE — 81025 URINE PREGNANCY TEST: CPT | Performed by: NURSE PRACTITIONER

## 2019-03-04 PROCEDURE — 99212 OFFICE O/P EST SF 10 MIN: CPT | Mod: 25 | Performed by: NURSE PRACTITIONER

## 2019-03-04 PROCEDURE — 58300 INSERT INTRAUTERINE DEVICE: CPT | Performed by: NURSE PRACTITIONER

## 2019-03-04 NOTE — LETTER
[facility]  [address] [address]  [phone]    2019    Re: Lucero Collier  [address] [place] [address] [phone] (home)     : 1995      To Whom It May Concern:      Lucero Collier was seen in clinic 3/4/19.  She is to continue with 4 hour shifts through 3/7/19.  She is able to work without restriction 3/8/19.      Sincerely,        Cristal DOBSONP

## 2019-03-04 NOTE — PROGRESS NOTES
SUBJECTIVE:   Lucero Collier is a 23 year old female who presents to clinic today for the following health issues:      Patient is coming in for IUD insert.    She is also still having some symptoms of recent concussion.  Symptoms improving with rest.  Wondering about waiting to return to full days at work until the end of the week.  No vomiting.  Does feel like symptoms are improving.    Problem list and histories reviewed & adjusted, as indicated.  Additional history: as documented    Patient Active Problem List   Diagnosis     ADHD (attention deficit hyperactivity disorder)     Constipation     Myopia     Spells     Chronic headaches     GERD (gastroesophageal reflux disease)     Adverse reaction to pertussis vaccine     Complex partial seizures (H)     Care Plan- Health Care Home     Learning problem     Overweight     Sleep disturbance     Adjustment disorder with mixed anxiety and depressed mood     Migraine without status migrainosus, not intractable, unspecified migraine type     Anxiety     Severe single current episode of major depressive disorder, without psychotic features (H)     Depression     HTN (hypertension)     Past Surgical History:   Procedure Laterality Date     ENDOSCOPY UPPER WITH PANCREATIC STIMULATION  4/06    Esophagitis 4/06     NISSEN FUNDOPLICATION  6/07    Dr. Meneses     PE TUBES  3/98     TONSILLECTOMY & ADENOIDECTOMY  4/02       Social History     Tobacco Use     Smoking status: Current Every Day Smoker     Types: Cigarettes     Smokeless tobacco: Never Used     Tobacco comment: 5 qd   Substance Use Topics     Alcohol use: No     Alcohol/week: 0.0 oz     Family History   Problem Relation Age of Onset     Diabetes Maternal Grandmother      Hypertension Maternal Grandmother      Diabetes Paternal Grandmother      Cardiovascular Paternal Grandmother      Breast Cancer Paternal Grandmother 61        right side breast cancer     Genetic Disorder Other         nephew.-genetic defects       Obesity Mother      Family History Negative Father            Reviewed and updated as needed this visit by clinical staff       Reviewed and updated as needed this visit by Provider         ROS:  SEE HPI.    OBJECTIVE:     /74 (BP Location: Right arm, Patient Position: Chair, Cuff Size: Adult Large)   Pulse 115   Temp 98.9  F (37.2  C) (Tympanic)   Resp 16   Wt 102.2 kg (225 lb 3.2 oz)   LMP 02/26/2019 (Exact Date)   SpO2 99%   BMI 36.63 kg/m    Body mass index is 36.63 kg/m .  GENERAL: healthy, alert and no distress  PSYCH: mentation appears normal, affect normal/bright    Diagnostic Test Results:  Results for orders placed or performed in visit on 03/04/19 (from the past 24 hour(s))   HCG Qual, Urine - CSC,  Range, Doole  ([id number])   Result Value Ref Range    HCG Qual Urine Negative NEG^Negative       ASSESSMENT/PLAN:   1. Encounter for insertion of mirena IUD  No known contraindications.  See below.  Tolerated well.  - HCG Qual, Urine - CSC,  Range, Doole  ([id number])  - HC LEVONORGESTREL IU 52MG 5 YR  - INSERTION INTRAUTERINE DEVICE    2. Concussion without loss of consciousness, subsequent encounter  Improving over time.  Return to full days at the end of the week.  Letter written.    Lucero Collier, 23 year old, female  who presents today requesting placement of an intrauterine device.    The patient meets and is agreeable to the following conditions:  She is not interested in conception in the near future.  There is no previous history of pelvic inflammatory disease.  There is no previous history of ectopic pregnancy.  She is willing to check monthly for the IUD string.  She is at least 8 weeks post-partum.  There is no history of unresolved abnormal uterine bleeding.  There is no history of an unresolved abnormal PAP smear.  She has no history of Beka's disease or an allergy to copper (for ParaGard).  She has no history of diabetes, AIDS, leukemia, IV drug use or chronic steroid  use.  She denies the possibility of pregnancy.  Pregnancy test today is negative.    The following risks were discussed with the patient:  Possibility of pregnancy and ectopic pregnancy.  Possibility of pelvic inflammatory disease, particularly with new partners.  Risk of uterine perforation or IUD expulsion.  Possibility of difficult removal.  Spotting or heavy bleeding.  Cramping, pain or infection during or after insertion.     The patient has given consent to proceed with placement of the IUD.  A written consent form is present in the chart.  She wishes to proceed.    PROCEDURE:    Type of IUD: Mirena    She has been pre-treated with misoprostol.   She is placed in a dorsal lithotomy position.  The cervix is identified and cleaned with betadine three times.  A single tooth tenaculum is applied to the anterior lip of the cervix for stabilization.  The uterus is sounded to 6.0 cm and removed. (Target sound depth is 6.5 cm to 8.5 cm.)  The IUD insertion tube is prepared to manufacturers recommendations and inserted into the uterus under sterile conditions to the sounding depth.   The arms of the IUD are then opened by withdrawing the insertion tube 2.0 cm while stabilizing the solid insertion michelle without difficulty.  The IUD string is then cut to 4.0 cm.    The patient tolerated this procedure without immediate complication.  The patient is to return or call immediately for any unexplained fever, abdominal or pelvic pain, excessive bleeding, possibility of pregnancy, foul-smelling discharge, sense that the IUD has been expelled.    SHELBY Summers Ra [facility]

## 2019-03-04 NOTE — PATIENT INSTRUCTIONS
Return or call immediately for any unexplained fever, abdominal or pelvic pain, excessive bleeding, possibility of pregnancy, foul-smelling discharge, sense that the IUD has been expelled.

## 2019-03-05 ENCOUNTER — HOSPITAL ENCOUNTER (OUTPATIENT)
Dept: CARDIOLOGY | Facility: CLINIC | Age: 24
Discharge: HOME | End: 2019-03-05
Attending: NURSE PRACTITIONER | Admitting: NURSE PRACTITIONER
Payer: COMMERCIAL

## 2019-03-05 DIAGNOSIS — R55 SYNCOPE, UNSPECIFIED SYNCOPE TYPE: ICD-10-CM

## 2019-03-05 PROCEDURE — 0298T ZIO PATCH HOLTER ADULT PEDIATRIC GREATER THAN 48 HRS: CPT | Performed by: INTERNAL MEDICINE

## 2019-03-05 PROCEDURE — 0296T ZIO PATCH HOLTER ADULT PEDIATRIC GREATER THAN 48 HRS: CPT

## 2019-03-19 ENCOUNTER — FCC EXTENDED DOCUMENTATION (OUTPATIENT)
Dept: PSYCHOLOGY | Facility: CLINIC | Age: 24
End: 2019-03-19
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
## 2019-04-04 ENCOUNTER — OFFICE VISIT (OUTPATIENT)
Dept: FAMILY MEDICINE | Facility: CLINIC | Age: 24
End: 2019-04-04
Payer: COMMERCIAL

## 2019-04-04 VITALS
SYSTOLIC BLOOD PRESSURE: 104 MMHG | OXYGEN SATURATION: 98 % | HEART RATE: 99 BPM | TEMPERATURE: 98.3 F | DIASTOLIC BLOOD PRESSURE: 66 MMHG | BODY MASS INDEX: 35.99 KG/M2 | RESPIRATION RATE: 16 BRPM | WEIGHT: 221.3 LBS

## 2019-04-04 DIAGNOSIS — Z30.431 IUD CHECK UP: Primary | ICD-10-CM

## 2019-04-04 PROCEDURE — 99213 OFFICE O/P EST LOW 20 MIN: CPT | Performed by: NURSE PRACTITIONER

## 2019-04-04 NOTE — PROGRESS NOTES
SUBJECTIVE:   Lucero Collier is a 23 year old female who presents to clinic today for the following health issues:      Patient is here for IUD check.    Minimal spotting.  No pain.  Has been sexually active once since placement.  No issues.  No new partners.    Problem list and histories reviewed & adjusted, as indicated.  Additional history: as documented    Patient Active Problem List   Diagnosis     ADHD (attention deficit hyperactivity disorder)     Constipation     Myopia     Spells     Chronic headaches     GERD (gastroesophageal reflux disease)     Adverse reaction to pertussis vaccine     Complex partial seizures (H)     Care Plan- Health Care Home     Learning problem     Overweight     Sleep disturbance     Adjustment disorder with mixed anxiety and depressed mood     Migraine without status migrainosus, not intractable, unspecified migraine type     Anxiety     Severe single current episode of major depressive disorder, without psychotic features (H)     Depression     HTN (hypertension)     Past Surgical History:   Procedure Laterality Date     ENDOSCOPY UPPER WITH PANCREATIC STIMULATION  4/06    Esophagitis 4/06     NISSEN FUNDOPLICATION  6/07    Dr. Meneses     PE TUBES  3/98     TONSILLECTOMY & ADENOIDECTOMY  4/02       Social History     Tobacco Use     Smoking status: Current Every Day Smoker     Types: Cigarettes     Smokeless tobacco: Never Used     Tobacco comment: 5 qd   Substance Use Topics     Alcohol use: No     Alcohol/week: 0.0 oz     Family History   Problem Relation Age of Onset     Diabetes Maternal Grandmother      Hypertension Maternal Grandmother      Diabetes Paternal Grandmother      Cardiovascular Paternal Grandmother      Breast Cancer Paternal Grandmother 61        right side breast cancer     Genetic Disorder Other         nephew.-genetic defects      Obesity Mother      Family History Negative Father            Reviewed and updated as needed this visit by clinical  staff  Tobacco  Allergies  Meds  Med Hx  Surg Hx  Fam Hx  Soc Hx      Reviewed and updated as needed this visit by Provider         ROS:  SEE HPI.    OBJECTIVE:     /66 (BP Location: Right arm, Patient Position: Chair, Cuff Size: Adult Large)   Pulse 99   Temp 98.3  F (36.8  C) (Tympanic)   Resp 16   Wt 100.4 kg (221 lb 4.8 oz)   LMP  (LMP Unknown)   SpO2 98%   BMI 35.99 kg/m    Body mass index is 35.99 kg/m .  GENERAL: healthy, alert and no distress   (female): normal female external genitalia, normal urethral meatus , vaginal mucosa pink, moist, well rugated and cervix normal, IUD strings visualized, trimmed to 2cm in length.  PSYCH: mentation appears normal, affect normal/bright    Diagnostic Test Results:  none     ASSESSMENT/PLAN:   1. IUD check up  Strings trimmed.  Tolerating well.  Monitor.    Cristal Beltran, SHELBY CNP  [facility]

## 2019-04-15 ENCOUNTER — MYC MEDICAL ADVICE (OUTPATIENT)
Dept: FAMILY MEDICINE | Facility: CLINIC | Age: 24
End: 2019-04-15

## 2019-04-15 DIAGNOSIS — R55 SYNCOPE, UNSPECIFIED SYNCOPE TYPE: Primary | ICD-10-CM

## 2019-04-17 ENCOUNTER — PATIENT OUTREACH (OUTPATIENT)
Dept: CARE COORDINATION | Facility: CLINIC | Age: 24
End: 2019-04-17

## 2019-04-17 DIAGNOSIS — S06.0XAA CONCUSSION: Primary | ICD-10-CM

## 2019-04-17 ASSESSMENT — ACTIVITIES OF DAILY LIVING (ADL): DEPENDENT_IADLS:: INDEPENDENT

## 2019-04-17 NOTE — PROGRESS NOTES
Clinic Care Coordination Contact  OUTREACH    Referral Information:  Referral Source: Care Team(Clinical Product Navigator)    Chief Complaint   Patient presents with     Clinic Care Coordination - Initial     multiple ED visits for concussion        Universal Utilization:   Clinic Utilization  No-Show Concerns: Yes - patient denies any barriers or concerns and reports this is no longer a problem.   Utilization    Last refreshed: 4/16/2019  1:16 PM:  Hospital Admissions 0           Last refreshed: 4/16/2019  1:16 PM:  ED Visits 0           Last refreshed: 4/16/2019  1:16 PM:  No Show Count (past year) 9              Current as of: 4/16/2019  1:16 PM              Clinical Concerns:  Current Medical Concerns:  Patient reports she is doing much better. Occasionally will get a head ache here and there but it is manageable. Has established care with a new therapist. Confirmed Primary Care Provider is correct, has just been seeing another provider due to availability and scheduling. Patient has no concerns or questions.   Education Provided to patient: CC role     Health Maintenance Reviewed: Not assessed  Clinical Pathway: None    Medication Management:  Patient independently managing her own medications. No concerns.     Functional Status:  Dependent ADLs:: Independent  Dependent IADLs:: Independent  Bed or wheelchair confined:: No  Mobility Status: Independent  Fallen 2 or more times in the past year?: No  Any fall with injury in the past year?: No    Diet/Exercise/Sleep:  Diet:: Regular  Inadequate nutrition (GOAL):: No  Food Insecurity: No  Tube Feeding: No  Significant changes in sleep pattern (GOAL): No    Transportation:  Transportation concerns (GOAL):: No  Transportation means:: Regular car     Psychosocial:  Mental health DX:: Yes  Mental health DX how managed:: Medication, Outpatient Counseling  Mental health management concern (GOAL):: No  Informal Support system:: Family     Financial/Insurance:    Financial/Insurance concerns (GOAL):: No     Resources and Interventions:  Current Resources:   Community Resources: None  Supplies used at home:: None  Equipment Currently Used at Home: none  Referrals Placed: None     Patient/Caregiver understanding: Patient verbalized understanding and denies any additional questions or concerns at this time. RNCC engaged in AIDET communications during encounter.     Plan: At this time, patient denies outstanding need for connection or referral to resources or assistance navigating recommended follow up care. No further Care Coordination outreaches scheduled at this time, patient provided with this writer's number and encouraged to call with future needs or questions.    Cherie Ro RN Care Coordinator  [facility] [place]  Email: [email or web address]  Phone: [phone]

## 2019-04-17 NOTE — TELEPHONE ENCOUNTER
Please let her know that her monitor did not show any explanation for her recent symptoms.  If she is still having symptoms she should see cardiology.  Okay to place referral if pt desires.  There was one variation that was seen on the monitor but it should not cause her symptoms.  DANIELA.

## 2019-04-17 NOTE — PROGRESS NOTES
Clinic Care Coordination Contact  [facility]/Voicemail    Referral Source: Care Team(Clinical Product Navigator). Referral received as patient has had 3 ED visits within the past 90 days and a risk of admission or ED visit score of 56%. ED visits were related to diagnosis of concussion without loss of consciousness. Of note, per chart review, appears patient was discharged from [facility] due to multiple no shows.   Clinical Data: Care Coordinator Outreach  Outreach attempted x 1.  Left message on voicemail with call back information and requested return call.  Plan: Care Coordinator will try to reach patient again in 3-5 business days.    Cherie Ro RN Care Coordinator  [facility] [place]  Email: [email or web address]  Phone: [phone]

## 2019-05-03 ENCOUNTER — OFFICE VISIT (OUTPATIENT)
Dept: FAMILY MEDICINE | Facility: CLINIC | Age: 24
End: 2019-05-03

## 2019-05-03 VITALS
DIASTOLIC BLOOD PRESSURE: 72 MMHG | HEART RATE: 110 BPM | SYSTOLIC BLOOD PRESSURE: 124 MMHG | BODY MASS INDEX: 36.76 KG/M2 | OXYGEN SATURATION: 97 % | RESPIRATION RATE: 16 BRPM | TEMPERATURE: 98.8 F | WEIGHT: 226 LBS

## 2019-05-03 DIAGNOSIS — S93.402A SPRAIN OF LEFT ANKLE, UNSPECIFIED LIGAMENT, INITIAL ENCOUNTER: Primary | ICD-10-CM

## 2019-05-03 PROCEDURE — 99213 OFFICE O/P EST LOW 20 MIN: CPT | Performed by: NURSE PRACTITIONER

## 2019-05-03 ASSESSMENT — ANXIETY QUESTIONNAIRES
1. FEELING NERVOUS, ANXIOUS, OR ON EDGE: MORE THAN HALF THE DAYS
2. NOT BEING ABLE TO STOP OR CONTROL WORRYING: MORE THAN HALF THE DAYS
3. WORRYING TOO MUCH ABOUT DIFFERENT THINGS: MORE THAN HALF THE DAYS
6. BECOMING EASILY ANNOYED OR IRRITABLE: MORE THAN HALF THE DAYS
GAD7 TOTAL SCORE: 14
7. FEELING AFRAID AS IF SOMETHING AWFUL MIGHT HAPPEN: MORE THAN HALF THE DAYS
5. BEING SO RESTLESS THAT IT IS HARD TO SIT STILL: MORE THAN HALF THE DAYS
IF YOU CHECKED OFF ANY PROBLEMS ON THIS QUESTIONNAIRE, HOW DIFFICULT HAVE THESE PROBLEMS MADE IT FOR YOU TO DO YOUR WORK, TAKE CARE OF THINGS AT HOME, OR GET ALONG WITH OTHER PEOPLE: VERY DIFFICULT

## 2019-05-03 NOTE — LETTER
[facility]  [address] [address] [address] [phone]    May 3, 2019    Re: Lucero Collier  [address] [facility] [id number] [phone] (home)     : 1995      To Whom It May Concern:      Lucero Collier was seen in clinic 5/3/19.      Sincerely,        Cristal DOBSONP

## 2019-05-03 NOTE — PATIENT INSTRUCTIONS
Rest, ice, elevate the ankle.  Take ibuprofen 600mg three times daily.  Tomorrow will be more painful and have more swelling.  Keep it wrapped if you are going to be up and around.  Follow up if symptoms are not improving or are worsening.

## 2019-05-03 NOTE — PROGRESS NOTES
SUBJECTIVE:   Lucero Collier is a 23 year old female who presents to clinic today for the following   health issues:      Musculoskeletal problem/pain      Duration: 1 day    Description  Location: left ankle    Intensity:  mild    Accompanying signs and symptoms: swelling    History  Previous similar problem: no   Previous evaluation:  none    Precipitating or alleviating factors:  Trauma or overuse: YES- rolled it while at work  Aggravating factors include: walking    Therapies tried and outcome: ice and acetaminophen    Works at a .  Yesterday was walking and rolled her L ankle inward.  No pain initially.  Continued with shift.  She went to bed as usual.  She noted swelling and pain today.  Did take tylenol.  Ibuprofen did not seem to help.  Pain with walking.    Additional history: as documented    Reviewed  and updated as needed this visit by clinical staff         Reviewed and updated as needed this visit by Provider         Patient Active Problem List   Diagnosis     ADHD (attention deficit hyperactivity disorder)     Constipation     Myopia     Spells     Chronic headaches     GERD (gastroesophageal reflux disease)     Adverse reaction to pertussis vaccine     Complex partial seizures (H)     Care Plan- Health Care Home     Learning problem     Overweight     Sleep disturbance     Adjustment disorder with mixed anxiety and depressed mood     Migraine without status migrainosus, not intractable, unspecified migraine type     Anxiety     Severe single current episode of major depressive disorder, without psychotic features (H)     Depression     HTN (hypertension)     Past Surgical History:   Procedure Laterality Date     ENDOSCOPY UPPER WITH PANCREATIC STIMULATION  4/06    Esophagitis 4/06     NISSEN FUNDOPLICATION  6/07    Dr. Meneses     PE TUBES  3/98     TONSILLECTOMY & ADENOIDECTOMY  4/02       Social History     Tobacco Use     Smoking status: Current Every Day Smoker     Types: Cigarettes      Smokeless tobacco: Never Used     Tobacco comment: 5 qd   Substance Use Topics     Alcohol use: No     Alcohol/week: 0.0 oz     Family History   Problem Relation Age of Onset     Diabetes Maternal Grandmother      Hypertension Maternal Grandmother      Diabetes Paternal Grandmother      Cardiovascular Paternal Grandmother      Breast Cancer Paternal Grandmother 61        right side breast cancer     Genetic Disorder Other         nephew.-genetic defects      Obesity Mother      Family History Negative Father            ROS:  SEE HPI.    OBJECTIVE:     /72 (BP Location: Right arm, Patient Position: Chair, Cuff Size: Adult Large)   Pulse 110   Temp 98.8  F (37.1  C) (Tympanic)   Resp 16   Wt 102.5 kg (226 lb)   LMP  (LMP Unknown)   SpO2 97%   BMI 36.76 kg/m    Body mass index is 36.76 kg/m .  GENERAL: healthy, alert and no distress  MS: No bruising or redness over L ankle or foot.  1+ swelling over L lateral malleolus.  Ttp over lateral malleolus.  Full ROM of ankle but does note pain with movements.   No ttp over medial malleolus.  PSYCH: mentation appears normal, affect normal/bright    Diagnostic Test Results:  none     ASSESSMENT/PLAN:   1. Sprain of left ankle, unspecified ligament, initial encounter  Discussed close monitoring, rest, ice, elevation and ibuprofen.  Tomorrow will likely be increased swelling and pain but symptoms should slowly resolve after this.  Okay to work on Monday.  Pt agrees with plan and verbalized understanding.    Cristal Beltran, APRN CNP  [facility]

## 2019-05-04 ASSESSMENT — ANXIETY QUESTIONNAIRES: GAD7 TOTAL SCORE: 14

## 2019-05-06 ENCOUNTER — OFFICE VISIT (OUTPATIENT)
Dept: FAMILY MEDICINE | Facility: CLINIC | Age: 24
End: 2019-05-06
Payer: COMMERCIAL

## 2019-05-06 VITALS
TEMPERATURE: 98.5 F | DIASTOLIC BLOOD PRESSURE: 72 MMHG | HEART RATE: 107 BPM | SYSTOLIC BLOOD PRESSURE: 116 MMHG | RESPIRATION RATE: 16 BRPM | BODY MASS INDEX: 37.62 KG/M2 | OXYGEN SATURATION: 98 % | HEIGHT: 65 IN | WEIGHT: 225.8 LBS

## 2019-05-06 DIAGNOSIS — F32.2 SEVERE SINGLE CURRENT EPISODE OF MAJOR DEPRESSIVE DISORDER, WITHOUT PSYCHOTIC FEATURES (H): Primary | ICD-10-CM

## 2019-05-06 DIAGNOSIS — E66.01 MORBID OBESITY (H): ICD-10-CM

## 2019-05-06 PROCEDURE — 99213 OFFICE O/P EST LOW 20 MIN: CPT | Performed by: PHYSICIAN ASSISTANT

## 2019-05-06 RX ORDER — SERTRALINE HYDROCHLORIDE 100 MG/1
100 TABLET, FILM COATED ORAL DAILY
Qty: 90 TABLET | Refills: 1 | Status: SHIPPED | OUTPATIENT
Start: 2019-05-06 | End: 2019-07-25

## 2019-05-06 RX ORDER — MIRTAZAPINE 7.5 MG/1
7.5 TABLET, FILM COATED ORAL AT BEDTIME
Qty: 30 TABLET | Refills: 1 | Status: SHIPPED | OUTPATIENT
Start: 2019-05-06 | End: 2019-06-10

## 2019-05-06 ASSESSMENT — MIFFLIN-ST. JEOR: SCORE: 1790.1

## 2019-05-06 NOTE — PROGRESS NOTES
SUBJECTIVE:   Lucero Collier is a 23 year old female who presents to clinic today for the following   health issues:      Medication Followup of Zoloft    Taking Medication as prescribed: yes    Side Effects:  None    Medication Helping Symptoms:  NO-patient does not feel like it has been helping, felt no change for helping, possibly made slightly worse    Patient did receive rx a couple months ago but didn't start taking it until last month  Started taking 1/2 tab for about two weeks then increased it to 1 full tab as prescribed, took last dose yesterday  Was good about taking it every day until she ran out  She notes no side effects of medication  She feels that it has helped her mood but she notes recently her mood has been poor  She feels like she is letting everyone down, stems from how her family treats her she notes  She also feels like her friends don't want to hang out with her  Is very tearful, not sleeping well and waking often, getting 4-5 hours of sleep  Admits to over-eating and gaining weight  She would rather sit at home and cry  She has fleeing SI thoughts, but denies active plan today  Has been sober 90 days, no drug use  Plans to see therapist tomorrow, called to see if she could be seen today  Her mom and her are speaking, and her mom is keeping a close eye on her       Additional history: as documented    Reviewed  and updated as needed this visit by clinical staff  Tobacco  Allergies  Meds  Med Hx  Surg Hx  Fam Hx  Soc Hx        Reviewed and updated as needed this visit by Provider         Patient Active Problem List   Diagnosis     ADHD (attention deficit hyperactivity disorder)     Constipation     Myopia     Spells     Chronic headaches     GERD (gastroesophageal reflux disease)     Adverse reaction to pertussis vaccine     Complex partial seizures (H)     Care Plan- Health Care Home     Learning problem     Overweight     Sleep disturbance     Adjustment disorder with mixed  anxiety and depressed mood     Migraine without status migrainosus, not intractable, unspecified migraine type     Anxiety     Severe single current episode of major depressive disorder, without psychotic features (H)     Depression     HTN (hypertension)     Obesity (BMI 35.0-39.9) with comorbidity (H)     Past Surgical History:   Procedure Laterality Date     ENDOSCOPY UPPER WITH PANCREATIC STIMULATION  4/06    Esophagitis 4/06     NISSEN FUNDOPLICATION  6/07    Dr. Meneses     PE TUBES  3/98     TONSILLECTOMY & ADENOIDECTOMY  4/02       Social History     Tobacco Use     Smoking status: Current Every Day Smoker     Types: Cigarettes     Smokeless tobacco: Never Used     Tobacco comment: 5 qd   Substance Use Topics     Alcohol use: No     Alcohol/week: 0.0 oz     Family History   Problem Relation Age of Onset     Diabetes Maternal Grandmother      Hypertension Maternal Grandmother      Diabetes Paternal Grandmother      Cardiovascular Paternal Grandmother      Breast Cancer Paternal Grandmother 61        right side breast cancer     Genetic Disorder Other         nephew.-genetic defects      Obesity Mother      Family History Negative Father          Current Outpatient Medications   Medication Sig Dispense Refill     fluocinonide (LIDEX) 0.05 % cream Apply sparingly to affected area twice daily as needed.  Do not apply to face. 60 g 0     IBUPROFEN PO Take 600 mg by mouth every 6 hours as needed for moderate pain       mirtazapine (REMERON) 7.5 MG tablet Take 1 tablet (7.5 mg) by mouth At Bedtime 30 tablet 1     ondansetron (ZOFRAN) 4 MG tablet Take 1 tablet (4 mg) by mouth every 8 hours as needed As needed for headache (Patient taking differently: Take 4 mg by mouth every 8 hours as needed for other (headaches) As needed for headache) 20 tablet 3     pimecrolimus (ELIDEL) 1 % cream Apply topically 2 times daily Ok to use on face 60 g 3     prochlorperazine (COMPAZINE) 10 MG tablet Take 1 tablet (10 mg) by mouth  "every 6 hours as needed for nausea or vomiting 10 tablet 1     ranitidine (ZANTAC) 150 MG tablet Take 1 tablet (150 mg) by mouth At Bedtime 90 tablet 1     sertraline (ZOLOFT) 50 MG tablet Take 2 tablets (100 mg) by mouth daily Take 1/2 tablet (25 mg) for 1-2 weeks, then increase to 1 tablet orally daily 90 tablet 1     SUMAtriptan (IMITREX) 25 MG tablet Take 1-2 tablets (25-50 mg) by mouth at onset of headache for migraine May repeat in 2 hours. Max 8 tablets/24 hours. 9 tablet 1     traZODone (DESYREL) 100 MG tablet Take 1 tablet (100 mg) by mouth nightly as needed for sleep 90 tablet 1     No Known Allergies    ROS:  Constitutional, HEENT, cardiovascular, pulmonary, gi and gu systems are negative, except as otherwise noted.    OBJECTIVE:     /72 (BP Location: Right arm, Patient Position: Chair, Cuff Size: Adult Large)   Pulse 107   Temp 98.5  F (36.9  C) (Oral)   Resp 16   Ht 1.651 m (5' 5\")   Wt 102.4 kg (225 lb 12.8 oz)   LMP  (LMP Unknown)   SpO2 98%   Breastfeeding? No   BMI 37.58 kg/m    Body mass index is 37.58 kg/m .  GENERAL: healthy, alert and no distress  NECK: no adenopathy, no asymmetry, masses, or scars and thyroid normal to palpation  RESP: lungs clear to auscultation - no rales, rhonchi or wheezes  CV: regular rate and rhythm, normal S1 S2, no S3 or S4, no murmur, click or rub, no peripheral edema and peripheral pulses strong  MS: no gross musculoskeletal defects noted, no edema  PSYCH: mentation appears normal and tearful    Diagnostic Test Results:  none     ASSESSMENT/PLAN:             1. Severe single current episode of major depressive disorder, without psychotic features (H)  Chronic issue, recently worsening.   Recommend increasing Zoloft to 100mg daily. Will also add Remeron at bedtime to help with anxiety and sleep.  Recheck in 1 month.  Continue to follow with therapist.  Discussed fleeting thoughts of SI, no active plan. Gave [facility] Crisis Line to call if need, or " go to ER.  Encouraged outreach to family/friends or clinic if needed.  - sertraline (ZOLOFT) 100mg: Take 1 tablet by mouth daily. Dispense: 90 Refill: 1  - mirtazapine (REMERON) 7.5 MG tablet; Take 1 tablet (7.5 mg) by mouth At Bedtime  Dispense: 30 tablet; Refill: 1    2. Morbid obesity (H)  Chronic issue, discussed monitor weight.      Risks, benefits and alternatives were discussed with patient. Agreeable to the plan of care.      Iris Rand PA-C  [facility]

## 2019-05-06 NOTE — LETTER
[facility]  [address] [address] [address]  Phone: [phone]    May 6, 2019        Lucero Collier  [address] [facility] [address]          To whom it may concern:    RE: Lucero Collier    Patient was seen and treated today at our clinic and missed work.    Please contact me for questions or concerns.      Sincerely,        Iris Rand PA-C

## 2019-05-11 ENCOUNTER — MYC REFILL (OUTPATIENT)
Dept: FAMILY MEDICINE | Facility: CLINIC | Age: 24
End: 2019-05-11

## 2019-05-11 DIAGNOSIS — R11.2 NAUSEA AND VOMITING, INTRACTABILITY OF VOMITING NOT SPECIFIED, UNSPECIFIED VOMITING TYPE: ICD-10-CM

## 2019-05-11 RX ORDER — PROCHLORPERAZINE MALEATE 10 MG
10 TABLET ORAL EVERY 6 HOURS PRN
Qty: 10 TABLET | Refills: 1 | Status: CANCELLED | OUTPATIENT
Start: 2019-05-11

## 2019-05-12 NOTE — TELEPHONE ENCOUNTER
"Requested Prescriptions   Pending Prescriptions Disp Refills     prochlorperazine (COMPAZINE) 10 MG tablet [Pharmacy Med Name: PROCHLORPERAZINE 10MG TABLETS] 10 tablet 0     Sig: TAKE 1 TABLET(10 MG) BY MOUTH EVERY 6 HOURS AS NEEDED FOR NAUSEA OR VOMITING  Last Written Prescription Date:  11/21/2018  Last Fill Quantity: 10 tablet,  # refills: 1    Last office visit: 5/6/2019 with prescribing provider:  Iris Rand PA-C        Future Office Visit:   Next 5 appointments (look out 90 days)    Chadd 10, 2019  9:10 AM CDT  Office Visit with Iris Rand PA-C  [facility] ([address] [address]  [phone]                 Antivertigo/Antiemetic Agents Passed - 5/11/2019  1:07 PM        Passed - Recent (12 mo) or future (30 days) visit within the authorizing provider's specialty     Patient had office visit in the last 12 months or has a visit in the next 30 days with authorizing provider or within the authorizing provider's specialty.  See \"Patient Info\" tab in inbasket, or \"Choose Columns\" in Meds & Orders section of the refill encounter.              Passed - Medication is active on med list        Passed - Patient is 18 years of age or older          "

## 2019-05-13 RX ORDER — PROCHLORPERAZINE MALEATE 10 MG
TABLET ORAL
Qty: 10 TABLET | Refills: 3 | Status: SHIPPED | OUTPATIENT
Start: 2019-05-13 | End: 2019-12-20

## 2019-05-13 NOTE — TELEPHONE ENCOUNTER
Compazine  Prescription approved per [facility] [place] Refill Protocol.    Myra Cooley, RN, BSN

## 2019-05-29 ENCOUNTER — OFFICE VISIT (OUTPATIENT)
Dept: CARDIOLOGY | Facility: CLINIC | Age: 24
End: 2019-05-29
Attending: NURSE PRACTITIONER
Payer: COMMERCIAL

## 2019-05-29 VITALS
SYSTOLIC BLOOD PRESSURE: 112 MMHG | WEIGHT: 220 LBS | HEIGHT: 65 IN | DIASTOLIC BLOOD PRESSURE: 70 MMHG | OXYGEN SATURATION: 99 % | HEART RATE: 77 BPM | BODY MASS INDEX: 36.65 KG/M2

## 2019-05-29 DIAGNOSIS — R07.9 CHEST PAIN, UNSPECIFIED TYPE: Primary | ICD-10-CM

## 2019-05-29 PROCEDURE — 99204 OFFICE O/P NEW MOD 45 MIN: CPT | Performed by: INTERNAL MEDICINE

## 2019-05-29 ASSESSMENT — MIFFLIN-ST. JEOR: SCORE: 1763.79

## 2019-05-29 NOTE — PROGRESS NOTES
HPI and Plan:   See dictation    Orders Placed This Encounter   Procedures     Echocardiogram Complete       No orders of the defined types were placed in this encounter.      There are no discontinued medications.      Encounter Diagnosis   Name Primary?     Chest pain, unspecified type Yes       CURRENT MEDICATIONS:  Current Outpatient Medications   Medication Sig Dispense Refill     fluocinonide (LIDEX) 0.05 % cream Apply sparingly to affected area twice daily as needed.  Do not apply to face. 60 g 0     IBUPROFEN PO Take 600 mg by mouth every 6 hours as needed for moderate pain       mirtazapine (REMERON) 7.5 MG tablet Take 1 tablet (7.5 mg) by mouth At Bedtime 30 tablet 1     ondansetron (ZOFRAN) 4 MG tablet Take 1 tablet (4 mg) by mouth every 8 hours as needed As needed for headache (Patient taking differently: Take 4 mg by mouth every 8 hours as needed for other (headaches) As needed for headache) 20 tablet 3     pimecrolimus (ELIDEL) 1 % cream Apply topically 2 times daily Ok to use on face 60 g 3     prochlorperazine (COMPAZINE) 10 MG tablet TAKE 1 TABLET(10 MG) BY MOUTH EVERY 6 HOURS AS NEEDED FOR NAUSEA OR VOMITING 10 tablet 3     ranitidine (ZANTAC) 150 MG tablet Take 1 tablet (150 mg) by mouth At Bedtime 90 tablet 1     sertraline (ZOLOFT) 100 MG tablet Take 1 tablet (100 mg) by mouth daily 90 tablet 1     SUMAtriptan (IMITREX) 25 MG tablet Take 1-2 tablets (25-50 mg) by mouth at onset of headache for migraine May repeat in 2 hours. Max 8 tablets/24 hours. 9 tablet 1     traZODone (DESYREL) 100 MG tablet Take 1 tablet (100 mg) by mouth nightly as needed for sleep (Patient not taking: Reported on 5/29/2019) 90 tablet 1       ALLERGIES   No Known Allergies    PAST MEDICAL HISTORY:  Past Medical History:   Diagnosis Date     ADHD     on Daytrana and clonidine     Adverse reaction to pertussis vaccine      Allergies     daily Claritin     Anxiety      Chronic headaches      Complex partial seizures (H)       Constipation      Depression      Developmental delay      GERD (gastroesophageal reflux disease)     UGI 3/07     History of tonsillectomy      Hypoglycemia     Endocrine eval neg 10/00     Kidney stone     6/02 detected on CT scan; resolved 12/04     Myopia      Obesity      Sinusitis, chronic      Sleep disorder     Since 2000; sleep clinic evaluation 4/00 Dr. Ramesh      Smoking      Spells      Syncope      Urinary tract infection 11/99    nl renal US & VCUG 11/99       PAST SURGICAL HISTORY:  Past Surgical History:   Procedure Laterality Date     ENDOSCOPY UPPER WITH PANCREATIC STIMULATION  4/06    Esophagitis 4/06     NISSEN FUNDOPLICATION  6/07    Dr. Meneses     PE TUBES  3/98     TONSILLECTOMY & ADENOIDECTOMY  4/02       FAMILY HISTORY:  Family History   Problem Relation Age of Onset     Diabetes Maternal Grandmother      Hypertension Maternal Grandmother      Diabetes Paternal Grandmother      Cardiovascular Paternal Grandmother      Breast Cancer Paternal Grandmother 61        right side breast cancer     Genetic Disorder Other         nephew.-genetic defects      Obesity Mother      Supraventricular tachycardia Mother      Family History Negative Father        SOCIAL HISTORY:  Social History     Socioeconomic History     Marital status: Single     Spouse name: None     Number of children: None     Years of education: None     Highest education level: None   Occupational History     None   Social Needs     Financial resource strain: None     Food insecurity:     Worry: None     Inability: None     Transportation needs:     Medical: None     Non-medical: None   Tobacco Use     Smoking status: Current Every Day Smoker     Types: Cigarettes     Smokeless tobacco: Current User     Types: Chew     Tobacco comment: 5 qd   Substance and Sexual Activity     Alcohol use: Yes     Alcohol/week: 0.0 oz     Comment: every other day 1 beer      Drug use: No     Sexual activity: Yes     Partners: Male     Birth  "control/protection: None   Lifestyle     Physical activity:     Days per week: None     Minutes per session: None     Stress: None   Relationships     Social connections:     Talks on phone: None     Gets together: None     Attends Zoroastrianism service: None     Active member of club or organization: None     Attends meetings of clubs or organizations: None     Relationship status: None     Intimate partner violence:     Fear of current or ex partner: None     Emotionally abused: None     Physically abused: None     Forced sexual activity: None   Other Topics Concern     Parent/sibling w/ CABG, MI or angioplasty before 65F 55M? Not Asked   Social History Narrative     None       Review of Systems:  Skin:  Positive for   excema    Eyes:  Positive for glasses;contacts    ENT:  Negative      Respiratory:  Positive for shortness of breath;dyspnea on exertion SOB happens randomly when sitting, laying and exerting    Cardiovascular:    lightheadedness;dizziness;Positive for;palpitations;chest pain;edema;syncope or near-syncope Alll symptoms go together except swelling of feet   Gastroenterology: Negative      Genitourinary:  Positive for urinary frequency    Musculoskeletal:  Negative      Neurologic:  Positive for migraine headaches;numbness or tingling of hands;numbness or tingling of feet    Psychiatric:  Negative      Heme/Lymph/Imm:  Negative      Endocrine:  Negative        Physical Exam:  Vitals: /70 (BP Location: Right arm, Patient Position: Sitting, Cuff Size: Adult Regular)   Pulse 77   Ht 1.651 m (5' 5\")   Wt 99.8 kg (220 lb)   LMP  (LMP Unknown)   SpO2 99%   BMI 36.61 kg/m      Constitutional:  cooperative, alert and oriented, well developed, well nourished, in no acute distress        Skin:  warm and dry to the touch, no apparent skin lesions or masses noted          Head:  normocephalic, no masses or lesions        Eyes:  pupils equal and round, conjunctivae and lids unremarkable, sclera white, no " xanthalasma, EOMS intact, no nystagmus        Lymph:No Cervical lymphadenopathy present     ENT:  no pallor or cyanosis, dentition good        Neck:  carotid pulses are full and equal bilaterally, JVP normal, no carotid bruit        Respiratory:  normal breath sounds, clear to auscultation, normal A-P diameter, normal symmetry, normal respiratory excursion, no use of accessory muscles         Cardiac: regular rhythm, normal S1/S2, no S3 or S4, apical impulse not displaced, no murmurs, gallops or rubs                pulses full and equal, no bruits auscultated                                        GI:  abdomen soft, non-tender, BS normoactive, no mass, no HSM, no bruits        Extremities and Muscular Skeletal:  no deformities, clubbing, cyanosis, erythema observed              Neurological:  no gross motor deficits        Psych:  Alert and Oriented x 3        CC  SHELBY Summers Ra CNP  [address] [place] [address]

## 2019-05-29 NOTE — PROGRESS NOTES
Service Date: 05/29/2019      HISTORY OF PRESENT ILLNESS:  I saw Ms. Collier for evaluation of recurrent chest pain.  She is a 23-year-old white female who has been having chest pain in the last few months.  The symptoms are described as intermittent left precordial sharp pain that would last for minutes.  It occasionally gets worse with breathing, but also could be triggered with walking or other exertion.  There is no diaphoresis or other severe symptoms.      She also had symptoms of palpitations, occasional syncope or near-syncope.  The patient could not give much detail and is not her current focus of the attention.  She has not suffered any injury.  She was put on a ZIO Patch monitor in March that showed no significant cardiac arrhythmias and her symptoms were not created with cardiac arrhythmia.      She is an active smoker for the last 2 years.  She smokes about 1 pack of cigarettes daily.  There is no family history of early coronary artery disease.      PAST MEDICAL HISTORY:  Remarkable for ADHD, migraine headache, complex partial seizure disorder, depression, anxiety, GI reflux and developmental delay.      PHYSICAL EXAMINATION:   VITAL SIGNS:  Blood pressure was 112/70, heart rate 77 beats per minute, body weight 220 pounds.   HEENT:  Eyes and ENT were unremarkable.     GENERAL:  She had multiple tattoos.   LUNGS:  Clear.   CARDIAC:  Rhythm was regular and the heart sounds were normal with no murmur.   ABDOMEN:  Showed moderate obesity.   EXTREMITIES:  There was no pedal edema.      Her EKG in March showed normal sinus rhythm.      ASSESSMENT AND RECOMMENDATIONS:  Ms. Collier reports chest pain that is atypical for angina and is unlikely cardiac in origin.  I think her pain is more likely secondary to cigarette smoking and the patient is strongly recommended to quit smoking.      I have ordered an echocardiography for her to assess the cardiac structure and function.  If the echo is normal, she does not need  routine cardiology followup.      She has occasional dizziness and a rare episode of syncope.  To avoid injury, the patient is advised to sit or lie down quickly if she feels dizzy or diaphoretic.  Based on her ZIO Patch monitor results, I suspect her syncopal episode is likely secondary to vasovagal syncope.      cc:      Iris Rand PA-C   [facility]   [address] [place] [address]         LAKHWINDER FUNG MD             D: 2019   T: 2019   MT: DAVID      Name:     SUZANNE SANTOS   MRN:      [id number]        Account:      [id number]   :      1995           Service Date: 2019      Document: [id number]

## 2019-05-29 NOTE — LETTER
5/29/2019    Iris Rand PA-C  [id number]  [facility] [id number]    RE: Lucero Su Nando       Dear Colleague,    I had the pleasure of seeing Lucero Sharlene Collier in the [facility].    HPI and Plan:   See dictation    Orders Placed This Encounter   Procedures     Echocardiogram Complete       No orders of the defined types were placed in this encounter.      There are no discontinued medications.      Encounter Diagnosis   Name Primary?     Chest pain, unspecified type Yes       CURRENT MEDICATIONS:  Current Outpatient Medications   Medication Sig Dispense Refill     fluocinonide (LIDEX) 0.05 % cream Apply sparingly to affected area twice daily as needed.  Do not apply to face. 60 g 0     IBUPROFEN PO Take 600 mg by mouth every 6 hours as needed for moderate pain       mirtazapine (REMERON) 7.5 MG tablet Take 1 tablet (7.5 mg) by mouth At Bedtime 30 tablet 1     ondansetron (ZOFRAN) 4 MG tablet Take 1 tablet (4 mg) by mouth every 8 hours as needed As needed for headache (Patient taking differently: Take 4 mg by mouth every 8 hours as needed for other (headaches) As needed for headache) 20 tablet 3     pimecrolimus (ELIDEL) 1 % cream Apply topically 2 times daily Ok to use on face 60 g 3     prochlorperazine (COMPAZINE) 10 MG tablet TAKE 1 TABLET(10 MG) BY MOUTH EVERY 6 HOURS AS NEEDED FOR NAUSEA OR VOMITING 10 tablet 3     ranitidine (ZANTAC) 150 MG tablet Take 1 tablet (150 mg) by mouth At Bedtime 90 tablet 1     sertraline (ZOLOFT) 100 MG tablet Take 1 tablet (100 mg) by mouth daily 90 tablet 1     SUMAtriptan (IMITREX) 25 MG tablet Take 1-2 tablets (25-50 mg) by mouth at onset of headache for migraine May repeat in 2 hours. Max 8 tablets/24 hours. 9 tablet 1     traZODone (DESYREL) 100 MG tablet Take 1 tablet (100 mg) by mouth nightly as needed for sleep (Patient not taking: Reported on 5/29/2019) 90 tablet 1       ALLERGIES   No Known Allergies    PAST MEDICAL  HISTORY:  Past Medical History:   Diagnosis Date     ADHD     on Daytrana and clonidine     Adverse reaction to pertussis vaccine      Allergies     daily Claritin     Anxiety      Chronic headaches      Complex partial seizures (H)      Constipation      Depression      Developmental delay      GERD (gastroesophageal reflux disease)     UGI 3/07     History of tonsillectomy      Hypoglycemia     Endocrine eval neg 10/00     Kidney stone     6/02 detected on CT scan; resolved 12/04     Myopia      Obesity      Sinusitis, chronic      Sleep disorder     Since 2000; sleep clinic evaluation 4/00 Dr. Ramesh      Smoking      Spells      Syncope      Urinary tract infection 11/99    nl renal US & VCUG 11/99       PAST SURGICAL HISTORY:  Past Surgical History:   Procedure Laterality Date     ENDOSCOPY UPPER WITH PANCREATIC STIMULATION  4/06    Esophagitis 4/06     NISSEN FUNDOPLICATION  6/07    Dr. Meneses     PE TUBES  3/98     TONSILLECTOMY & ADENOIDECTOMY  4/02       FAMILY HISTORY:  Family History   Problem Relation Age of Onset     Diabetes Maternal Grandmother      Hypertension Maternal Grandmother      Diabetes Paternal Grandmother      Cardiovascular Paternal Grandmother      Breast Cancer Paternal Grandmother 61        right side breast cancer     Genetic Disorder Other         nephew.-genetic defects      Obesity Mother      Supraventricular tachycardia Mother      Family History Negative Father        SOCIAL HISTORY:  Social History     Socioeconomic History     Marital status: Single     Spouse name: None     Number of children: None     Years of education: None     Highest education level: None   Occupational History     None   Social Needs     Financial resource strain: None     Food insecurity:     Worry: None     Inability: None     Transportation needs:     Medical: None     Non-medical: None   Tobacco Use     Smoking status: Current Every Day Smoker     Types: Cigarettes     Smokeless tobacco: Current User  "    Types: Chew     Tobacco comment: 5 qd   Substance and Sexual Activity     Alcohol use: Yes     Alcohol/week: 0.0 oz     Comment: every other day 1 beer      Drug use: No     Sexual activity: Yes     Partners: Male     Birth control/protection: None   Lifestyle     Physical activity:     Days per week: None     Minutes per session: None     Stress: None   Relationships     Social connections:     Talks on phone: None     Gets together: None     Attends Judaism service: None     Active member of club or organization: None     Attends meetings of clubs or organizations: None     Relationship status: None     Intimate partner violence:     Fear of current or ex partner: None     Emotionally abused: None     Physically abused: None     Forced sexual activity: None   Other Topics Concern     Parent/sibling w/ CABG, MI or angioplasty before 65F 55M? Not Asked   Social History Narrative     None       Review of Systems:  Skin:  Positive for   excema    Eyes:  Positive for glasses;contacts    ENT:  Negative      Respiratory:  Positive for shortness of breath;dyspnea on exertion SOB happens randomly when sitting, laying and exerting    Cardiovascular:    lightheadedness;dizziness;Positive for;palpitations;chest pain;edema;syncope or near-syncope Alll symptoms go together except swelling of feet   Gastroenterology: Negative      Genitourinary:  Positive for urinary frequency    Musculoskeletal:  Negative      Neurologic:  Positive for migraine headaches;numbness or tingling of hands;numbness or tingling of feet    Psychiatric:  Negative      Heme/Lymph/Imm:  Negative      Endocrine:  Negative        Physical Exam:  Vitals: /70 (BP Location: Right arm, Patient Position: Sitting, Cuff Size: Adult Regular)   Pulse 77   Ht 1.651 m (5' 5\")   Wt 99.8 kg (220 lb)   LMP  (LMP Unknown)   SpO2 99%   BMI 36.61 kg/m       Constitutional:  cooperative, alert and oriented, well developed, well nourished, in no acute " distress        Skin:  warm and dry to the touch, no apparent skin lesions or masses noted          Head:  normocephalic, no masses or lesions        Eyes:  pupils equal and round, conjunctivae and lids unremarkable, sclera white, no xanthalasma, EOMS intact, no nystagmus        Lymph:No Cervical lymphadenopathy present     ENT:  no pallor or cyanosis, dentition good        Neck:  carotid pulses are full and equal bilaterally, JVP normal, no carotid bruit        Respiratory:  normal breath sounds, clear to auscultation, normal A-P diameter, normal symmetry, normal respiratory excursion, no use of accessory muscles         Cardiac: regular rhythm, normal S1/S2, no S3 or S4, apical impulse not displaced, no murmurs, gallops or rubs                pulses full and equal, no bruits auscultated                                        GI:  abdomen soft, non-tender, BS normoactive, no mass, no HSM, no bruits        Extremities and Muscular Skeletal:  no deformities, clubbing, cyanosis, erythema observed              Neurological:  no gross motor deficits        Psych:  Alert and Oriented x 3        CC  SHELBY Summers Ra CNP  [address] [place] [address]                Thank you for allowing me to participate in the care of your patient.      Sincerely,     Chriss Burgess MD     [facility]    cc:   SHELBY Summers Ra CNP  [address] [id number]

## 2019-05-29 NOTE — LETTER
5/29/2019      Iris Rand PA-C  [address] [address] [facility] [address]      RE: Lucero Collier       Dear Colleague,    I had the pleasure of seeing Lucero Collier in the [facility].    Service Date: 05/29/2019      HISTORY OF PRESENT ILLNESS:  I saw Ms. Collier for evaluation of recurrent chest pain.  She is a 23-year-old white female who has been having chest pain in the last few months.  The symptoms are described as intermittent left precordial sharp pain that would last for minutes.  It occasionally gets worse with breathing, but also could be triggered with walking or other exertion.  There is no diaphoresis or other severe symptoms.      She also had symptoms of palpitations, occasional syncope or near-syncope.  The patient could not give much detail and is not her current focus of the attention.  She has not suffered any injury.  She was put on a ZIO Patch monitor in March that showed no significant cardiac arrhythmias and her symptoms were not created with cardiac arrhythmia.      She is an active smoker for the last 2 years.  She smokes about 1 pack of cigarettes daily.  There is no family history of early coronary artery disease.      PAST MEDICAL HISTORY:  Remarkable for ADHD, migraine headache, complex partial seizure disorder, depression, anxiety, GI reflux and developmental delay.      PHYSICAL EXAMINATION:   VITAL SIGNS:  Blood pressure was 112/70, heart rate 77 beats per minute, body weight 220 pounds.   HEENT:  Eyes and ENT were unremarkable.     GENERAL:  She had multiple tattoos.   LUNGS:  Clear.   CARDIAC:  Rhythm was regular and the heart sounds were normal with no murmur.   ABDOMEN:  Showed moderate obesity.   EXTREMITIES:  There was no pedal edema.      Her EKG in March showed normal sinus rhythm.      ASSESSMENT AND RECOMMENDATIONS:  Ms. Collier reports chest pain that is atypical for angina and is unlikely cardiac in origin.  I think her pain is  more likely secondary to cigarette smoking and the patient is strongly recommended to quit smoking.      I have ordered an echocardiography for her to assess the cardiac structure and function.  If the echo is normal, she does not need routine cardiology followup.      She has occasional dizziness and a rare episode of syncope.  To avoid injury, the patient is advised to sit or lie down quickly if she feels dizzy or diaphoretic.  Based on her ZIO Patch monitor results, I suspect her syncopal episode is likely secondary to vasovagal syncope.      cc:      Iris Rand PA-C   [facility]   [address] [place] [address]         LAKHWINDER FUNG MD             D: 2019   T: 2019   MT: DAVID      Name:     SUZANNE SANTOS   MRN:      [id number]        Account:      [id number]   :      1995           Service Date: 2019      Document: [id number]           Outpatient Encounter Medications as of 2019   Medication Sig Dispense Refill     fluocinonide (LIDEX) 0.05 % cream Apply sparingly to affected area twice daily as needed.  Do not apply to face. 60 g 0     IBUPROFEN PO Take 600 mg by mouth every 6 hours as needed for moderate pain       mirtazapine (REMERON) 7.5 MG tablet Take 1 tablet (7.5 mg) by mouth At Bedtime 30 tablet 1     ondansetron (ZOFRAN) 4 MG tablet Take 1 tablet (4 mg) by mouth every 8 hours as needed As needed for headache (Patient taking differently: Take 4 mg by mouth every 8 hours as needed for other (headaches) As needed for headache) 20 tablet 3     pimecrolimus (ELIDEL) 1 % cream Apply topically 2 times daily Ok to use on face 60 g 3     prochlorperazine (COMPAZINE) 10 MG tablet TAKE 1 TABLET(10 MG) BY MOUTH EVERY 6 HOURS AS NEEDED FOR NAUSEA OR VOMITING 10 tablet 3     ranitidine (ZANTAC) 150 MG tablet Take 1 tablet (150 mg) by mouth At Bedtime 90 tablet 1     sertraline (ZOLOFT) 100 MG tablet Take 1 tablet (100 mg) by mouth daily 90 tablet 1      SUMAtriptan (IMITREX) 25 MG tablet Take 1-2 tablets (25-50 mg) by mouth at onset of headache for migraine May repeat in 2 hours. Max 8 tablets/24 hours. 9 tablet 1     traZODone (DESYREL) 100 MG tablet Take 1 tablet (100 mg) by mouth nightly as needed for sleep (Patient not taking: Reported on 5/29/2019) 90 tablet 1     No facility-administered encounter medications on file as of 5/29/2019.        Again, thank you for allowing me to participate in the care of your patient.      Sincerely,    Chriss Burgess MD     [facility]

## 2019-06-10 ENCOUNTER — HOSPITAL ENCOUNTER (OUTPATIENT)
Dept: CARDIOLOGY | Facility: CLINIC | Age: 24
Discharge: HOME | End: 2019-06-10
Attending: INTERNAL MEDICINE | Admitting: INTERNAL MEDICINE
Payer: COMMERCIAL

## 2019-06-10 ENCOUNTER — OFFICE VISIT (OUTPATIENT)
Dept: FAMILY MEDICINE | Facility: CLINIC | Age: 24
End: 2019-06-10
Payer: COMMERCIAL

## 2019-06-10 VITALS
RESPIRATION RATE: 16 BRPM | OXYGEN SATURATION: 99 % | HEIGHT: 65 IN | SYSTOLIC BLOOD PRESSURE: 124 MMHG | HEART RATE: 77 BPM | WEIGHT: 221.9 LBS | DIASTOLIC BLOOD PRESSURE: 76 MMHG | TEMPERATURE: 98.1 F | BODY MASS INDEX: 36.97 KG/M2

## 2019-06-10 DIAGNOSIS — L20.9 ATOPIC DERMATITIS, UNSPECIFIED TYPE: ICD-10-CM

## 2019-06-10 DIAGNOSIS — G43.909 MIGRAINE WITHOUT STATUS MIGRAINOSUS, NOT INTRACTABLE, UNSPECIFIED MIGRAINE TYPE: ICD-10-CM

## 2019-06-10 DIAGNOSIS — K21.9 GASTROESOPHAGEAL REFLUX DISEASE WITHOUT ESOPHAGITIS: ICD-10-CM

## 2019-06-10 DIAGNOSIS — R07.9 CHEST PAIN, UNSPECIFIED TYPE: ICD-10-CM

## 2019-06-10 DIAGNOSIS — F32.2 SEVERE SINGLE CURRENT EPISODE OF MAJOR DEPRESSIVE DISORDER, WITHOUT PSYCHOTIC FEATURES (H): Primary | ICD-10-CM

## 2019-06-10 PROCEDURE — 93306 TTE W/DOPPLER COMPLETE: CPT | Mod: 26 | Performed by: INTERNAL MEDICINE

## 2019-06-10 PROCEDURE — 93306 TTE W/DOPPLER COMPLETE: CPT

## 2019-06-10 PROCEDURE — 99214 OFFICE O/P EST MOD 30 MIN: CPT | Performed by: PHYSICIAN ASSISTANT

## 2019-06-10 RX ORDER — SUMATRIPTAN 25 MG/1
25-50 TABLET, FILM COATED ORAL
Qty: 9 TABLET | Refills: 1 | Status: SHIPPED | OUTPATIENT
Start: 2019-06-10 | End: 2019-12-23

## 2019-06-10 RX ORDER — MIRTAZAPINE 7.5 MG/1
7.5 TABLET, FILM COATED ORAL AT BEDTIME
Qty: 30 TABLET | Refills: 1 | Status: SHIPPED | OUTPATIENT
Start: 2019-06-10 | End: 2019-07-03

## 2019-06-10 RX ORDER — FLUOCINONIDE 0.5 MG/G
CREAM TOPICAL
Qty: 60 G | Refills: 0 | Status: ON HOLD | OUTPATIENT
Start: 2019-06-10 | End: 2024-08-31

## 2019-06-10 ASSESSMENT — ANXIETY QUESTIONNAIRES
7. FEELING AFRAID AS IF SOMETHING AWFUL MIGHT HAPPEN: NEARLY EVERY DAY
2. NOT BEING ABLE TO STOP OR CONTROL WORRYING: MORE THAN HALF THE DAYS
1. FEELING NERVOUS, ANXIOUS, OR ON EDGE: NEARLY EVERY DAY
6. BECOMING EASILY ANNOYED OR IRRITABLE: MORE THAN HALF THE DAYS
IF YOU CHECKED OFF ANY PROBLEMS ON THIS QUESTIONNAIRE, HOW DIFFICULT HAVE THESE PROBLEMS MADE IT FOR YOU TO DO YOUR WORK, TAKE CARE OF THINGS AT HOME, OR GET ALONG WITH OTHER PEOPLE: VERY DIFFICULT
5. BEING SO RESTLESS THAT IT IS HARD TO SIT STILL: MORE THAN HALF THE DAYS
3. WORRYING TOO MUCH ABOUT DIFFERENT THINGS: MORE THAN HALF THE DAYS
GAD7 TOTAL SCORE: 16

## 2019-06-10 ASSESSMENT — PATIENT HEALTH QUESTIONNAIRE - PHQ9
SUM OF ALL RESPONSES TO PHQ QUESTIONS 1-9: 19
5. POOR APPETITE OR OVEREATING: MORE THAN HALF THE DAYS

## 2019-06-10 ASSESSMENT — MIFFLIN-ST. JEOR: SCORE: 1772.41

## 2019-06-10 NOTE — PROGRESS NOTES
"Subjective     Lucero Collier is a 23 year old female who presents to clinic today for the following health issues:    HPI   Depression and Anxiety Follow-Up    How are you doing with your depression since your last visit? (5/6/19) { :413135::\"No change\"}    How are you doing with your anxiety since your last visit?  { :948558::\"No change\"}    Are you having other symptoms that might be associated with depression or anxiety? { :359508}    Have you had a significant life event? { :723383}     Do you have any concerns with your use of alcohol or other drugs? { :533560}    Social History     Tobacco Use     Smoking status: Current Every Day Smoker     Types: Cigarettes     Smokeless tobacco: Current User     Types: Chew     Tobacco comment: 5 qd   Substance Use Topics     Alcohol use: Yes     Alcohol/week: 0.0 oz     Comment: every other day 1 beer      Drug use: No     PHQ 12/3/2018 2/26/2019 5/3/2019   PHQ-9 Total Score 21 19 20   Q9: Thoughts of better off dead/self-harm past 2 weeks Several days Not at all Several days   F/U: Thoughts of suicide or self-harm No - -   F/U: Self harm-plan - - -   F/U: Self-harm action - - -   F/U: Safety concerns No - -     CLARY-7 SCORE 11/21/2018 12/3/2018 5/3/2019   Total Score - - -   Total Score 14 14 14     {Last PHQ9 or GAD7 Responses (Optional):265639}  {PROVIDER ONLY Complete follow-up questions for patients who report suicide ideation  (Optional):014088}    Suicide Assessment Five-step Evaluation and Treatment (SAFE-T)    Amount of exercise or physical activity: {Exercise frequency days per week:078992}    Problems taking medications regularly: {Med Problems:528857::\"No\"}    Medication side effects: {CHRONIC MED SIDE EFFECTS:150744::\"none\"}    Diet: { :467865}      {additonal problems for provider to add (Optional):685793}    {HIST REVIEW/ LINKS 2 (Optional):480324}    Reviewed and updated as needed this visit by Provider         Review of Systems   {ROS COMP " "(Optional):886048}      Objective    There were no vitals taken for this visit.  There is no height or weight on file to calculate BMI.  Physical Exam   {Exam List (Optional):884719}    {Diagnostic Test Results (Optional):630353::\"Diagnostic Test Results:\",\"Labs reviewed in Epic\"}        {PROVIDER CHARTING PREFERENCE:227420}    "

## 2019-06-10 NOTE — PROGRESS NOTES
Subjective     Lucero Collier is a 23 year old female who presents to clinic today for the following health issues:    HPI   Medication Followup of Zoloft and Remeron    Taking Medication as prescribed: yes    Side Effects:  With zoloft she was easily bruising for awhile but this has gone away    Medication Helping Symptoms:  yes     Patient is here today to follow up on her mood  She feels as though things are stable compared to her visit last month  She notes she has started cutting again, this started a few weeks ago, last cutting episode was about 1 week ago  She is also drinking again, 1 drink every other day  She notes no SI/HI today, no plan  She feels like her family is not supportive to her, she feels like her friends don't want to be around her  She feels like the Zoloft has helped a little but would like to go up on the dose  She is requesting a referral to Psychiatry in [place] and also partial hospitalization day therapy   She is seeing her therapist once per week  No other concerns today    Patient Active Problem List   Diagnosis     ADHD (attention deficit hyperactivity disorder)     Constipation     Myopia     Spells     Chronic headaches     GERD (gastroesophageal reflux disease)     Adverse reaction to pertussis vaccine     Complex partial seizures (H)     Care Plan- Health Care Home     Learning problem     Overweight     Sleep disturbance     Adjustment disorder with mixed anxiety and depressed mood     Migraine without status migrainosus, not intractable, unspecified migraine type     Anxiety     Severe single current episode of major depressive disorder, without psychotic features (H)     Depression     HTN (hypertension)     Obesity (BMI 35.0-39.9) with comorbidity (H)     Past Surgical History:   Procedure Laterality Date     ENDOSCOPY UPPER WITH PANCREATIC STIMULATION  4/06    Esophagitis 4/06     NISSEN FUNDOPLICATION  6/07    Dr. Meneses     PE TUBES  3/98     TONSILLECTOMY &  ADENOIDECTOMY  4/02       Social History     Tobacco Use     Smoking status: Current Every Day Smoker     Types: Cigarettes     Smokeless tobacco: Current User     Types: Chew     Tobacco comment: 5 qd   Substance Use Topics     Alcohol use: Yes     Alcohol/week: 0.0 oz     Comment: every other day 1 beer      Family History   Problem Relation Age of Onset     Diabetes Maternal Grandmother      Hypertension Maternal Grandmother      Diabetes Paternal Grandmother      Cardiovascular Paternal Grandmother      Breast Cancer Paternal Grandmother 61        right side breast cancer     Genetic Disorder Other         nephew.-genetic defects      Obesity Mother      Supraventricular tachycardia Mother      Family History Negative Father          Current Outpatient Medications   Medication Sig Dispense Refill     fluocinonide (LIDEX) 0.05 % external cream Apply sparingly to affected area twice daily as needed.  Do not apply to face. 60 g 0     IBUPROFEN PO Take 600 mg by mouth every 6 hours as needed for moderate pain       levonorgestrel (MIRENA) 20 MCG/24HR IUD 1 each by Intrauterine route once       mirtazapine (REMERON) 7.5 MG tablet Take 1 tablet (7.5 mg) by mouth At Bedtime 30 tablet 1     pimecrolimus (ELIDEL) 1 % cream Apply topically 2 times daily Ok to use on face 60 g 3     prochlorperazine (COMPAZINE) 10 MG tablet TAKE 1 TABLET(10 MG) BY MOUTH EVERY 6 HOURS AS NEEDED FOR NAUSEA OR VOMITING 10 tablet 3     ranitidine (ZANTAC) 150 MG tablet Take 1 tablet (150 mg) by mouth At Bedtime 90 tablet 1     sertraline (ZOLOFT) 100 MG tablet Take 1 tablet (100 mg) by mouth daily 90 tablet 1     sertraline (ZOLOFT) 50 MG tablet Take 1 tablet (50 mg) by mouth daily In addition to your 100mg per day, total 150mg daily 90 tablet 1     SUMAtriptan (IMITREX) 25 MG tablet Take 1-2 tablets (25-50 mg) by mouth at onset of headache for migraine May repeat in 2 hours. Max 8 tablets/24 hours. 9 tablet 1     No Known  "Allergies    Reviewed and updated as needed this visit by Provider         Review of Systems   ROS COMP: Constitutional, HEENT, cardiovascular, pulmonary, gi and gu systems are negative, except as otherwise noted.      Objective    /76 (BP Location: Right arm, Patient Position: Chair, Cuff Size: Adult Large)   Pulse 77   Temp 98.1  F (36.7  C) (Oral)   Resp 16   Ht 1.651 m (5' 5\")   Wt 100.7 kg (221 lb 14.4 oz)   LMP  (LMP Unknown)   SpO2 99%   Breastfeeding? No   BMI 36.93 kg/m    Body mass index is 36.93 kg/m .  Physical Exam   GENERAL: healthy, alert and no distress  NECK: no adenopathy, no asymmetry, masses, or scars and thyroid normal to palpation  RESP: lungs clear to auscultation - no rales, rhonchi or wheezes  CV: regular rate and rhythm, normal S1 S2, no S3 or S4, no murmur, click or rub, no peripheral edema and peripheral pulses strong  MS: no gross musculoskeletal defects noted, no edema  PSYCH: mentation appears normal, affect normal/bright    Diagnostic Test Results:  none         Assessment & Plan     1. Severe single current episode of major depressive disorder, without psychotic features (H)  Chronic issue, not well controlled. Patient starting cutting again and is drinking again.  Will plan to increase Zoloft to 150mg daily, continue Remeron at bed time.  Referral to get day treatment and referral to psychiatry also given.  Advised to quit drinking, no SI/HI today.  Recheck in 1 month, sooner if issues.  - mirtazapine (REMERON) 7.5 MG tablet; Take 1 tablet (7.5 mg) by mouth At Bedtime  Dispense: 30 tablet; Refill: 1  - MENTAL HEALTH REFERRAL  - Child/Adolescent; Outpatient Treatment; Day Treatment/Dual Disorder/Partial Hospitalization Program; [facility]: Adolescent Partial Hospitalization Program [phone]; Patient call to schedule  - MENTAL HEALTH REFERRAL  - Adult; Psychiatry and Medication Management; Psychiatry; Other: Not Listed - Enter Referral Details in Scheduling Comments " Below; Patient call to schedule  - sertraline (ZOLOFT) 50 MG tablet; Take 1 tablet (50 mg) by mouth daily In addition to your 100mg per day, total 150mg daily  Dispense: 90 tablet; Refill: 1    2. Atopic dermatitis, unspecified type  - fluocinonide (LIDEX) 0.05 % external cream; Apply sparingly to affected area twice daily as needed.  Do not apply to face.  Dispense: 60 g; Refill: 0    3. Gastroesophageal reflux disease without esophagitis  - ranitidine (ZANTAC) 150 MG tablet; Take 1 tablet (150 mg) by mouth At Bedtime  Dispense: 90 tablet; Refill: 1    4. Migraine without status migrainosus, not intractable, unspecified migraine type  - SUMAtriptan (IMITREX) 25 MG tablet; Take 1-2 tablets (25-50 mg) by mouth at onset of headache for migraine May repeat in 2 hours. Max 8 tablets/24 hours.  Dispense: 9 tablet; Refill: 1       Risks, benefits and alternatives were discussed with patient. Agreeable to the plan of care.      Return in about 1 month (around 7/8/2019) for Mood Recheck.    Iris Rand PA-C  [facility]

## 2019-06-11 ASSESSMENT — ANXIETY QUESTIONNAIRES: GAD7 TOTAL SCORE: 16

## 2019-06-14 ENCOUNTER — TELEPHONE (OUTPATIENT)
Dept: FAMILY MEDICINE | Facility: CLINIC | Age: 24
End: 2019-06-14

## 2019-06-14 ENCOUNTER — TELEPHONE (OUTPATIENT)
Dept: BEHAVIORAL HEALTH | Facility: CLINIC | Age: 24
End: 2019-06-14

## 2019-06-14 DIAGNOSIS — F32.2 SEVERE SINGLE CURRENT EPISODE OF MAJOR DEPRESSIVE DISORDER, WITHOUT PSYCHOTIC FEATURES (H): Primary | ICD-10-CM

## 2019-06-14 NOTE — TELEPHONE ENCOUNTER
Spoke with Lucero about both the Partial Hosp Program and the Dual Diagnosis Program. She is interested in DDP. Outpt Behavioral Coordinator will call to schedule.

## 2019-06-14 NOTE — TELEPHONE ENCOUNTER
Routed to pcp to t up referral.      Spoke with Eduardo [phone] from [facility] partial hospitalization program and referral sent by Iirs SHEARER needs an MD to sign. Informed Iris.    Would you sign referral?    Regina Gerardo RN

## 2019-06-20 ENCOUNTER — OFFICE VISIT (OUTPATIENT)
Dept: FAMILY MEDICINE | Facility: CLINIC | Age: 24
End: 2019-06-20
Payer: COMMERCIAL

## 2019-06-20 ENCOUNTER — DOCUMENTATION ONLY (OUTPATIENT)
Dept: FAMILY MEDICINE | Facility: CLINIC | Age: 24
End: 2019-06-20

## 2019-06-20 VITALS
DIASTOLIC BLOOD PRESSURE: 71 MMHG | WEIGHT: 221.3 LBS | RESPIRATION RATE: 18 BRPM | TEMPERATURE: 98.5 F | HEART RATE: 102 BPM | HEIGHT: 65 IN | OXYGEN SATURATION: 99 % | BODY MASS INDEX: 36.87 KG/M2 | SYSTOLIC BLOOD PRESSURE: 110 MMHG

## 2019-06-20 DIAGNOSIS — Z11.3 SCREEN FOR STD (SEXUALLY TRANSMITTED DISEASE): ICD-10-CM

## 2019-06-20 DIAGNOSIS — F32.2 SEVERE SINGLE CURRENT EPISODE OF MAJOR DEPRESSIVE DISORDER, WITHOUT PSYCHOTIC FEATURES (H): ICD-10-CM

## 2019-06-20 DIAGNOSIS — R35.0 URINARY FREQUENCY: ICD-10-CM

## 2019-06-20 DIAGNOSIS — Z20.2 EXPOSURE TO STD: Primary | ICD-10-CM

## 2019-06-20 LAB
ALBUMIN UR-MCNC: NEGATIVE MG/DL
APPEARANCE UR: CLEAR
BILIRUB UR QL STRIP: NEGATIVE
COLOR UR AUTO: YELLOW
GLUCOSE UR STRIP-MCNC: NEGATIVE MG/DL
HGB UR QL STRIP: NEGATIVE
KETONES UR STRIP-MCNC: NEGATIVE MG/DL
LEUKOCYTE ESTERASE UR QL STRIP: NEGATIVE
NITRATE UR QL: NEGATIVE
PH UR STRIP: 7.5 PH (ref 5–7)
SOURCE: ABNORMAL
SP GR UR STRIP: 1.02 (ref 1–1.03)
UROBILINOGEN UR STRIP-ACNC: 0.2 EU/DL (ref 0.2–1)

## 2019-06-20 PROCEDURE — 87389 HIV-1 AG W/HIV-1&-2 AB AG IA: CPT | Performed by: NURSE PRACTITIONER

## 2019-06-20 PROCEDURE — 87591 N.GONORRHOEAE DNA AMP PROB: CPT | Performed by: NURSE PRACTITIONER

## 2019-06-20 PROCEDURE — 87491 CHLMYD TRACH DNA AMP PROBE: CPT | Performed by: NURSE PRACTITIONER

## 2019-06-20 PROCEDURE — 99214 OFFICE O/P EST MOD 30 MIN: CPT | Performed by: NURSE PRACTITIONER

## 2019-06-20 PROCEDURE — 86780 TREPONEMA PALLIDUM: CPT | Performed by: NURSE PRACTITIONER

## 2019-06-20 PROCEDURE — 81003 URINALYSIS AUTO W/O SCOPE: CPT | Performed by: NURSE PRACTITIONER

## 2019-06-20 PROCEDURE — 36415 COLL VENOUS BLD VENIPUNCTURE: CPT | Performed by: NURSE PRACTITIONER

## 2019-06-20 RX ORDER — AZITHROMYCIN 500 MG/1
1000 TABLET, FILM COATED ORAL DAILY
Qty: 2 TABLET | Refills: 0 | Status: SHIPPED | OUTPATIENT
Start: 2019-06-20 | End: 2019-07-03

## 2019-06-20 ASSESSMENT — MIFFLIN-ST. JEOR: SCORE: 1764.69

## 2019-06-20 NOTE — PATIENT INSTRUCTIONS
We are testing for additional stds today.  If anything comes back positive we will send treatment.    Take the chlamydia.  No intercourse until 7 days after treatment.

## 2019-06-20 NOTE — PROGRESS NOTES
Subjective     Lucero Collier is a 24 year old female who presents to clinic today for the following health issues:    HPI   STD Check     Pt states she was exposed about 10-14 days ago to a STD.  [place] with a partner without condom.  Now aware that he has chlamydia.    Pt also states that she is having pain, burning, itching with frequent urination.   No fever.  No n/v.  Normal intake.    She missed her appt for the partial inpatient program for mental health.  Scheduled for tomorrow but plans to move this to next week as it is important to her that her mom is able to be with her at the appt.  She feels safe, some intermittent SI but feels if this escalates she can ask her mom to take her in to the hospital over the weekend.    Patient Active Problem List   Diagnosis     ADHD (attention deficit hyperactivity disorder)     Constipation     Myopia     Spells     Chronic headaches     GERD (gastroesophageal reflux disease)     Adverse reaction to pertussis vaccine     Complex partial seizures (H)     Care Plan- Health Care Home     Learning problem     Overweight     Sleep disturbance     Adjustment disorder with mixed anxiety and depressed mood     Migraine without status migrainosus, not intractable, unspecified migraine type     Anxiety     Severe single current episode of major depressive disorder, without psychotic features (H)     Depression     HTN (hypertension)     Obesity (BMI 35.0-39.9) with comorbidity (H)     Past Surgical History:   Procedure Laterality Date     ENDOSCOPY UPPER WITH PANCREATIC STIMULATION  4/06    Esophagitis 4/06     NISSEN FUNDOPLICATION  6/07    Dr. Meneses     PE TUBES  3/98     TONSILLECTOMY & ADENOIDECTOMY  4/02       Social History     Tobacco Use     Smoking status: Current Every Day Smoker     Types: Cigarettes     Smokeless tobacco: Current User     Types: Chew     Tobacco comment: 5 qd   Substance Use Topics     Alcohol use: Yes     Alcohol/week: 0.0 oz     Comment:  "every other day 1 beer      Family History   Problem Relation Age of Onset     Diabetes Maternal Grandmother      Hypertension Maternal Grandmother      Diabetes Paternal Grandmother      Cardiovascular Paternal Grandmother      Breast Cancer Paternal Grandmother 61        right side breast cancer     Genetic Disorder Other         nephew.-genetic defects      Obesity Mother      Supraventricular tachycardia Mother      Family History Negative Father            Reviewed and updated as needed this visit by Provider         Review of Systems   SEE HPI.      Objective    /71 (BP Location: Right arm, Patient Position: Chair, Cuff Size: Adult Large)   Pulse 102   Temp 98.5  F (36.9  C) (Oral)   Resp 18   Ht 1.651 m (5' 5\")   Wt 100.4 kg (221 lb 4.8 oz)   LMP  (LMP Unknown)   SpO2 99%   BMI 36.83 kg/m    Body mass index is 36.83 kg/m .  Physical Exam   GENERAL: healthy, alert and no distress  RESP: lungs clear to auscultation - no rales, rhonchi or wheezes  CV: regular rates and rhythm and normal S1 S2, no S3 or S4  ABDOMEN: soft, nontender  PSYCH: mentation appears normal, affect normal/bright    Diagnostic Test Results:  Labs reviewed in Epic  No results found for this or any previous visit (from the past 24 hour(s)).        Assessment & Plan     1. Exposure to STD  Treat now.  - azithromycin (ZITHROMAX) 500 MG tablet; Take 2 tablets (1,000 mg) by mouth daily for 1 day  Dispense: 2 tablet; Refill: 0    2. Screen for STD (sexually transmitted disease)  Additional testing ordered today.  - Neisseria gonorrhoeae PCR  - Chlamydia trachomatis PCR  - HIV Antigen Antibody Combo  - Treponema Abs w Reflex to RPR and Titer    3. Urinary frequency  Check UA to ensure no infection.  - *UA reflex to Microscopic and Culture ([facility] (except [place] and [place])    4.  Depression  Continue with plan to enter partial inpatient treatment.  ED if symptoms worsen in the meantime.     Tobacco " "Cessation:   reports that she has been smoking cigarettes.  Her smokeless tobacco use includes chew.        BMI:   Estimated body mass index is 36.83 kg/m  as calculated from the following:    Height as of this encounter: 1.651 m (5' 5\").    Weight as of this encounter: 100.4 kg (221 lb 4.8 oz).      No follow-ups on file.    SHELBY Summers [facility]    "

## 2019-06-21 ENCOUNTER — CARE COORDINATION (OUTPATIENT)
Dept: BEHAVIORAL HEALTH | Facility: CLINIC | Age: 24
End: 2019-06-21

## 2019-06-21 LAB — HIV 1+2 AB+HIV1 P24 AG SERPL QL IA: NONREACTIVE

## 2019-06-22 LAB — T PALLIDUM AB SER QL: NONREACTIVE

## 2019-06-25 DIAGNOSIS — F41.9 ANXIETY: Primary | ICD-10-CM

## 2019-06-25 DIAGNOSIS — F32.2 SEVERE SINGLE CURRENT EPISODE OF MAJOR DEPRESSIVE DISORDER, WITHOUT PSYCHOTIC FEATURES (H): ICD-10-CM

## 2019-06-25 DIAGNOSIS — F10.10 ALCOHOL ABUSE: ICD-10-CM

## 2019-06-26 ENCOUNTER — HOSPITAL ENCOUNTER (OUTPATIENT)
Dept: BEHAVIORAL HEALTH | Facility: CLINIC | Age: 24
Discharge: HOME | End: 2019-06-26
Attending: PSYCHIATRY & NEUROLOGY | Admitting: PSYCHIATRY & NEUROLOGY
Payer: COMMERCIAL

## 2019-06-26 PROCEDURE — 90791 PSYCH DIAGNOSTIC EVALUATION: CPT | Performed by: MARRIAGE & FAMILY THERAPIST

## 2019-06-26 ASSESSMENT — PAIN SCALES - GENERAL: PAINLEVEL: NO PAIN (0)
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
## 2019-07-03 ENCOUNTER — OFFICE VISIT (OUTPATIENT)
Dept: FAMILY MEDICINE | Facility: CLINIC | Age: 24
End: 2019-07-03
Payer: COMMERCIAL

## 2019-07-03 VITALS
DIASTOLIC BLOOD PRESSURE: 68 MMHG | HEART RATE: 109 BPM | OXYGEN SATURATION: 98 % | RESPIRATION RATE: 18 BRPM | WEIGHT: 226.8 LBS | BODY MASS INDEX: 37.74 KG/M2 | TEMPERATURE: 98.8 F | SYSTOLIC BLOOD PRESSURE: 116 MMHG

## 2019-07-03 DIAGNOSIS — F32.2 SEVERE SINGLE CURRENT EPISODE OF MAJOR DEPRESSIVE DISORDER, WITHOUT PSYCHOTIC FEATURES (H): ICD-10-CM

## 2019-07-03 PROCEDURE — 99213 OFFICE O/P EST LOW 20 MIN: CPT | Performed by: PHYSICIAN ASSISTANT

## 2019-07-03 RX ORDER — SERTRALINE HYDROCHLORIDE 100 MG/1
100 TABLET, FILM COATED ORAL DAILY
Qty: 90 TABLET | Refills: 1 | Status: SHIPPED | OUTPATIENT
Start: 2019-07-03 | End: 2020-02-24

## 2019-07-03 RX ORDER — MIRTAZAPINE 7.5 MG/1
7.5 TABLET, FILM COATED ORAL AT BEDTIME
Qty: 90 TABLET | Refills: 1 | Status: SHIPPED | OUTPATIENT
Start: 2019-07-03 | End: 2020-02-24

## 2019-07-03 ASSESSMENT — ANXIETY QUESTIONNAIRES
5. BEING SO RESTLESS THAT IT IS HARD TO SIT STILL: SEVERAL DAYS
IF YOU CHECKED OFF ANY PROBLEMS ON THIS QUESTIONNAIRE, HOW DIFFICULT HAVE THESE PROBLEMS MADE IT FOR YOU TO DO YOUR WORK, TAKE CARE OF THINGS AT HOME, OR GET ALONG WITH OTHER PEOPLE: VERY DIFFICULT
GAD7 TOTAL SCORE: 12
7. FEELING AFRAID AS IF SOMETHING AWFUL MIGHT HAPPEN: MORE THAN HALF THE DAYS
6. BECOMING EASILY ANNOYED OR IRRITABLE: MORE THAN HALF THE DAYS
3. WORRYING TOO MUCH ABOUT DIFFERENT THINGS: MORE THAN HALF THE DAYS
1. FEELING NERVOUS, ANXIOUS, OR ON EDGE: MORE THAN HALF THE DAYS
2. NOT BEING ABLE TO STOP OR CONTROL WORRYING: MORE THAN HALF THE DAYS

## 2019-07-03 ASSESSMENT — PATIENT HEALTH QUESTIONNAIRE - PHQ9
5. POOR APPETITE OR OVEREATING: SEVERAL DAYS
SUM OF ALL RESPONSES TO PHQ QUESTIONS 1-9: 16

## 2019-07-03 NOTE — PROGRESS NOTES
Subjective     Lucero Collier is a 24 year old female who presents to clinic today for the following health issues:    HPI   Medication Followup of Remeron 7.5 mg and Zoloft 50 and 100 mg    Taking Medication as prescribed: yes    Side Effects:  None    Medication Helping Symptoms:  yes     Patient is here today to follow up on mood  Is doing better on the higher dose of the Zoloft, taking 150mg  No cutting, rarely drinking since last seen  No SI/HI today  She continues to see therapist weekly  Starting new job at [employer] as     Patient Active Problem List   Diagnosis     ADHD (attention deficit hyperactivity disorder)     Constipation     Myopia     Spells     Chronic headaches     GERD (gastroesophageal reflux disease)     Adverse reaction to pertussis vaccine     Complex partial seizures (H)     Care Plan- [facility]     Learning problem     Overweight     Sleep disturbance     Adjustment disorder with mixed anxiety and depressed mood     Migraine without status migrainosus, not intractable, unspecified migraine type     Anxiety     Severe single current episode of major depressive disorder, without psychotic features (H)     Depression     HTN (hypertension)     Obesity (BMI 35.0-39.9) with comorbidity (H)     Past Surgical History:   Procedure Laterality Date     ENDOSCOPY UPPER WITH PANCREATIC STIMULATION  4/06    Esophagitis 4/06     NISSEN FUNDOPLICATION  6/07    Dr. Meneses     PE TUBES  3/98     TONSILLECTOMY & ADENOIDECTOMY  4/02       Social History     Tobacco Use     Smoking status: Current Every Day Smoker     Packs/day: 1.00     Types: Cigarettes     Smokeless tobacco: Current User     Types: Chew     Tobacco comment: 5 qd   Substance Use Topics     Alcohol use: Yes     Alcohol/week: 0.0 oz     Comment: every other day 1 beer      Family History   Problem Relation Age of Onset     Diabetes Maternal Grandmother      Hypertension Maternal Grandmother      Diabetes  Paternal Grandmother      Cardiovascular Paternal Grandmother      Breast Cancer Paternal Grandmother 61        right side breast cancer     Genetic Disorder Other         nephew.-genetic defects      Obesity Mother      Supraventricular tachycardia Mother      Family History Negative Father          Current Outpatient Medications   Medication Sig Dispense Refill     fluocinonide (LIDEX) 0.05 % external cream Apply sparingly to affected area twice daily as needed.  Do not apply to face. 60 g 0     IBUPROFEN PO Take 600 mg by mouth every 6 hours as needed for moderate pain       levonorgestrel (MIRENA) 20 MCG/24HR IUD 1 each by Intrauterine route once       mirtazapine (REMERON) 7.5 MG tablet Take 1 tablet (7.5 mg) by mouth At Bedtime 90 tablet 1     pimecrolimus (ELIDEL) 1 % cream Apply topically 2 times daily Ok to use on face 60 g 3     prochlorperazine (COMPAZINE) 10 MG tablet TAKE 1 TABLET(10 MG) BY MOUTH EVERY 6 HOURS AS NEEDED FOR NAUSEA OR VOMITING 10 tablet 3     ranitidine (ZANTAC) 150 MG tablet Take 1 tablet (150 mg) by mouth At Bedtime 90 tablet 1     sertraline (ZOLOFT) 100 MG tablet Take 1 tablet (100 mg) by mouth daily 90 tablet 1     sertraline (ZOLOFT) 100 MG tablet Take 1 tablet (100 mg) by mouth daily 90 tablet 1     sertraline (ZOLOFT) 50 MG tablet Take 1 tablet (50 mg) by mouth daily 90 tablet 1     SUMAtriptan (IMITREX) 25 MG tablet Take 1-2 tablets (25-50 mg) by mouth at onset of headache for migraine May repeat in 2 hours. Max 8 tablets/24 hours. 9 tablet 1     No Known Allergies    Reviewed and updated as needed this visit by Provider         Review of Systems   ROS COMP: Constitutional, HEENT, cardiovascular, pulmonary, gi and gu systems are negative, except as otherwise noted.      Objective    /68 (BP Location: Right arm, Patient Position: Chair, Cuff Size: Adult Large)   Pulse 109   Temp 98.8  F (37.1  C) (Tympanic)   Resp 18   Wt 102.9 kg (226 lb 12.8 oz)   LMP  (LMP Unknown)    SpO2 98%   BMI 37.74 kg/m    Body mass index is 37.74 kg/m .  Physical Exam   GENERAL: healthy, alert and no distress  NECK: no adenopathy, no asymmetry, masses, or scars and thyroid normal to palpation  RESP: lungs clear to auscultation - no rales, rhonchi or wheezes  CV: regular rate and rhythm, normal S1 S2, no S3 or S4, no murmur, click or rub, no peripheral edema and peripheral pulses strong  MS: no gross musculoskeletal defects noted, no edema  PSYCH: mentation appears normal, affect normal/bright    Diagnostic Test Results:  none         Assessment & Plan     1. Severe single current episode of major depressive disorder, without psychotic features (H)  Chronic issue, slightly improved since last visit.  PHQ9/GAD7 updated today.  Will continue meds without change.  She is still hoping to get inpatient treatment for mood- on waiting list.  Recheck in 6 months.  - mirtazapine (REMERON) 7.5 MG tablet; Take 1 tablet (7.5 mg) by mouth At Bedtime  Dispense: 90 tablet; Refill: 1  - sertraline (ZOLOFT) 100 MG tablet; Take 1 tablet (100 mg) by mouth daily  Dispense: 90 tablet; Refill: 1  - sertraline (ZOLOFT) 50 MG tablet; Take 1 tablet (50 mg) by mouth daily  Dispense: 90 tablet; Refill: 1        Risks, benefits and alternatives were discussed with patient. Agreeable to the plan of care.      Return in about 6 months (around 1/3/2020) for Mood Recheck.    Iris Rand PA-C  [facility]

## 2019-07-04 ASSESSMENT — ANXIETY QUESTIONNAIRES: GAD7 TOTAL SCORE: 12

## 2019-07-11 ENCOUNTER — TELEPHONE (OUTPATIENT)
Dept: BEHAVIORAL HEALTH | Facility: CLINIC | Age: 24
End: 2019-07-11

## 2019-07-24 ENCOUNTER — OFFICE VISIT (OUTPATIENT)
Dept: FAMILY MEDICINE | Facility: CLINIC | Age: 24
End: 2019-07-24
Payer: COMMERCIAL

## 2019-07-24 ENCOUNTER — ANCILLARY PROCEDURE (OUTPATIENT)
Dept: GENERAL RADIOLOGY | Facility: CLINIC | Age: 24
End: 2019-07-24
Attending: NURSE PRACTITIONER
Payer: COMMERCIAL

## 2019-07-24 VITALS
TEMPERATURE: 97.3 F | DIASTOLIC BLOOD PRESSURE: 76 MMHG | BODY MASS INDEX: 38.71 KG/M2 | HEART RATE: 97 BPM | WEIGHT: 232.6 LBS | OXYGEN SATURATION: 98 % | SYSTOLIC BLOOD PRESSURE: 114 MMHG | RESPIRATION RATE: 18 BRPM

## 2019-07-24 DIAGNOSIS — M79.671 RIGHT FOOT PAIN: Primary | ICD-10-CM

## 2019-07-24 DIAGNOSIS — N64.4 BREAST PAIN: ICD-10-CM

## 2019-07-24 DIAGNOSIS — M79.671 RIGHT FOOT PAIN: ICD-10-CM

## 2019-07-24 PROCEDURE — 99214 OFFICE O/P EST MOD 30 MIN: CPT | Performed by: NURSE PRACTITIONER

## 2019-07-24 PROCEDURE — 73630 X-RAY EXAM OF FOOT: CPT | Mod: RT

## 2019-07-24 NOTE — PROGRESS NOTES
Subjective     Lucero Collier is a 24 year old female who presents to clinic today for the following health issues:    HPI   Musculoskeletal problem/pain      Duration: few months    Description  Location: right foot    Intensity:  mild    Accompanying signs and symptoms: numbness    History  Previous similar problem: no   Previous evaluation:  none    Precipitating or alleviating factors:  Trauma or overuse: no   Aggravating factors include: walking    Therapies tried and outcome: support wrap, ice/ heat- not effective     R foot pain, dorsal aspect.  No injury.  Walking more now for work but this was recent.  Pain was present prior activity change.  Sometimes sees some mild swelling.  Pain only present with weight bearing.  Has tried elevation.    Patient has been having bilateral breast pain for 1 week. Patient has not felt/ seen and lumps.   Has not had period since IUD was placed 3/2019.  No nipple discharge, no additional symptoms.        Patient Active Problem List   Diagnosis     ADHD (attention deficit hyperactivity disorder)     Constipation     Myopia     Spells     Chronic headaches     GERD (gastroesophageal reflux disease)     Adverse reaction to pertussis vaccine     Complex partial seizures (H)     Care Plan- [facility]     Learning problem     Overweight     Sleep disturbance     Adjustment disorder with mixed anxiety and depressed mood     Migraine without status migrainosus, not intractable, unspecified migraine type     Anxiety     Severe single current episode of major depressive disorder, without psychotic features (H)     Depression     HTN (hypertension)     Obesity (BMI 35.0-39.9) with comorbidity (H)     Past Surgical History:   Procedure Laterality Date     ENDOSCOPY UPPER WITH PANCREATIC STIMULATION  4/06    Esophagitis 4/06     NISSEN FUNDOPLICATION  6/07    Dr. Meneses     PE TUBES  3/98     TONSILLECTOMY & ADENOIDECTOMY  4/02       Social History     Tobacco Use     Smoking  status: Current Every Day Smoker     Packs/day: 1.00     Types: Cigarettes     Smokeless tobacco: Current User     Types: Chew     Tobacco comment: 5 qd   Substance Use Topics     Alcohol use: Yes     Alcohol/week: 0.0 oz     Comment: every other day 1 beer      Family History   Problem Relation Age of Onset     Diabetes Maternal Grandmother      Hypertension Maternal Grandmother      Diabetes Paternal Grandmother      Cardiovascular Paternal Grandmother      Breast Cancer Paternal Grandmother 61        right side breast cancer     Genetic Disorder Other         nephew.-genetic defects      Obesity Mother      Supraventricular tachycardia Mother      Family History Negative Father              Reviewed and updated as needed this visit by Provider  Tobacco  Allergies  Meds  Problems  Med Hx  Surg Hx  Fam Hx         Review of Systems   SEE HPI.      Objective    /76 (BP Location: Right arm, Patient Position: Chair, Cuff Size: Adult Large)   Pulse 97   Temp 97.3  F (36.3  C) (Tympanic)   Resp 18   Wt 105.5 kg (232 lb 9.6 oz)   LMP  (LMP Unknown)   SpO2 98%   BMI 38.71 kg/m    Body mass index is 38.71 kg/m .  Physical Exam   GENERAL: healthy, alert and no distress  RESP: lungs clear to auscultation - no rales, rhonchi or wheezes  BREAST: normal without masses, tenderness or nipple discharge and no palpable axillary masses or adenopathy  CV: regular rates and rhythm and normal S1 S2, no S3 or S4  MS: R foot with tenderness over dorsal aspect, generalized.  No redness or swelling.  Normal pedal pulses.  Full ROM of ankle without pain.  PSYCH: mentation appears normal, affect normal/bright    Diagnostic Test Results:  Labs reviewed in Epic        Assessment & Plan     1. Right foot pain  Xrays without obvious abnormality.  Scheduled to see podiatry for follow up.  - XR Foot Right G/E 3 Views; Future    2. Breast pain  Monitor, no additional symptoms.  Normal exam.  If symptoms persist will follow  "up.       Tobacco Cessation:   reports that she has been smoking cigarettes.  She has been smoking about 1.00 pack per day. Her smokeless tobacco use includes chew.        BMI:   Estimated body mass index is 38.71 kg/m  as calculated from the following:    Height as of 6/20/19: 1.651 m (5' 5\").    Weight as of this encounter: 105.5 kg (232 lb 9.6 oz).       Return in about 4 weeks (around 8/21/2019).    SHELBY Summers Ra, CNP  [facility]      "

## 2019-07-25 ENCOUNTER — ALLIED HEALTH/NURSE VISIT (OUTPATIENT)
Dept: PHARMACY | Facility: CLINIC | Age: 24
End: 2019-07-25
Payer: COMMERCIAL

## 2019-07-25 VITALS
WEIGHT: 232.8 LBS | OXYGEN SATURATION: 97 % | BODY MASS INDEX: 38.74 KG/M2 | HEART RATE: 94 BPM | DIASTOLIC BLOOD PRESSURE: 78 MMHG | SYSTOLIC BLOOD PRESSURE: 100 MMHG

## 2019-07-25 DIAGNOSIS — F23 BRIEF PSYCHOTIC DISORDER (H): ICD-10-CM

## 2019-07-25 DIAGNOSIS — F43.23 ADJUSTMENT DISORDER WITH MIXED ANXIETY AND DEPRESSED MOOD: Primary | ICD-10-CM

## 2019-07-25 PROCEDURE — 99607 MTMS BY PHARM ADDL 15 MIN: CPT | Performed by: PHARMACIST

## 2019-07-25 PROCEDURE — 99605 MTMS BY PHARM NP 15 MIN: CPT | Performed by: PHARMACIST

## 2019-07-25 RX ORDER — QUETIAPINE FUMARATE 50 MG/1
TABLET, FILM COATED ORAL
Qty: 60 TABLET | Refills: 3 | Status: SHIPPED | OUTPATIENT
Start: 2019-07-25 | End: 2020-02-21

## 2019-07-25 NOTE — PROGRESS NOTES
"SUBJECTIVE/OBJECTIVE:                           Lucero Collier is a 24 year old female coming in for an initial visit for Medication Therapy Management.  She was referred to me from Iris Rand  .    Chief Complaint:  Severe depression/anxiety /psychoses?    She feels ome provider told her recently she has a psychotic issues .    She currently has no psychiatrist and was only able to see mtm today --she feels she really needs something to stop the voices in her head.        Personal Healthcare Goals: stable mental health !    Allergies/ADRs: Reviewed in Epic  Tobacco: 0-1 pack per day - is not interested in quittingTobacco Cessation Action Plan: Information offered: Patient not interested at this time  Alcohol: not currently using--quit 1  alexa ago.   Caffeine: 2 cups/day of coffee, 1 cups/day of tea, 3 cans regular coke/pepsi sodas/day, 1 energy drinks/day  Activity: not very active. 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 With dementia.   PMH: Reviewed in Epic    Medication Adherence/Access:  no issues reported      Anxiety/Depression/psychoses :  Hearing voices  A lot --see's things people cannot see --1 month ago --diagnosed as psychotic .    Depression is her #1 issue vs. Anxiety --she is asking today what antipsych med is safe with past seizure hx.     Past psych md in [place].    never has had a psychiatrist .     Mirtazapine 7.5mg hs --sleep-- wakes hourly- just cannot sleep.     sertaline 150mg sertaline , past epilepsy but never medicated for it - last seizure -grandmal 5 years ago.      Trying to get a therapist . Here?     BP Readings from Last 1 Encounters:   07/25/19 100/78     Pulse Readings from Last 1 Encounters:   07/25/19 94     Wt Readings from Last 1 Encounters:   07/25/19 232 lb 12.8 oz (105.6 kg)     Ht Readings from Last 1 Encounters:   06/20/19 5' 5\" (1.651 m)     Estimated body mass index is 38.74 kg/m  as calculated from the following:    Height as of " "6/20/19: 5' 5\" (1.651 m).    Weight as of this encounter: 232 lb 12.8 oz (105.6 kg).    Temp Readings from Last 1 Encounters:   07/24/19 97.3  F (36.3  C) (Tympanic)           ASSESSMENT:                             Current medications were reviewed today.     Medication Adherence: excellent, no issues identified    Anxiety/depression/psychoses:  Needs improvement - [facility] consulted with my mtm colleague Fabi staples --she is a [facility]-psychiatry specialist --can I safely give her quetiapine? She has a seizure hx and not on any meds for that --literature states all antipsychotic meds can cause a seizure --she still wants to try something.   Per my mtm colleague fabi staples at [facility] psych:  i think i would do 50mg QHS x7 days, 100mg x7 days and try to check in with her after that if you can? if its first episode, she might get some benefit for the voices at 100mg. although typically doses of 300-400 are used for psychosis. If no benefit at 100mg, id keep increasing by 50mg.    mtm sent quetiapine rx to her phcy --patient willing to try it and see how it goes.    Recommended she start working on finding a psychiatrist and therapist asa.   Call [facility] health crises line or go to ER if not feeling well or if you feel like harming yourself.   Patient is agreeable to this plan.   Recheck EKG on 8-12-19.         PLAN:                              1. FYI--ok to stay on sertraline 150mg./day and mirtazapine 7.5mg daily, lets ADD in Quetiapine 50mg. Tabs --start with 1 tab at bedtime x 7 days -if still hearing voices increase dose to 100mg. At bedtime , going forward we can increase that by 50mg. Increments weekly until voices stop.    Call crises line if your not feeling well or go to ER  , consider seeing therapist here at our clinic , consider seeing Morteza Londono --he is our psychiatrist here at the clinic .         Next [facility] visit:  2 weeks -Monday August 12th --11am .       I spent 50 minutes with this " patient today. All changes were made via collaborative practice agreement with Iris Rand  . A copy of the visit note was provided to the patient's primary care provider.        The patient was given a summary of these recommendations as an after visit summary.     Kel Silver Rph.  Medication Therapy Management Provider  [phone]

## 2019-07-25 NOTE — Clinical Note
kaiden--sandra--thx, for mtm referral -- I started masood on some bedtime quetiapine to help her sleep and hopefuly stop the voices she is hearing in her head. F/up in 3 weeks , strongly recommended she find a therapist and psychiatrist asap -we have both here at our clinic if she is interested ?Thx.-Kel

## 2019-07-25 NOTE — PATIENT INSTRUCTIONS
Recommendations from today's MTM visit:                                                    MTM (medication therapy management) is a service provided by a clinical pharmacist designed to help you get the most of out of your medicines.   Today we reviewed what your medicines are for, how to know if they are working, that your medicines are safe and how to make your medicine regimen as easy as possible.     1. FYI--ok to stay on sertraline 150mg./day and mirtazapine 7.5mg daily, lets ADD in Quetiapine 50mg. Tabs --start with 1 tab at bedtime x 7 days -if still hearing voices increase dose to 100mg. At bedtime , going forward we can increase that by 50mg. Increments weekly until voices stop.    Call crises line if your not feeling well or go to ER  , consider seeing therapist here at our clinic , consider seeing Morteza Londono --he is our psychiatrist here at the clinic .         Next MTM visit:  2 weeks -Monday August 12th --11am .     To schedule another MTM appointment, please call the clinic directly or you may call the MTM scheduling line at [phone] or toll-free at [phone].     My Clinical Pharmacist's contact information:                                                      It was a pleasure talking with you today!  Please feel free to contact me with any questions or concerns you have.      Kel Silver [facility].  Medication Therapy Management Provider  [phone]      You may receive a survey about the MTM services you received by email and/or US Mail.  I would appreciate your feedback to help me serve you better in the future. Your comments will be anonymous.

## 2019-08-07 ENCOUNTER — OFFICE VISIT (OUTPATIENT)
Dept: PODIATRY | Facility: CLINIC | Age: 24
End: 2019-08-07
Payer: COMMERCIAL

## 2019-08-07 VITALS — BODY MASS INDEX: 38.61 KG/M2 | WEIGHT: 232 LBS | SYSTOLIC BLOOD PRESSURE: 100 MMHG | DIASTOLIC BLOOD PRESSURE: 78 MMHG

## 2019-08-07 DIAGNOSIS — M79.671 RIGHT FOOT PAIN: Primary | ICD-10-CM

## 2019-08-07 DIAGNOSIS — S93.621A LISFRANC'S SPRAIN, RIGHT, INITIAL ENCOUNTER: ICD-10-CM

## 2019-08-07 PROCEDURE — 99204 OFFICE O/P NEW MOD 45 MIN: CPT | Performed by: PODIATRIST

## 2019-08-07 NOTE — PATIENT INSTRUCTIONS
Thank you for choosing [facility]!    DR. FRANKS'S CLINIC SCHEDULE  MONDAY AM - REAL TUESDAY - [place]   [address] [address] [address] [address] [address] [place] [address]   [phone] / [phone] / [phone]       WEDNESDAY - [place] FRIDAY AM - [facility]   [address]   [place] [address] [place] [address]   [phone] / [phone]       FRIDAY PM - [place] SCHEDULE SURGERY: [phone]   [address] BILLING QUESTIONS: [phone]   [place] [address] AFTER HOURS: [phone] / [phone] APPOINTMENTS: [phone]     Consumer Price Line (CPL) [phone]       Please call to schedule your MRI/CT/Ultrasound/Arthrogram appointment.  The number is [phone].        STRESS FRACTURES   Stress fractures are fractures or near fractures caused by repetitive overuse, rather than one acute injury. The analogy is bending a paper clip repeatedly until it snaps. These are the most common reason for pain on the top ofthe foot. They are usually worse with activity and improve with rest. Frequently patients report buying a new shoe or having a sudden increase in activity, such as beginning a walking program or moving, a few days to a few weeks prior to the pain starting.    Stress fractures rarely show up on x-ray unless there has been significant damage to the bone. They are usually diagnosed based on symptoms. Sometimes they show up on bone scans or MRIs, but these tests are not necessary for diagnosis.     Treatment ranges from decreasing activity to walking casts to crutches. Most patients respond best to a removable walking cast, worn for 4 weeks. This should be worn at all times, except when sleeping, showering, and driving. This removable cast makes the foot pain go away relatively quickly, however, it is clumsy and may irritate knees, hips, and lower  back. Sometimes patients wear a thick soled shoe on the other foot to minimize the difference in height ofthe walking cast.   Once the symptoms are gone, patients are slowly returned to regular activity. Too rapid ofa return will cause the stress fracture to recur. Some patients have custom inserts made for their shoes to help distribute forces more evenly, minimizing recurrence.   Stress fractures often develop into complete fractures if not diagnosed early and treated aggressively. If complete fracture occurs, prolonged use of crutches without weight on the foot and possibly surgery may be needed in order to heal.       LlSFRANC INJURY     Lisfranc injuries involve the midfoot area. The Lisfranc jOints are between the first metatarsal, second metarsal, medial cuneiform and intermediate cuneiform. The Lisfranc complex involves all of the tarsometatarsal joints. There is also a Lisfranc ligament that runs on the bottom of the medial cuneiform to the second metatarsal base. Lisfranc  injuries, to the midfoot, can include sprains, dislocations, fractures (broken bones) or all of the above. Two very serious complications can be seen with Lisfranc injuries. They' are injury to the dorsalis pedis artery or compartment syndrome. These are both medical emergencies and need surgery right away.     CAUSES   Lisfranc injuries are most often caused by some type of trauma. The trauma can be   either direct or indirect. An example of direct trauma would be dropping something on   the foot. Indirect trauma usually involves a twisting of the foot after it gets caught on   something. Lisfranc injuries account for about one percent of all fractures. They are not common, but sports medicine doctors tend to see the injury more frequently. Lisfranc injuries can occur in car accidents,  personnel, horse riders and in many sports such as; football, baseball and soccer to name a few.     SIGNS & SYMPTOMS   Swelling of the foot  and/or ankle, bruising of the foot and/or ankle, pain usually in the middle part of the foot, difficulty stepping on the foot (due to pain), widening of the midfoot area and large bump on the top midfoot area (due to bone dislocation)     DIAGNOSIS   First your doctor will do a thorough history and physical exam. He or she will be sure to check your pulse (dorsalis pedis artery) on the top of your foot because sometimes this artery can be injured due to the trauma. Your doctor will also make sure you do not have a compartment syndrome, which is a build-up of pressure in the foot causing excruciating pain. The excessive pressure can cause damage to the soft tissues, nerves, arteries and muscles. Compartment syndrome is a medical emergency that requires surgery to relieve the pressure. Next, you will probably get x-rays of your injured foot. The x-ray may be weight bearing (standing on the foot) arnon-weight bearing. Sometimes your healthy foot is also x-rayed for comparison. Lisfranc injuries can be difficult to see on plain x-rays. If there is a question about it, your doctor may send you for a CT (computed tomography) scan. A CT scan will allow the doctor to see the bones in much better detail as it has three dimensional views. An MRI (magnetic resonance imaging) is also occasionally used to evaluate the soft tissues, especially the Lisfranc ligament. An MRI is not usually needed if there is dislocation or fracture because this usually signifies that the Lisfranc ligament has already ruptured. If the dorsalis pedis artery is not felt by hand, then a Doppler ultrasound may be needed to assess the artery.     TREATMENT   The treatment for Lisfranc injuries varies depending on if you had a sprain, dislocation or fracture or all of the above. Treatment also depends on when you were diagnosed with the injury. Generally speaking, if you have sustained a Lisfranc sprain you will need to be non-weight bearing (no weight on  the foot) in a cast or removable cast boot for 4-6 weeks. This will usually be followed by physical therapy and gradual return to activity. Lisfranc dislocation and fracture injuries are usually treated with surgery. It is important to try and line up the bones and joints as much as possible. This is done with wires, screws or plates. After surgery, you are usually non-weight bearing for six weeks and then you begin walking with a cast walking boot for another four weeks. If the bones and joints are not lined up properly there is increased risk of degenerative changes which can lead to arthritis and instability. If pain in the midfoot continues for a long time, sometimes a second surgery is needed to fuse (arthrodese) the midfoot bones together.         BODY WEIGHT AND YOUR FEET  The following information is included in the after visit summary for all patients. Body weight can be a sensitive issue to discuss in clinic, but we think the following information is very important. Although we focus on the feet and ankles, we do support the overall health of our patients.     Many things can cause foot and ankle problems. Foot structure, activity level, foot mechanics and injuries are common causes of pain. One very important issue that often goes unmentioned, is body weight. Extra weight can cause increased stress on muscles, ligaments, bones and tendons. Sometimes just a few extra pounds is all it takes to put one over her/his threshold. Without reducing that stress, it can be difficult to alleviate pain. As Foot & Ankle specialists, our job is addressing the lower extremity problem and possible causes. Regarding extra body weight, we encourage patients to discuss diet and weight management plans with their primary care doctors. It is this team approach that gives you the best opportunity for pain relief and getting you back on your feet.      [facility] has a Comprehensive Weight Management Program. This program includes  counseling, education, non-surgical and surgical approaches to weight loss. If you are interested in learning more either talk to you primary care provider or call [phone].

## 2019-08-07 NOTE — LETTER
REPORT OF WORK ABILITY    [facility]  [address] [address]  [phone]    Employee Name: Lucero Collier        : 1995         Today's date: 2019    To Whom it May Concern:    Patient was seen in clinic today for left foot pain. At this time, she will need to wear tall cast boot on left foot for next 6 weeks. Please allow seated work only. Please call with questions or concerns.           Ashleigh White DPM

## 2019-08-07 NOTE — LETTER
REPORT OF WORK ABILITY    [facility]  [address] [address]  [phone]    Employee Name: Lucero Collier        : 1995         Today's date: 2019    To Whom it May Concern:    Patient was seen in clinic today for right foot pain. At this time, she will need to wear tall cast boot on right foot for next 6 weeks. Please allow seated work only. Please call with questions or concerns.               Ashleigh White DPM

## 2019-08-07 NOTE — PROGRESS NOTES
PATIENT HISTORY:    Lucero Collier is a 24 year old female who presents to clinic for pain to right foot. States it has been going on for 3 months. Denies injury but very sore to walk on. Pain is 5/10 but can be 8/10. 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 Notes swelling and numbness at times. Has tried icing, heat without relief. Did have xrays the other day but they were negative. Wondering what is causing pain and what can be done for it.     Review of Systems:  Patient denies fever, chills, rash, wound, stiffness,  numbness, weakness, heart burn, blood in stool, chest pain with activity, calf pain when walking, shortness of breath with activity, chronic cough, easy bleeding/bruising, excessive thirst, fatigue, depression, anxiety.  Patient admits to limping, swelling.     PAST MEDICAL HISTORY:   Past Medical History:   Diagnosis Date     ADHD     on Daytrana and clonidine     Adverse reaction to pertussis vaccine      Allergies     daily Claritin     Anxiety      Chronic headaches      Complex partial seizures (H)      Constipation      Depression      Depressive disorder      Developmental delay      GERD (gastroesophageal reflux disease)     UGI 3/07     History of tonsillectomy      Hypoglycemia     Endocrine eval neg 10/00     Kidney stone     6/02 detected on CT scan; resolved 12/04     Myopia      Obesity      Sinusitis, chronic      Sleep disorder     Since 2000; sleep clinic evaluation 4/00 Dr. Ramesh      Smoking      Spells      Syncope      Urinary tract infection 11/99    nl renal US & [facility]CUG 11/99        PAST SURGICAL HISTORY:   Past Surgical History:   Procedure Laterality Date     ENDOSCOPY UPPER WITH PANCREATIC STIMULATION  4/06    Esophagitis 4/06     NISSEN FUNDOPLICATION  6/07    Dr. Meneses     PE TUBES  3/98     TONSILLECTOMY & ADENOIDECTOMY  4/02        MEDICATIONS:   Current Outpatient Medications:      fluocinonide (LIDEX) 0.05 % external cream, Apply  sparingly to affected area twice daily as needed.  Do not apply to face., Disp: 60 g, Rfl: 0     IBUPROFEN PO, Take 600 mg by mouth every 6 hours as needed for moderate pain, Disp: , Rfl:      levonorgestrel (MIRENA) 20 MCG/24HR IUD, 1 each by Intrauterine route once, Disp: , Rfl:      mirtazapine (REMERON) 7.5 MG tablet, Take 1 tablet (7.5 mg) by mouth At Bedtime, Disp: 90 tablet, Rfl: 1     pimecrolimus (ELIDEL) 1 % cream, Apply topically 2 times daily Ok to use on face, Disp: 60 g, Rfl: 3     prochlorperazine (COMPAZINE) 10 MG tablet, TAKE 1 TABLET(10 MG) BY MOUTH EVERY 6 HOURS AS NEEDED FOR NAUSEA OR VOMITING, Disp: 10 tablet, Rfl: 3     QUEtiapine (SEROQUEL) 50 MG tablet, 50mg. At bedtime x 7 days , then if needed 100mg. At bedtime daily ., Disp: 60 tablet, Rfl: 3     ranitidine (ZANTAC) 150 MG tablet, Take 1 tablet (150 mg) by mouth At Bedtime, Disp: 90 tablet, Rfl: 1     sertraline (ZOLOFT) 100 MG tablet, Take 1 tablet (100 mg) by mouth daily, Disp: 90 tablet, Rfl: 1     sertraline (ZOLOFT) 50 MG tablet, Take 1 tablet (50 mg) by mouth daily, Disp: 90 tablet, Rfl: 1     SUMAtriptan (IMITREX) 25 MG tablet, Take 1-2 tablets (25-50 mg) by mouth at onset of headache for migraine May repeat in 2 hours. Max 8 tablets/24 hours., Disp: 9 tablet, Rfl: 1     ALLERGIES:  No Known Allergies     SOCIAL HISTORY:   Social History     Socioeconomic History     Marital status: Single     Spouse name: Not on file     Number of children: Not on file     Years of education: Not on file     Highest education level: Not on file   Occupational History     Not on file   Social Needs     Financial resource strain: Not on file     Food insecurity:     Worry: Not on file     Inability: Not on file     Transportation needs:     Medical: Not on file     Non-medical: Not on file   Tobacco Use     Smoking status: Current Every Day Smoker     Packs/day: 1.00     Types: Cigarettes     Smokeless tobacco: Current User     Types: Chew      Tobacco comment: 5 qd   Substance and Sexual Activity     Alcohol use: Yes     Alcohol/week: 0.0 oz     Comment: every other day 1 beer      Drug use: No     Sexual activity: Yes     Partners: Male     Birth control/protection: IUD   Lifestyle     Physical activity:     Days per week: Not on file     Minutes per session: Not on file     Stress: Not on file   Relationships     Social connections:     Talks on phone: Not on file     Gets together: Not on file     Attends [employer] service: Not on file     Active member of club or organization: Not on file     Attends meetings of clubs or organizations: Not on file     Relationship status: Not on file     Intimate partner violence:     Fear of current or ex partner: Not on file     Emotionally abused: Not on file     Physically abused: Not on file     Forced sexual activity: Not on file   Other Topics Concern     Parent/sibling w/ CABG, MI or angioplasty before 65F 55M? Not Asked   Social History Narrative     Not on file        FAMILY HISTORY:   Family History   Problem Relation Age of Onset     Diabetes Maternal Grandmother      Hypertension Maternal Grandmother      Diabetes Paternal Grandmother      Cardiovascular Paternal Grandmother      Breast Cancer Paternal Grandmother 61        right side breast cancer     Genetic Disorder Other         nephew.-genetic defects      Obesity Mother      Supraventricular tachycardia Mother      Family History Negative Father         EXAM:Vitals: /78   Wt 105.2 kg (232 lb)   LMP  (LMP Unknown)   BMI 38.61 kg/m    BMI= Body mass index is 38.61 kg/m .    General appearance: Patient is alert and fully cooperative with history & exam.  No sign of distress is noted during the visit.     Psychiatric: Affect is pleasant & appropriate.  Patient appears motivated to improve health.     Respiratory: Breathing is regular & unlabored while sitting.     HEENT: Hearing is intact to spoken word.  Speech is clear.  No gross evidence of  visual impairment that would impact ambulation.     Dermatologic: Skin is intact to both lower extremities without significant lesions, rash or abrasion.  No paronychia or evidence of soft tissue infection is noted.     Vascular: DP & PT pulses are intact & regular bilaterally.  No significant edema or varicosities noted.  CFT and skin temperature is normal to both lower extremities.     Neurologic: Lower extremity sensation is intact to light touch.  No evidence of weakness or contracture in the lower extremities.  No evidence of neuropathy.     Musculoskeletal: Patient is ambulatory without assistive device or brace. Decrease arch height. Pain on palpation of the right midfoot area over 2nd metatarsal cuneiform joint.     Radiographs: right foot xrays - No fracture or osseous lesion is seen. The joint spaces are  well preserved. No soft tissue pathology is seen.     ASSESSMENT:     Right foot pain  Lisfranc's sprain, right, initial encounter       PLAN:  Review[facility] in [facility]. Reviewed xrays. Concern for stress fracture or lisfranc injury at this time given location of pain. Recommend tall cast boot for next month. Will order MRI to assess for fracture or ligament injury. Will call with results.        Ashleigh White DPM, Podiatry/Foot and Ankle Surgery    Weight management plan: Patient was referred to their PCP to discuss a diet and exercise plan.

## 2019-08-07 NOTE — LETTER
8/7/2019         RE: Lucero Collier  [address]  [facility] [address]        Dear Colleague,    Thank you for referring your patient, Lucero Collier, to the [facility]. Please see a copy of my visit note below.    PATIENT HISTORY:    Lucero Collier is a 24 year old female who presents to clinic for pain to left foot. States it has been going on for 3 months. Denies injury but very sore to walk on. Lina is 5/10 but can be 8/10. 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 Notes swelling and numbness at times. Has tried icing, heat without relief. Did have xrays the other day but they were negative. Wondering what is causing pain and what can be done for it.     Review of Systems:  Patient denies fever, chills, rash, wound, stiffness,  numbness, weakness, heart burn, blood in stool, chest pain with activity, calf pain when walking, shortness of breath with activity, chronic cough, easy bleeding/bruising, excessive thirst, fatigue, depression, anxiety.  Patient admits to limping, swelling.     PAST MEDICAL HISTORY:   Past Medical History:   Diagnosis Date     ADHD     on Daytrana and clonidine     Adverse reaction to pertussis vaccine      Allergies     daily Claritin     Anxiety      Chronic headaches      Complex partial seizures (H)      Constipation      Depression      Depressive disorder      Developmental delay      GERD (gastroesophageal reflux disease)     UGI 3/07     History of tonsillectomy      Hypoglycemia     Endocrine eval neg 10/00     Kidney stone     6/02 detected on CT scan; resolved 12/04     Myopia      Obesity      Sinusitis, chronic      Sleep disorder     Since 2000; sleep clinic evaluation 4/00 Dr. Ramesh      Smoking      Spells      Syncope      Urinary tract infection 11/99    nl renal US & VCUG 11/99        PAST SURGICAL HISTORY:   Past Surgical History:   Procedure Laterality Date     ENDOSCOPY UPPER WITH PANCREATIC STIMULATION   4/06    Esophagitis 4/06     NISSEN FUNDOPLICATION  6/07    Dr. Meneses     PE TUBES  3/98     TONSILLECTOMY & ADENOIDECTOMY  4/02        MEDICATIONS:   Current Outpatient Medications:      fluocinonide (LIDEX) 0.05 % external cream, Apply sparingly to affected area twice daily as needed.  Do not apply to face., Disp: 60 g, Rfl: 0     IBUPROFEN PO, Take 600 mg by mouth every 6 hours as needed for moderate pain, Disp: , Rfl:      levonorgestrel (MIRENA) 20 MCG/24HR IUD, 1 each by Intrauterine route once, Disp: , Rfl:      mirtazapine (REMERON) 7.5 MG tablet, Take 1 tablet (7.5 mg) by mouth At Bedtime, Disp: 90 tablet, Rfl: 1     pimecrolimus (ELIDEL) 1 % cream, Apply topically 2 times daily Ok to use on face, Disp: 60 g, Rfl: 3     prochlorperazine (COMPAZINE) 10 MG tablet, TAKE 1 TABLET(10 MG) BY MOUTH EVERY 6 HOURS AS NEEDED FOR NAUSEA OR VOMITING, Disp: 10 tablet, Rfl: 3     QUEtiapine (SEROQUEL) 50 MG tablet, 50mg. At bedtime x 7 days , then if needed 100mg. At bedtime daily ., Disp: 60 tablet, Rfl: 3     ranitidine (ZANTAC) 150 MG tablet, Take 1 tablet (150 mg) by mouth At Bedtime, Disp: 90 tablet, Rfl: 1     sertraline (ZOLOFT) 100 MG tablet, Take 1 tablet (100 mg) by mouth daily, Disp: 90 tablet, Rfl: 1     sertraline (ZOLOFT) 50 MG tablet, Take 1 tablet (50 mg) by mouth daily, Disp: 90 tablet, Rfl: 1     SUMAtriptan (IMITREX) 25 MG tablet, Take 1-2 tablets (25-50 mg) by mouth at onset of headache for migraine May repeat in 2 hours. Max 8 tablets/24 hours., Disp: 9 tablet, Rfl: 1     ALLERGIES:  No Known Allergies     SOCIAL HISTORY:   Social History     Socioeconomic History     Marital status: Single     Spouse name: Not on file     Number of children: Not on file     Years of education: Not on file     Highest education level: Not on file   Occupational History     Not on file   Social Needs     Financial resource strain: Not on file     Food insecurity:     Worry: Not on file     Inability: Not on file      Transportation needs:     Medical: Not on file     Non-medical: Not on file   Tobacco Use     Smoking status: Current Every Day Smoker     Packs/day: 1.00     Types: Cigarettes     Smokeless tobacco: Current User     Types: Chew     Tobacco comment: 5 qd   Substance and Sexual Activity     Alcohol use: Yes     Alcohol/week: 0.0 oz     Comment: every other day 1 beer      Drug use: No     Sexual activity: Yes     Partners: Male     Birth control/protection: IUD   Lifestyle     Physical activity:     Days per week: Not on file     Minutes per session: Not on file     Stress: Not on file   Relationships     Social connections:     Talks on phone: Not on file     Gets together: Not on file     Attends Evangelical service: Not on file     Active member of club or organization: Not on file     Attends meetings of clubs or organizations: Not on file     Relationship status: Not on file     Intimate partner violence:     Fear of current or ex partner: Not on file     Emotionally abused: Not on file     Physically abused: Not on file     Forced sexual activity: Not on file   Other Topics Concern     Parent/sibling w/ CABG, MI or angioplasty before 65F 55M? Not Asked   Social History Narrative     Not on file        FAMILY HISTORY:   Family History   Problem Relation Age of Onset     Diabetes Maternal Grandmother      Hypertension Maternal Grandmother      Diabetes Paternal Grandmother      Cardiovascular Paternal Grandmother      Breast Cancer Paternal Grandmother 61        right side breast cancer     Genetic Disorder Other         nephew.-genetic defects      Obesity Mother      Supraventricular tachycardia Mother      Family History Negative Father         EXAM:Vitals: /78   Wt 105.2 kg (232 lb)   LMP  (LMP Unknown)   BMI 38.61 kg/m     BMI= Body mass index is 38.61 kg/m .    General appearance: Patient is alert and fully cooperative with history & exam.  No sign of distress is noted during the visit.      Psychiatric: Affect is pleasant & appropriate.  Patient appears motivated to improve health.     Respiratory: Breathing is regular & unlabored while sitting.     HEENT: Hearing is intact to spoken word.  Speech is clear.  No gross evidence of visual impairment that would impact ambulation.     Dermatologic: Skin is intact to both lower extremities without significant lesions, rash or abrasion.  No paronychia or evidence of soft tissue infection is noted.     Vascular: DP & PT pulses are intact & regular bilaterally.  No significant edema or varicosities noted.  CFT and skin temperature is normal to both lower extremities.     Neurologic: Lower extremity sensation is intact to light touch.  No evidence of weakness or contracture in the lower extremities.  No evidence of neuropathy.     Musculoskeletal: Patient is ambulatory without assistive device or brace. Decrease arch height. Pain on palpation of the left midfoot area over 2nd metatarsal cuneiform joint.     Radiographs: right foot xrays - No fracture or osseous lesion is seen. The joint spaces are  well preserved. No soft tissue pathology is seen.     ASSESSMENT:    Left foot pain  Sprain of tarsometatarsal ligament of left foot, initial encounter       PLAN:  Reviewed patient's chart in [facility]. Reviewed xrays. Concern for stress fracture or lisfranc injury at this time given location of pain. Recommend tall cast boot for next month. Will order MRI to assess for fracture or ligament injury. Will call with results.        Ashleigh White DPM, Podiatry/Foot and Ankle Surgery    Weight management plan: Patient was referred to their PCP to discuss a diet and exercise plan.      Again, thank you for allowing me to participate in the care of your patient.        Sincerely,        Ashleigh White DPM, Podiatry/Foot and Ankle Surgery

## 2019-08-08 ENCOUNTER — HOSPITAL ENCOUNTER (OUTPATIENT)
Dept: MRI IMAGING | Facility: CLINIC | Age: 24
Discharge: HOME | End: 2019-08-08
Attending: PODIATRIST | Admitting: PODIATRIST
Payer: COMMERCIAL

## 2019-08-08 DIAGNOSIS — M79.671 RIGHT FOOT PAIN: ICD-10-CM

## 2019-08-08 DIAGNOSIS — S93.621A LISFRANC'S SPRAIN, RIGHT, INITIAL ENCOUNTER: ICD-10-CM

## 2019-08-08 PROCEDURE — 73718 MRI LOWER EXTREMITY W/O DYE: CPT | Mod: RT

## 2019-08-08 PROCEDURE — 73721 MRI JNT OF LWR EXTRE W/O DYE: CPT | Mod: RT

## 2019-08-27 ENCOUNTER — TELEPHONE (OUTPATIENT)
Dept: PODIATRY | Facility: CLINIC | Age: 24
End: 2019-08-27

## 2019-08-27 NOTE — TELEPHONE ENCOUNTER
Letter written. I am assuming the note below meant 9/2/2019 instead of 8/2/2019.     Please let her know she can pick it up.       Ashleigh White DPM

## 2019-08-27 NOTE — TELEPHONE ENCOUNTER
Patient is calling requesting a letter to go back to work on 8/2/2019.  She was seen in podiatry on 8/7/2019 for foot pain.  Please call patient when ready to be picked up.  Thank you

## 2019-08-27 NOTE — LETTER
REPORT OF WORK ABILITY    [facility]  [address] [address] [address]  [phone]    Employee Name: Lucero Collier        : 1995         Today's date: 2019    To Whom it May Concern:    Patient has been seen in clinic. She can go back to work on 2019 without restriction.                 Ashleigh White DPM

## 2019-08-27 NOTE — LETTER
REPORT OF WORK ABILITY     [facility]  [address] [address] [address]  [phone]     Employee Name: Lucero Collier        : 1995           Today's date: 2019     To Whom it May Concern:     Patient has been seen in clinic. She can go back to work on 2019 without restriction.                        Ashleigh White DPM

## 2019-08-27 NOTE — TELEPHONE ENCOUNTER
Spoke to patient, she would like the return to work date set for tomorrow 8/28/19. Patient was given location information for [facility] and will  from here.          Mansoor Britt on 8/27/2019 at 11:18 AM

## 2019-10-18 ENCOUNTER — TRANSFERRED RECORDS (OUTPATIENT)
Dept: HEALTH INFORMATION MANAGEMENT | Facility: CLINIC | Age: 24
End: 2019-10-18

## 2019-10-22 ENCOUNTER — TRANSFERRED RECORDS (OUTPATIENT)
Dept: HEALTH INFORMATION MANAGEMENT | Facility: CLINIC | Age: 24
End: 2019-10-22

## 2019-11-16 ENCOUNTER — TRANSFERRED RECORDS (OUTPATIENT)
Dept: HEALTH INFORMATION MANAGEMENT | Facility: CLINIC | Age: 24
End: 2019-11-16

## 2019-11-22 ENCOUNTER — TELEPHONE (OUTPATIENT)
Dept: PEDIATRICS | Facility: CLINIC | Age: 24
End: 2019-11-22

## 2019-11-22 NOTE — TELEPHONE ENCOUNTER
Received notice from [facility] that patient is taking Ranitidine which has been recalled. Please contact patient and let her know if she needs antacid, we should be giving her an alternative.

## 2019-11-25 ENCOUNTER — MYC MEDICAL ADVICE (OUTPATIENT)
Dept: FAMILY MEDICINE | Facility: CLINIC | Age: 24
End: 2019-11-25

## 2019-11-25 DIAGNOSIS — K21.9 GASTROESOPHAGEAL REFLUX DISEASE WITHOUT ESOPHAGITIS: Primary | ICD-10-CM

## 2019-11-25 RX ORDER — FAMOTIDINE 40 MG/1
40 TABLET, FILM COATED ORAL DAILY
Qty: 30 TABLET | Refills: 11 | Status: SHIPPED | OUTPATIENT
Start: 2019-11-25 | End: 2020-02-24

## 2019-11-25 NOTE — TELEPHONE ENCOUNTER
Tried calling twice- it will not allow call to go through. I will send a MyChart.     Alisha ROBLEDO RN

## 2019-12-12 DIAGNOSIS — Z71.89 OTHER SPECIFIED COUNSELING: Primary | Chronic | ICD-10-CM

## 2019-12-18 ENCOUNTER — PATIENT OUTREACH (OUTPATIENT)
Dept: CARE COORDINATION | Facility: CLINIC | Age: 24
End: 2019-12-18

## 2019-12-20 DIAGNOSIS — G43.909 MIGRAINE WITHOUT STATUS MIGRAINOSUS, NOT INTRACTABLE, UNSPECIFIED MIGRAINE TYPE: ICD-10-CM

## 2019-12-23 RX ORDER — SUMATRIPTAN 25 MG/1
TABLET, FILM COATED ORAL
Qty: 9 TABLET | Refills: 0 | Status: SHIPPED | OUTPATIENT
Start: 2019-12-23 | End: 2024-08-12

## 2019-12-23 NOTE — TELEPHONE ENCOUNTER
"Imitrex 25 mg   Last Written Prescription Date:  6/10/19  Last Fill Quantity: 9,  # refills: 1   Last office visit: 7/24/2019 with prescribing provider:  DANIELA   Future Office Visit:    Requested Prescriptions   Pending Prescriptions Disp Refills     SUMAtriptan (IMITREX) 25 MG tablet [Pharmacy Med Name: SUMATRIPTAN SUCC 25 MG TABLET] 9 tablet 1     Sig: TAKE 1-2 TABLETS AT ONSET OF MIGRAINE. MAY REPEAT IN 2 HOURS. MAX 8 TABS/24 HOURS       Serotonin Agonists Failed - 12/20/2019  2:13 PM        Failed - Serotonin Agonist request needs review.     Please review patient's record. If patient has had 8 or more treatments in the past month, please forward to provider.          Passed - Blood pressure under 140/90 in past 12 months     BP Readings from Last 3 Encounters:   08/07/19 100/78   07/25/19 100/78   07/24/19 114/76                 Passed - Recent (12 mo) or future (30 days) visit within the authorizing provider's specialty     Patient has had an office visit with the authorizing provider or a provider within the authorizing providers department within the previous 12 mos or has a future within next 30 days. See \"Patient Info\" tab in inbasket, or \"Choose Columns\" in Meds & Orders section of the refill encounter.              Passed - Medication is active on med list        Passed - Patient is age 18 or older        Passed - No active pregnancy on record        Passed - No positive pregnancy test in past 12 months        Prescription approved per [facility] [place].    "

## 2019-12-30 ENCOUNTER — TELEPHONE (OUTPATIENT)
Dept: FAMILY MEDICINE | Facility: CLINIC | Age: 24
End: 2019-12-30

## 2019-12-30 DIAGNOSIS — K21.9 GASTROESOPHAGEAL REFLUX DISEASE WITHOUT ESOPHAGITIS: Primary | ICD-10-CM

## 2019-12-30 NOTE — TELEPHONE ENCOUNTER
Pharmacy: ranitidine and famotidine is unavailable. Could you write an Rx for omeprazole?     Disp Refills Start End MANDY   famotidine (PEPCID) 40 MG tablet 30 tablet 11 11/25/2019

## 2020-01-27 ENCOUNTER — COMMUNICATION - HEALTHEAST (OUTPATIENT)
Dept: SCHEDULING | Facility: CLINIC | Age: 25
End: 2020-01-27

## 2020-01-28 ENCOUNTER — OFFICE VISIT - HEALTHEAST (OUTPATIENT)
Dept: FAMILY MEDICINE | Facility: CLINIC | Age: 25
End: 2020-01-28

## 2020-01-28 DIAGNOSIS — Z00.00 WELLNESS EXAMINATION: ICD-10-CM

## 2020-01-28 DIAGNOSIS — F17.200 SMOKING: ICD-10-CM

## 2020-01-28 DIAGNOSIS — G40.909 NONINTRACTABLE EPILEPSY WITHOUT STATUS EPILEPTICUS, UNSPECIFIED EPILEPSY TYPE (H): ICD-10-CM

## 2020-01-28 LAB
ANION GAP SERPL CALCULATED.3IONS-SCNC: 14 MMOL/L (ref 5–18)
BUN SERPL-MCNC: 9 MG/DL (ref 8–22)
CALCIUM SERPL-MCNC: 9.3 MG/DL (ref 8.5–10.5)
CHLORIDE BLD-SCNC: 108 MMOL/L (ref 98–107)
CO2 SERPL-SCNC: 19 MMOL/L (ref 22–31)
CREAT SERPL-MCNC: 0.68 MG/DL (ref 0.6–1.1)
ERYTHROCYTE [DISTWIDTH] IN BLOOD BY AUTOMATED COUNT: 12.1 % (ref 11–14.5)
GFR SERPL CREATININE-BSD FRML MDRD: >60 ML/MIN/1.73M2
GLUCOSE BLD-MCNC: 91 MG/DL (ref 70–125)
HBA1C MFR BLD: 5.2 % (ref 3.5–6)
HCT VFR BLD AUTO: 41.9 % (ref 35–47)
HGB BLD-MCNC: 13.9 G/DL (ref 12–16)
MCH RBC QN AUTO: 28.5 PG (ref 27–34)
MCHC RBC AUTO-ENTMCNC: 33.2 G/DL (ref 32–36)
MCV RBC AUTO: 86 FL (ref 80–100)
PLATELET # BLD AUTO: 319 THOU/UL (ref 140–440)
PMV BLD AUTO: 7.9 FL (ref 7–10)
POTASSIUM BLD-SCNC: 4.1 MMOL/L (ref 3.5–5)
RBC # BLD AUTO: 4.88 MILL/UL (ref 3.8–5.4)
SODIUM SERPL-SCNC: 141 MMOL/L (ref 136–145)
WBC: 7.5 THOU/UL (ref 4–11)

## 2020-01-28 ASSESSMENT — MIFFLIN-ST. JEOR: SCORE: 1790.08

## 2020-01-29 ENCOUNTER — COMMUNICATION - HEALTHEAST (OUTPATIENT)
Dept: FAMILY MEDICINE | Facility: CLINIC | Age: 25
End: 2020-01-29

## 2020-02-19 ENCOUNTER — TELEPHONE (OUTPATIENT)
Dept: FAMILY MEDICINE | Facility: CLINIC | Age: 25
End: 2020-02-19

## 2020-02-19 NOTE — TELEPHONE ENCOUNTER
Patient has apt next week with PCP for numbness in left leg.  Sending to triage per red flag symptoms.    Sundeep Givens CMA ([facility])

## 2020-02-19 NOTE — TELEPHONE ENCOUNTER
Patient also needs to reschedule when she calls back, Iris now has meeting during this.  850 slot same day is on hold for patient, otherwise offer different day.  Sundeep Givens CMA ([facility])

## 2020-02-21 NOTE — PROGRESS NOTES
"Subjective     Lucero Collier is a 24 year old female who presents to clinic today for the following health issues:    HPI   1. Medication Recheck- Prilosec and Remeron/Zoloft/Seroquel  Taking as prescribed: yes  Side effects: none  Helping symptoms: Remeron/Zoloft/Seroquel yes, Prilosec no    Patient is here today for evaluation of mood  Needs mood medication recheck  She notes things are going really well  No side effects from medication  No SI/HI  No drugs or alcohol to cope    In regards to her reflux, Prilosec does not help, would like to try Zantac again    2. Numbness in Left Leg      Duration: a couple weeks    Description (location/character/radiation): patient notes numbness from left knee to left hip, notes it is \"entirely numb\", has tingling on the medial side of her leg    Intensity:  moderate    Accompanying signs and symptoms: denies any weakness, notes intermittent sharp pain    History (similar episodes/previous evaluation): None    Precipitating or alleviating factors: None    Therapies tried and outcome: Massaging didn't help   No injury or fall  No pants that are pushing on area  Has taken no medication for this    3. Medication Request  Patient notes used to take Adderall for ADHD  Has not taken since high school  Has had difficulty concentrating at work, has been bothering her for awhile but decided now she would like to be back on Adderall again      Patient Active Problem List   Diagnosis     ADHD (attention deficit hyperactivity disorder)     Constipation     Myopia     Spells     Chronic headaches     GERD (gastroesophageal reflux disease)     Adverse reaction to pertussis vaccine     Complex partial seizures (H)     Care Plan- Health Care Home     Learning problem     Overweight     Sleep disturbance     Adjustment disorder with mixed anxiety and depressed mood     Migraine without status migrainosus, not intractable, unspecified migraine type     Anxiety     Severe single current " episode of major depressive disorder, without psychotic features (H)     Depression     HTN (hypertension)     Obesity (BMI 35.0-39.9) with comorbidity (H)     Past Surgical History:   Procedure Laterality Date     ENDOSCOPY UPPER WITH PANCREATIC STIMULATION  4/06    Esophagitis 4/06     NISSEN FUNDOPLICATION  6/07    Dr. Meneses     PE TUBES  3/98     TONSILLECTOMY & ADENOIDECTOMY  4/02       Social History     Tobacco Use     Smoking status: Current Every Day Smoker     Packs/day: 1.00     Types: Cigarettes     Smokeless tobacco: Current User     Types: Chew     Tobacco comment: 5 qd   Substance Use Topics     Alcohol use: Yes     Alcohol/week: 0.0 standard drinks     Comment: every other day 1 beer      Family History   Problem Relation Age of Onset     Diabetes Maternal Grandmother      Hypertension Maternal Grandmother      Diabetes Paternal Grandmother      Cardiovascular Paternal Grandmother      Breast Cancer Paternal Grandmother 61        right side breast cancer     Genetic Disorder Other         nephew.-genetic defects      Obesity Mother      Supraventricular tachycardia Mother      Family History Negative Father          Current Outpatient Medications   Medication Sig Dispense Refill     amphetamine-dextroamphetamine (ADDERALL XR) 15 MG 24 hr capsule Take 1 capsule (15 mg) by mouth daily 30 capsule 0     [START ON 3/26/2020] amphetamine-dextroamphetamine (ADDERALL XR) 15 MG 24 hr capsule Take 1 capsule (15 mg) by mouth daily 30 capsule 0     [START ON 4/26/2020] amphetamine-dextroamphetamine (ADDERALL XR) 15 MG 24 hr capsule Take 1 capsule (15 mg) by mouth daily 30 capsule 0     fluocinonide (LIDEX) 0.05 % external cream Apply sparingly to affected area twice daily as needed.  Do not apply to face. 60 g 0     IBUPROFEN PO Take 600 mg by mouth every 6 hours as needed for moderate pain       levonorgestrel (MIRENA) 20 MCG/24HR IUD 1 each by Intrauterine route once       mirtazapine (REMERON) 7.5 MG tablet  "Take 1 tablet (7.5 mg) by mouth At Bedtime 90 tablet 1     pimecrolimus (ELIDEL) 1 % cream Apply topically 2 times daily Ok to use on face 60 g 3     prochlorperazine (COMPAZINE) 10 MG tablet TAKE 1 TABLET BY MOUTH EVERY 6 HOURS IF NEEDED FOR NAUSEA/VOMITING 10 tablet 0     QUEtiapine (SEROQUEL) 100 MG tablet Take 1 tablet (100 mg) by mouth At Bedtime 50mg. At bedtime x 7 days , then if needed 100mg. At bedtime daily . 90 tablet 1     ranitidine (ZANTAC) 300 MG tablet Take 1 tablet (300 mg) by mouth At Bedtime 90 tablet 1     sertraline (ZOLOFT) 100 MG tablet Take 1 tablet (100 mg) by mouth daily 90 tablet 1     sertraline (ZOLOFT) 50 MG tablet Take 1 tablet (50 mg) by mouth daily 90 tablet 1     SUMAtriptan (IMITREX) 25 MG tablet TAKE 1-2 TABLETS AT ONSET OF MIGRAINE. MAY REPEAT IN 2 HOURS. MAX 8 TABS/24 HOURS 9 tablet 0     No Known Allergies    Reviewed and updated as needed this visit by Provider  Tobacco  Allergies  Meds  Problems  Med Hx  Surg Hx  Fam Hx         Review of Systems   ROS COMP: Constitutional, HEENT, cardiovascular, pulmonary, gi and gu systems are negative, except as otherwise noted.      Objective    /68 (BP Location: Right arm, Patient Position: Chair, Cuff Size: Adult Large)   Pulse 101   Temp 98.1  F (36.7  C) (Oral)   Resp 16   Ht 1.651 m (5' 5\")   Wt 105.5 kg (232 lb 9.6 oz)   LMP  (LMP Unknown)   SpO2 99%   Breastfeeding No   BMI 38.71 kg/m    Body mass index is 38.71 kg/m .  Physical Exam   GENERAL: healthy, alert and no distress  NECK: no adenopathy, no asymmetry, masses, or scars and thyroid normal to palpation  RESP: lungs clear to auscultation - no rales, rhonchi or wheezes  CV: regular rate and rhythm, normal S1 S2, no S3 or S4, no murmur, click or rub, no peripheral edema and peripheral pulses strong  ABDOMEN: soft, nontender, no hepatosplenomegaly, no masses and bowel sounds normal  MS: no gross musculoskeletal defects noted, no edema  NEURO: Normal strength " and tone, mentation intact and speech normal    Diagnostic Test Results:  none         Assessment & Plan     1. Lateral femoral cutaneous entrapment syndrome, left  New problem, unclear cause, will likely resolve with time.  Referral to PT given to see if some stretching will improve area.  - [facility]; Future    2. Severe single current episode of major depressive disorder, without psychotic features (H)  Chronic issue, PHQ9/GAD7 updated today.  Meds refilled.  - mirtazapine (REMERON) 7.5 MG tablet; Take 1 tablet (7.5 mg) by mouth At Bedtime  Dispense: 90 tablet; Refill: 1  - sertraline (ZOLOFT) 100 MG tablet; Take 1 tablet (100 mg) by mouth daily  Dispense: 90 tablet; Refill: 1  - sertraline (ZOLOFT) 50 MG tablet; Take 1 tablet (50 mg) by mouth daily  Dispense: 90 tablet; Refill: 1    3. Gastroesophageal reflux disease without esophagitis  Chronic issue, will transition back to Zantac.  - ranitidine (ZANTAC) 300 MG tablet; Take 1 tablet (300 mg) by mouth At Bedtime  Dispense: 90 tablet; Refill: 1    4. Adjustment disorder with mixed anxiety and depressed mood  See #2.  - QUEtiapine (SEROQUEL) 100 MG tablet; Take 1 tablet (100 mg) by mouth At Bedtime 50mg. At bedtime x 7 days , then if needed 100mg. At bedtime daily .  Dispense: 90 tablet; Refill: 1    5. Attention deficit hyperactivity disorder (ADHD), combined type  Chronic issue, recently problematic.  Restart Adderall 15mgXR daily.   3 month supply given.    6. Attention deficit hyperactivity disorder (ADHD), predominantly inattentive type  - amphetamine-dextroamphetamine (ADDERALL XR) 15 MG 24 hr capsule; Take 1 capsule (15 mg) by mouth daily  Dispense: 30 capsule; Refill: 0  - amphetamine-dextroamphetamine (ADDERALL XR) 15 MG 24 hr capsule; Take 1 capsule (15 mg) by mouth daily  Dispense: 30 capsule; Refill: 0  - amphetamine-dextroamphetamine (ADDERALL XR) 15 MG 24 hr capsule; Take 1 capsule (15 mg) by mouth daily  Dispense: 30  capsule; Refill: 0       Risks, benefits and alternatives were discussed with patient. Agreeable to the plan of care.      Return in about 3 months (around 5/24/2020) for Mood Recheck, ADHD Check Up.    Iris Rand PA-C  [facility]

## 2020-02-24 ENCOUNTER — OFFICE VISIT (OUTPATIENT)
Dept: FAMILY MEDICINE | Facility: CLINIC | Age: 25
End: 2020-02-24
Payer: COMMERCIAL

## 2020-02-24 VITALS
SYSTOLIC BLOOD PRESSURE: 122 MMHG | OXYGEN SATURATION: 99 % | RESPIRATION RATE: 16 BRPM | WEIGHT: 232.6 LBS | HEIGHT: 65 IN | BODY MASS INDEX: 38.75 KG/M2 | TEMPERATURE: 98.1 F | DIASTOLIC BLOOD PRESSURE: 68 MMHG | HEART RATE: 101 BPM

## 2020-02-24 DIAGNOSIS — F32.2 SEVERE SINGLE CURRENT EPISODE OF MAJOR DEPRESSIVE DISORDER, WITHOUT PSYCHOTIC FEATURES (H): ICD-10-CM

## 2020-02-24 DIAGNOSIS — K21.9 GASTROESOPHAGEAL REFLUX DISEASE WITHOUT ESOPHAGITIS: ICD-10-CM

## 2020-02-24 DIAGNOSIS — G57.12 LATERAL FEMORAL CUTANEOUS ENTRAPMENT SYNDROME, LEFT: Primary | ICD-10-CM

## 2020-02-24 DIAGNOSIS — F90.0 ATTENTION DEFICIT HYPERACTIVITY DISORDER (ADHD), PREDOMINANTLY INATTENTIVE TYPE: ICD-10-CM

## 2020-02-24 DIAGNOSIS — F90.2 ATTENTION DEFICIT HYPERACTIVITY DISORDER (ADHD), COMBINED TYPE: ICD-10-CM

## 2020-02-24 DIAGNOSIS — F43.23 ADJUSTMENT DISORDER WITH MIXED ANXIETY AND DEPRESSED MOOD: ICD-10-CM

## 2020-02-24 PROCEDURE — 99214 OFFICE O/P EST MOD 30 MIN: CPT | Performed by: PHYSICIAN ASSISTANT

## 2020-02-24 RX ORDER — DEXTROAMPHETAMINE SACCHARATE, AMPHETAMINE ASPARTATE MONOHYDRATE, DEXTROAMPHETAMINE SULFATE AND AMPHETAMINE SULFATE 3.75; 3.75; 3.75; 3.75 MG/1; MG/1; MG/1; MG/1
15 CAPSULE, EXTENDED RELEASE ORAL DAILY
Qty: 30 CAPSULE | Refills: 0 | Status: SHIPPED | OUTPATIENT
Start: 2020-03-26 | End: 2020-05-19

## 2020-02-24 RX ORDER — DEXTROAMPHETAMINE SACCHARATE, AMPHETAMINE ASPARTATE MONOHYDRATE, DEXTROAMPHETAMINE SULFATE AND AMPHETAMINE SULFATE 3.75; 3.75; 3.75; 3.75 MG/1; MG/1; MG/1; MG/1
15 CAPSULE, EXTENDED RELEASE ORAL DAILY
Qty: 30 CAPSULE | Refills: 0 | Status: SHIPPED | OUTPATIENT
Start: 2020-04-26 | End: 2020-08-13

## 2020-02-24 RX ORDER — SERTRALINE HYDROCHLORIDE 100 MG/1
100 TABLET, FILM COATED ORAL DAILY
Qty: 90 TABLET | Refills: 1 | Status: SHIPPED | OUTPATIENT
Start: 2020-02-24 | End: 2020-08-13 | Stop reason: DRUGHIGH

## 2020-02-24 RX ORDER — DEXTROAMPHETAMINE SACCHARATE, AMPHETAMINE ASPARTATE MONOHYDRATE, DEXTROAMPHETAMINE SULFATE AND AMPHETAMINE SULFATE 3.75; 3.75; 3.75; 3.75 MG/1; MG/1; MG/1; MG/1
15 CAPSULE, EXTENDED RELEASE ORAL DAILY
Qty: 30 CAPSULE | Refills: 0 | Status: SHIPPED | OUTPATIENT
Start: 2020-02-24 | End: 2020-05-19

## 2020-02-24 RX ORDER — QUETIAPINE FUMARATE 100 MG/1
100 TABLET, FILM COATED ORAL AT BEDTIME
Qty: 90 TABLET | Refills: 1 | Status: SHIPPED | OUTPATIENT
Start: 2020-02-24 | End: 2020-08-07

## 2020-02-24 RX ORDER — MIRTAZAPINE 7.5 MG/1
7.5 TABLET, FILM COATED ORAL AT BEDTIME
Qty: 90 TABLET | Refills: 1 | Status: SHIPPED | OUTPATIENT
Start: 2020-02-24 | End: 2020-08-07

## 2020-02-24 ASSESSMENT — MIFFLIN-ST. JEOR: SCORE: 1815.95

## 2020-02-24 ASSESSMENT — ANXIETY QUESTIONNAIRES
GAD7 TOTAL SCORE: 2
1. FEELING NERVOUS, ANXIOUS, OR ON EDGE: SEVERAL DAYS
7. FEELING AFRAID AS IF SOMETHING AWFUL MIGHT HAPPEN: NOT AT ALL
3. WORRYING TOO MUCH ABOUT DIFFERENT THINGS: SEVERAL DAYS
6. BECOMING EASILY ANNOYED OR IRRITABLE: NOT AT ALL
2. NOT BEING ABLE TO STOP OR CONTROL WORRYING: NOT AT ALL
5. BEING SO RESTLESS THAT IT IS HARD TO SIT STILL: NOT AT ALL
IF YOU CHECKED OFF ANY PROBLEMS ON THIS QUESTIONNAIRE, HOW DIFFICULT HAVE THESE PROBLEMS MADE IT FOR YOU TO DO YOUR WORK, TAKE CARE OF THINGS AT HOME, OR GET ALONG WITH OTHER PEOPLE: NOT DIFFICULT AT ALL

## 2020-02-24 ASSESSMENT — PATIENT HEALTH QUESTIONNAIRE - PHQ9
SUM OF ALL RESPONSES TO PHQ QUESTIONS 1-9: 5
5. POOR APPETITE OR OVEREATING: NOT AT ALL

## 2020-02-25 ASSESSMENT — ANXIETY QUESTIONNAIRES: GAD7 TOTAL SCORE: 2

## 2020-04-07 ENCOUNTER — TELEPHONE (OUTPATIENT)
Dept: PHYSICAL THERAPY | Facility: CLINIC | Age: 25
End: 2020-04-07

## 2020-04-07 NOTE — TELEPHONE ENCOUNTER
Tried to call this patient to let her know about the need to change her visit to a virtual visit and kept getting a recording that the phone number has changed or been disconnected.

## 2020-04-13 ENCOUNTER — E-VISIT (OUTPATIENT)
Dept: FAMILY MEDICINE | Facility: CLINIC | Age: 25
End: 2020-04-13
Payer: COMMERCIAL

## 2020-04-13 DIAGNOSIS — J20.9 ACUTE BRONCHITIS, UNSPECIFIED ORGANISM: Primary | ICD-10-CM

## 2020-04-13 PROCEDURE — 99421 OL DIG E/M SVC 5-10 MIN: CPT | Performed by: PHYSICIAN ASSISTANT

## 2020-04-13 RX ORDER — AZITHROMYCIN 250 MG/1
TABLET, FILM COATED ORAL
Qty: 6 TABLET | Refills: 0 | Status: SHIPPED | OUTPATIENT
Start: 2020-04-13 | End: 2020-05-19

## 2020-04-13 NOTE — PATIENT INSTRUCTIONS
Thank you for choosing us for your care. I have placed an order for a prescription so that you can start treatment. View your full visit summary for details by clicking on the link below. Your pharmacist will able to address any questions you may have about the medication.     If you're not feeling better within 5-7 days, please schedule an appointment.  You can schedule an appointment right here in [place], or call [phone]  If the visit is for the same symptoms as your e-visit, we'll refund the cost of your e-visit if seen within seven days.

## 2020-05-19 ENCOUNTER — VIRTUAL VISIT (OUTPATIENT)
Dept: FAMILY MEDICINE | Facility: CLINIC | Age: 25
End: 2020-05-19
Payer: COMMERCIAL

## 2020-05-19 DIAGNOSIS — F90.2 ATTENTION DEFICIT HYPERACTIVITY DISORDER (ADHD), COMBINED TYPE: ICD-10-CM

## 2020-05-19 DIAGNOSIS — F32.2 SEVERE SINGLE CURRENT EPISODE OF MAJOR DEPRESSIVE DISORDER, WITHOUT PSYCHOTIC FEATURES (H): Primary | ICD-10-CM

## 2020-05-19 DIAGNOSIS — F41.9 ANXIETY: ICD-10-CM

## 2020-05-19 PROCEDURE — 99214 OFFICE O/P EST MOD 30 MIN: CPT | Mod: TEL | Performed by: PHYSICIAN ASSISTANT

## 2020-05-19 RX ORDER — DEXTROAMPHETAMINE SACCHARATE, AMPHETAMINE ASPARTATE MONOHYDRATE, DEXTROAMPHETAMINE SULFATE AND AMPHETAMINE SULFATE 3.75; 3.75; 3.75; 3.75 MG/1; MG/1; MG/1; MG/1
15 CAPSULE, EXTENDED RELEASE ORAL DAILY
Qty: 30 CAPSULE | Refills: 0 | Status: SHIPPED | OUTPATIENT
Start: 2020-05-19 | End: 2020-08-13

## 2020-05-19 RX ORDER — DEXTROAMPHETAMINE SACCHARATE, AMPHETAMINE ASPARTATE MONOHYDRATE, DEXTROAMPHETAMINE SULFATE AND AMPHETAMINE SULFATE 3.75; 3.75; 3.75; 3.75 MG/1; MG/1; MG/1; MG/1
15 CAPSULE, EXTENDED RELEASE ORAL DAILY
Qty: 30 CAPSULE | Refills: 0 | Status: SHIPPED | OUTPATIENT
Start: 2020-07-20 | End: 2020-08-13 | Stop reason: DRUGHIGH

## 2020-05-19 RX ORDER — DEXTROAMPHETAMINE SACCHARATE, AMPHETAMINE ASPARTATE MONOHYDRATE, DEXTROAMPHETAMINE SULFATE AND AMPHETAMINE SULFATE 3.75; 3.75; 3.75; 3.75 MG/1; MG/1; MG/1; MG/1
15 CAPSULE, EXTENDED RELEASE ORAL DAILY
Qty: 30 CAPSULE | Refills: 0 | Status: SHIPPED | OUTPATIENT
Start: 2020-06-19 | End: 2020-08-13

## 2020-05-19 ASSESSMENT — ANXIETY QUESTIONNAIRES
3. WORRYING TOO MUCH ABOUT DIFFERENT THINGS: SEVERAL DAYS
IF YOU CHECKED OFF ANY PROBLEMS ON THIS QUESTIONNAIRE, HOW DIFFICULT HAVE THESE PROBLEMS MADE IT FOR YOU TO DO YOUR WORK, TAKE CARE OF THINGS AT HOME, OR GET ALONG WITH OTHER PEOPLE: SOMEWHAT DIFFICULT
5. BEING SO RESTLESS THAT IT IS HARD TO SIT STILL: NOT AT ALL
6. BECOMING EASILY ANNOYED OR IRRITABLE: NOT AT ALL
1. FEELING NERVOUS, ANXIOUS, OR ON EDGE: NOT AT ALL
7. FEELING AFRAID AS IF SOMETHING AWFUL MIGHT HAPPEN: MORE THAN HALF THE DAYS
2. NOT BEING ABLE TO STOP OR CONTROL WORRYING: SEVERAL DAYS
GAD7 TOTAL SCORE: 7

## 2020-05-19 ASSESSMENT — PATIENT HEALTH QUESTIONNAIRE - PHQ9
5. POOR APPETITE OR OVEREATING: NEARLY EVERY DAY
SUM OF ALL RESPONSES TO PHQ QUESTIONS 1-9: 8

## 2020-05-19 NOTE — PROGRESS NOTES
"Lucero Collier is a 24 year old female who is being evaluated via a billable telephone visit.      The patient has been notified of following:     \"This telephone visit will be conducted via a call between you and your physician/provider. We have found that certain health care needs can be provided without the need for a physical exam.  This service lets us provide the care you need with a short phone conversation.  If a prescription is necessary we can send it directly to your pharmacy.  If lab work is needed we can place an order for that and you can then stop by our lab to have the test done at a later time.    Telephone visits are billed at different rates depending on your insurance coverage. During this emergency period, for some insurers they may be billed the same as an in-person visit.  Please reach out to your insurance provider with any questions.    If during the course of the call the physician/provider feels a telephone visit is not appropriate, you will not be charged for this service.\"    Patient has given verbal consent for Telephone visit?  Yes    What phone number would you like to be contacted at? [phone]    How would you like to obtain your AVS? Nina Foster     Lucero Collier is a 24 year old female who presents via phone visit today for the following health issues:    HPI  1. Depression and Anxiety Follow-Up    How are you doing with your depression since your last visit (2/24/20)? No change    How are you doing with your anxiety since your last visit (2/24/20)?  No change    Are you having other symptoms that might be associated with depression or anxiety? No    Have you had a significant life event? No     Do you have any concerns with your use of alcohol or other drugs? Yes:  Patient notes she was addicted to pain pills, notes she stopped about one month ago     Notes she was taking oxy that was not prescribed to her; was taking oxy multiple times every day for about 4-5 " months but not in the last month    Taking 60mg of Oxycodone, buying them from friends    Taking for mood---admits to feeling depressed     Decided not to do it anymore, stopped taking as she almost overdosed    Does not want counseling or support group    Not drinking at all, no drug use per patient    Depression slightly more prominent since stopping using the oxycodone    Feels like the her meds are at the right level    No SI/HI    No hallucinations    Social History     Tobacco Use     Smoking status: Current Every Day Smoker     Packs/day: 1.00     Types: Cigarettes     Smokeless tobacco: Current User     Types: Chew     Tobacco comment: 5 qd   Substance Use Topics     Alcohol use: Yes     Alcohol/week: 0.0 standard drinks     Comment: every other day 1 beer      Drug use: Not Currently     Types: Oxycodone     PHQ 7/3/2019 2/24/2020 5/19/2020   PHQ-9 Total Score 16 5 8   Q9: Thoughts of better off dead/self-harm past 2 weeks Several days Not at all Not at all   F/U: Thoughts of suicide or self-harm - - -   F/U: Self harm-plan - - -   F/U: Self-harm action - - -   F/U: Safety concerns - - -     CLARY-7 SCORE 7/3/2019 2/24/2020 5/19/2020   Total Score - - -   Total Score 12 2 7     Last PHQ-9 5/19/2020   1.  Little interest or pleasure in doing things 1   2.  Feeling down, depressed, or hopeless 1   3.  Trouble falling or staying asleep, or sleeping too much 0   4.  Feeling tired or having little energy 0   5.  Poor appetite or overeating 3   6.  Feeling bad about yourself 1   7.  Trouble concentrating 1   8.  Moving slowly or restless 1   Q9: Thoughts of better off dead/self-harm past 2 weeks 0   PHQ-9 Total Score 8   Difficulty at work, home, or with people Somewhat difficult   In the past two weeks have you had thoughts of suicide or self harm? -   Do you have concerns about your personal safety or the safety of others? -   In the past 2 weeks have you thought about a plan or had intention to harm yourself? -    In the past 2 weeks have you acted on these thoughts in any way? -     CLARY-7  5/19/2020   1. Feeling nervous, anxious, or on edge 0   2. Not being able to stop or control worrying 1   3. Worrying too much about different things 1   4. Trouble relaxing 3   5. Being so restless that it is hard to sit still 0   6. Becoming easily annoyed or irritable 0   7. Feeling afraid, as if something awful might happen 2   CLARY-7 Total Score 7   If you checked any problems, how difficult have they made it for you to do your work, take care of things at home, or get along with other people? Somewhat difficult         Suicide Assessment Five-step Evaluation and Treatment (SAFE-T)      How many servings of fruits and vegetables do you eat daily?  0-1    On average, how many sweetened beverages do you drink each day (Examples: soda, juice, sweet tea, etc.  Do NOT count diet or artificially sweetened beverages)?   5+    How many days per week do you exercise enough to make your heart beat faster? Never    How many minutes a day do you exercise enough to make your heart beat faster? NA, does not exercise per patient    How many days per week do you miss taking your medication? 0    2. Medication Followup of Adderall XR 15 mg    Taking Medication as prescribed: yes    Side Effects:  None    Medication Helping Symptoms:  Yes    She has been taking Adderall   No side effects  No chest pain, palpitations, trouble sleeping, appetite is okay  Denies selling her Adderall scripts  Notes she has good focus            Patient Active Problem List   Diagnosis     ADHD (attention deficit hyperactivity disorder)     Constipation     Myopia     Spells     Chronic headaches     GERD (gastroesophageal reflux disease)     Adverse reaction to pertussis vaccine     Complex partial seizures (H)     Care Plan- Health Care Home     Learning problem     Overweight     Sleep disturbance     Adjustment disorder with mixed anxiety and depressed mood     Migraine  without status migrainosus, not intractable, unspecified migraine type     Anxiety     Severe single current episode of major depressive disorder, without psychotic features (H)     Depression     HTN (hypertension)     Obesity (BMI 35.0-39.9) with comorbidity (H)     Past Surgical History:   Procedure Laterality Date     ENDOSCOPY UPPER WITH PANCREATIC STIMULATION  4/06    Esophagitis 4/06     NISSEN FUNDOPLICATION  6/07    Dr. Meneses     PE TUBES  3/98     TONSILLECTOMY & ADENOIDECTOMY  4/02       Social History     Tobacco Use     Smoking status: Current Every Day Smoker     Packs/day: 1.00     Types: Cigarettes     Smokeless tobacco: Current User     Types: Chew     Tobacco comment: 5 qd   Substance Use Topics     Alcohol use: Yes     Alcohol/week: 0.0 standard drinks     Comment: every other day 1 beer      Family History   Problem Relation Age of Onset     Diabetes Maternal Grandmother      Hypertension Maternal Grandmother      Diabetes Paternal Grandmother      Cardiovascular Paternal Grandmother      Breast Cancer Paternal Grandmother 61        right side breast cancer     Genetic Disorder Other         nephew.-genetic defects      Obesity Mother      Supraventricular tachycardia Mother      Family History Negative Father          Current Outpatient Medications   Medication Sig Dispense Refill     amphetamine-dextroamphetamine (ADDERALL XR) 15 MG 24 hr capsule Take 1 capsule (15 mg) by mouth daily 30 capsule 0     [START ON 6/19/2020] amphetamine-dextroamphetamine (ADDERALL XR) 15 MG 24 hr capsule Take 1 capsule (15 mg) by mouth daily 30 capsule 0     [START ON 7/20/2020] amphetamine-dextroamphetamine (ADDERALL XR) 15 MG 24 hr capsule Take 1 capsule (15 mg) by mouth daily 30 capsule 0     amphetamine-dextroamphetamine (ADDERALL XR) 15 MG 24 hr capsule Take 1 capsule (15 mg) by mouth daily 30 capsule 0     fluocinonide (LIDEX) 0.05 % external cream Apply sparingly to affected area twice daily as needed.  Do  not apply to face. 60 g 0     IBUPROFEN PO Take 600 mg by mouth every 6 hours as needed for moderate pain       levonorgestrel (MIRENA) 20 MCG/24HR IUD 1 each by Intrauterine route once       mirtazapine (REMERON) 7.5 MG tablet Take 1 tablet (7.5 mg) by mouth At Bedtime 90 tablet 1     pimecrolimus (ELIDEL) 1 % cream Apply topically 2 times daily Ok to use on face 60 g 3     prochlorperazine (COMPAZINE) 10 MG tablet TAKE 1 TABLET BY MOUTH EVERY 6 HOURS IF NEEDED FOR NAUSEA/VOMITING 10 tablet 0     QUEtiapine (SEROQUEL) 100 MG tablet Take 1 tablet (100 mg) by mouth At Bedtime 50mg. At bedtime x 7 days , then if needed 100mg. At bedtime daily . 90 tablet 1     ranitidine (ZANTAC) 300 MG tablet Take 1 tablet (300 mg) by mouth At Bedtime 90 tablet 1     sertraline (ZOLOFT) 100 MG tablet Take 1 tablet (100 mg) by mouth daily 90 tablet 1     sertraline (ZOLOFT) 50 MG tablet Take 1 tablet (50 mg) by mouth daily 90 tablet 1     SUMAtriptan (IMITREX) 25 MG tablet TAKE 1-2 TABLETS AT ONSET OF MIGRAINE. MAY REPEAT IN 2 HOURS. MAX 8 TABS/24 HOURS 9 tablet 0     No Known Allergies    Reviewed and updated as needed this visit by Provider  Tobacco  Allergies  Meds  Problems  Med Hx  Surg Hx  Fam Hx         Review of Systems   Constitutional, HEENT, cardiovascular, pulmonary, gi and gu systems are negative, except as otherwise noted.       Objective   Reported vitals:  There were no vitals taken for this visit.   healthy, alert and no distress  PSYCH: Alert and oriented times 3; coherent speech, normal   rate and volume, able to articulate logical thoughts, able   to abstract reason, no tangential thoughts, no hallucinations   or delusions  Her affect is normal  RESP: No cough, no audible wheezing, able to talk in full sentences  Remainder of exam unable to be completed due to telephone visits    Diagnostic Test Results:  none         Assessment/Plan:  1. Severe single current episode of major depressive disorder, without  psychotic features (H)    2. Anxiety    Chronic issue, stable at this time per patient, does not want to adjust medications. Has enough refills. Will need close monitoring due to addictive type of behavior. No SI/HI.  Recheck in 3 months.    3. Attention deficit hyperactivity disorder (ADHD), combined type  Chronic issue, refills given.   No side effects medication.  Consider drug testing in future given recent oxycodone addiction.  - amphetamine-dextroamphetamine (ADDERALL XR) 15 MG 24 hr capsule; Take 1 capsule (15 mg) by mouth daily  Dispense: 30 capsule; Refill: 0  - amphetamine-dextroamphetamine (ADDERALL XR) 15 MG 24 hr capsule; Take 1 capsule (15 mg) by mouth daily  Dispense: 30 capsule; Refill: 0  - amphetamine-dextroamphetamine (ADDERALL XR) 15 MG 24 hr capsule; Take 1 capsule (15 mg) by mouth daily  Dispense: 30 capsule; Refill: 0    Return in about 3 months (around 8/19/2020) for If symptoms worsen or fail to improve, Mood Recheck.      Phone call duration:  10 minutes    Iris Rand PA-C

## 2020-05-20 ASSESSMENT — ANXIETY QUESTIONNAIRES: GAD7 TOTAL SCORE: 7

## 2020-08-07 ENCOUNTER — VIRTUAL VISIT (OUTPATIENT)
Dept: FAMILY MEDICINE | Facility: CLINIC | Age: 25
End: 2020-08-07
Payer: COMMERCIAL

## 2020-08-07 DIAGNOSIS — F32.2 SEVERE SINGLE CURRENT EPISODE OF MAJOR DEPRESSIVE DISORDER, WITHOUT PSYCHOTIC FEATURES (H): ICD-10-CM

## 2020-08-07 DIAGNOSIS — F90.2 ATTENTION DEFICIT HYPERACTIVITY DISORDER (ADHD), COMBINED TYPE: ICD-10-CM

## 2020-08-07 DIAGNOSIS — F43.23 ADJUSTMENT DISORDER WITH MIXED ANXIETY AND DEPRESSED MOOD: ICD-10-CM

## 2020-08-07 PROCEDURE — 99213 OFFICE O/P EST LOW 20 MIN: CPT | Mod: 95 | Performed by: NURSE PRACTITIONER

## 2020-08-07 RX ORDER — QUETIAPINE FUMARATE 100 MG/1
100 TABLET, FILM COATED ORAL AT BEDTIME
Qty: 90 TABLET | Refills: 1 | Status: SHIPPED | OUTPATIENT
Start: 2020-08-07 | End: 2021-04-05

## 2020-08-07 RX ORDER — MIRTAZAPINE 7.5 MG/1
7.5 TABLET, FILM COATED ORAL AT BEDTIME
Qty: 90 TABLET | Refills: 1 | Status: SHIPPED | OUTPATIENT
Start: 2020-08-07 | End: 2021-04-05

## 2020-08-07 RX ORDER — DEXTROAMPHETAMINE SACCHARATE, AMPHETAMINE ASPARTATE MONOHYDRATE, DEXTROAMPHETAMINE SULFATE AND AMPHETAMINE SULFATE 3.75; 3.75; 3.75; 3.75 MG/1; MG/1; MG/1; MG/1
15 CAPSULE, EXTENDED RELEASE ORAL DAILY
Qty: 30 CAPSULE | Refills: 0 | Status: CANCELLED | OUTPATIENT
Start: 2020-08-07

## 2020-08-07 RX ORDER — SERTRALINE HYDROCHLORIDE 100 MG/1
200 TABLET, FILM COATED ORAL DAILY
Qty: 60 TABLET | Refills: 1 | Status: SHIPPED | OUTPATIENT
Start: 2020-08-07 | End: 2021-04-05

## 2020-08-07 RX ORDER — DEXTROAMPHETAMINE SACCHARATE, AMPHETAMINE ASPARTATE MONOHYDRATE, DEXTROAMPHETAMINE SULFATE AND AMPHETAMINE SULFATE 5; 5; 5; 5 MG/1; MG/1; MG/1; MG/1
20 CAPSULE, EXTENDED RELEASE ORAL DAILY
Qty: 30 CAPSULE | Refills: 0 | Status: SHIPPED | OUTPATIENT
Start: 2020-08-07 | End: 2020-10-21

## 2020-08-07 ASSESSMENT — ANXIETY QUESTIONNAIRES
2. NOT BEING ABLE TO STOP OR CONTROL WORRYING: MORE THAN HALF THE DAYS
1. FEELING NERVOUS, ANXIOUS, OR ON EDGE: MORE THAN HALF THE DAYS
GAD7 TOTAL SCORE: 14
5. BEING SO RESTLESS THAT IT IS HARD TO SIT STILL: MORE THAN HALF THE DAYS
3. WORRYING TOO MUCH ABOUT DIFFERENT THINGS: MORE THAN HALF THE DAYS
7. FEELING AFRAID AS IF SOMETHING AWFUL MIGHT HAPPEN: MORE THAN HALF THE DAYS
6. BECOMING EASILY ANNOYED OR IRRITABLE: MORE THAN HALF THE DAYS
IF YOU CHECKED OFF ANY PROBLEMS ON THIS QUESTIONNAIRE, HOW DIFFICULT HAVE THESE PROBLEMS MADE IT FOR YOU TO DO YOUR WORK, TAKE CARE OF THINGS AT HOME, OR GET ALONG WITH OTHER PEOPLE: VERY DIFFICULT

## 2020-08-07 ASSESSMENT — PATIENT HEALTH QUESTIONNAIRE - PHQ9
5. POOR APPETITE OR OVEREATING: MORE THAN HALF THE DAYS
SUM OF ALL RESPONSES TO PHQ QUESTIONS 1-9: 17

## 2020-08-07 NOTE — PROGRESS NOTES
"Lucero Collier is a 25 year old female who is being evaluated via a billable video visit.      The patient has been notified of following:     \"This video visit will be conducted via a call between you and your physician/provider. We have found that certain health care needs can be provided without the need for an in-person physical exam.  This service lets us provide the care you need with a video conversation.  If a prescription is necessary we can send it directly to your pharmacy.  If lab work is needed we can place an order for that and you can then stop by our lab to have the test done at a later time.    Video visits are billed at different rates depending on your insurance coverage.  Please reach out to your insurance provider with any questions.    If during the course of the call the physician/provider feels a video visit is not appropriate, you will not be charged for this service.\"    Patient has given verbal consent for Video visit? Yes  How would you like to obtain your AVS? MyChart  If you are dropped from the video visit, the video invite should be resent to: Text to cell phone: [phone]  Will anyone else be joining your video visit? No      Subjective     Lucero Collier is a 25 year old female who presents today via video visit for the following health issues:    HPI    Medication Followup of Adderall    Taking Medication as prescribed: yes    Side Effects:  None    Medication Helping Symptoms:  Would like dose increased     PHQ 2/24/2020 5/19/2020 8/7/2020   PHQ-9 Total Score 5 8 17   Q9: Thoughts of better off dead/self-harm past 2 weeks Not at all Not at all Several days   F/U: Thoughts of suicide or self-harm - - -   F/U: Self harm-plan - - -   F/U: Self-harm action - - -   F/U: Safety concerns - - -            CLARY-7 SCORE 2/24/2020 5/19/2020 8/7/2020   Total Score - - -   Total Score 2 7 14            Video Start Time: 2:33pm    Feels mood is stable but could use improvement.  Anxiety " well controlled.  More low mood.    Also feeling like adderall could be increased.  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  Having more issues with concentration and focus.  Tolerates the medication without problem.    Patient Active Problem List   Diagnosis     ADHD (attention deficit hyperactivity disorder)     Constipation     Myopia     Spells     Chronic headaches     GERD (gastroesophageal reflux disease)     Adverse reaction to pertussis vaccine     Complex partial seizures (H)     Care Plan- Health Care Home     Learning problem     Overweight     Sleep disturbance     Adjustment disorder with mixed anxiety and depressed mood     Migraine without status migrainosus, not intractable, unspecified migraine type     Anxiety     Severe single current episode of major depressive disorder, without psychotic features (H)     Depression     HTN (hypertension)     Obesity (BMI 35.0-39.9) with comorbidity (H)     Past Surgical History:   Procedure Laterality Date     ENDOSCOPY UPPER WITH PANCREATIC STIMULATION  4/06    Esophagitis 4/06     NISSEN FUNDOPLICATION  6/07    Dr. Meneses     PE TUBES  3/98     TONSILLECTOMY & ADENOIDECTOMY  4/02       Social History     Tobacco Use     Smoking status: Current Every Day Smoker     Packs/day: 1.00     Types: Cigarettes     Smokeless tobacco: Current User     Types: Chew     Tobacco comment: 5 qd   Substance Use Topics     Alcohol use: Yes     Alcohol/week: 0.0 standard drinks     Comment: every other day 1 beer      Family History   Problem Relation Age of Onset     Diabetes Maternal Grandmother      Hypertension Maternal Grandmother      Diabetes Paternal Grandmother      Cardiovascular Paternal Grandmother      Breast Cancer Paternal Grandmother 61        right side breast cancer     Genetic Disorder Other         nephew.-genetic defects      Obesity Mother      Supraventricular tachycardia Mother      Family History  Negative Father            Reviewed and updated as needed this visit by Provider         Review of Systems   Constitutional, HEENT, cardiovascular, pulmonary, gi and gu systems are negative, except as otherwise noted.      Objective             Physical Exam     GENERAL: Healthy, alert and no distress  EYES: Eyes grossly normal to inspection.  No discharge or erythema, or obvious scleral/conjunctival abnormalities.  RESP: No audible wheeze, cough, or visible cyanosis.  No visible retractions or increased work of breathing.    SKIN: Visible skin clear. No significant rash, abnormal pigmentation or lesions.  NEURO: Cranial nerves grossly intact.  Mentation and speech appropriate for age.  PSYCH: Mentation appears normal, affect normal/bright, judgement and insight intact, normal speech and appearance well-groomed.      Diagnostic Test Results:  none         Assessment & Plan     1. Attention deficit hyperactivity disorder (ADHD), combined type  Increase dose to 20mg daily.  Recheck in 1 month.  - amphetamine-dextroamphetamine (ADDERALL XR) 20 MG 24 hr capsule; Take 1 capsule (20 mg) by mouth daily  Dispense: 30 capsule; Refill: 0    2. Severe single current episode of major depressive disorder, without psychotic features (H)  Increased symptoms.  INcrease zoloft to 200mg daily.  - sertraline (ZOLOFT) 100 MG tablet; Take 2 tablets (200 mg) by mouth daily  Dispense: 60 tablet; Refill: 1  - mirtazapine (REMERON) 7.5 MG tablet; Take 1 tablet (7.5 mg) by mouth At Bedtime  Dispense: 90 tablet; Refill: 1    3. Adjustment disorder with mixed anxiety and depressed mood  - sertraline (ZOLOFT) 100 MG tablet; Take 2 tablets (200 mg) by mouth daily  Dispense: 60 tablet; Refill: 1  - QUEtiapine (SEROQUEL) 100 MG tablet; Take 1 tablet (100 mg) by mouth At Bedtime 50mg. At bedtime x 7 days , then if needed 100mg. At bedtime daily .  Dispense: 90 tablet; Refill: 1     Tobacco Cessation:   reports that she has been smoking cigarettes.  "She has been smoking about 1.00 pack per day. Her smokeless tobacco use includes chew.        BMI:   Estimated body mass index is 38.71 kg/m  as calculated from the following:    Height as of 2/24/20: 1.651 m (5' 5\").    Weight as of 2/24/20: 105.5 kg (232 lb 9.6 oz).         Depression Screening Follow Up    PHQ 8/7/2020   PHQ-9 Total Score 17   Q9: Thoughts of better off dead/self-harm past 2 weeks Several days   F/U: Thoughts of suicide or self-harm -   F/U: Self harm-plan -   F/U: Self-harm action -   F/U: Safety concerns -       Pt has good support.  Able to reach out to friends or parents if SI arises.    Return in about 4 weeks (around 9/4/2020) for video visit.    SHELBY Summers Ra, CNP  [facility]      Video-Visit Details    Type of service:  Video Visit    Video End Time:2:39pm    Originating Location (pt. Location): Home    Distant Location (provider location):  [facility] [employer]     Platform used for Video Visit: Doximity    No follow-ups on file.       SHELBY Summers Ra, CNP      "

## 2020-08-08 ASSESSMENT — ANXIETY QUESTIONNAIRES: GAD7 TOTAL SCORE: 14

## 2020-08-11 ENCOUNTER — MYC MEDICAL ADVICE (OUTPATIENT)
Dept: FAMILY MEDICINE | Facility: CLINIC | Age: 25
End: 2020-08-11

## 2020-08-13 ENCOUNTER — OFFICE VISIT (OUTPATIENT)
Dept: FAMILY MEDICINE | Facility: CLINIC | Age: 25
End: 2020-08-13
Payer: COMMERCIAL

## 2020-08-13 VITALS
TEMPERATURE: 98.7 F | OXYGEN SATURATION: 98 % | HEIGHT: 65 IN | WEIGHT: 225 LBS | SYSTOLIC BLOOD PRESSURE: 120 MMHG | DIASTOLIC BLOOD PRESSURE: 78 MMHG | HEART RATE: 108 BPM | BODY MASS INDEX: 37.49 KG/M2

## 2020-08-13 DIAGNOSIS — Z32.00 ENCOUNTER FOR PREGNANCY TEST, RESULT UNKNOWN: Primary | ICD-10-CM

## 2020-08-13 LAB — HCG UR QL: NEGATIVE

## 2020-08-13 PROCEDURE — 84702 CHORIONIC GONADOTROPIN TEST: CPT | Performed by: NURSE PRACTITIONER

## 2020-08-13 PROCEDURE — 36415 COLL VENOUS BLD VENIPUNCTURE: CPT | Performed by: NURSE PRACTITIONER

## 2020-08-13 PROCEDURE — 81025 URINE PREGNANCY TEST: CPT | Performed by: NURSE PRACTITIONER

## 2020-08-13 PROCEDURE — 99213 OFFICE O/P EST LOW 20 MIN: CPT | Performed by: NURSE PRACTITIONER

## 2020-08-13 ASSESSMENT — MIFFLIN-ST. JEOR: SCORE: 1776.47

## 2020-08-13 NOTE — PROGRESS NOTES
Subjective     Lucero Collier is a 25 year old female who presents to clinic today for the following health issues:    HPI       Patient took two at home pregnancy tests, one on 8/7/20 and 8/10/20 both were positive. Patient states that she took the tests because she was feeling nausea in the morning. Also is having lower back pain and right sided abdominal pain.     She reports mild continued nausea.  Still eating and drinking without problem.  Has had some spotting.  She does not menstruate with mirena in place.  She is sexually active.        Patient Active Problem List   Diagnosis     ADHD (attention deficit hyperactivity disorder)     Constipation     Myopia     Spells     Chronic headaches     GERD (gastroesophageal reflux disease)     Adverse reaction to pertussis vaccine     Complex partial seizures (H)     Care Plan- Health Care Home     Learning problem     Overweight     Sleep disturbance     Adjustment disorder with mixed anxiety and depressed mood     Migraine without status migrainosus, not intractable, unspecified migraine type     Anxiety     Severe single current episode of major depressive disorder, without psychotic features (H)     Depression     HTN (hypertension)     Obesity (BMI 35.0-39.9) with comorbidity (H)     Past Surgical History:   Procedure Laterality Date     ENDOSCOPY UPPER WITH PANCREATIC STIMULATION  4/06    Esophagitis 4/06     NISSEN FUNDOPLICATION  6/07    Dr. Meneses     PE TUBES  3/98     TONSILLECTOMY & ADENOIDECTOMY  4/02       Social History     Tobacco Use     Smoking status: Current Every Day Smoker     Packs/day: 1.00     Types: Cigarettes     Smokeless tobacco: Current User     Types: Chew     Tobacco comment: 5 qd   Substance Use Topics     Alcohol use: Yes     Alcohol/week: 0.0 standard drinks     Comment: every other day 1 beer      Family History   Problem Relation Age of Onset     Diabetes Maternal Grandmother      Hypertension Maternal Grandmother      Diabetes  "Paternal Grandmother      Cardiovascular Paternal Grandmother      Breast Cancer Paternal Grandmother 61        right side breast cancer     Genetic Disorder Other         nephew.-genetic defects      Obesity Mother      Supraventricular tachycardia Mother      Family History Negative Father            Reviewed and updated as needed this visit by Provider         Review of Systems   Constitutional, HEENT, cardiovascular, pulmonary, gi and gu systems are negative, except as otherwise noted.      Objective    /78 (BP Location: Right arm, Cuff Size: Adult Regular)   Pulse 108   Temp 98.7  F (37.1  C) (Oral)   Ht 1.651 m (5' 5\")   Wt 102.1 kg (225 lb)   SpO2 98%   BMI 37.44 kg/m    Body mass index is 37.44 kg/m .  Physical Exam   GENERAL: healthy, alert and no distress   (female): normal female external genitalia, normal urethral meatus , vaginal mucosa pink, moist, well rugated and normal cervix, IUD strings visualized.  PSYCH: mentation appears normal, affect normal/bright    Diagnostic Test Results:  Labs reviewed in Epic  Results for orders placed or performed in visit on 08/13/20 (from the past 24 hour(s))   HCG Qual, Urine ([id number])   Result Value Ref Range    HCG Qual Urine Negative NEG^Negative           Assessment & Plan     1. Encounter for pregnancy test, result unknown  UPT negative in clinic.  IUD strings visualized.  HCG quant ordered.  - HCG Qual, Urine ([id number])  - HCG Quantitative Pregnancy, Blood ([id number])     Tobacco Cessation:   reports that she has been smoking cigarettes. She has been smoking about 1.00 pack per day. Her smokeless tobacco use includes chew.        BMI:   Estimated body mass index is 37.44 kg/m  as calculated from the following:    Height as of this encounter: 1.651 m (5' 5\").    Weight as of this encounter: 102.1 kg (225 lb).    No follow-ups on file.    SHELBY Summers Ra [facility]  "

## 2020-08-14 LAB — B-HCG SERPL-ACNC: <1 IU/L (ref 0–5)

## 2020-08-24 ENCOUNTER — VIRTUAL VISIT (OUTPATIENT)
Dept: INTERNAL MEDICINE | Facility: CLINIC | Age: 25
End: 2020-08-24
Payer: COMMERCIAL

## 2020-08-24 DIAGNOSIS — R10.11 ABDOMINAL PAIN, RIGHT UPPER QUADRANT: Primary | ICD-10-CM

## 2020-08-24 DIAGNOSIS — K21.9 GASTROESOPHAGEAL REFLUX DISEASE WITHOUT ESOPHAGITIS: ICD-10-CM

## 2020-08-24 PROCEDURE — 99214 OFFICE O/P EST MOD 30 MIN: CPT | Mod: TEL | Performed by: NURSE PRACTITIONER

## 2020-08-24 NOTE — LETTER
August 24, 2020      Lucero Collier  [address] [place] [address]              To Whom It May Concern:    Lucero had a virtual visit today.  She can return to work tomorrow 8/25/2020.          Sincerely,      Meghan Lockhart CNP

## 2020-08-24 NOTE — PROGRESS NOTES
"Lucero Collier is a 25 year old female who is being evaluated via a billable telephone visit.      The patient has been notified of following:     \"This telephone visit will be conducted via a call between you and your physician/provider. We have found that certain health care needs can be provided without the need for a physical exam.  This service lets us provide the care you need with a short phone conversation.  If a prescription is necessary we can send it directly to your pharmacy.  If lab work is needed we can place an order for that and you can then stop by our lab to have the test done at a later time.    Telephone visits are billed at different rates depending on your insurance coverage. During this emergency period, for some insurers they may be billed the same as an in-person visit.  Please reach out to your insurance provider with any questions.    If during the course of the call the physician/provider feels a telephone visit is not appropriate, you will not be charged for this service.\"    Patient has given verbal consent for Telephone visit?  Yes    What phone number would you like to be contacted at? [phone]    How would you like to obtain your AVS? Lest    Subjective     uLcero Collier is a 25 year old female who presents via phone visit today for the following health issues:    HPI    Complaining of abdominal pains x 3 days.  I am nauseated but no vomiting.  Describes as intermittent sharp stabbing pain that radiates to back.  Reports mostly the right side.  Not eating much.  Stayed home from work so needs note.  BM daily with last one yesterday.  Denies diarrhea.  Sometimes spicy greasy food bother me. Has Compazine on medication list for nausea.    No fever or cough.  No shortness of breathe or chest pain.  No urination issues    Reviewed medical history: Nissen Fundoplication as a young child; was on Ranitidine 300 mg at bedtime but taken off the market so currently on no " "medications.     Had CT of abdomen/pelvis done back in 2016 that showed tiny calculus left kidney and small hiatal hernia.    Review of Systems   Constitutional, HEENT, cardiovascular, pulmonary, gi and gu systems are negative, except as otherwise noted.       Objective          Vitals:  No vitals were obtained today due to virtual visit.    healthy, alert and no distress  PSYCH: Alert and oriented times 3; coherent speech, normal   rate and volume, able to articulate logical thoughts, able   to abstract reason, no tangential thoughts, no hallucinations   or delusions  Her affect is normal  RESP: No cough, no audible wheezing, able to talk in full sentences  Remainder of exam unable to be completed due to telephone visits            Assessment/Plan:    Assessment & Plan     Abdominal pain, right upper quadrant  - etiology unknown may be gallbladder disease vs appendicitis vs pancreatitis  - due to Nissen Fundoplication will start medication called:  - omeprazole (PRILOSEC) 20 MG DR capsule; Take 1 capsule (20 mg) by mouth daily; take in the AM x 1 RF  - has Compazine prn for nausea  - clear liquids x 24 hours then bland diet (crackers, toast, rice) x 48 hours then increase as tolerated  - if symptoms worsen or persist, go to UC or ER for further evaluation  - Note for work (absent today).    Gastroesophageal reflux disease without esophagitis  - due to Nissen Fundoplication will start medications called (in place of Ranitidine as no longer on the market):    omeprazole (PRILOSEC) 20 MG DR capsule; Take 1 capsule (20 mg) by mouth daily     Tobacco Cessation:   reports that she has been smoking cigarettes. She has been smoking about 1.00 pack per day. Her smokeless tobacco use includes chew.        BMI:   Estimated body mass index is 37.44 kg/m  as calculated from the following:    Height as of 8/13/20: 1.651 m (5' 5\").    Weight as of 8/13/20: 102.1 kg (225 lb).           Patient instructions: See above.  [facility] " [facility]    No follow-ups on file.    Meghan Lockhart, NOHEMI  [facility]    Phone call duration:  12 minutes

## 2020-08-24 NOTE — PATIENT INSTRUCTIONS
Abdominal pain, right upper quadrant  - etiology unknown may be gallbladder disease vs appendicitis vs pancreatitis  - due to Nissen Fundoplication will start medication called:  - omeprazole (PRILOSEC) 20 MG DR capsule; Take 1 capsule (20 mg) by mouth daily; take in the AM x 1 RF  - has Compazine prn for nausea  - clear liquids x 24 hours then bland diet (crackers, toast, rice) x 48 hours then increase as tolerated  - if symptoms worsen or persist, go to UC or ER for further evaluation  - Note for work (absent today).    Gastroesophageal reflux disease without esophagitis  - due to Nissen Fundoplication will start medication called (in place of Ranitidine as no longer on the market):    omeprazole (PRILOSEC) 20 MG DR capsule; Take 1 capsule (20 mg) by mouth daily

## 2020-08-29 ENCOUNTER — MYC MEDICAL ADVICE (OUTPATIENT)
Dept: FAMILY MEDICINE | Facility: CLINIC | Age: 25
End: 2020-08-29

## 2020-08-31 ENCOUNTER — MYC MEDICAL ADVICE (OUTPATIENT)
Dept: FAMILY MEDICINE | Facility: CLINIC | Age: 25
End: 2020-08-31

## 2020-08-31 NOTE — TELEPHONE ENCOUNTER
I haven't seen her for anything related to ability to drive or LOC.  Who treated her for this?  We likely need an appt.  Depending on who she followed with- if I can review their records, we could do a virtual visit.  DANIELA.

## 2020-09-02 ENCOUNTER — VIRTUAL VISIT (OUTPATIENT)
Dept: FAMILY MEDICINE | Facility: CLINIC | Age: 25
End: 2020-09-02
Payer: COMMERCIAL

## 2020-09-02 DIAGNOSIS — G40.209 PARTIAL SYMPTOMATIC EPILEPSY WITH COMPLEX PARTIAL SEIZURES, NOT INTRACTABLE, WITHOUT STATUS EPILEPTICUS (H): Primary | ICD-10-CM

## 2020-09-02 PROCEDURE — 99213 OFFICE O/P EST LOW 20 MIN: CPT | Mod: TEL | Performed by: NURSE PRACTITIONER

## 2020-09-02 NOTE — PROGRESS NOTES
"Lucero Collier is a 25 year old female who is being evaluated via a billable telephone visit.      The patient has been notified of following:     \"This telephone visit will be conducted via a call between you and your physician/provider. We have found that certain health care needs can be provided without the need for a physical exam.  This service lets us provide the care you need with a short phone conversation.  If a prescription is necessary we can send it directly to your pharmacy.  If lab work is needed we can place an order for that and you can then stop by our lab to have the test done at a later time.    Telephone visits are billed at different rates depending on your insurance coverage. During this emergency period, for some insurers they may be billed the same as an in-person visit.  Please reach out to your insurance provider with any questions.    If during the course of the call the physician/provider feels a telephone visit is not appropriate, you will not be charged for this service.\"    Patient has given verbal consent for Telephone visit?  Yes    What phone number would you like to be contacted at?     How would you like to obtain your AVS? 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  As a young child was followed by [facility] for \"spells.\"  Normal EEG.  She was on keppra.  Last seen by neurology 11/2018 at which time they did not recommend any further treatment.  Advised continued monitoring.  She has not had any episodes of unexplained LOC.      Review of Systems   Constitutional, HEENT, cardiovascular, pulmonary, gi and gu systems are negative, except as otherwise noted.       Objective          Vitals:  No vitals were obtained today due to virtual visit.    healthy, alert and no distress  PSYCH: Alert and oriented " "times 3; coherent speech, normal   rate and volume, able to articulate logical thoughts, able   to abstract reason, no tangential thoughts, no hallucinations   or delusions  Her affect is normal  RESP: No cough, no audible wheezing, able to talk in full sentences  Remainder of exam unable to be completed due to telephone visits          Assessment/Plan:    Assessment & Plan     Partial symptomatic epilepsy with complex partial seizures, not intractable, without status epilepticus (H)  Reviewed neurology notes.  No follow up needed.  They dx with \"spells.\"  Monitor.  Paperwork completed.       Tobacco Cessation:   reports that she has been smoking cigarettes. She has been smoking about 1.00 pack per day. Her smokeless tobacco use includes chew.      BMI:   Estimated body mass index is 37.44 kg/m  as calculated from the following:    Height as of 8/13/20: 1.651 m (5' 5\").    Weight as of 8/13/20: 102.1 kg (225 lb).       No follow-ups on file.    SHELBY Summers Ra, CNP  [facility]    Phone call duration:  6 minutes              "

## 2020-10-09 ENCOUNTER — TRANSFERRED RECORDS (OUTPATIENT)
Dept: HEALTH INFORMATION MANAGEMENT | Facility: CLINIC | Age: 25
End: 2020-10-09

## 2020-10-16 ENCOUNTER — TELEPHONE (OUTPATIENT)
Dept: FAMILY MEDICINE | Facility: CLINIC | Age: 25
End: 2020-10-16

## 2020-10-16 NOTE — TELEPHONE ENCOUNTER
Patient reports that the DMV is stating that her license will be revoked on 10/28/2020 due to not receiving the forms that were supposed to be completed during her virtual visit with PCP on 9/2/2020. Unable to locate original form. New form printed and will keep at 's desk for PCP to complete.  -Rosalee Rogel

## 2020-10-21 ENCOUNTER — COMMUNICATION - HEALTHEAST (OUTPATIENT)
Dept: SCHEDULING | Facility: CLINIC | Age: 25
End: 2020-10-21

## 2020-10-21 ENCOUNTER — OFFICE VISIT (OUTPATIENT)
Dept: FAMILY MEDICINE | Facility: CLINIC | Age: 25
End: 2020-10-21
Payer: COMMERCIAL

## 2020-10-21 ENCOUNTER — ANCILLARY PROCEDURE (OUTPATIENT)
Dept: GENERAL RADIOLOGY | Facility: CLINIC | Age: 25
End: 2020-10-21
Attending: NURSE PRACTITIONER
Payer: COMMERCIAL

## 2020-10-21 VITALS
SYSTOLIC BLOOD PRESSURE: 133 MMHG | OXYGEN SATURATION: 97 % | WEIGHT: 235 LBS | DIASTOLIC BLOOD PRESSURE: 83 MMHG | TEMPERATURE: 98.1 F | HEART RATE: 95 BPM | RESPIRATION RATE: 12 BRPM | BODY MASS INDEX: 39.11 KG/M2
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
## 2020-10-21 DIAGNOSIS — F90.2 ATTENTION DEFICIT HYPERACTIVITY DISORDER (ADHD), COMBINED TYPE: ICD-10-CM

## 2020-10-21 DIAGNOSIS — S92.354D NONDISPLACED FRACTURE OF FIFTH METATARSAL BONE, RIGHT FOOT, SUBSEQUENT ENCOUNTER FOR FRACTURE WITH ROUTINE HEALING: ICD-10-CM

## 2020-10-21 DIAGNOSIS — Z23 ENCOUNTER FOR IMMUNIZATION: ICD-10-CM

## 2020-10-21 DIAGNOSIS — E66.01 MORBID OBESITY (H): ICD-10-CM

## 2020-10-21 DIAGNOSIS — S92.354D NONDISPLACED FRACTURE OF FIFTH METATARSAL BONE, RIGHT FOOT, SUBSEQUENT ENCOUNTER FOR FRACTURE WITH ROUTINE HEALING: Primary | ICD-10-CM

## 2020-10-21 PROCEDURE — 99214 OFFICE O/P EST MOD 30 MIN: CPT | Performed by: NURSE PRACTITIONER

## 2020-10-21 PROCEDURE — 73630 X-RAY EXAM OF FOOT: CPT | Mod: RT | Performed by: RADIOLOGY

## 2020-10-21 PROCEDURE — 73610 X-RAY EXAM OF ANKLE: CPT | Mod: RT | Performed by: RADIOLOGY

## 2020-10-21 RX ORDER — OXYCODONE HYDROCHLORIDE 5 MG/1
5 TABLET ORAL
COMMUNITY
Start: 2020-10-09 | End: 2020-10-21

## 2020-10-21 RX ORDER — DEXTROAMPHETAMINE SACCHARATE, AMPHETAMINE ASPARTATE MONOHYDRATE, DEXTROAMPHETAMINE SULFATE AND AMPHETAMINE SULFATE 5; 5; 5; 5 MG/1; MG/1; MG/1; MG/1
20 CAPSULE, EXTENDED RELEASE ORAL DAILY
Qty: 30 CAPSULE | Refills: 0 | Status: SHIPPED | OUTPATIENT
Start: 2020-10-21 | End: 2020-11-24

## 2020-10-21 ASSESSMENT — ANXIETY QUESTIONNAIRES
2. NOT BEING ABLE TO STOP OR CONTROL WORRYING: SEVERAL DAYS
7. FEELING AFRAID AS IF SOMETHING AWFUL MIGHT HAPPEN: SEVERAL DAYS
1. FEELING NERVOUS, ANXIOUS, OR ON EDGE: SEVERAL DAYS
7. FEELING AFRAID AS IF SOMETHING AWFUL MIGHT HAPPEN: SEVERAL DAYS
6. BECOMING EASILY ANNOYED OR IRRITABLE: SEVERAL DAYS
GAD7 TOTAL SCORE: 7
4. TROUBLE RELAXING: SEVERAL DAYS
GAD7 TOTAL SCORE: 7
3. WORRYING TOO MUCH ABOUT DIFFERENT THINGS: SEVERAL DAYS
5. BEING SO RESTLESS THAT IT IS HARD TO SIT STILL: SEVERAL DAYS
GAD7 TOTAL SCORE: 7

## 2020-10-21 ASSESSMENT — PATIENT HEALTH QUESTIONNAIRE - PHQ9
10. IF YOU CHECKED OFF ANY PROBLEMS, HOW DIFFICULT HAVE THESE PROBLEMS MADE IT FOR YOU TO DO YOUR WORK, TAKE CARE OF THINGS AT HOME, OR GET ALONG WITH OTHER PEOPLE: SOMEWHAT DIFFICULT
SUM OF ALL RESPONSES TO PHQ QUESTIONS 1-9: 8
SUM OF ALL RESPONSES TO PHQ QUESTIONS 1-9: 8

## 2020-10-21 NOTE — PROGRESS NOTES
Subjective     Lucero Collier is a 25 year old female who presents to clinic today for the following health issues:    HPI         Musculoskeletal problem/pain  Onset/Duration: DOI-almost 2 weeks ago-rt foot  Description  Location: foot - right  Joint Swelling: YES  Redness: no  Pain: YES  Warmth: YES- sometimes  Intensity:  moderate  Progression of Symptoms:  worsening  Accompanying signs and symptoms:   Fevers: no  Numbness/tingling/weakness: YES  History  Trauma to the area: YES  Recent illness:  no  Previous similar problem: no  Previous evaluation:  YES- [facility]  Precipitating or alleviating factors:  Aggravating factors include: walking and climbing stairs  Therapies tried and outcome: Oxycodone, Icing, NSAIDS    Seen in the ER on 10/9/2020 and diagnosed with a R foot nondisplaced 5th metatarsal fracture.  She was subsequently seen by [facility] for follow up last week.  She has been wearing a walking boot regularly and was told she can be weight bearing.  She did slip and fall yesterday when she was out of the boot.  Foot has been more painful after the fall.          ADHD:  Seen by PCP in August for follow up.  At that time was feeling like dose needed to be increased; was having issues with concentration and focus at work.  She reports she takes this every day, but has not been able to get it refilled because she was busy working a lot and kept forgetting to make an appointment.  She feels since increasing her dose that symptoms are much better controlled and would like a refill today.     Review of Systems   CONSTITUTIONAL:NEGATIVE  for fever   INTEGUMENTARY/SKIN: NEGATIVE for worrisome rashes, moles or lesions  MUSCULOSKELETAL: POSITIVE  for R ankle and foot pain       Objective    /83 (BP Location: Right arm, Patient Position: Chair, Cuff Size: Adult Regular)   Pulse 95   Temp 98.1  F (36.7  C) (Oral)   Resp 12   Wt 106.6 kg (235 lb)   SpO2 97%   BMI 39.11 kg/m    Body mass index is  39.11 kg/m .  Physical Exam   GENERAL: healthy, alert and no distress  MS: R ankle: FROM, mild lateral tenderness and swelling; R foot: tenderness over the distal 3-5 metatarsals  SKIN: slight swelling over the distal 4-5 metatarsals     Xray - foot with nondisplaced 5th metatarsal fracture, ankle unremarkable         Assessment & Plan       Nondisplaced fracture of fifth metatarsal bone, right foot, subsequent encounter for fracture with routine healing  Continue in walking boot and follow-up with ortho as planned.  RICE and alternate tylenol and ibuprofen.  Note given for work.   - XR Foot Right G/E 3 Views; Future  - XR Ankle Right G/E 3 Views; Future    Fall, initial encounter  As above.   - XR Foot Right G/E 3 Views; Future  - XR Ankle Right G/E 3 Views; Future    Attention deficit hyperactivity disorder (ADHD), combined type  Symptoms better controlled after dose increase.  Refilling for her today.  Follow-up with PCP for additional refills.   - amphetamine-dextroamphetamine (ADDERALL XR) 20 MG 24 hr capsule; Take 1 capsule (20 mg) by mouth daily    Morbid obesity (H)             No follow-ups on file.    SHELBY Gustafson [facility]      Answers for HPI/ROS submitted by the patient on 10/21/2020   If you checked off any problems, how difficult have these problems made it for you to do your work, take care of things at home, or get along with other people?: Somewhat difficult  PHQ9 TOTAL SCORE: 8  CLARY 7 TOTAL SCORE: 7

## 2020-10-21 NOTE — TELEPHONE ENCOUNTER
Form completed and faxed to [facility] at [phone], they will give the form to the patient being seen there today.  -Rosalee Rogel

## 2020-10-21 NOTE — PATIENT INSTRUCTIONS
Continue with rest, ice, elevation and alternating tylenol and ibuprofen.    Wear the boot!  Follow up with ortho as planned.

## 2020-10-21 NOTE — PROGRESS NOTES
.  Pre-Visit Planning :     Future Appointments   Date Time Provider Department Center   10/21/2020 10:45 AM Kay Oviedo APRN CNP [facility]      Arrival Time for this Appointment: 10:35 AM      Appointment Notes for this encounter:    swelling from broken foot-5th metatarsal-Rt foot     Questionnaires Reviewed/Assigned:       Spoke to patient via phone. Are there any additional questions or concerns you'd like to review with your provider during your visit? N/A      Last OV with provider:  9/2/2020; [facility]; Partial symptomatic epilepsy with complex partial seizures, not intractable, without status epilepticus (H    Hospital ER Visits:  10/10/2020; [facility] ER ;Closed nondisplaced fracture of fifth metatarsal bone of right foot, initial encounter (Primary Dx); Right foot pain     Is the visit preventive? no             Specialty Visits:  5/29/2019; [facility]-;Chriss Burgess MD; Chest pain, unspecified type                 Imaging and Lab Review:8/8/2019; MR RIGHT ANKLE WITHOUT CONTRAST, MR RIGHT FOOT WITHOUT CONTRAST ; HISTORY: Three-month history of dorsal midfoot/forefoot pain. No              specific injury. Assess for stress fracture or Lisfranc injury.; IMPRESSION: 1. Thickening and irregularity of the anterior talofibular ligament, likely related to old injury.2. Probable old injury deep tibiotalar components of the deltoid              Ligament. 3. No other abnormality. In particular, there is no evidence for stress injury and the tarsometatarsal joints and surrounding soft tissues as well as the Lisfranc ligament all appear normal.     Recent Procedures:  N/A    MEDS ;    Current Outpatient Medications   Medication     amphetamine-dextroamphetamine (ADDERALL XR) 20 MG 24 hr capsule     fluocinonide (LIDEX) 0.05 % external cream     IBUPROFEN PO     levonorgestrel (MIRENA) 20 MCG/24HR IUD     mirtazapine (REMERON) 7.5 MG tablet      "omeprazole (PRILOSEC) 20 MG DR capsule     pimecrolimus (ELIDEL) 1 % cream     prochlorperazine (COMPAZINE) 10 MG tablet     QUEtiapine (SEROQUEL) 100 MG tablet     sertraline (ZOLOFT) 100 MG tablet     SUMAtriptan (IMITREX) 25 MG tablet     No current facility-administered medications for this visit.       Is there anything on your medication list that needs to be updated?  Please ask @ check-in     Health Maintenance Due   Topic Date Due     ANNUAL REVIEW OF HM ORDERS  1997     Pneumococcal Vaccine: Pediatrics (0 to 5 Years) and At-Risk Patients (6 to 64 Years) (1 of 1 - PPSV23) 06/17/2003     CHLAMYDIA SCREENING  06/20/2020     INFLUENZA VACCINE (1) 09/01/2020     Preferred pharmacy: [facility]ARMACY #[id number] - [place], [place] - [address] [place] [address]    MyChart: YES    Questionnaire Review :  {If MyChart INACTIVE \"Please plan on arriving early so that you have time to complete your questionnaires prior to seeing your provider.\"       Patient preferred phone number: [phone]    Claudia Greene, Registered Nurse, [facility]   [phone]    Answers for HPI/ROS submitted by the patient on 10/21/2020   If you checked off any problems, how difficult have these problems made it for you to do your work, take care of things at home, or get along with other people?: Somewhat difficult  PHQ9 TOTAL SCORE: 8  CLARY 7 TOTAL SCORE: 7    "

## 2020-10-21 NOTE — LETTER
[facility]  [address] [address]  Phone: [phone]    October 21, 2020        Lucero Collier  [address] [address]          To whom it may concern:    RE: Lucero Collier    Patient was seen and treated today at our clinic today.  Please excuse her from work until 10/24/2020.    Please contact me for questions or concerns.      Sincerely,        SHELBY Gustafson CNP

## 2020-10-22 ASSESSMENT — ANXIETY QUESTIONNAIRES: GAD7 TOTAL SCORE: 7

## 2020-10-22 ASSESSMENT — PATIENT HEALTH QUESTIONNAIRE - PHQ9: SUM OF ALL RESPONSES TO PHQ QUESTIONS 1-9: 8

## 2020-11-24 DIAGNOSIS — F90.2 ATTENTION DEFICIT HYPERACTIVITY DISORDER (ADHD), COMBINED TYPE: ICD-10-CM

## 2020-11-24 RX ORDER — DEXTROAMPHETAMINE SACCHARATE, AMPHETAMINE ASPARTATE MONOHYDRATE, DEXTROAMPHETAMINE SULFATE AND AMPHETAMINE SULFATE 5; 5; 5; 5 MG/1; MG/1; MG/1; MG/1
20 CAPSULE, EXTENDED RELEASE ORAL DAILY
Qty: 30 CAPSULE | Refills: 0 | Status: SHIPPED | OUTPATIENT
Start: 2020-11-24 | End: 2021-04-05

## 2020-11-24 NOTE — TELEPHONE ENCOUNTER
Adderall XR 20 mg       Last Written Prescription Date:  10/21/20  Last Fill Quantity: 30,   # refills: 0  Last Office Visit: 9/2/20  Future Office visit:       Routing refill request to provider for review/approval because:  Drug not on the [facility], [facility] or  Health refill protocol or controlled substance  Pt is out       not working     Luis Hurd's case presented to selection committee.  Patient has been accepted for liver transplant due to Acute Alcoholic Hepatitis and complications of end stage liver disease including hyperbilirubinemia, hypoalbuminemia, coagulopathy, ascites, jaundice, hyponatremia, portal hypertension, splenomegaly, thrombocytopenia and SBP, chronic anemia, fatigue, LORENE with a MELD score of 35.  Patient has no absolute contraindications for liver transplant.  Patient will be listed pending financial approval.    Patient will accept HBcAb positive livers.  Patient will accept HCVAB positive livers.  Patient will accept DCD livers.  Patient will accept HCV FARAZ positive livers  Patient will accept HBV FARAZ positive livers  I was present at the committee meeting and attest to the decision of the committee.    George Vogelondmarcia  12/04/2019

## 2020-11-24 NOTE — TELEPHONE ENCOUNTER
Reason for call:  Medication   If this is a refill request, has the caller requested the refill from the pharmacy already? No     Will the patient be using a [facility]? No     Name of the pharmacy and phone number for the current request: [facility]/PHARMACY #[id number] - [place], [place] - [address]    Name of the medication requested: amphetamine-dextroamphetamine (ADDERALL XR) 20 MG 24 hr capsule    Other request: n/a    Phone number to reach patient:  Home number on file [phone] (home)    Best Time:  any    Can we leave a detailed message on this number?  YES    Travel screening: Not Applicable

## 2020-11-25 ENCOUNTER — TELEPHONE (OUTPATIENT)
Dept: FAMILY MEDICINE | Facility: CLINIC | Age: 25
End: 2020-11-25

## 2020-11-25 ENCOUNTER — E-VISIT (OUTPATIENT)
Dept: FAMILY MEDICINE | Facility: CLINIC | Age: 25
End: 2020-11-25
Payer: COMMERCIAL

## 2020-11-25 DIAGNOSIS — N76.0 BV (BACTERIAL VAGINOSIS): ICD-10-CM

## 2020-11-25 DIAGNOSIS — R30.0 DYSURIA: Primary | ICD-10-CM

## 2020-11-25 DIAGNOSIS — B96.89 BV (BACTERIAL VAGINOSIS): ICD-10-CM

## 2020-11-25 DIAGNOSIS — B96.89 BACTERIAL VAGINOSIS: ICD-10-CM

## 2020-11-25 DIAGNOSIS — N76.0 BACTERIAL VAGINOSIS: ICD-10-CM

## 2020-11-25 PROCEDURE — 99422 OL DIG E/M SVC 11-20 MIN: CPT | Performed by: INTERNAL MEDICINE

## 2020-11-25 NOTE — TELEPHONE ENCOUNTER
Reason for call:  Symptom   Symptom or request: Painful urination and cloudy urine    Duration (how long have symptoms been present): na  Have you been treated for this before? No    Additional comments: No apt available for today and pt is advised of urgent care. Pt requested to speak a nurse    Phone number to reach patient:  Cell number on file:    Telephone Information:   Mobile [phone]       Best Time:  Any time    Can we leave a detailed message on this number?  YES    Travel screening: Not Applicable     Aneta Garcia on 11/25/2020 at 12:37 PM

## 2020-11-25 NOTE — TELEPHONE ENCOUNTER
Pt wanted antibiotic for UTI.     Adv she would need some kind of visit e-visit or [email or web address]    She is going to do [facility].     Alisha ROBLEDO RN

## 2020-11-27 DIAGNOSIS — R30.0 DYSURIA: ICD-10-CM

## 2020-11-27 LAB
ALBUMIN UR-MCNC: NEGATIVE MG/DL
APPEARANCE UR: ABNORMAL
BACTERIA #/AREA URNS HPF: ABNORMAL /HPF
BILIRUB UR QL STRIP: NEGATIVE
COLOR UR AUTO: YELLOW
GLUCOSE UR STRIP-MCNC: NEGATIVE MG/DL
HGB UR QL STRIP: ABNORMAL
KETONES UR STRIP-MCNC: NEGATIVE MG/DL
LEUKOCYTE ESTERASE UR QL STRIP: NEGATIVE
MUCOUS THREADS #/AREA URNS LPF: PRESENT /LPF
NITRATE UR QL: NEGATIVE
NON-SQ EPI CELLS #/AREA URNS LPF: ABNORMAL /LPF
PH UR STRIP: 7 PH (ref 5–7)
RBC #/AREA URNS AUTO: ABNORMAL /HPF
SOURCE: ABNORMAL
SP GR UR STRIP: 1.02 (ref 1–1.03)
SPECIMEN SOURCE: ABNORMAL
UROBILINOGEN UR STRIP-ACNC: 1 EU/DL (ref 0.2–1)
WBC #/AREA URNS AUTO: ABNORMAL /HPF
WET PREP SPEC: ABNORMAL

## 2020-11-27 PROCEDURE — 87591 N.GONORRHOEAE DNA AMP PROB: CPT | Performed by: INTERNAL MEDICINE

## 2020-11-27 PROCEDURE — 87210 SMEAR WET MOUNT SALINE/INK: CPT | Performed by: INTERNAL MEDICINE

## 2020-11-27 PROCEDURE — 81001 URINALYSIS AUTO W/SCOPE: CPT | Performed by: INTERNAL MEDICINE

## 2020-11-27 PROCEDURE — 87491 CHLMYD TRACH DNA AMP PROBE: CPT | Performed by: INTERNAL MEDICINE

## 2020-12-13 RX ORDER — METRONIDAZOLE 500 MG/1
500 TABLET ORAL 2 TIMES DAILY
Qty: 14 TABLET | Refills: 0 | Status: SHIPPED | OUTPATIENT
Start: 2020-12-13 | End: 2020-12-20

## 2020-12-14 ENCOUNTER — VIRTUAL VISIT (OUTPATIENT)
Dept: FAMILY MEDICINE | Facility: CLINIC | Age: 25
End: 2020-12-14
Payer: COMMERCIAL

## 2020-12-14 DIAGNOSIS — R11.2 NAUSEA AND VOMITING, INTRACTABILITY OF VOMITING NOT SPECIFIED, UNSPECIFIED VOMITING TYPE: Primary | ICD-10-CM

## 2020-12-14 DIAGNOSIS — R50.9 FEVER, UNSPECIFIED FEVER CAUSE: ICD-10-CM

## 2020-12-14 DIAGNOSIS — N76.0 BV (BACTERIAL VAGINOSIS): ICD-10-CM

## 2020-12-14 DIAGNOSIS — B96.89 BV (BACTERIAL VAGINOSIS): ICD-10-CM

## 2020-12-14 PROCEDURE — 99213 OFFICE O/P EST LOW 20 MIN: CPT | Mod: TEL | Performed by: PHYSICIAN ASSISTANT

## 2020-12-14 RX ORDER — ONDANSETRON 4 MG/1
4 TABLET, FILM COATED ORAL EVERY 8 HOURS PRN
Qty: 21 TABLET | Refills: 0 | Status: SHIPPED | OUTPATIENT
Start: 2020-12-14 | End: 2021-09-22

## 2020-12-14 NOTE — PATIENT INSTRUCTIONS
Thank you for choosing us for your care. I have placed an order for a prescription so that you can start treatment. View your full visit summary for details by clicking on the link below. Your pharmacist will able to address any questions you may have about the medication.     If you re not feeling better within 2-3 days, please schedule an appointment.  You can schedule an appointment right here in [facility], or call [phone]  If the visit is for the same symptoms as your e-visit, we ll refund the cost of your e-visit if seen within seven days.      Bacterial Vaginosis    You have a vaginal infection called bacterial vaginosis (BV). Both good and bad bacteria are present in a healthy vagina. BV occurs when these bacteria get out of balance. The number of bad bacteria increase. And the number of good bacteria decrease. Although BV is associated with sexual activity, it is not a sexually transmitted disease.  BV may or may not cause symptoms. If symptoms do occur, they can include:    Thin, gray, milky-white, or sometimes green discharge    Unpleasant odor or  fishy  smell    Itching, burning, or pain in or around the vagina  It is not known what causes BV, but certain factors can make the problem more likely. This can include:    Douching    Having sex with a new partner    Having sex with more than one partner  BV will sometimes go away on its own. But treatment is usually recommended. This is because untreated BV can increase the risk of more serious health problems such as:    Pelvic inflammatory disease (PID)     delivery (giving birth to a baby early if you re pregnant)    HIV and certain other sexually transmitted diseases (STDs)    Infection after surgery on the reproductive organs  Home care  General care    BV is most often treated with medicines called antibiotics. These may be given as pills or as a vaginal cream. If antibiotics are prescribed, be sure to use them exactly as directed. Also, be  sure to complete all of the medicine, even if your symptoms go away.    Don't douche or having sex during treatment.    If you have sex with a female partner, ask your healthcare provider if she should also be treated.  Prevention    Don't douche.    Don't have sex. If you do have sex, then take steps to lower your risk:  ? Use condoms when having sex.  ? Limit the number of sexual partners you have.  Follow-up care  Follow up with your healthcare provider, or as advised.  When to seek medical advice  Call your healthcare provider right away if:    You have a fever of 100.4 F (38 C) or higher, or as directed by your provider.    Your symptoms worsen, or they don t go away within a few days of starting treatment.    You have new pain in the lower belly or pelvic region.    You have side effects that bother you or a reaction to the pills or cream you re prescribed.    You or any partners you have sex with have new symptoms, such as a rash, joint pain, or sores.  [employer] last reviewed this educational content on 10/1/2017    8471-5052 [employer]. [address] [place] [address]. All rights reserved. This information is not intended as a substitute for professional medical care. Always follow your healthcare professional's instructions.

## 2020-12-14 NOTE — PROGRESS NOTES
"Lucero Collier is a 25 year old female who is being evaluated via a billable telephone visit.      The patient has been notified of following:     \"This telephone visit will be conducted via a call between you and your physician/provider. We have found that certain health care needs can be provided without the need for a physical exam.  This service lets us provide the care you need with a short phone conversation.  If a prescription is necessary we can send it directly to your pharmacy.  If lab work is needed we can place an order for that and you can then stop by our lab to have the test done at a later time.    Telephone visits are billed at different rates depending on your insurance coverage. During this emergency period, for some insurers they may be billed the same as an in-person visit.  Please reach out to your insurance provider with any questions.    If during the course of the call the physician/provider feels a telephone visit is not appropriate, you will not be charged for this service.\"    Patient has given verbal consent for Telephone visit?  Yes    What phone number would you like to be contacted at? [phone]    How would you like to obtain your AVS? MyChart    Subjective     Lucero Collier is a 25 year old female who presents via phone visit today for the following health issues:    HPI     Acute Illness  Acute illness concerns: COVID Symptoms   Onset/Duration: Since last Thursday   Symptoms:  Fever: YES  Chills/Sweats: no  Headache (location?): YES- pt states normally has headaches   Sinus Pressure: no  Conjunctivitis:  no  Ear Pain: no  Rhinorrhea: no  Congestion: yes  Sore Throat: no  Cough: YES-non-productive  Wheeze: no  Decreased Appetite: no  Nausea: YES  Vomiting: YES  Diarrhea: no  Dysuria/Freq.: no  Dysuria or Hematuria: no  Fatigue/Achiness: no  Sick/Strep Exposure: no  Therapies tried and outcome: None    Lucero Collier is a 25 year old female who presents today for TELEPHONE " visit symptom check  She developed a fever last week (100) and a cough  Negative for COVID on Saturday  She is still coughing but this is not any different than her normal cough as a smoker  Some nasal congestion; still can breathe through the nose  Fevers are off and on, around 100 (never above)  She is also nauseated and vomiting; often following meals  BMs are normal  No change to diet/or fluids  No other close contacts with similar symptoms  There is NO abdominal pain  She has had nausea and vomiting previously and used compazine   She IS sexually active; HAS IUD;         Review of Systems   Constitutional, HEENT, cardiovascular, pulmonary, gi and gu systems are negative, except as otherwise noted.       Objective          Vitals:  No vitals were obtained today due to virtual visit.    healthy, alert and no distress  PSYCH: Alert and oriented times 3; coherent speech, normal   rate and volume, able to articulate logical thoughts, able   to abstract reason, no tangential thoughts, no hallucinations   or delusions  Her affect is normal  RESP: No cough, no audible wheezing, able to talk in full sentences  Remainder of exam unable to be completed due to telephone visits          Assessment/Plan:    Assessment & Plan     Nausea and vomiting, intractability of vomiting not specified, unspecified vomiting type  Fever, unspecified fever cause  Patient is a 26yo female with mild fever and n/v witohut diarrhea the past few days. Comes in stages during which she will feel improved in between. She is sexually active but has IUD. I did suggest pregnancy test. She missed work over the weekend and expects needs one more day. There is NO abdominal pain and is urinating well. See below re vaginal concerns/complaints. She should follow up in clinic if symptoms not improving or changing. Ok for below to help aide symptoms.  - ondansetron (ZOFRAN) 4 MG tablet; Take 1 tablet (4 mg) by mouth every 8 hours as needed for nausea    "  Bacterial Vaginosis  Patient did an E-visit on 11/25 and provided a sample on 11/27. She was not treated until yesterday. She has not yet picked up her medication but plans to today. Unclear if this will help with symptomatic improvement. Reviewed NO etoh while on meds. She'll start today           Tobacco Cessation:   reports that she has been smoking cigarettes. She has been smoking about 1.00 pack per day. Her smokeless tobacco use includes chew.      BMI:   Estimated body mass index is 39.11 kg/m  as calculated from the following:    Height as of 8/13/20: 1.651 m (5' 5\").    Weight as of 10/21/20: 106.6 kg (235 lb).            Return in about 3 days (around 12/17/2020) for recheck if symptoms are not improving. Sooner if worsening. .    Morteza Iverson PA-C  [facility]    Phone call duration:  13 minutes              "

## 2020-12-14 NOTE — LETTER
[facility]  [address] [address] [facility] [address]  Phone: [phone]    December 14, 2020        Lucero Collier  [address] [facility] [address]          To whom it may concern:    RE: Lucero Collier    Patient was seen and treated today virtually at our clinic and missed work.  She reports these symptoms also caused her to miss work on Saturday and Sunday. I do recommend another day to help with symptom improvement. She'll follow up if not improving after that.    Please contact me for questions or concerns.      Sincerely,        Morteza Iverson PA-C

## 2021-01-01 ENCOUNTER — E-VISIT (OUTPATIENT)
Dept: URGENT CARE | Facility: CLINIC | Age: 26
End: 2021-01-01
Payer: COMMERCIAL

## 2021-01-01 DIAGNOSIS — N89.8 VAGINAL DISCHARGE: Primary | ICD-10-CM

## 2021-01-01 PROCEDURE — 99207 PR NO CHARGE LOS: CPT

## 2021-01-02 NOTE — PATIENT INSTRUCTIONS
Dear Lucero Collier,    We are sorry you are not feeling well. Based on the responses you provided, it is recommended that you be seen in-person in urgent care so we can better evaluate your symptoms.   Recommend being seen within 24 hours.   Please click here to find the nearest urgent care location to you.   You will not be charged for this Visit. Thank you for trusting us with your care.    Stephany Alvarez MD

## 2021-02-16 ENCOUNTER — OFFICE VISIT (OUTPATIENT)
Dept: FAMILY MEDICINE | Facility: CLINIC | Age: 26
End: 2021-02-16
Payer: COMMERCIAL

## 2021-02-16 VITALS
RESPIRATION RATE: 20 BRPM | HEART RATE: 100 BPM | SYSTOLIC BLOOD PRESSURE: 120 MMHG | WEIGHT: 246.7 LBS | TEMPERATURE: 98.1 F | BODY MASS INDEX: 41.05 KG/M2 | OXYGEN SATURATION: 100 % | DIASTOLIC BLOOD PRESSURE: 76 MMHG

## 2021-02-16 DIAGNOSIS — N76.0 BACTERIAL VAGINOSIS: ICD-10-CM

## 2021-02-16 DIAGNOSIS — R32 URINARY INCONTINENCE, UNSPECIFIED TYPE: ICD-10-CM

## 2021-02-16 DIAGNOSIS — E66.01 MORBID OBESITY (H): ICD-10-CM

## 2021-02-16 DIAGNOSIS — R35.0 URINARY FREQUENCY: Primary | ICD-10-CM

## 2021-02-16 DIAGNOSIS — N89.8 VAGINAL ITCHING: ICD-10-CM

## 2021-02-16 DIAGNOSIS — B96.89 BACTERIAL VAGINOSIS: ICD-10-CM

## 2021-02-16 DIAGNOSIS — T36.95XA ANTIBIOTIC-INDUCED YEAST INFECTION: ICD-10-CM

## 2021-02-16 DIAGNOSIS — B37.9 ANTIBIOTIC-INDUCED YEAST INFECTION: ICD-10-CM

## 2021-02-16 LAB
ALBUMIN UR-MCNC: NEGATIVE MG/DL
ANION GAP SERPL CALCULATED.3IONS-SCNC: 8 MMOL/L (ref 3–14)
APPEARANCE UR: CLEAR
BILIRUB UR QL STRIP: NEGATIVE
BUN SERPL-MCNC: 8 MG/DL (ref 7–30)
CALCIUM SERPL-MCNC: 8.9 MG/DL (ref 8.5–10.1)
CHLORIDE SERPL-SCNC: 109 MMOL/L (ref 94–109)
CO2 SERPL-SCNC: 23 MMOL/L (ref 20–32)
COLOR UR AUTO: YELLOW
CREAT SERPL-MCNC: 0.58 MG/DL (ref 0.52–1.04)
ERYTHROCYTE [DISTWIDTH] IN BLOOD BY AUTOMATED COUNT: 12.2 % (ref 10–15)
GFR SERPL CREATININE-BSD FRML MDRD: >90 ML/MIN/{1.73_M2}
GLUCOSE SERPL-MCNC: 93 MG/DL (ref 70–99)
GLUCOSE UR STRIP-MCNC: NEGATIVE MG/DL
HBA1C MFR BLD: 5.2 % (ref 0–5.6)
HCG UR QL: NEGATIVE
HCT VFR BLD AUTO: 36.6 % (ref 35–47)
HGB BLD-MCNC: 11.9 G/DL (ref 11.7–15.7)
HGB UR QL STRIP: NEGATIVE
KETONES UR STRIP-MCNC: NEGATIVE MG/DL
LEUKOCYTE ESTERASE UR QL STRIP: NEGATIVE
MCH RBC QN AUTO: 27.9 PG (ref 26.5–33)
MCHC RBC AUTO-ENTMCNC: 32.5 G/DL (ref 31.5–36.5)
MCV RBC AUTO: 86 FL (ref 78–100)
NITRATE UR QL: NEGATIVE
PH UR STRIP: 8 PH (ref 5–7)
PLATELET # BLD AUTO: 302 10E9/L (ref 150–450)
POTASSIUM SERPL-SCNC: 3.8 MMOL/L (ref 3.4–5.3)
RBC # BLD AUTO: 4.26 10E12/L (ref 3.8–5.2)
SODIUM SERPL-SCNC: 140 MMOL/L (ref 133–144)
SOURCE: ABNORMAL
SP GR UR STRIP: 1.02 (ref 1–1.03)
SPECIMEN SOURCE: ABNORMAL
TSH SERPL DL<=0.005 MIU/L-ACNC: 2.81 MU/L (ref 0.4–4)
UROBILINOGEN UR STRIP-ACNC: 0.2 EU/DL (ref 0.2–1)
WBC # BLD AUTO: 6.9 10E9/L (ref 4–11)
WET PREP SPEC: ABNORMAL

## 2021-02-16 PROCEDURE — 99214 OFFICE O/P EST MOD 30 MIN: CPT | Performed by: PHYSICIAN ASSISTANT

## 2021-02-16 PROCEDURE — 36415 COLL VENOUS BLD VENIPUNCTURE: CPT | Performed by: PHYSICIAN ASSISTANT

## 2021-02-16 PROCEDURE — 81003 URINALYSIS AUTO W/O SCOPE: CPT | Performed by: PHYSICIAN ASSISTANT

## 2021-02-16 PROCEDURE — 81025 URINE PREGNANCY TEST: CPT | Performed by: PHYSICIAN ASSISTANT

## 2021-02-16 PROCEDURE — 80048 BASIC METABOLIC PNL TOTAL CA: CPT | Performed by: PHYSICIAN ASSISTANT

## 2021-02-16 PROCEDURE — 83036 HEMOGLOBIN GLYCOSYLATED A1C: CPT | Performed by: PHYSICIAN ASSISTANT

## 2021-02-16 PROCEDURE — 87210 SMEAR WET MOUNT SALINE/INK: CPT | Performed by: PHYSICIAN ASSISTANT

## 2021-02-16 PROCEDURE — 85027 COMPLETE CBC AUTOMATED: CPT | Performed by: PHYSICIAN ASSISTANT

## 2021-02-16 PROCEDURE — 84443 ASSAY THYROID STIM HORMONE: CPT | Performed by: PHYSICIAN ASSISTANT

## 2021-02-16 RX ORDER — FLUCONAZOLE 150 MG/1
150 TABLET ORAL ONCE
Qty: 1 TABLET | Refills: 0 | Status: SHIPPED | OUTPATIENT
Start: 2021-02-16 | End: 2021-02-16

## 2021-02-16 RX ORDER — METRONIDAZOLE 500 MG/1
500 TABLET ORAL 2 TIMES DAILY
Qty: 14 TABLET | Refills: 0 | Status: SHIPPED | OUTPATIENT
Start: 2021-02-16 | End: 2021-02-23

## 2021-02-16 NOTE — PATIENT INSTRUCTIONS
No alcohol while taking flagyl and for 3 days after.  Follow-up with urology for urinary leakage and frequency    Patient Education     Bacterial Vaginosis    You have a vaginal infection called bacterial vaginosis (BV). Both good and bad bacteria are present in a healthy vagina. BV occurs when these bacteria get out of balance. The number of bad bacteria increase. And the number of good bacteria decrease. BV is linked with sexual activity, but it's not a sexually transmitted infection (STI).   BV may or may not cause symptoms. If symptoms do occur, they can include:     Thin, gray, milky-white, or sometimes green discharge    Unpleasant odor or  fishy  smell    Itching, burning, or pain in or around the vagina  It is not known what causes BV, but certain factors can make the problem more likely. These can include:     Douching    Spermicides    Use of antibiotics    Change in hormone levels with pregnancy, breastfeeding, or menopause    Having sex with a new partner    Having sex with more than one partner  BV will sometimes go away on its own. But treatment is often advised. This is because untreated BV can raise the risk of more serious health problems such as:     Pelvic inflammatory disease (PID)     delivery (giving birth to a baby early if you re pregnant)    HIV and some other sexually transmitted infections (STIs)    Infection after surgery on the reproductive organs  Home care  General care    BV is most often treated with medicines called antibiotics. These may be given as pills or as a vaginal cream. If antibiotics are prescribed, be sure to use them exactly as directed. And complete all of the medicine, even if your symptoms go away.    Don't douche or having sex during treatment.    If you have sex with a female partner, ask your healthcare provider if she should also be treated.  Prevention    Don't douche.    Don't have sex. If you do have sex, then take steps to lower your risk:  ? Use  condoms when having sex.  ? Limit the number of sex partners you have.    Follow-up care  Follow up with your healthcare provider, or as advised.   When to get medical advice  Call your healthcare provider right away if:     You have a fever of 100.4 F (38 C) or higher, or as directed by your provider.    Your symptoms get worse, or they don t go away within a few days of starting treatment.    You have new pain in the lower belly or pelvic region.    You have side effects that bother you or a reaction to the pills or cream you re prescribed.    You or any of your sex partners have new symptoms, such as a rash, joint pain, or sores.  [employer] last reviewed this educational content on 6/1/2020 2000-2020 [employer]. [address] [place] [address], [place] [address]. All rights reserved. This information is not intended as a substitute for professional medical care. Always follow your healthcare professional's instructions.

## 2021-02-16 NOTE — NURSING NOTE
"Chief Complaint   Patient presents with     Urinary Problem     Initial /76 (BP Location: Right arm, Patient Position: Sitting, Cuff Size: Adult Large)   Pulse 100   Temp 98.1  F (36.7  C) (Oral)   Resp 20   Wt 111.9 kg (246 lb 11.2 oz)   SpO2 100%   BMI 41.05 kg/m   Estimated body mass index is 41.05 kg/m  as calculated from the following:    Height as of 8/13/20: 1.651 m (5' 5\").    Weight as of this encounter: 111.9 kg (246 lb 11.2 oz).  BP completed using cuff size large right arm    Lisa Magill, CMA    "

## 2021-02-16 NOTE — PROGRESS NOTES
Assessment & Plan     Urinary frequency  Urinary incontinence, unspecified type  Ongoing for past month or so. UA shows no evidence of infection. A1c is normal. She appears well, non-toxic, afebrile. Unclear cause. Discussed recommendation to follow-up with urology if persistent. Follow-up if worsening symptoms or if developing fever, vomiting, flank pain, abdominal pain.  Urgent follow-up if back pain, leg pain, paresthesias, saddle anesthesia, leg weakness, or other concerns. Risks and benefits of treatment plan discussed. Patient and/or parent acknowledges and agrees with plan of care, all questions answered.    - *UA reflex to Microscopic and Culture ([place] and [facility] (except [place] and [place])  - HCG Qual, Urine ([id number])  - Hemoglobin A1c  - Basic metabolic panel  (Ca, Cl, CO2, Creat, Gluc, K, Na, BUN)  - TSH with free T4 reflex  - CBC with platelets  - UROLOGY ADULT REFERRAL; Future    Vaginal itching  - Wet prep    Bacterial vaginosis  Treat with Flagyl. Discussed no alcohol while taking and for 3 days after. Follow-up if worsening or no improvement.  - metroNIDAZOLE (FLAGYL) 500 MG tablet; Take 1 tablet (500 mg) by mouth 2 times daily for 7 days    Antibiotic-induced yeast infection  History of antibiotic induced yeast infections, will give diflucan to take if she develops symptoms.  - fluconazole (DIFLUCAN) 150 MG tablet; Take 1 tablet (150 mg) by mouth once for 1 dose    Morbid obesity (H)    Return in about 1 week (around 2/23/2021) for urology, if no improvement, earlier if worsening.    Nicki De León PA-C  [facility]    Subjective     Lucero Collier is a 25 year old female who presents to clinic today for the following health issues    HPI     Genitourinary - Female  Onset/Duration: 1 month ago  Description:   Painful urination (Dysuria): no           Frequency: YES  Blood in urine (Hematuria): no  Delay in urine (Hesitency): no  Intensity: moderate -  severe  Progression of Symptoms:  same  Accompanying Signs & Symptoms:  Fever/chills: YES- sometimes chills, no fever  Flank pain: no  Nausea and vomiting: YES- nausea  Vaginal symptoms: itching  Abdominal/Pelvic Pain: no  History:   History of frequent UTI s: YES  History of kidney stones: YES  Sexually Active: YES  Possibility of pregnancy: Maybe - has IUD but states she got pregnant with the IUD in the past  Precipitating or alleviating factors: None  Therapies tried and outcome:  nothing     Over the past month, has noticed increased urinary frequency and urgency. Sometimes doesn't make it to the bathroom and has some urinary leakage in her underwear. No hematuria, dysuria. Has some nausea but no vomiting. No abdominal pain, flank pain. No fever. Has tried drinking less but didn't make a difference. Has noticed she is more thirsty. Does not have children. Has had some vaginal itching, went to ER and was prescribed a cream which helped. No more vaginal itching. No abnormal vaginal discharge or bleeding. Has IUD - does not have regular periods. No concern for STI. Had STI testing in November which was negative. No new partners since then. Mild low back pain, no leg pain/weakness/paresthesias, saddle anesthesia.    Review of Systems   Constitutional, HEENT, cardiovascular, pulmonary, gi and gu systems are negative, except as otherwise noted.      Objective    /76 (BP Location: Right arm, Patient Position: Sitting, Cuff Size: Adult Large)   Pulse 100   Temp 98.1  F (36.7  C) (Oral)   Resp 20   Wt 111.9 kg (246 lb 11.2 oz)   SpO2 100%   BMI 41.05 kg/m    Body mass index is 41.05 kg/m .  Physical Exam   GENERAL: healthy, alert and no distress  NECK: no adenopathy, no asymmetry, masses, or scars and thyroid normal to palpation  RESP: lungs clear to auscultation - no rales, rhonchi or wheezes  CV: regular rate and rhythm, normal S1 S2, no S3 or S4, no murmur, click or rub, no peripheral edema and peripheral  pulses strong  ABDOMEN: soft, nontender, no hepatosplenomegaly, no masses and bowel sounds normal  MS: no gross musculoskeletal defects noted, no edema  NEURO: Normal strength and tone, mentation intact and speech normal  BACK: no CVA tenderness, no paralumbar tenderness  PSYCH: mentation appears normal, affect normal/bright    Results for orders placed or performed in visit on 02/16/21 (from the past 24 hour(s))   *UA reflex to Microscopic and Culture ([place] and [facility] (except [place] and [place])    Specimen: Midstream Urine   Result Value Ref Range    Color Urine Yellow     Appearance Urine Clear     Glucose Urine Negative NEG^Negative mg/dL    Bilirubin Urine Negative NEG^Negative    Ketones Urine Negative NEG^Negative mg/dL    Specific Gravity Urine 1.025 1.003 - 1.035    Blood Urine Negative NEG^Negative    pH Urine 8.0 (H) 5.0 - 7.0 pH    Protein Albumin Urine Negative NEG^Negative mg/dL    Urobilinogen Urine 0.2 0.2 - 1.0 EU/dL    Nitrite Urine Negative NEG^Negative    Leukocyte Esterase Urine Negative NEG^Negative    Source Midstream Urine    HCG Qual, Urine ([id number])   Result Value Ref Range    HCG Qual Urine Negative NEG^Negative   Wet prep    Specimen: Vagina   Result Value Ref Range    Specimen Description Vagina     Wet Prep Few  Clue cells seen   (A)     Wet Prep No Trichomonas seen     Wet Prep No yeast seen     Wet Prep Few  WBC'S seen      Hemoglobin A1c   Result Value Ref Range    Hemoglobin A1C 5.2 0 - 5.6 %   CBC with platelets   Result Value Ref Range    WBC 6.9 4.0 - 11.0 10e9/L    RBC Count 4.26 3.8 - 5.2 10e12/L    Hemoglobin 11.9 11.7 - 15.7 g/dL    Hematocrit 36.6 35.0 - 47.0 %    MCV 86 78 - 100 fl    MCH 27.9 26.5 - 33.0 pg    MCHC 32.5 31.5 - 36.5 g/dL    RDW 12.2 10.0 - 15.0 %    Platelet Count 302 150 - 450 10e9/L

## 2021-03-16 ENCOUNTER — NURSE TRIAGE (OUTPATIENT)
Dept: NURSING | Facility: CLINIC | Age: 26
End: 2021-03-16

## 2021-03-16 DIAGNOSIS — R35.0 URINARY FREQUENCY: Primary | ICD-10-CM

## 2021-03-16 DIAGNOSIS — F90.2 ATTENTION DEFICIT HYPERACTIVITY DISORDER (ADHD), COMBINED TYPE: ICD-10-CM

## 2021-03-16 RX ORDER — DEXTROAMPHETAMINE SACCHARATE, AMPHETAMINE ASPARTATE MONOHYDRATE, DEXTROAMPHETAMINE SULFATE AND AMPHETAMINE SULFATE 5; 5; 5; 5 MG/1; MG/1; MG/1; MG/1
20 CAPSULE, EXTENDED RELEASE ORAL DAILY
Qty: 30 CAPSULE | Refills: 0 | Status: CANCELLED | OUTPATIENT
Start: 2021-03-16

## 2021-03-16 RX ORDER — DEXTROAMPHETAMINE SACCHARATE, AMPHETAMINE ASPARTATE MONOHYDRATE, DEXTROAMPHETAMINE SULFATE AND AMPHETAMINE SULFATE 5; 5; 5; 5 MG/1; MG/1; MG/1; MG/1
20 CAPSULE, EXTENDED RELEASE ORAL DAILY
Qty: 30 CAPSULE | Refills: 0 | Status: SHIPPED | OUTPATIENT
Start: 2021-03-16 | End: 2022-03-10

## 2021-03-16 NOTE — LETTER
March 16, 2021      Lucreo Collier  [address] [facility] [address]        Dear Ms. Lucero Collier,    We recently received a call from your pharmacy requesting a refill of your medication Adderall.    We are contacting you today to notify you that you are due for a medication check for further refills.    We have authorized one refill of your medication to allow time for you to schedule your appointment.    This appointment can be in clinic or virtual by either telephone, video.    Please call [phone] to schedule an appointment or if you have MyChart you can schedule with your provider as well.    Taking care of your health is important to us, an ongoing visits with your provider are vital to your care. We look forward to seeing you in the near future.    Thank you for using [facility] for your Medical Needs.          Sincerely,     Cristal Beltran, MSN, FNP-BC

## 2021-03-16 NOTE — TELEPHONE ENCOUNTER
"Triage Call:    -Patient needs a refill of her Adderall.  -RN confirmed dose and directions of how patient is taking.   -Patient is out of medication.  -Medication and pharmacy pended.  -RN advised patient in the future that [facility] has a 72 buisness hour policy to address any refills, but RN will send message over to provider and care team to advise on. Patient verbalized understanding and agrees with plan.     Patient would like a call back once this is ordered.  Call patient at: [phone]  Okay to leave a detailed message? YES    Salena Farris RN, BSN Nurse Triage Advisor 12:49 PM 3/16/2021     Reason for Disposition    Caller requesting a NON-URGENT new prescription or refill and triager unable to refill per department policy    Protocols used: MEDICATION QUESTION CALL-A-OH      COVID 19 Nurse Triage Plan/Patient Instructions    Please be aware that novel coronavirus (COVID-19) may be circulating in the community. If you develop symptoms such as fever, cough, or SOB or if you have concerns about the presence of another infection including coronavirus (COVID-19), please contact your health care provider or visit [email or web address].     Disposition/Instructions    Additional COVID19 information to add for patients.   How can I protect others?  If you have symptoms (fever, cough, body aches or trouble breathing): Stay home and away from others (self-isolate) until:    At least 10 days have passed since your symptoms started, And     You ve had no fever--and no medicine that reduces fever--for 1 full day (24 hours), And      Your other symptoms have resolved (gotten better).     If you don t have symptoms, but a test showed that you have COVID-19 (you tested positive):    Stay home and away from others (self-isolate). Follow the tips under \"How do I self-isolate?\" below for 10 days (20 days if you have a weak immune system).    You don't need to be retested for COVID-19 before going back to " school or work. As long as you're fever-free and feeling better, you can go back to school, work and other activities after waiting the 10 or 20 days.     How do I self-isolate?    Stay in your own room, even for meals. Use your own bathroom if you can.     Stay away from others in your home. No hugging, kissing or shaking hands. No visitors.    Don t go to work, school or anywhere else.     Clean  high touch  surfaces often (doorknobs, counters, handles, etc.). Use a household cleaning spray or wipes. You ll find a full list on the EPA website:  www.epa.gov/pesticide-registration/list-n-disinfectants-use-against-sars-cov-2.    Cover your mouth and nose with a mask, tissue or washcloth to avoid spreading germs.    Wash your hands and face often. Use soap and water.    Caregivers in these groups are at risk for severe illness due to COVID-19:  o People 65 years and older  o People who live in a nursing home or long-term care facility  o People with chronic disease (lung, heart, cancer, diabetes, kidney, liver, immunologic)  o People who have a weakened immune system, including those who:  - Are in cancer treatment  - Take medicine that weakens the immune system, such as corticosteroids  - Had a bone marrow or organ transplant  - Have an immune deficiency  - Have poorly controlled HIV or AIDS  - Are obese (body mass index of 40 or higher)  - Smoke regularly    Caregivers should wear gloves while washing dishes, handling laundry and cleaning bedrooms and bathrooms.    Use caution when washing and drying laundry: Don t shake dirty laundry, and use the warmest water setting that you can.    For more tips, go to www.cdc.gov/coronavirus/2019-ncov/downloads/10Things.pdf.    How can I take care of myself?  1. Get lots of rest. Drink extra fluids (unless a doctor has told you not to).     2. Take Tylenol (acetaminophen) for fever or pain. If you have liver or kidney problems, ask your family doctor if it s okay to take  Tylenol.     Adults can take either:     650 mg (two 325 mg pills) every 4 to 6 hours, or     1,000 mg (two 500 mg pills) every 8 hours as needed.     Note: Don t take more than 3,000 mg in one day.   Acetaminophen is found in many medicines (both prescribed and over-the-counter medicines). Read all labels to be sure you don t take too much.     For children, check the Tylenol bottle for the right dose. The dose is based on the child s age or weight.    3. If you have other health problems (like cancer, heart failure, an organ transplant or severe kidney disease): Call your specialty clinic if you don t feel better in the next 2 days.    4. Know when to call 911: Emergency warning signs include:    Trouble breathing or shortness of breath    Pain or pressure in the chest that doesn t go away    Feeling confused like you haven t felt before, or not being able to wake up    Bluish-colored lips or face    What are the symptoms of COVID-19?     The most common symptoms are cough, fever and trouble breathing.     Less common symptoms include body aches, chills, diarrhea (loose, watery poops), fatigue (feeling very tired), headache, runny nose, sore throat and loss of smell.    COVID-19 can cause severe coughing (bronchitis) and lung infection (pneumonia).    How does it spread?     The virus may spread when a person coughs or sneezes into the air. The virus can travel about 6 feet this way, and it can live on surfaces.      Common  (household disinfectants) will kill the virus.    Who is at risk?  Anyone can catch COVID-19 if they re around someone who has the virus.    How can others protect themselves?     Stay away from people who have COVID-19 (or symptoms of COVID-19).    Wash hands often with soap and water. Or, use hand  with at least 60% alcohol.    Avoid touching the eyes, nose or mouth.     Wear a face mask when you go out in public, when sick or when caring for a sick person.    Where can I  get more information?    [facility]: About COVID-19: [email or web address]    CDC: What to Do If You re Sick: www.cdc.gov/coronavirus/2019-ncov/about/steps-when-sick.html    CDC: Ending Home Isolation: www.cdc.gov/coronavirus/2019-ncov/hcp/disposition-in-home-patients.html     CDC: Caring for Someone: www.cdc.gov/coronavirus/2019-ncov/if-you-are-sick/care-for-someone.html     [facility]: Interim Guidance for Hospital Discharge to Home: www.[facility]/diseases/coronavirus/hcp/hospdischarge.pdf    [facility] clinical trials (COVID-19 research studies): clinicalaffairs.[facility]-clinical-trials     Below are the COVID-19 hotlines at the [place] Department of [facility] ([facility]). Interpreters are available.   o For health questions: Call 259-684-5314 or 1-767.888.3511 (7 a.m. to 7 p.m.)  o For questions about schools and childcare: Call 966-397-5959 or 1-591.870.8047 (7 a.m. to 7 p.m.)          Thank you for taking steps to prevent the spread of this virus.  o Limit your contact with others.  o Wear a simple mask to cover your cough.  o Wash your hands well and often.    Resources    [facility]: About COVID-19: [email or web address]    CDC: What to Do If You're Sick: www.cdc.gov/coronavirus/2019-ncov/about/steps-when-sick.html    CDC: Ending Home Isolation: www.cdc.gov/coronavirus/2019-ncov/hcp/disposition-in-home-patients.html     CDC: Caring for Someone: www.cdc.gov/coronavirus/2019-ncov/if-you-are-sick/care-for-someone.html     [facility]: Interim Guidance for Hospital Discharge to Home: www.[facility]/diseases/coronavirus/hcp/hospdischarge.pdf    [facility] clinical trials (COVID-19 research studies): [email or web address].[email or web address]-clinical-trials     Below are the COVID-19 hotlines at the [place] [facility] ([facility]). Interpreters are available.   o For health questions: Call 732-126-0056 or 1-922.360.3199 (7 a.m. to 7 p.m.)  o For questions about  schools and childcare: Call 440-343-7620 or 1-737.987.1784 (7 a.m. to 7 p.m.)

## 2021-03-16 NOTE — TELEPHONE ENCOUNTER
Pt due for a visit.  Please help her schedule.  We have not refilled this for her lately.  Refill sent.  DANIELA.

## 2021-04-05 ENCOUNTER — VIRTUAL VISIT (OUTPATIENT)
Dept: FAMILY MEDICINE | Facility: CLINIC | Age: 26
End: 2021-04-05
Payer: COMMERCIAL

## 2021-04-05 DIAGNOSIS — F90.2 ATTENTION DEFICIT HYPERACTIVITY DISORDER (ADHD), COMBINED TYPE: ICD-10-CM

## 2021-04-05 DIAGNOSIS — K21.9 GASTROESOPHAGEAL REFLUX DISEASE WITHOUT ESOPHAGITIS: ICD-10-CM

## 2021-04-05 DIAGNOSIS — F32.2 SEVERE SINGLE CURRENT EPISODE OF MAJOR DEPRESSIVE DISORDER, WITHOUT PSYCHOTIC FEATURES (H): ICD-10-CM

## 2021-04-05 DIAGNOSIS — F43.23 ADJUSTMENT DISORDER WITH MIXED ANXIETY AND DEPRESSED MOOD: ICD-10-CM

## 2021-04-05 PROCEDURE — 99214 OFFICE O/P EST MOD 30 MIN: CPT | Mod: 95 | Performed by: NURSE PRACTITIONER

## 2021-04-05 RX ORDER — DEXTROAMPHETAMINE SACCHARATE, AMPHETAMINE ASPARTATE MONOHYDRATE, DEXTROAMPHETAMINE SULFATE AND AMPHETAMINE SULFATE 5; 5; 5; 5 MG/1; MG/1; MG/1; MG/1
20 CAPSULE, EXTENDED RELEASE ORAL DAILY
Qty: 30 CAPSULE | Refills: 0 | Status: SHIPPED | OUTPATIENT
Start: 2021-05-16 | End: 2021-06-15

## 2021-04-05 RX ORDER — SERTRALINE HYDROCHLORIDE 100 MG/1
200 TABLET, FILM COATED ORAL DAILY
Qty: 60 TABLET | Refills: 3 | Status: SHIPPED | OUTPATIENT
Start: 2021-04-05 | End: 2022-03-10

## 2021-04-05 RX ORDER — DEXTROAMPHETAMINE SACCHARATE, AMPHETAMINE ASPARTATE MONOHYDRATE, DEXTROAMPHETAMINE SULFATE AND AMPHETAMINE SULFATE 5; 5; 5; 5 MG/1; MG/1; MG/1; MG/1
20 CAPSULE, EXTENDED RELEASE ORAL DAILY
Qty: 30 CAPSULE | Refills: 0 | Status: CANCELLED | OUTPATIENT
Start: 2021-04-05

## 2021-04-05 RX ORDER — DEXTROAMPHETAMINE SACCHARATE, AMPHETAMINE ASPARTATE MONOHYDRATE, DEXTROAMPHETAMINE SULFATE AND AMPHETAMINE SULFATE 5; 5; 5; 5 MG/1; MG/1; MG/1; MG/1
20 CAPSULE, EXTENDED RELEASE ORAL DAILY
Qty: 30 CAPSULE | Refills: 0 | Status: SHIPPED | OUTPATIENT
Start: 2021-06-16 | End: 2021-07-16

## 2021-04-05 RX ORDER — DEXTROAMPHETAMINE SACCHARATE, AMPHETAMINE ASPARTATE MONOHYDRATE, DEXTROAMPHETAMINE SULFATE AND AMPHETAMINE SULFATE 5; 5; 5; 5 MG/1; MG/1; MG/1; MG/1
20 CAPSULE, EXTENDED RELEASE ORAL DAILY
Qty: 30 CAPSULE | Refills: 0 | Status: SHIPPED | OUTPATIENT
Start: 2021-04-16 | End: 2021-05-16

## 2021-04-05 RX ORDER — QUETIAPINE FUMARATE 100 MG/1
100 TABLET, FILM COATED ORAL AT BEDTIME
Qty: 90 TABLET | Refills: 1 | Status: SHIPPED | OUTPATIENT
Start: 2021-04-05 | End: 2022-03-10

## 2021-04-05 NOTE — PROGRESS NOTES
"Lucero is a 25 year old who is being evaluated via a billable video visit.      How would you like to obtain your AVS? MyChart  If the video visit is dropped, the invitation should be resent by: Text to cell phone: [phone]  Will anyone else be joining your video visit? No      Video Start Time: 3:21 PM    Assessment & Plan         Gastroesophageal reflux disease without esophagitis  Uses as needed with good results.  - omeprazole (PRILOSEC) 20 MG DR capsule; Take 1 capsule (20 mg) by mouth daily    Attention deficit hyperactivity disorder (ADHD), combined type  Stable, monitor.   Request an additional 3 months when needed.  Visit in 6 months.  - amphetamine-dextroamphetamine (ADDERALL XR) 20 MG 24 hr capsule; Take 1 capsule (20 mg) by mouth daily  - amphetamine-dextroamphetamine (ADDERALL XR) 20 MG 24 hr capsule; Take 1 capsule (20 mg) by mouth daily  - amphetamine-dextroamphetamine (ADDERALL XR) 20 MG 24 hr capsule; Take 1 capsule (20 mg) by mouth daily    Adjustment disorder with mixed anxiety and depressed mood  Restart seroquel as planned.  Continue zoloft.  - QUEtiapine (SEROQUEL) 100 MG tablet; Take 1 tablet (100 mg) by mouth At Bedtime 50mg. At bedtime x 7 days , then if needed 100mg. At bedtime daily .  - sertraline (ZOLOFT) 100 MG tablet; Take 2 tablets (200 mg) by mouth daily    Severe single current episode of major depressive disorder, without psychotic features (H)  - sertraline (ZOLOFT) 100 MG tablet; Take 2 tablets (200 mg) by mouth daily         Tobacco Cessation:   reports that she has been smoking cigarettes. She has been smoking about 1.00 pack per day. Her smokeless tobacco use includes chew.      BMI:   Estimated body mass index is 41.05 kg/m  as calculated from the following:    Height as of 8/13/20: 1.651 m (5' 5\").    Weight as of 2/16/21: 111.9 kg (246 lb 11.2 oz).           No follow-ups on file.    Cristal Beltran, SHELBY CNP  [facility]    Subjective   Lucero " is a 25 year old who presents for the following health issues     HPI     Medication Followup of prilosec, adderall, seroquel     Restarted adderall as she started working again.  She is doing well with this.  Feels it is helping, no dose change desired.  No side effects.    Needs refill of omeprazole.  Takes as needed.    Also would like to restart seroquel.  Mood is stable, continues on zoloft.    Review of Systems   Constitutional, HEENT, cardiovascular, pulmonary, gi and gu systems are negative, except as otherwise noted.      Objective           Vitals:  No vitals were obtained today due to virtual visit.    Physical Exam   GENERAL: Healthy, alert and no distress  EYES: Eyes grossly normal to inspection.  No discharge or erythema, or obvious scleral/conjunctival abnormalities.  RESP: No audible wheeze, cough, or visible cyanosis.  No visible retractions or increased work of breathing.    SKIN: Visible skin clear. No significant rash, abnormal pigmentation or lesions.  NEURO: Cranial nerves grossly intact.  Mentation and speech appropriate for age.  PSYCH: Mentation appears normal, affect normal/bright, judgement and insight intact, normal speech and appearance well-groomed.            Video-Visit Details    Type of service:  Video Visit    Video End Time:3:29 PM    Originating Location (pt. Location): Home    Distant Location (provider location):  [facility]     Platform used for Video Visit: [employer]

## 2021-04-21 ENCOUNTER — OFFICE VISIT - HEALTHEAST (OUTPATIENT)
Dept: FAMILY MEDICINE | Facility: CLINIC | Age: 26
End: 2021-04-21

## 2021-04-21 DIAGNOSIS — S05.02XA ABRASION OF LEFT CORNEA, INITIAL ENCOUNTER: ICD-10-CM

## 2021-04-21 ASSESSMENT — ANXIETY QUESTIONNAIRES
2. NOT BEING ABLE TO STOP OR CONTROL WORRYING: MORE THAN HALF THE DAYS
IF YOU CHECKED OFF ANY PROBLEMS ON THIS QUESTIONNAIRE, HOW DIFFICULT HAVE THESE PROBLEMS MADE IT FOR YOU TO DO YOUR WORK, TAKE CARE OF THINGS AT HOME, OR GET ALONG WITH OTHER PEOPLE: SOMEWHAT DIFFICULT
1. FEELING NERVOUS, ANXIOUS, OR ON EDGE: MORE THAN HALF THE DAYS
4. TROUBLE RELAXING: MORE THAN HALF THE DAYS
5. BEING SO RESTLESS THAT IT IS HARD TO SIT STILL: MORE THAN HALF THE DAYS
6. BECOMING EASILY ANNOYED OR IRRITABLE: MORE THAN HALF THE DAYS
7. FEELING AFRAID AS IF SOMETHING AWFUL MIGHT HAPPEN: MORE THAN HALF THE DAYS
3. WORRYING TOO MUCH ABOUT DIFFERENT THINGS: MORE THAN HALF THE DAYS
GAD7 TOTAL SCORE: 14

## 2021-04-21 ASSESSMENT — MIFFLIN-ST. JEOR: SCORE: 1826.82

## 2021-04-21 ASSESSMENT — PATIENT HEALTH QUESTIONNAIRE - PHQ9: SUM OF ALL RESPONSES TO PHQ QUESTIONS 1-9: 16

## 2021-05-13 DIAGNOSIS — K21.9 GASTROESOPHAGEAL REFLUX DISEASE WITHOUT ESOPHAGITIS: ICD-10-CM

## 2021-05-27 ASSESSMENT — PATIENT HEALTH QUESTIONNAIRE - PHQ9: SUM OF ALL RESPONSES TO PHQ QUESTIONS 1-9: 16

## 2021-05-28 ASSESSMENT — ANXIETY QUESTIONNAIRES: GAD7 TOTAL SCORE: 14

## 2021-06-04 VITALS
OXYGEN SATURATION: 98 % | SYSTOLIC BLOOD PRESSURE: 118 MMHG | WEIGHT: 228 LBS | HEIGHT: 65 IN | HEART RATE: 93 BPM | BODY MASS INDEX: 37.99 KG/M2 | DIASTOLIC BLOOD PRESSURE: 64 MMHG | RESPIRATION RATE: 16 BRPM

## 2021-06-05 VITALS
WEIGHT: 236.1 LBS | BODY MASS INDEX: 39.34 KG/M2 | HEIGHT: 65 IN | SYSTOLIC BLOOD PRESSURE: 122 MMHG | DIASTOLIC BLOOD PRESSURE: 76 MMHG | HEART RATE: 100 BPM

## 2021-06-05 NOTE — PROGRESS NOTES
"Chief Complaint   Patient presents with     Diabetes     Pt requesting DM check, fasting       HPI: 24-year-old female presents today because, \"I want my diabetes checked\".  When I inquired about diabetes she states that she is never been told she has diabetes but she wants to make sure that she does not.  She would like to have her annual exam performed today.  She notes that her Pap smear is up-to-date.  We discussed breast cancer and mammogram timing.  The patient states that she has discontinued her antiseizure medications at the request of her neurologist.    ROS: 10 point system review complete notable for nausea occasional eye pain and blurred vision occasional headaches and dry skin.    SH: Reviewed-see Snapshot for review.     FH: Reviewed-see Snapshot for review.    Meds:  Lucero has a current medication list which includes the following prescription(s): bacitracin-polymyxin b, docusate sodium, famotidine, fluocinonide, levetiracetam, levonorgestrel, mirtazapine, misoprostol, omeprazole, ondansetron, ondansetron, pimecrolimus, prochlorperazine, quetiapine, ranitidine, sertraline, sertraline, and sumatriptan.    O:  /64   Pulse 93   Resp 16   Ht 5' 5\" (1.651 m)   Wt (!) 228 lb (103.4 kg)   SpO2 98%   BMI 37.94 kg/m    Constitutional:    --Vitals as above  --No acute distress  Eyes-  --Sclera noninjected  --Lids and conjunctiva normal  ENT-  --TMs clear  --Sclera noninjected  --Pharynx not erythematous  Neck-  --Neck supple    Lymph-  --No cervical lymphadenopathy  Lungs-  --Clear to Auscultation  Heart-  --Regular rate and rhythm  Skin-  --Pink and dry  Psych-  --Alert and oriented x3 with blunted affect    A/P:   1. Wellness examination  Reviewed immunizations which are up-to-date.  Discussed mammogram timing.  - Glycosylated Hemoglobin A1c  - HM2(CBC w/o Differential)  - Basic Metabolic Panel    2. Smoking  Encourage cessation    3. Nonintractable epilepsy without status epilepticus, " unspecified epilepsy type (H)  Encourage patient to follow-up with neurology

## 2021-06-05 NOTE — TELEPHONE ENCOUNTER
"Pt called c/o headache and symptoms that may be related to blood sugar levels.  Headache location: Top towards the forehead, headache  Pain level: 6 or 7 out of 10  Started at 5pm this evening, constant   Lightheaded  Shakiness  Minimal pain in right eye  Have tried tylenol and not effective  Blurry vision intermittent  Nausea  No recent injury to the head  Not pregnant   No fever   Pt states hx of diabetes on both sides of family.   Pt states she has had symptoms like this in the past and it normally happens if blood sugar is getting low, when asked if pt has checked her blood sugars at home, she states no. Pt states, she would eat something and then the headache goes away after a half hour or so. She has tried eating but the headache is not going away.   Hx of migraines, pt states this headache is \"not like that\"       Reason for Disposition    Headache    [1] MILD dizziness (e.g., walking normally) AND [2] has NOT been evaluated by physician for this  (Exception: dizziness caused by heat exposure, sudden standing, or poor fluid intake)    Protocols used: HEADACHE-A-AH, DIZZINESS - [id number]    Care Disposition: See PCP within 3 days. Pt verbalizes understanding. Pt has an OV scheduled for tomorrow.     Sujata Wilcox RN Triage Nurse Advisor 10:33 PM      "

## 2021-06-12 NOTE — TELEPHONE ENCOUNTER
COVID 19 Nurse Triage Plan/Patient Instructions    Please be aware that novel coronavirus (COVID-19) may be circulating in the community. If you develop symptoms such as fever, cough, or SOB or if you have concerns about the presence of another infection including coronavirus (COVID-19), please contact your health care provider or visit [email or web address].     Disposition/Instructions    In-Person Visit with provider recommended. Reference Visit Selection Guide.    Thank you for taking steps to prevent the spread of this virus.  o Limit your contact with others.  o Wear a simple mask to cover your cough.  o Wash your hands well and often.    Resources    [facility]: About COVID-19: [email or web address]    CDC: What to Do If You're Sick: www.cdc.gov/coronavirus/2019-ncov/about/steps-when-sick.html    CDC: Ending Home Isolation: www.cdc.gov/coronavirus/2019-ncov/hcp/disposition-in-home-patients.html     CDC: Caring for Someone: www.cdc.gov/coronavirus/2019-ncov/if-you-are-sick/care-for-someone.html     [facility]: Interim Guidance for Hospital Discharge to Home: www.health.[facility].[place]/diseases/coronavirus/hcp/hospdischarge.pdf    [facility] clinical trials (COVID-19 research studies): clinicalaffairs.[facility]-clinical-trials     Below are the COVID-19 hotlines at the [place] [facility] ([facility]). Interpreters are available.   o For health questions: Call 591-322-0383 or 1-489.452.6395 (7 a.m. to 7 p.m.)  o For questions about schools and childcare: Call 873-464-5643 or 1-878.824.2989 (7 a.m. to 7 p.m.)           RN triage   Call from pt   Pt states she broke her R foot and seen in ED on 10/9-- has walking boot  Was getting better   Now since Monday = getting worse -   More swelling - more pain   And now bruising by toes  No redness or red streaks   Has some tingling off and on = since injury = no change   Is still icing and elevating   Reviewed home care advice   Per protocol = should  be seen  -- pt wants to establish care at [place]   Transferred to    Claire Moreno RN [facility] RN triage      Reason for Disposition    Patient wants to be seen    Additional Information    Negative: Major bleeding (actively dripping or spurting) that can't be stopped    Negative: Amputation or bone sticking through the skin    Negative: Looks like a dislocated joint (crooked or deformed)    Negative: Sounds like a life-threatening emergency to the triager    Negative: Wound looks infected    Negative: Caused by an animal bite    Negative: Puncture wound of foot    Negative: Toe injury is the main symptom    Negative: Cast problems or questions    Negative: Numbness (new loss of sensation) of toe(s)    Negative: Looks infected (e.g., spreading redness, pus, red streak)    Protocols used: ANKLE AND FOOT INJURY-A-OH

## 2021-06-16 NOTE — PATIENT INSTRUCTIONS - HE
Your eye stain today suggests that you are dealing with a corneal abrasion.    Erythromycin ointment to the left eye 4 times daily for 7 days.    Follow-up with the ophthalmology clinic in case your symptoms are not improving as they should.  Please call [place] [facility] at [phone] to make an appointment.    Ibuprofen for pain.  Take with food.

## 2021-06-16 NOTE — PROGRESS NOTES
"    Assessment & Plan     Abrasion of left cornea  - Ambulatory referral to Ophthalmology  - erythromycin ophthalmic ointment  Dispense: 3.5 g; Refill: 0    Patient Instructions   Your eye stain today suggests that you are dealing with a corneal abrasion.    Erythromycin ointment to the left eye 4 times daily for 7 days.    Follow-up with the ophthalmology clinic in case your symptoms are not improving as they should.  Please call [place] [facility] at [phone] to make an appointment.    Ibuprofen for pain.  Take with food.        15 minutes spent on the date of the encounter doing chart review, history and exam, documentation and further activities per the note       Tobacco Cessation:   reports that she has been smoking. She has never used smokeless tobacco.  I have counseled the patient for tobacco cessation and the follow up will occur  at the next visit.  BMI:   Estimated body mass index is 39.29 kg/m  as calculated from the following:    Height as of this encounter: 5' 5\" (1.651 m).    Weight as of this encounter: 236 lb 1.6 oz (107.1 kg).       Depression Screening Follow Up    PHQ 4/21/2021   PHQ-9 Total Score 16   Q9: Thoughts of better off dead/self-harm past 2 weeks 0       Follow Up Actions Taken  Crisis resource information provided in After Visit Summary  Referred patient back to PCP   Return in about 6 months (around 10/21/2021).    Jhoan Maldonado [facility]   Lucero Collier is 25 y.o. and presents today for the following health issues   HPI     Yesterday, she accidentally scratched her left eye with a Ziploc bag.  She has been using some of her contact solution for symptomatic relief.  She does not feel like her vision acuity has been affected much.  At times, she will feel like there is a foreign object in her eye-gritty type sensation    Review of Systems  negative      Objective    /76 (Patient Site: Right Arm, Patient Position: " "Sitting, Cuff Size: Adult Large)   Pulse 100   Ht 5' 5\" (1.651 m)   Wt (!) 236 lb 1.6 oz (107.1 kg)   BMI 39.29 kg/m    Body mass index is 39.29 kg/m .  Physical Exam  Fluorescein stain reveals possible corneal abrasion on left eye. PEERLA. Sclera is injected.              "

## 2021-06-20 NOTE — LETTER
Letter by Thomas Fuentes MD at      Author: Thomas Fuentes MD Service: -- Author Type: --    Filed:  Encounter Date: 1/29/2020 Status: (Other)         Lucero Collier  [address] [place] [facility] [id number]            January 29, 2020        Dear Ms. Collier,        Below are the results from your recent visit:    Resulted Orders   Glycosylated Hemoglobin A1c   Result Value Ref Range    Hemoglobin A1c 5.2 3.5 - 6.0 %   HM2(CBC w/o Differential)   Result Value Ref Range    WBC 7.5 4.0 - 11.0 thou/uL    RBC 4.88 3.80 - 5.40 mill/uL    Hemoglobin 13.9 12.0 - 16.0 g/dL    Hematocrit 41.9 35.0 - 47.0 %    MCV 86 80 - 100 fL    MCH 28.5 27.0 - 34.0 pg    MCHC 33.2 32.0 - 36.0 g/dL    RDW 12.1 11.0 - 14.5 %    Platelets 319 140 - 440 thou/uL    MPV 7.9 7.0 - 10.0 fL   Basic Metabolic Panel   Result Value Ref Range    Sodium 141 136 - 145 mmol/L    Potassium 4.1 3.5 - 5.0 mmol/L    Chloride 108 (H) 98 - 107 mmol/L    CO2 19 (L) 22 - 31 mmol/L    Anion Gap, Calculation 14 5 - 18 mmol/L    Glucose 91 70 - 125 mg/dL    Calcium 9.3 8.5 - 10.5 mg/dL    BUN 9 8 - 22 mg/dL    Creatinine 0.68 0.60 - 1.10 mg/dL    GFR MDRD Af Amer >60 >60 mL/min/1.73m2    GFR MDRD Non Af Amer >60 >60 mL/min/1.73m2    Narrative    Fasting Glucose reference range is 70-99 mg/dL per  American Diabetes Association (ADA) guidelines.         Lucero, your laboratory results are normal.  That is good news.  Thank you for coming in to visit.  You should see ANA LAURA Meyer,  in 1 year.      Sincerely,        CASE Fuentes MD, DIEGO  [facility]  [phone]

## 2021-06-22 ENCOUNTER — NURSE TRIAGE (OUTPATIENT)
Dept: FAMILY MEDICINE | Facility: CLINIC | Age: 26
End: 2021-06-22

## 2021-06-22 NOTE — TELEPHONE ENCOUNTER
Patient states she c/o chest pain past couple days. Last time experienced chest pain was last night, lasted couple of hours. Describes as a dull ache and someone sitting on chest. Was hard to breathe at that time and c/o back pain  Checked blood pressure on Sunday = 158/89,   Has not checked blood pressure today  No chest pain now  Did wear a heart monitor a couple years ago for same symptoms  Due to patient not having chest pain now, to go to nearest urgent care. If unable to get to urgent care, to go to ED    Beba GARCIAN, RN        Reason for Disposition    Chest pain lasting longer than 5 minutes and occurred in last 3 days (72 hours) (Exception: feels exactly the same as previously diagnosed heartburn and has accompanying sour taste in mouth)    Protocols used: CHEST PAIN-A-OH

## 2021-07-25 ENCOUNTER — HEALTH MAINTENANCE LETTER (OUTPATIENT)
Age: 26
End: 2021-07-25

## 2021-08-26 ENCOUNTER — PATIENT OUTREACH (OUTPATIENT)
Dept: FAMILY MEDICINE | Facility: CLINIC | Age: 26
End: 2021-08-26

## 2021-08-26 NOTE — LETTER
[facility]  [address] [facility] [id number] [phone]       October 7, 2021    Lucero Collier  [address] [facility] [address]    Dear Vivek,    We care about your health and have reviewed your health plan and are making recommendations based on this review, to optimize your health.    You are in particular need of attention regarding:  -Depression  -Cervical Cancer Screening  -Wellness (Physical) Visit     We are recommending that you:  -schedule a FOLLOWUP OFFICE APPOINTMENT with me.         In addition, here is a list of due or overdue Health Maintenance reminders.    Health Maintenance Due   Topic Date Due     URINE DRUG SCREEN  Never done     ANNUAL REVIEW OF HM ORDERS  Never done     Discuss Advance Care Planning  Never done     Pneumococcal Vaccine (1 of 2 - PPSV23) Never done     Preventive Care Visit  01/28/2021     COVID-19 Vaccine (2 - Moderna 2-dose series) 02/19/2021     PAP Smear  07/09/2021     Flu Vaccine (1) 09/01/2021       To address the above recommendations, we encourage you to contact us at [phone], via [employer] or by contacting Central Scheduling toll free at [phone] 24 hours a day. They will assist you with finding the most convenient time and location.    Thank you for trusting [facility] and we appreciate the opportunity to serve you.  We look forward to supporting your healthcare needs in the future.    Healthy Regards,    Your [facility] Team

## 2021-08-26 NOTE — TELEPHONE ENCOUNTER
Patient Quality Outreach      Summary:    Patient has the following on her problem list/HM:     Depression / Dysthymia review           PHQ-9 SCORE 10/21/2020 4/21/2021 6/24/2021   PHQ-9 Total Score MyChart 8 (Mild depression) - -   PHQ-9 Total Score 8 16 12       If PHQ-9 recheck is 5 or more, route to provider for next steps.    Patient is due/failing the following:   Cervical Cancer Screening - PAP Needed and PHQ-9 Needed and Depression follow-up visit    Type of outreach:    Sent True Style message.    Questions for provider review:    None                                                                                                                                     Beba Ambriz [facility]     Chart routed to Care Team.

## 2021-09-01 NOTE — PROGRESS NOTES
Clinic Care Coordination Contact    Clinical Product Navigator RN reviewed chart; patient on payer product coverage.  Review results: Not met any any referral criteria at this time.  Will monitor for future needs    Sujata Ramirez RN/Clinical Product Navigator     Protopic Pregnancy And Lactation Text: This medication is Pregnancy Category C. It is unknown if this medication is excreted in breast milk when applied topically.

## 2021-09-13 ENCOUNTER — OFFICE VISIT (OUTPATIENT)
Dept: URGENT CARE | Facility: URGENT CARE | Age: 26
End: 2021-09-13
Payer: COMMERCIAL

## 2021-09-13 VITALS
HEART RATE: 97 BPM | SYSTOLIC BLOOD PRESSURE: 112 MMHG | DIASTOLIC BLOOD PRESSURE: 60 MMHG | TEMPERATURE: 98.5 F | OXYGEN SATURATION: 98 %

## 2021-09-13 DIAGNOSIS — R30.0 DYSURIA: ICD-10-CM

## 2021-09-13 DIAGNOSIS — N23 KIDNEY PAIN: ICD-10-CM

## 2021-09-13 DIAGNOSIS — N39.0 URINARY TRACT INFECTION WITHOUT HEMATURIA, SITE UNSPECIFIED: Primary | ICD-10-CM

## 2021-09-13 DIAGNOSIS — M54.50 ACUTE BILATERAL LOW BACK PAIN WITHOUT SCIATICA: ICD-10-CM

## 2021-09-13 DIAGNOSIS — B37.31 CANDIDIASIS OF VAGINA: ICD-10-CM

## 2021-09-13 LAB
ALBUMIN UR-MCNC: NEGATIVE MG/DL
APPEARANCE UR: CLEAR
BACTERIA #/AREA URNS HPF: ABNORMAL /HPF
BILIRUB UR QL STRIP: NEGATIVE
COLOR UR AUTO: YELLOW
GLUCOSE UR STRIP-MCNC: NEGATIVE MG/DL
HGB UR QL STRIP: NEGATIVE
KETONES UR STRIP-MCNC: NEGATIVE MG/DL
LEUKOCYTE ESTERASE UR QL STRIP: ABNORMAL
NITRATE UR QL: NEGATIVE
PH UR STRIP: 7.5 [PH] (ref 5–7)
RBC #/AREA URNS AUTO: ABNORMAL /HPF
SP GR UR STRIP: 1.02 (ref 1–1.03)
SQUAMOUS #/AREA URNS AUTO: ABNORMAL /LPF
UROBILINOGEN UR STRIP-ACNC: 0.2 E.U./DL
WBC #/AREA URNS AUTO: ABNORMAL /HPF

## 2021-09-13 PROCEDURE — 87086 URINE CULTURE/COLONY COUNT: CPT | Performed by: FAMILY MEDICINE

## 2021-09-13 PROCEDURE — 99215 OFFICE O/P EST HI 40 MIN: CPT | Performed by: FAMILY MEDICINE

## 2021-09-13 PROCEDURE — 81001 URINALYSIS AUTO W/SCOPE: CPT | Performed by: FAMILY MEDICINE

## 2021-09-13 PROCEDURE — 87186 SC STD MICRODIL/AGAR DIL: CPT | Performed by: FAMILY MEDICINE

## 2021-09-13 RX ORDER — FLUCONAZOLE 150 MG/1
150 TABLET ORAL ONCE
Qty: 1 TABLET | Refills: 0 | Status: SHIPPED | OUTPATIENT
Start: 2021-09-13 | End: 2021-09-13

## 2021-09-13 RX ORDER — CYCLOBENZAPRINE HCL 5 MG
5 TABLET ORAL
Qty: 30 TABLET | Refills: 0 | Status: SHIPPED | OUTPATIENT
Start: 2021-09-13 | End: 2021-09-22

## 2021-09-13 RX ORDER — CEPHALEXIN 500 MG/1
500 CAPSULE ORAL 2 TIMES DAILY
Qty: 20 CAPSULE | Refills: 0 | Status: SHIPPED | OUTPATIENT
Start: 2021-09-13 | End: 2021-09-21 | Stop reason: ALTCHOICE

## 2021-09-13 NOTE — PATIENT INSTRUCTIONS
Start cephalexin for urine infection based on symptoms    If upper back pain becomes worse, fever, nausea/vomiting to ER    For your back pain     Start  Ibuprofen (motrin/advil)  800 mg 3 times a day always take with food for the next 5 to 7 days until pain resolves-maximum dose of ibuprofen is 2400 mg in 24 hours     Can alternate with     Acetaminophen (Tylenol ) 1000 mg 3 times a day for the next 5 to 7 days until pain resolves-maximum dose of acetaminophen (tylenol)  is 3000 mg in 24-hours    Start cyclobenzaprine at bedtime, will make you sleepy, ok to take second dose if wake up in pain    If severe pain in back/weakness in legs, loss of bowel/bladder control to ER          Patient Education     Urinary Tract Infections in Women  Urinary tract infections (UTIs) are most often caused by bacteria. These bacteria enter the urinary tract. The bacteria may come from inside the body. Or they may travel from the skin outside the rectum or vagina into the urethra. Female anatomy makes it easy for bacteria from the bowel to enter a woman s urinary tract, which is the most common source of UTI. This means women develop UTIs more often than men. Pain in or around the urinary tract is a common UTI symptom. But the only way to know for sure if you have a UTI for the healthcare provider to test your urine. The two tests that may be done are the urinalysis and urine culture.     Types of UTIs    Cystitis. A bladder infection (cystitis) is the most common UTI in women. You may have urgent or frequent need to pee. You may also have pain, burning when you pee, and bloody urine.    Urethritis. This is an inflamed urethra, which is the tube that carries urine from the bladder to outside the body. You may have lower stomach or back pain. You may also have urgent or frequent need to pee.    Pyelonephritis. This is a kidney infection. If not treated, it can be serious and damage your kidneys. In severe cases, you may need to stay  in the hospital. You may have a fever and lower back pain.    Medicines to treat a UTI  Most UTIs are treated with antibiotics. These kill the bacteria. The length of time you need to take them depends on the type of infection. It may be as short as 3 days. If you have repeated UTIs, you may need a low-dose antibiotic for several months. Take antibiotics exactly as directed. Don t stop taking them until all of the medicine is gone. If you stop taking the antibiotic too soon, the infection may not go away. You may also develop a resistance to the antibiotic. This can make it much harder to treat.   Lifestyle changes to treat and prevent UTIs   The lifestyle changes below will help get rid of your UTI. They may also help prevent future UTIs.     Drink plenty of fluids. This includes water, juice, or other caffeine-free drinks. Fluids help flush bacteria out of your body.    Empty your bladder. Always empty your bladder when you feel the urge to pee. And always pee before going to sleep. Urine that stays in your bladder can lead to infection. Try to pee before and after sex as well.    Practice good personal hygiene. Wipe yourself from front to back after using the toilet. This helps keep bacteria from getting into the urethra.    Use condoms during sex. These help prevent UTIs caused by sexually transmitted bacteria. Also don't use spermicides during sex. These can increase the risk for UTIs. Choose other forms of birth control instead. For women who tend to get UTIs after sex, a low-dose of a preventive antibiotic may be used. Be sure to discuss this option with your healthcare provider.    Follow up with your healthcare provider as directed. He or she may test to make sure the infection has cleared. If needed, more treatment may be started.  [employer] last reviewed this educational content on 7/1/2019 2000-2021 [employer]. All rights reserved. This information is not intended as a substitute for  professional medical care. Always follow your healthcare professional's instructions.           Patient Education     Understanding Lumbosacral Strain    Lumbosacral strain is a medical term for an injury that causes low back pain. The lumbosacral area (low back) is between the bottom of the ribcage and the top of the buttocks. A strain is tearing of muscles and tendons. These tears can be very small but still cause pain.   How a lumbosacral strain happens  Muscles and tendons connected to the spine can be strained in a number of ways:    Sitting or standing in the same position for long periods of time. This can harm the low back over time. Poor posture can make low back pain more likely.    Moving the muscles and tendons past their usual range of motion. This can cause a sudden injury. This can happen when you twist, bend over, or lift something heavy. Not using correct technique for sports or tasks like lifting can make back injury more likely.    Accidents or falls  Lumbosacral strain can be caused by other problems, but these are less common.  Symptoms of lumbosacral strain  Symptoms may include:    Pain in the back, often on one side    Pain that gets worse with movement and gets better with rest    Inability to move as freely as usual    Swelling, slight redness, and skin warmth in the painful area  Treatment for lumbosacral strain  Low back pain often goes away by itself within several weeks. But it often comes back. Treatment focuses on reducing pain and avoiding further injury. Bed rest is usually not recommended for low back pain. Treatments may include:     Avoiding or changing the action that caused the problem. This helps prevent injuring the tissues again.    Prescription or over-the-counter medicines. These help reduce inflammation, swelling, and pain. NSAIDs (nonsteroidal anti-inflammatory drugs) are the most common medicines used. Medicines may be prescribed or bought over the counter. They may be  given as pills. Or they may be put on the skin a gel, cream, or patch.    Cold or heat packs. These help reduce pain and swelling.    Stretching and other exercises.  These improve flexibility and strength.    Physical therapy. This usually includes exercises and other treatments.    Injections of medicine. This may relieve symptoms. The medicine is usually a corticosteroid. This is a strong anti-inflammatory medicine.  If these treatments don't relieve symptoms, your healthcare provider may order imaging tests to learn more about the problem. Sometimes you may need surgery.   Possible complications of lumbosacral strain  If the cause of the pain is not addressed, symptoms may return or get worse. Follow your healthcare provider s instructions on lifestyle changes and treating your back.   When to call your healthcare provider  Call your healthcare provider right away if you have any of these:    Fever of 100.4 F (38 C) or higher, or as directed by your rrovider    Chills    Numbness, tingling, or weakness    Problems with bowel or bladder control, or problems having sex    Pain that does not go away, or gets worse    New symptoms  [employer] last reviewed this educational content on 6/1/2019 2000-2021 [employer]. All rights reserved. This information is not intended as a substitute for professional medical care. Always follow your healthcare professional's instructions.

## 2021-09-13 NOTE — PROGRESS NOTES
Patient presents with:  Urgent Care: severe back pain by both kidneys- Had a UTI but yesterday back pain got worse        Subjective     Lucero Collier is a 26 year old female who presents to clinic today for the following health issues:    HPI     URINARY TRACT SYMPTOMS      Duration: 2 weeks urinary -onset symptoms-5 days resolved on own, no treatment, ok for 3-4 days and then symptoms with back pain     Back pain-ibuprofen/tylenol/biofreeze did not help    Back pain more severe in last 24 hours     Last tylenol this am /no fever/chills     Description  frequency, urgency, nocturia , hesitancy, retention, incontinence, back pain and kidney stone    Intensity:  moderate, severe    Accompanying signs and symptoms:  Fever/chills: no   Flank pain YES  Nausea and vomiting: YES- nausea   Vaginal symptoms: none  Abdominal/Pelvic Pain: YES    History  History of frequent UTI's: YES-3-4 in a year  History of kidney stones: YES- onset childhood  Sexually Active: YES- male partners, 2 days ago- months   IUD mirena   Possibility of pregnancy: No    Precipitating or alleviating factors: None    Therapies tried and outcome: ibuprofen and Tylenol/cranberry juice   Outcome: no improvement              Past Medical History:   Diagnosis Date     ADHD     on Daytrana and clonidine     Adverse reaction to pertussis vaccine      Allergies     daily Claritin     Anxiety      Chronic headaches      Complex partial seizures (H)      Constipation      Depression      Depressive disorder      Developmental delay      Epilepsy (H)      GERD (gastroesophageal reflux disease)     UGI 3/07     History of tonsillectomy      Hypoglycemia     Endocrine eval neg 10/00     Kidney stone     6/02 detected on CT scan; resolved 12/04     Myopia      Obesity      Sinusitis, chronic      Sleep disorder     Since 2000; sleep clinic evaluation 4/00 Dr. Ramesh      Smoking      Spells      Syncope      Urinary tract infection 11/99    nl renal US & VCUG  11/99     Social History     Tobacco Use     Smoking status: Current Every Day Smoker     Packs/day: 1.00     Types: Cigarettes     Smokeless tobacco: Never Used     Tobacco comment: 5 qd   Substance Use Topics     Alcohol use: Yes     Alcohol/week: 0.0 standard drinks     Comment: Alcoholic Drinks/day: Occasional       Current Outpatient Medications   Medication Sig Dispense Refill     amphetamine-dextroamphetamine (ADDERALL XR) 20 MG 24 hr capsule Take 1 capsule (20 mg) by mouth daily 30 capsule 0     IBUPROFEN PO Take 600 mg by mouth every 6 hours as needed for moderate pain       levonorgestrel (MIRENA) 20 MCG/24HR IUD 1 each by Intrauterine route once       omeprazole (PRILOSEC) 20 MG DR capsule Take 1 capsule (20 mg) by mouth daily 30 capsule 1     pimecrolimus (ELIDEL) 1 % cream Apply topically 2 times daily Ok to use on face 60 g 3     QUEtiapine (SEROQUEL) 100 MG tablet Take 1 tablet (100 mg) by mouth At Bedtime 50mg. At bedtime x 7 days , then if needed 100mg. At bedtime daily . 90 tablet 1     sertraline (ZOLOFT) 100 MG tablet Take 2 tablets (200 mg) by mouth daily 60 tablet 3     SUMAtriptan (IMITREX) 25 MG tablet TAKE 1-2 TABLETS AT ONSET OF MIGRAINE. MAY REPEAT IN 2 HOURS. MAX 8 TABS/24 HOURS 9 tablet 0     fluocinonide (LIDEX) 0.05 % external cream Apply sparingly to affected area twice daily as needed.  Do not apply to face. (Patient not taking: Reported on 4/5/2021) 60 g 0     methocarbamol (ROBAXIN) 500 MG tablet Take 1-3 tablets (500-1,500 mg) by mouth 3 times daily as needed for muscle spasms 60 tablet 0     No Known Allergies          ROS are negative, except as otherwise noted HPI      Objective    /60   Pulse 97   Temp 98.5  F (36.9  C)   SpO2 98%   There is no height or weight on file to calculate BMI.  Physical Exam   GENERAL:  alert and no distress  ABDOMEN: soft, nontender, no hepatosplenomegaly, no masses and bowel sounds normal  Left CVAT  MS: no gross musculoskeletal defects  noted, no edema  NEURO: Normal strength and tone, mentation intact and speech normal  BACK:   Comprehensive back pain exam:  Tenderness of paraspinous muscles in lumbar area , Pain with flexion less than extension , Lower extremity strength functional and equal on both sides-normal heel/toe gait , Lower extremity reflexes within normal limits bilaterally +2, Lower extremity sensation normal and equal on both sides and Straight leg raise negative bilaterally    Diagnostic Test Results:  Labs reviewed in Epic  Results for orders placed or performed in visit on 09/13/21   UA macro with reflex to Microscopic and Culture - Clinc Collect     Status: Abnormal    Specimen: Urine, Clean Catch   Result Value Ref Range    Color Urine Yellow Colorless, Straw, Light Yellow, Yellow    Appearance Urine Clear Clear    Glucose Urine Negative Negative mg/dL    Bilirubin Urine Negative Negative    Ketones Urine Negative Negative mg/dL    Specific Gravity Urine 1.020 1.003 - 1.035    Blood Urine Negative Negative    pH Urine 7.5 (H) 5.0 - 7.0    Protein Albumin Urine Negative Negative mg/dL    Urobilinogen Urine 0.2 0.2, 1.0 E.U./dL    Nitrite Urine Negative Negative    Leukocyte Esterase Urine Trace (A) Negative   Urine Microscopic     Status: Abnormal   Result Value Ref Range    Bacteria Urine Few (A) None Seen /HPF    RBC Urine 0-2 0-2 /HPF /HPF    WBC Urine 0-5 0-5 /HPF /HPF    Squamous Epithelials Urine Few (A) None Seen /LPF    Narrative    Urine Culture not indicated         ASSESSMENT/PLAN:      ICD-10-CM    1. Urinary tract infection without hematuria, site unspecified  N39.0 DISCONTINUED: cephALEXin (KEFLEX) 500 MG capsule   2. Acute bilateral low back pain without sciatica  M54.5 DISCONTINUED: cyclobenzaprine (FLEXERIL) 5 MG tablet   3. Dysuria  R30.0 Urine Culture Aerobic Bacterial - lab collect     Urine Culture Aerobic Bacterial - lab collect   4. Kidney pain  N23 UA macro with reflex to Microscopic and Culture - Clinc  Collect     Urine Microscopic     Urine Culture Aerobic Bacterial - lab collect     Urine Culture Aerobic Bacterial - lab collect   5. Candidiasis of vagina  B37.3 fluconazole (DIFLUCAN) 150 MG tablet             Reviewed medication instructions and side effects. Follow up if experiences side effects.     I reviewed supportive care, otc meds to use if needed, expected course, and signs of concern.  Follow up as needed or if she does not improve within  1-2 days or if worsens in any way.  Reviewed red flag symptoms and is to go to the ER if experiences any of these.     The use of Dragon/PowerMic dictation services may have been used to construct the content in this note; any grammatical or spelling errors are non-intentional. Please contact the author of this note directly if you are in need of any clarification.        On the day of the encounter, time spend on chart review, patient visit, review of testing, documentation and discussion with other providers was 40 minutes        Patient Instructions   Start cephalexin for urine infection based on symptoms    If upper back pain becomes worse, fever, nausea/vomiting to ER    For your back pain     Start  Ibuprofen (motrin/advil)  800 mg 3 times a day always take with food for the next 5 to 7 days until pain resolves-maximum dose of ibuprofen is 2400 mg in 24 hours     Can alternate with     Acetaminophen (Tylenol ) 1000 mg 3 times a day for the next 5 to 7 days until pain resolves-maximum dose of acetaminophen (tylenol)  is 3000 mg in 24-hours    Start cyclobenzaprine at bedtime, will make you sleepy, ok to take second dose if wake up in pain    If severe pain in back/weakness in legs, loss of bowel/bladder control to ER          Patient Education     Urinary Tract Infections in Women  Urinary tract infections (UTIs) are most often caused by bacteria. These bacteria enter the urinary tract. The bacteria may come from inside the body. Or they may travel from the skin  outside the rectum or vagina into the urethra. Female anatomy makes it easy for bacteria from the bowel to enter a woman s urinary tract, which is the most common source of UTI. This means women develop UTIs more often than men. Pain in or around the urinary tract is a common UTI symptom. But the only way to know for sure if you have a UTI for the healthcare provider to test your urine. The two tests that may be done are the urinalysis and urine culture.     Types of UTIs    Cystitis. A bladder infection (cystitis) is the most common UTI in women. You may have urgent or frequent need to pee. You may also have pain, burning when you pee, and bloody urine.    Urethritis. This is an inflamed urethra, which is the tube that carries urine from the bladder to outside the body. You may have lower stomach or back pain. You may also have urgent or frequent need to pee.    Pyelonephritis. This is a kidney infection. If not treated, it can be serious and damage your kidneys. In severe cases, you may need to stay in the hospital. You may have a fever and lower back pain.    Medicines to treat a UTI  Most UTIs are treated with antibiotics. These kill the bacteria. The length of time you need to take them depends on the type of infection. It may be as short as 3 days. If you have repeated UTIs, you may need a low-dose antibiotic for several months. Take antibiotics exactly as directed. Don t stop taking them until all of the medicine is gone. If you stop taking the antibiotic too soon, the infection may not go away. You may also develop a resistance to the antibiotic. This can make it much harder to treat.   Lifestyle changes to treat and prevent UTIs   The lifestyle changes below will help get rid of your UTI. They may also help prevent future UTIs.     Drink plenty of fluids. This includes water, juice, or other caffeine-free drinks. Fluids help flush bacteria out of your body.    Empty your bladder. Always empty your bladder  when you feel the urge to pee. And always pee before going to sleep. Urine that stays in your bladder can lead to infection. Try to pee before and after sex as well.    Practice good personal hygiene. Wipe yourself from front to back after using the toilet. This helps keep bacteria from getting into the urethra.    Use condoms during sex. These help prevent UTIs caused by sexually transmitted bacteria. Also don't use spermicides during sex. These can increase the risk for UTIs. Choose other forms of birth control instead. For women who tend to get UTIs after sex, a low-dose of a preventive antibiotic may be used. Be sure to discuss this option with your healthcare provider.    Follow up with your healthcare provider as directed. He or she may test to make sure the infection has cleared. If needed, more treatment may be started.  Belle last reviewed this educational content on 7/1/2019 2000-2021 [employer]. All rights reserved. This information is not intended as a substitute for professional medical care. Always follow your healthcare professional's instructions.           Patient Education     Understanding Lumbosacral Strain    Lumbosacral strain is a medical term for an injury that causes low back pain. The lumbosacral area (low back) is between the bottom of the ribcage and the top of the buttocks. A strain is tearing of muscles and tendons. These tears can be very small but still cause pain.   How a lumbosacral strain happens  Muscles and tendons connected to the spine can be strained in a number of ways:    Sitting or standing in the same position for long periods of time. This can harm the low back over time. Poor posture can make low back pain more likely.    Moving the muscles and tendons past their usual range of motion. This can cause a sudden injury. This can happen when you twist, bend over, or lift something heavy. Not using correct technique for sports or tasks like lifting can  make back injury more likely.    Accidents or falls  Lumbosacral strain can be caused by other problems, but these are less common.  Symptoms of lumbosacral strain  Symptoms may include:    Pain in the back, often on one side    Pain that gets worse with movement and gets better with rest    Inability to move as freely as usual    Swelling, slight redness, and skin warmth in the painful area  Treatment for lumbosacral strain  Low back pain often goes away by itself within several weeks. But it often comes back. Treatment focuses on reducing pain and avoiding further injury. Bed rest is usually not recommended for low back pain. Treatments may include:     Avoiding or changing the action that caused the problem. This helps prevent injuring the tissues again.    Prescription or over-the-counter medicines. These help reduce inflammation, swelling, and pain. NSAIDs (nonsteroidal anti-inflammatory drugs) are the most common medicines used. Medicines may be prescribed or bought over the counter. They may be given as pills. Or they may be put on the skin a gel, cream, or patch.    Cold or heat packs. These help reduce pain and swelling.    Stretching and other exercises.  These improve flexibility and strength.    Physical therapy. This usually includes exercises and other treatments.    Injections of medicine. This may relieve symptoms. The medicine is usually a corticosteroid. This is a strong anti-inflammatory medicine.  If these treatments don't relieve symptoms, your healthcare provider may order imaging tests to learn more about the problem. Sometimes you may need surgery.   Possible complications of lumbosacral strain  If the cause of the pain is not addressed, symptoms may return or get worse. Follow your healthcare provider s instructions on lifestyle changes and treating your back.   When to call your healthcare provider  Call your healthcare provider right away if you have any of these:    Fever of 100.4 F  (38 C) or higher, or as directed by your rrovider    Chills    Numbness, tingling, or weakness    Problems with bowel or bladder control, or problems having sex    Pain that does not go away, or gets worse    New symptoms  Belle last reviewed this educational content on 6/1/2019 2000-2021 [employer]. All rights reserved. This information is not intended as a substitute for professional medical care. Always follow your healthcare professional's instructions.

## 2021-09-13 NOTE — LETTER
[facility]  [address]  [address]  [place] [address]  Phone: [phone]  Fax: [phone]    September 13, 2021        Lucero Collier  [facility] [place] [id number]          To whom it may concern:    RE: Lucero Collier    Patient was seen and treated today at our clinic. Please excuse her from work today, 9/13/21.      Please contact me for questions or concerns.      Sincerely,        Nora Cummings MD

## 2021-09-16 DIAGNOSIS — N39.0 URINARY TRACT INFECTION WITHOUT HEMATURIA, SITE UNSPECIFIED: Primary | ICD-10-CM

## 2021-09-16 LAB — BACTERIA UR CULT: ABNORMAL

## 2021-09-16 RX ORDER — NITROFURANTOIN 25; 75 MG/1; MG/1
100 CAPSULE ORAL 2 TIMES DAILY
Qty: 14 CAPSULE | Refills: 0 | Status: SHIPPED | OUTPATIENT
Start: 2021-09-16 | End: 2021-09-23

## 2021-09-16 NOTE — PROGRESS NOTES
Urine culture positive for E.coli, resistant to Keflex.    New RX macrobid 100 mg BID X 7 days efaxed to pharmacy on file.    Will have support staff notify patient of medication adjustment    Kaushal Rodriguez MD,  September 16, 2021 10:52 AM

## 2021-09-19 ENCOUNTER — HEALTH MAINTENANCE LETTER (OUTPATIENT)
Age: 26
End: 2021-09-19

## 2021-09-21 ENCOUNTER — NURSE TRIAGE (OUTPATIENT)
Dept: FAMILY MEDICINE | Facility: CLINIC | Age: 26
End: 2021-09-21

## 2021-09-21 NOTE — TELEPHONE ENCOUNTER
"S-(situation): Pt calling with back pain    B-(background): Pt noted seen in UC for UTI 8 days ago. Pt has changed abx due to no improvement with first. Pt now reporting back pain    A-(assessment): Pt reports left sided back pain, 7/10. Pt noted bladder symptoms are improved. Pt denies fever, chills, hematuria, N/V. Pt reports no relief with OTC medications. Pt reports pain as constant without aggravating symptoms like movement or urination. Pt reports hx of kidney stones. Denies low flow or trouble starting urination, reports going often. Denies loss of bowel or bladder control.    R-(recommendations): Pt advised to be seen, advised may need US to r/o kidney stone. Pt advised can be seen in clinic tomorrow morning or if pain not tolerable can go to ER. Pt would like clinic visit.   Next 5 appointments (look out 90 days)    Sep 22, 2021  7:35 AM  (Arrive by 7:15 AM)  Office Visit with Chirag Short PA-C  [facility] ([facility] ) [address] [place] [id number] [phone]        Chet PARK RN   [facility] Triage        Reason for Disposition    MODERATE pain (e.g., interferes with normal activities or awakens from sleep)    History of kidney stones    MILD pain (i.e., scale 1-3; does not interfere with normal activities) and present > 3 days    Answer Assessment - Initial Assessment Questions  1. LOCATION: \"Where does it hurt?\" (e.g., left, right)      left  2. ONSET: \"When did the pain start?\"      Last weekend   3. SEVERITY: \"How bad is the pain?\" (e.g., Scale 1-10; mild, moderate, or severe)    - MILD (1-3): doesn't interfere with normal activities     - MODERATE (4-7): interferes with normal activities or awakens from sleep     - SEVERE (8-10): excruciating pain and patient unable to do normal activities (stays in bed)        7/10   4. PATTERN: \"Does the pain come and go, or is it constant?\"       Constant pain. " "  5. CAUSE: \"What do you think is causing the pain?\"      Kidney? Hx of kidney stones  6. OTHER SYMPTOMS:  \"Do you have any other symptoms?\" (e.g., fever, abdominal pain, vomiting, leg weakness, burning with urination, blood in urine)      none  7. PREGNANCY:  \"Is there any chance you are pregnant?\" \"When was your last menstrual period?\"      no    No alleviating pain relief factors, no irritating factors    Protocols used: FLANK PAIN-A-OH      "

## 2021-09-22 ENCOUNTER — OFFICE VISIT (OUTPATIENT)
Dept: FAMILY MEDICINE | Facility: CLINIC | Age: 26
End: 2021-09-22
Payer: COMMERCIAL

## 2021-09-22 VITALS
HEART RATE: 102 BPM | OXYGEN SATURATION: 97 % | SYSTOLIC BLOOD PRESSURE: 125 MMHG | RESPIRATION RATE: 20 BRPM | DIASTOLIC BLOOD PRESSURE: 80 MMHG | TEMPERATURE: 97.4 F | WEIGHT: 228 LBS | BODY MASS INDEX: 37.99 KG/M2 | HEIGHT: 65 IN

## 2021-09-22 DIAGNOSIS — R10.9 LEFT FLANK PAIN: ICD-10-CM

## 2021-09-22 DIAGNOSIS — G40.209 PARTIAL SYMPTOMATIC EPILEPSY WITH COMPLEX PARTIAL SEIZURES, NOT INTRACTABLE, WITHOUT STATUS EPILEPTICUS (H): ICD-10-CM

## 2021-09-22 DIAGNOSIS — N30.01 ACUTE CYSTITIS WITH HEMATURIA: Primary | ICD-10-CM

## 2021-09-22 LAB
ALBUMIN UR-MCNC: NEGATIVE MG/DL
APPEARANCE UR: CLEAR
BILIRUB UR QL STRIP: NEGATIVE
COLOR UR AUTO: YELLOW
GLUCOSE UR STRIP-MCNC: NEGATIVE MG/DL
HCG UR QL: NEGATIVE
HGB UR QL STRIP: NEGATIVE
KETONES UR STRIP-MCNC: NEGATIVE MG/DL
LEUKOCYTE ESTERASE UR QL STRIP: NEGATIVE
NITRATE UR QL: NEGATIVE
PH UR STRIP: 5.5 [PH] (ref 5–7)
SP GR UR STRIP: 1.02 (ref 1–1.03)
UROBILINOGEN UR STRIP-ACNC: 0.2 E.U./DL

## 2021-09-22 PROCEDURE — 99214 OFFICE O/P EST MOD 30 MIN: CPT | Performed by: PHYSICIAN ASSISTANT

## 2021-09-22 PROCEDURE — 81025 URINE PREGNANCY TEST: CPT | Performed by: PHYSICIAN ASSISTANT

## 2021-09-22 PROCEDURE — 81003 URINALYSIS AUTO W/O SCOPE: CPT | Performed by: PHYSICIAN ASSISTANT

## 2021-09-22 RX ORDER — METHOCARBAMOL 500 MG/1
500-1500 TABLET, FILM COATED ORAL 3 TIMES DAILY PRN
Qty: 60 TABLET | Refills: 0 | Status: SHIPPED | OUTPATIENT
Start: 2021-09-22 | End: 2024-08-12

## 2021-09-22 ASSESSMENT — MIFFLIN-ST. JEOR: SCORE: 1785.08

## 2021-09-22 NOTE — PROGRESS NOTES
Assessment & Plan     Acute cystitis with hematuria  Previously seen on UA. Continued back symptoms though urinary symptoms have improved. Will recheck UA today for evaluation of resolution. macrobid is not best for pyelo and though does not appear as severe pyelo on exam, this is possible etiology. If needed, will change to cipro bid. Stone also possible. If signs of blood but no infection, consider CT abdomen and pelvis without contrast for evaluation. STI considered as well as pregnancy. If UA negative, these to be added to just in case urine obtained.   - UA Macro with Reflex to Micro and Culture - lab collect; Future  - Extra Tube; Future  - UA Macro with Reflex to Micro and Culture - lab collect    Left flank pain  As above. Also, possible unrelated msk etiology. Pending work up if negative UA, will given alternative muscle relaxant robaxin 500-1500 mg tid prn with continued nsaids and heat. If remaining work up is normal and symptoms fail to improve in 1-2 weeks, consider formal physical therapy.   - UA Macro with Reflex to Micro and Culture - lab collect; Future  - Extra Tube; Future  - UA Macro with Reflex to Micro and Culture - lab collect       (G40.209) Partial symptomatic epilepsy with complex partial seizures, not intractable, without status epilepticus (H)  Comment: stable per patient. Due for routine annual wellness with pcp. To be discussed more at this visit.   Plan:           Return in about 3 months (around 12/22/2021) for Physical Exam, pap, with pcp.    Chirag Short PA-C  [facility]   [place] is a 26 year old who presents for the following health issues     HPI     ED/UC Followup:    Facility:  [facility]  Date of visit: 9/13/21  Reason for visit: UTI  Current Status: having left lower back pain    In summary, patient was seen at  on 9/13 for 2 weeks of left lower back pain and urination increased frequency, hesitancy, and nocturia. Was  "found to have a UTI and given keflex. Culture showed resistance so this was changed to macrobid. This is scheduled to be completed tomorrow and while the urinary symptoms have resolved, back pain is unchanged. Does not radiate. No change throughout the day or with any activity. 7/10. No change in pain with urination. No obvious blood seen in urine. Was given flexeril 5 mg without improvement. Continues heat and otc ibuprofen with little to no improvement.     No fever or chills. No recent periods secondary to IUD. Is sexually active. Not concerned for sti. Denies any vaginal symptoms.           Review of Systems   Constitutional, HEENT, cardiovascular, pulmonary, GI, , musculoskeletal, neuro, skin, endocrine and psych systems are negative, except as otherwise noted.      Objective    /80 (BP Location: Right arm, Patient Position: Chair, Cuff Size: Adult Large)   Pulse 102   Temp 97.4  F (36.3  C) (Oral)   Resp 20   Ht 1.651 m (5' 5\")   Wt 103.4 kg (228 lb)   SpO2 97%   BMI 37.94 kg/m    Body mass index is 37.94 kg/m .  Physical Exam   GENERAL: healthy, alert and no distress  RESP: lungs clear to auscultation - no rales, rhonchi or wheezes  CV: regular rates and rhythm, normal S1 S2, no S3 or S4 and no murmur, click or rub  ABDOMEN: soft, nontender, no hepatosplenomegaly, no masses and bowel sounds normal  Comprehensive back pain exam:  Tenderness of L3-s1 and left surrounding paralumbar musculature as well as left SI joint and left flank pain. , Range of motion not limited by pain, Lower extremity strength functional and equal on both sides, Lower extremity reflexes within normal limits bilaterally, Lower extremity sensation normal and equal on both sides and Straight leg raise negative bilaterally-pain  With resisted left hip flexion.   PSYCH: mentation appears normal, affect normal/bright                  "

## 2021-09-22 NOTE — PATIENT INSTRUCTIONS
Patient Education     Flank Pain with Uncertain Cause    The flank is the area between your upper belly (abdomen) and your back. Pain there is often caused by a problem with your kidneys. It might be a kidney infection or a kidney stone. Other causes of flank pain include spinal arthritis, a pinched nerve from a back injury, a rib injury, a bruise, a back muscle strain, inflammation, or spasm.  The cause of your flank pain is not certain. You may need other tests.  Home care  Follow these tips when caring for yourself at home:    You may use acetaminophen or ibuprofen to control pain, unless your healthcare provider prescribed another medicine. If you have chronic liver or kidney disease, talk with your provider before taking these medicines. Also talk with your provider first if you ve ever had a stomach ulcer or digestive bleeding.    If the pain is coming from your muscles, you may get relief with ice or heat. During the first 2 days after the injury, put an ice pack on the painful area for 20 minutes every 2 to 4 hours. This will reduce swelling and pain. A hot shower, hot bath, or heating pad works well for a muscle spasm. You can start with ice, then switch to heat after 2 days. You might find that alternating ice and heat works well. Use the method that feels the best to you.  Follow-up care  Follow up with your healthcare provider if your symptoms don t get better over the next few days.  When to seek medical advice  Call your healthcare provider right away if any of these happen:    Repeated vomiting    Fever of 100.4 F (38 C) or higher, or as directed by your healthcare provider    Flank pain that gets worse    Pain that spreads to the front of your belly (abdomen)    Dizziness, weakness, or fainting    Blood in your urine    Burning feeling when you urinate or the need to urinate often    Pain in one of your legs that gets worse    Numbness or weakness in a leg  [employer] last reviewed this educational  content on 10/1/2019    5005-0246 [employer]. All rights reserved. This information is not intended as a substitute for professional medical care. Always follow your healthcare professional's instructions.

## 2021-10-07 NOTE — TELEPHONE ENCOUNTER
Patient Quality Outreach 2nd Attempt      Summary:    Type of outreach:    Sent letter.    Next Steps:  Reach out within 90 days via Phone.    Max number of attempts reached: Yes. Will try again in 90 days if patient still on fail list.    Questions for provider review:    None                                                                                                                    Beba Ambriz CMA     Chart routed to Care Team.

## 2021-11-15 ENCOUNTER — OFFICE VISIT (OUTPATIENT)
Dept: OBGYN | Facility: CLINIC | Age: 26
End: 2021-11-15
Payer: COMMERCIAL

## 2021-11-15 VITALS — SYSTOLIC BLOOD PRESSURE: 128 MMHG | BODY MASS INDEX: 39.44 KG/M2 | WEIGHT: 237 LBS | DIASTOLIC BLOOD PRESSURE: 60 MMHG

## 2021-11-15 DIAGNOSIS — Z30.431 INTRAUTERINE DEVICE SURVEILLANCE: ICD-10-CM

## 2021-11-15 DIAGNOSIS — N64.4 BREAST TENDERNESS: Primary | ICD-10-CM

## 2021-11-15 LAB
B-HCG SERPL-ACNC: <1 IU/L (ref 0–5)
HCG IFA URINE: NEGATIVE

## 2021-11-15 PROCEDURE — 36415 COLL VENOUS BLD VENIPUNCTURE: CPT | Performed by: ADVANCED PRACTICE MIDWIFE

## 2021-11-15 PROCEDURE — 84703 CHORIONIC GONADOTROPIN ASSAY: CPT | Performed by: ADVANCED PRACTICE MIDWIFE

## 2021-11-15 PROCEDURE — 84702 CHORIONIC GONADOTROPIN TEST: CPT | Performed by: ADVANCED PRACTICE MIDWIFE

## 2021-11-15 PROCEDURE — 99203 OFFICE O/P NEW LOW 30 MIN: CPT | Performed by: ADVANCED PRACTICE MIDWIFE

## 2021-11-15 NOTE — PROGRESS NOTES
SUBJECTIVE:                                                   Lucero Collier is a 26 year old who presents to clinic today for the following health issue(s):  No chief complaint on file.      HPI:  Lucero presents today with report of two positive pregnancy tests at home. She took the pregnancy tests because, she didn't feel right and her breasts were tender.  Has not had a menses since she had Mirena IUD in place since 03/2019. She has not been able to feel her strings recently. Her last sexual contact was ~ 3 weeks ago.    No LMP recorded. (Menstrual status: IUD).  Menstrual History: amenorrhea   Patient is sexually active  No obstetric history on file..  Using IUD for contraception.   Health maintenance updated:  yes    Last PHQ-9 score on record =   PHQ-9 SCORE 6/24/2021   PHQ-9 Total Score MyChart -   PHQ-9 Total Score 12     Last GAD7 score on record =   CLARY-7 SCORE 6/24/2021   Total Score -   Total Score 6         Problem list and histories reviewed & adjusted, as indicated.  Additional history: as documented.    Patient Active Problem List   Diagnosis     ADHD (attention deficit hyperactivity disorder)     Constipation     Myopia     Spells     Chronic headaches     GERD (gastroesophageal reflux disease)     Adverse reaction to pertussis vaccine     Complex partial seizures (H)     Care Plan- Health Care Home     Learning problem     Overweight     Sleep disturbance     Adjustment disorder with mixed anxiety and depressed mood     Migraine without status migrainosus, not intractable, unspecified migraine type     Anxiety     Severe single current episode of major depressive disorder, without psychotic features (H)     Depression     HTN (hypertension)     Obesity (BMI 35.0-39.9) with comorbidity (H)     Past Surgical History:   Procedure Laterality Date     ENDOSCOPY UPPER WITH PANCREATIC STIMULATION  4/06    Esophagitis 4/06     NISSEN FUNDOPLICATION  6/07    Dr. Meneses     NISSEN FUNDOPLICATION        PE TUBES  3/98     TONSILLECTOMY & ADENOIDECTOMY  4/02      Social History     Tobacco Use     Smoking status: Current Every Day Smoker     Packs/day: 1.00     Types: Cigarettes     Smokeless tobacco: Never Used     Tobacco comment: 5 qd   Substance Use Topics     Alcohol use: Yes     Alcohol/week: 0.0 standard drinks     Comment: Alcoholic Drinks/day: Occasional      Problem (# of Occurrences) Relation (Name,Age of Onset)    Breast Cancer (1) Paternal Grandmother (61): right side breast cancer    Cardiovascular (1) Paternal Grandmother    Diabetes (2) Maternal Grandmother, Paternal Grandmother    Family History Negative (1) Father    Genetic Disorder (1) Other: nephew.-genetic defects     Hypertension (1) Maternal Grandmother    Obesity (1) Mother    Supraventricular tachycardia (1) Mother            Current Outpatient Medications   Medication Sig     amphetamine-dextroamphetamine (ADDERALL XR) 20 MG 24 hr capsule Take 1 capsule (20 mg) by mouth daily     IBUPROFEN PO Take 600 mg by mouth every 6 hours as needed for moderate pain     levonorgestrel (MIRENA) 20 MCG/24HR IUD 1 each by Intrauterine route once     methocarbamol (ROBAXIN) 500 MG tablet Take 1-3 tablets (500-1,500 mg) by mouth 3 times daily as needed for muscle spasms     omeprazole (PRILOSEC) 20 MG DR capsule Take 1 capsule (20 mg) by mouth daily     pimecrolimus (ELIDEL) 1 % cream Apply topically 2 times daily Ok to use on face     QUEtiapine (SEROQUEL) 100 MG tablet Take 1 tablet (100 mg) by mouth At Bedtime 50mg. At bedtime x 7 days , then if needed 100mg. At bedtime daily .     sertraline (ZOLOFT) 100 MG tablet Take 2 tablets (200 mg) by mouth daily     SUMAtriptan (IMITREX) 25 MG tablet TAKE 1-2 TABLETS AT ONSET OF MIGRAINE. MAY REPEAT IN 2 HOURS. MAX 8 TABS/24 HOURS     fluocinonide (LIDEX) 0.05 % external cream Apply sparingly to affected area twice daily as needed.  Do not apply to face. (Patient not taking: Reported on 4/5/2021)     No current  facility-administered medications for this visit.     No Known Allergies    ROS:  CONSTITUTIONAL: NEGATIVE for fever, chills, change in weight  BREAST: NEGATIVE for masses,  or discharge  POSITIVE for bilateral tenderness  GI: NEGATIVE for nausea, abdominal pain, heartburn, or change in bowel habits  : NEGATIVE for unusual urinary or vaginal symptoms. Periods are absent  NEURO: NEGATIVE for weakness, dizziness or paresthesias  HEME/ALLERGY/IMMUNE: NEGATIVE for bleeding problems  PSYCHIATRIC: NEGATIVE for changes in mood or affect    OBJECTIVE:     /60 (BP Location: Right arm, Cuff Size: Adult Large)   Wt 107.5 kg (237 lb)   BMI 39.44 kg/m    Body mass index is 39.44 kg/m .    PHYSICAL EXAM:  Constitutional:  Appearance: Well nourished, well developed alert, in no acute distress  Chest:  Respiratory Effort:  Breathing unlabored.   Neurologic:  Mental Status:  Oriented X3.  Normal strength and tone, sensory exam grossly normal, mentation intact and speech normal.    Psychiatric:  Mentation appears normal and affect normal/bright.     Pelvic Exam:  Vulva: No external lesions, normal hair distribution, no adenopathy  Vagina: Moist, pink, no abnormal discharge, well rugated, no lesions  Cervix: smooth, pink, no visible lesions, IUD strings visualized at cervical os  Uterus: Normal size, anteverted, non-tender, mobile  Ovaries: No mass, non-tender, mobile  Rectal exam: Deferred    In-Clinic Test Results:  Results for orders placed or performed in visit on 11/15/21   HCG IFA Urine POCT, Enter/Edit Result     Status: None   Result Value Ref Range    HCG IFA Urine Negative neg       ASSESSMENT/PLAN:                                                          ICD-10-CM    1. Breast tenderness  N64.4 HCG IFA Urine POCT, Enter/Edit Result     HCG quantitative pregnancy     HCG quantitative pregnancy   2. Intrauterine device surveillance  Z30.431          PLAN:    Clinic pregnancy test negative. Pelvic exam indicates IUD  in proper location. Quantitative HCG pending. Advised patient that IUD would need to be removed with positive pregnancy. Will advise patient of quantitative HCG when available. Encouraged patient to call with any questions or concerns.      SHELBY Zimmerman, SALOMONM

## 2021-11-15 NOTE — NURSING NOTE
"Chief Complaint   Patient presents with     Confirmation Of Pregnancy     2 positive home tests c/i feeling off and breast tenderness, has Mirena, unable to feel strings        Initial /60 (BP Location: Right arm, Cuff Size: Adult Large)   Wt 107.5 kg (237 lb)   BMI 39.44 kg/m   Estimated body mass index is 39.44 kg/m  as calculated from the following:    Height as of 9/22/21: 1.651 m (5' 5\").    Weight as of this encounter: 107.5 kg (237 lb).  BP completed using cuff size: large    Questioned patient about current smoking habits.  Pt. currently smokes.  Advised about smoking cessation.      No obstetric history on file.    "

## 2022-03-10 ENCOUNTER — VIRTUAL VISIT (OUTPATIENT)
Dept: FAMILY MEDICINE | Facility: CLINIC | Age: 27
End: 2022-03-10
Payer: COMMERCIAL

## 2022-03-10 DIAGNOSIS — F90.2 ATTENTION DEFICIT HYPERACTIVITY DISORDER (ADHD), COMBINED TYPE: Primary | ICD-10-CM

## 2022-03-10 DIAGNOSIS — N30.00 ACUTE CYSTITIS WITHOUT HEMATURIA: ICD-10-CM

## 2022-03-10 DIAGNOSIS — F32.2 SEVERE SINGLE CURRENT EPISODE OF MAJOR DEPRESSIVE DISORDER, WITHOUT PSYCHOTIC FEATURES (H): ICD-10-CM

## 2022-03-10 DIAGNOSIS — F43.23 ADJUSTMENT DISORDER WITH MIXED ANXIETY AND DEPRESSED MOOD: ICD-10-CM

## 2022-03-10 PROCEDURE — 99214 OFFICE O/P EST MOD 30 MIN: CPT | Mod: 95 | Performed by: NURSE PRACTITIONER

## 2022-03-10 RX ORDER — SERTRALINE HYDROCHLORIDE 100 MG/1
200 TABLET, FILM COATED ORAL DAILY
Qty: 180 TABLET | Refills: 1 | Status: SHIPPED | OUTPATIENT
Start: 2022-03-10 | End: 2023-01-26

## 2022-03-10 RX ORDER — NITROFURANTOIN 25; 75 MG/1; MG/1
100 CAPSULE ORAL 2 TIMES DAILY
Qty: 10 CAPSULE | Refills: 0 | Status: SHIPPED | OUTPATIENT
Start: 2022-03-10 | End: 2022-04-05

## 2022-03-10 RX ORDER — DEXTROAMPHETAMINE SACCHARATE, AMPHETAMINE ASPARTATE MONOHYDRATE, DEXTROAMPHETAMINE SULFATE AND AMPHETAMINE SULFATE 6.25; 6.25; 6.25; 6.25 MG/1; MG/1; MG/1; MG/1
25 CAPSULE, EXTENDED RELEASE ORAL EVERY MORNING
Qty: 30 CAPSULE | Refills: 0 | Status: SHIPPED | OUTPATIENT
Start: 2022-03-10 | End: 2022-09-07

## 2022-03-10 RX ORDER — QUETIAPINE FUMARATE 100 MG/1
50 TABLET, FILM COATED ORAL AT BEDTIME
Qty: 90 TABLET | Refills: 0 | Status: SHIPPED | OUTPATIENT
Start: 2022-03-10 | End: 2023-01-26

## 2022-03-10 NOTE — PROGRESS NOTES
"Vivek is a 26 year old who is being evaluated via a billable video visit.      How would you like to obtain your AVS? MyChart  If the video visit is dropped, the invitation should be resent by: Text to cell phone: [phone]  Will anyone else be joining your video visit? No    Video Start Time: 10:40 AM    Assessment & Plan     Attention deficit hyperactivity disorder (ADHD), combined type  Follow up in 3 weeks.  - amphetamine-dextroamphetamine (ADDERALL XR) 25 MG 24 hr capsule; Take 1 capsule (25 mg) by mouth every morning    Severe single current episode of major depressive disorder, without psychotic features (H)  Stable, monitor.  Refilled.  - sertraline (ZOLOFT) 100 MG tablet; Take 2 tablets (200 mg) by mouth daily    Adjustment disorder with mixed anxiety and depressed mood  Stable, monitor, refilled.  - sertraline (ZOLOFT) 100 MG tablet; Take 2 tablets (200 mg) by mouth daily  - QUEtiapine (SEROQUEL) 100 MG tablet; Take 0.5 tablets (50 mg) by mouth At Bedtime    Acute cystitis without hematuria  If no improvement will come in for visit.  - nitroFURantoin macrocrystal-monohydrate (MACROBID) 100 MG capsule; Take 1 capsule (100 mg) by mouth 2 times daily     Tobacco Cessation:   reports that she has been smoking cigarettes. She has been smoking about 1.00 pack per day. She has never used smokeless tobacco.      BMI:   Estimated body mass index is 39.44 kg/m  as calculated from the following:    Height as of 9/22/21: 1.651 m (5' 5\").    Weight as of 11/15/21: 107.5 kg (237 lb).           Return in about 3 weeks (around 3/31/2022) for telephone visit, adhd recheck.    Cristal Beltran, SHELBY CNP  [facility]    Subjective   Vivek is a 26 year old who presents for the following health issues     History of Present Illness       Reason for visit:  Medication refill and possibly uti  Symptom onset:  1-3 days ago  Symptoms include:  Pain during urinating, frequently urinating , " cloudy  Symptom intensity:  Mild  Symptom progression:  Staying the same  Had these symptoms before:  Yes  Has tried/received treatment for these symptoms:  Yes  Previous treatment was successful:  Yes    She eats 0-1 (Incomplete) servings of fruits and vegetables daily.She consumes 4 (Incomplete) sweetened beverage(s) daily.She exercises with enough effort to increase her heart rate 60 or more (Incomplete) minutes per day.  She exercises with enough effort to increase her heart rate 3 or less (Incomplete) days per week.   She is taking medications regularly.       Medication Followup of Adderall     Taking Medication as prescribed: yes    Side Effects:  None    Medication Helping Symptoms:  yes     Feels dose could be increased of adderall, finds it stops working too early.  No side effects.  Mood is stable.    Genitourinary - Female  Onset/Duration: 3 days   Description:   Painful urination (Dysuria): YES           Frequency: YES  Blood in urine (Hematuria): no - cloudy   Delay in urine (Hesitency): no  Intensity: severe  Progression of Symptoms:  worsening  Accompanying Signs & Symptoms:  Fever/chills: no  Flank pain: no  Nausea and vomiting: no  Vaginal symptoms: odor  Abdominal/Pelvic Pain: no  History:   History of frequent UTI s: YES  History of kidney stones: YES-   Sexually Active: YES  Possibility of pregnancy: No  Precipitating or alleviating factors: None  Therapies tried and outcome:    No fever/n/v.          Review of Systems   Constitutional, HEENT, cardiovascular, pulmonary, gi and gu systems are negative, except as otherwise noted.      Objective           Vitals:  No vitals were obtained today due to virtual visit.    Physical Exam   GENERAL: Healthy, alert and no distress  EYES: Eyes grossly normal to inspection.  No discharge or erythema, or obvious scleral/conjunctival abnormalities.  RESP: No audible wheeze, cough, or visible cyanosis.  No visible retractions or increased work of breathing.     SKIN: Visible skin clear. No significant rash, abnormal pigmentation or lesions.  NEURO: Cranial nerves grossly intact.  Mentation and speech appropriate for age.  PSYCH: Mentation appears normal, affect normal/bright, judgement and insight intact, normal speech and appearance well-groomed.                Video-Visit Details    Type of service:  Video Visit    Video End Time:10:45am    Originating Location (pt. Location): Home    Distant Location (provider location):  [facility]     Platform used for Video Visit: Tengion

## 2022-04-05 ENCOUNTER — VIRTUAL VISIT (OUTPATIENT)
Dept: FAMILY MEDICINE | Facility: CLINIC | Age: 27
End: 2022-04-05
Payer: COMMERCIAL

## 2022-04-05 DIAGNOSIS — F90.2 ATTENTION DEFICIT HYPERACTIVITY DISORDER (ADHD), COMBINED TYPE: ICD-10-CM

## 2022-04-05 DIAGNOSIS — F32.2 SEVERE SINGLE CURRENT EPISODE OF MAJOR DEPRESSIVE DISORDER, WITHOUT PSYCHOTIC FEATURES (H): Primary | ICD-10-CM

## 2022-04-05 PROCEDURE — 99214 OFFICE O/P EST MOD 30 MIN: CPT | Mod: 95 | Performed by: NURSE PRACTITIONER

## 2022-04-05 RX ORDER — DEXTROAMPHETAMINE SACCHARATE, AMPHETAMINE ASPARTATE MONOHYDRATE, DEXTROAMPHETAMINE SULFATE AND AMPHETAMINE SULFATE 6.25; 6.25; 6.25; 6.25 MG/1; MG/1; MG/1; MG/1
25 CAPSULE, EXTENDED RELEASE ORAL DAILY
Qty: 30 CAPSULE | Refills: 0 | Status: SHIPPED | OUTPATIENT
Start: 2022-06-10 | End: 2022-07-10

## 2022-04-05 RX ORDER — DEXTROAMPHETAMINE SACCHARATE, AMPHETAMINE ASPARTATE MONOHYDRATE, DEXTROAMPHETAMINE SULFATE AND AMPHETAMINE SULFATE 6.25; 6.25; 6.25; 6.25 MG/1; MG/1; MG/1; MG/1
25 CAPSULE, EXTENDED RELEASE ORAL DAILY
Qty: 30 CAPSULE | Refills: 0 | Status: SHIPPED | OUTPATIENT
Start: 2022-04-10 | End: 2022-05-10

## 2022-04-05 RX ORDER — ESCITALOPRAM OXALATE 10 MG/1
10 TABLET ORAL DAILY
Qty: 90 TABLET | Refills: 0 | Status: SHIPPED | OUTPATIENT
Start: 2022-04-05 | End: 2023-01-26

## 2022-04-05 RX ORDER — DEXTROAMPHETAMINE SACCHARATE, AMPHETAMINE ASPARTATE MONOHYDRATE, DEXTROAMPHETAMINE SULFATE AND AMPHETAMINE SULFATE 6.25; 6.25; 6.25; 6.25 MG/1; MG/1; MG/1; MG/1
25 CAPSULE, EXTENDED RELEASE ORAL DAILY
Qty: 30 CAPSULE | Refills: 0 | Status: SHIPPED | OUTPATIENT
Start: 2022-05-10 | End: 2022-06-09

## 2022-04-05 ASSESSMENT — ANXIETY QUESTIONNAIRES
7. FEELING AFRAID AS IF SOMETHING AWFUL MIGHT HAPPEN: MORE THAN HALF THE DAYS
3. WORRYING TOO MUCH ABOUT DIFFERENT THINGS: MORE THAN HALF THE DAYS
GAD7 TOTAL SCORE: 13
1. FEELING NERVOUS, ANXIOUS, OR ON EDGE: MORE THAN HALF THE DAYS
2. NOT BEING ABLE TO STOP OR CONTROL WORRYING: MORE THAN HALF THE DAYS
IF YOU CHECKED OFF ANY PROBLEMS ON THIS QUESTIONNAIRE, HOW DIFFICULT HAVE THESE PROBLEMS MADE IT FOR YOU TO DO YOUR WORK, TAKE CARE OF THINGS AT HOME, OR GET ALONG WITH OTHER PEOPLE: SOMEWHAT DIFFICULT
5. BEING SO RESTLESS THAT IT IS HARD TO SIT STILL: MORE THAN HALF THE DAYS
6. BECOMING EASILY ANNOYED OR IRRITABLE: SEVERAL DAYS

## 2022-04-05 ASSESSMENT — PATIENT HEALTH QUESTIONNAIRE - PHQ9
SUM OF ALL RESPONSES TO PHQ QUESTIONS 1-9: 12
5. POOR APPETITE OR OVEREATING: MORE THAN HALF THE DAYS

## 2022-04-05 NOTE — PATIENT INSTRUCTIONS
Decrease zoloft to 150mg for 1 week, then 100mg for 1 week, then 50mg for 1 week, then stop.    You can start the new medication- lexapro when you are taking 100mg of zoloft.

## 2022-04-05 NOTE — PROGRESS NOTES
"Vivek is a 26 year old who is being evaluated via a billable video visit.      How would you like to obtain your AVS? MyChart  If the video visit is dropped, the invitation should be resent by: Text to cell phone: [phone]  Will anyone else be joining your video visit? No  Video Start Time: 4:45 PM    Assessment & Plan     Severe single current episode of major depressive disorder, without psychotic features (H)  Change to lexapro.  See pt instructions for cross taper.  - escitalopram (LEXAPRO) 10 MG tablet; Take 1 tablet (10 mg) by mouth daily    Attention deficit hyperactivity disorder (ADHD), combined type  Request an additional 3 months when needed.  Visit in 6 months.               Tobacco Cessation:   reports that she has been smoking cigarettes. She has been smoking about 1.00 pack per day. She has never used smokeless tobacco.      BMI:   Estimated body mass index is 39.44 kg/m  as calculated from the following:    Height as of 9/22/21: 1.651 m (5' 5\").    Weight as of 11/15/21: 107.5 kg (237 lb).           Return in about 4 weeks (around 5/3/2022) for follow up.    Cristal Beltran, SHELBY CNP  [facility]    Subjective   Vivek is a 26 year old who presents for the following health issues     HPI     Depression and Anxiety Follow-Up    How are you doing with your depression since your last visit? Worsened     How are you doing with your anxiety since your last visit?  Worsened states medications were helping     Are you having other symptoms that might be associated with depression or anxiety? Yes:  some nausea, sweaty hands and feet, sob and anxious    Have you had a significant life event? No     Do you have any concerns with your use of alcohol or other drugs? No    Social History     Tobacco Use     Smoking status: Current Every Day Smoker     Packs/day: 1.00     Types: Cigarettes     Smokeless tobacco: Never Used     Tobacco comment: 5 qd   Vaping Use     Vaping Use: " Every day     Substances: Nicotine   Substance Use Topics     Alcohol use: Yes     Alcohol/week: 0.0 standard drinks     Comment: Alcoholic Drinks/day: Occasional     Drug use: No     Types: Oxycodone     PHQ 4/21/2021 6/24/2021 4/5/2022   PHQ-9 Total Score 16 12 12   Q9: Thoughts of better off dead/self-harm past 2 weeks Not at all Not at all Not at all   F/U: Thoughts of suicide or self-harm - - -   F/U: Self harm-plan - - -   F/U: Self-harm action - - -   F/U: Safety concerns - - -     CLARY-7 SCORE 4/21/2021 6/24/2021 4/5/2022   Total Score - - -   Total Score 14 6 13     Last PHQ-9 4/5/2022   1.  Little interest or pleasure in doing things 1   2.  Feeling down, depressed, or hopeless 2   3.  Trouble falling or staying asleep, or sleeping too much 2   4.  Feeling tired or having little energy 2   5.  Poor appetite or overeating 2   6.  Feeling bad about yourself 1   7.  Trouble concentrating 1   8.  Moving slowly or restless 1   Q9: Thoughts of better off dead/self-harm past 2 weeks 0   PHQ-9 Total Score 12   Difficulty at work, home, or with people Somewhat difficult   In the past two weeks have you had thoughts of suicide or self harm? -   Do you have concerns about your personal safety or the safety of others? -   In the past 2 weeks have you thought about a plan or had intention to harm yourself? -   In the past 2 weeks have you acted on these thoughts in any way? -     CLARY-7  4/5/2022   1. Feeling nervous, anxious, or on edge 2   2. Not being able to stop or control worrying 2   3. Worrying too much about different things 2   4. Trouble relaxing 2   5. Being so restless that it is hard to sit still 2   6. Becoming easily annoyed or irritable 1   7. Feeling afraid, as if something awful might happen 2   CLARY-7 Total Score 13   If you checked any problems, how difficult have they made it for you to do your work, take care of things at home, or get along with other people? Somewhat difficult       Suicide  Assessment Five-step Evaluation and Treatment (SAFE-T)     States that meds were working well but now they are not working as well.     How many servings of fruits and vegetables do you eat daily?      On average, how many sweetened beverages do you drink each day (Examples: soda, juice, sweet tea, etc.  Do NOT count diet or artificially sweetened beverages)?       How many days per week do you exercise enough to make your heart beat faster? 3 or less    How many minutes a day do you exercise enough to make your heart beat faster? 30 - 60    How many days per week do you miss taking your medication? 0    Medication Followup of Attention Deficit disorder    Taking Medication as prescribed: yes    Side Effects:  None    Medication Helping Symptoms:  yes     Adderall working well.  No side effects.  No change desired.    Feels more depression.  No thoughts of self harm.  Has been exercising, got a gym membership.  She is interested in changing to a different med.  Seroquel still helpful at bedtime for sleep.  In the past was on prozac but this also seemed to lose effectiveness.    Review of Systems   Constitutional, HEENT, cardiovascular, pulmonary, gi and gu systems are negative, except as otherwise noted.      Objective           Vitals:  No vitals were obtained today due to virtual visit.    Physical Exam   GENERAL: Healthy, alert and no distress  EYES: Eyes grossly normal to inspection.  No discharge or erythema, or obvious scleral/conjunctival abnormalities.  RESP: No audible wheeze, cough, or visible cyanosis.  No visible retractions or increased work of breathing.    SKIN: Visible skin clear. No significant rash, abnormal pigmentation or lesions.  NEURO: Cranial nerves grossly intact.  Mentation and speech appropriate for age.  PSYCH: Mentation appears normal, affect normal/bright, judgement and insight intact, normal speech and appearance well-groomed.                Video-Visit Details    Type of service:   Video Visit    Video End Time:4:54 PM    Originating Location (pt. Location): Home    Distant Location (provider location):  [facility]     Platform used for Video Visit: iJn

## 2022-04-06 ASSESSMENT — ANXIETY QUESTIONNAIRES: GAD7 TOTAL SCORE: 13

## 2022-04-11 ENCOUNTER — MYC MEDICAL ADVICE (OUTPATIENT)
Dept: FAMILY MEDICINE | Facility: CLINIC | Age: 27
End: 2022-04-11
Payer: COMMERCIAL

## 2022-04-11 ENCOUNTER — NURSE TRIAGE (OUTPATIENT)
Dept: FAMILY MEDICINE | Facility: CLINIC | Age: 27
End: 2022-04-11

## 2022-04-11 NOTE — TELEPHONE ENCOUNTER
Nilesh astorga/PCP Cristal Beltran. Advised virtual appointment tomorrow and to hold Lexapro until appointment. Pt to follow up with clinic or call nurse care line if new or worsening symptoms develop prior to appointment.    Provider Recommendation Follow Up:   Reached patient/caregiver. Informed of provider's recommendations. Patient verbalized understanding and agrees with the plan.         Myranda EASLEY RN

## 2022-04-11 NOTE — TELEPHONE ENCOUNTER
"Nurse Triage SBAR    Is this a 2nd Level Triage? YES, LICENSED PRACTITIONER REVIEW IS REQUIRED    Situation:   Pt calls reporting increased anxiety and possible \"seizure\" that occurred on Saturday night 4/9/22 after starting Lexapro.    Background:   Pt was prescribed Lexapro on 4/5/22. Pt started medication on 4/8/22. Pt is taking 10 mg daily in the AM. Pt denies starting any other new medications. Pt is not taking zoloft. Pt has not used Imitrex or methocarbamol recently. Pt is taking Adderall daily.    Assessment:   Pt reports moderate anxiety since starting Lexapro. Pt states that she has had anxiety before, but it is much worse now. Pt reports that her arms and legs are shaky since starting medication, she thinks this is related to increased anxiety. Pt also states that she had a possible \"seizure\" Saturday night. Pt states that her boyfriend witnessed pt staring at the wall and not responding to her name being called ten times. Pt states boyfriend reports this episode lasted a couple minutes. Pt denies loss of consciousness or confusion.    Protocol Recommended Disposition:   Discuss With PCP And Call Back By Nurse Within 1 Hour    Recommendation:   Will send to provider for review and recommendations.      Routed to provider    Does the patient meet one of the following criteria for ADS visit consideration? 16+ years old, with an [facility] PCP     TIP  Providers, please consider if this condition is appropriate for management at one of our Acute and Diagnostic Services sites.     If patient is a good candidate, please use dotphrase <dot>triageresponse and select Refer to ADS to document.    Reason for Disposition    Caller has urgent medication question about med that PCP prescribed and triager unable to answer question    Additional Information    Negative: Drug overdose and triager unable to answer question    Negative: Caller requesting information unrelated to medicine    Negative: Caller requesting a " "prescription for Strep throat and has a positive culture result    Negative: Rash while taking a medication or within 3 days of stopping it    Negative: Immunization reaction suspected    Negative: Asthma and having symptoms of asthma (cough, wheezing, etc.)    Negative: Breastfeeding questions about mother's medicines and diet    Negative: MORE THAN A DOUBLE DOSE of a prescription or over-the-counter (OTC) drug    Negative: DOUBLE DOSE (an extra dose or lesser amount) of over-the-counter (OTC) drug and any symptoms (e.g., dizziness, nausea, pain, sleepiness)    Negative: DOUBLE DOSE (an extra dose or lesser amount) of prescription drug and any symptoms (e.g., dizziness, nausea, pain, sleepiness)    Negative: Took another person's prescription drug    Negative: DOUBLE DOSE (an extra dose or lesser amount) of prescription drug and NO symptoms (Exception: a double dose of antibiotics)    Negative: Diabetes drug error or overdose (e.g., took wrong type of insulin or took extra dose)    Negative: Caller has medication question about med not prescribed by PCP and triager unable to answer question (e.g., compatibility with other med, storage)    Negative: Request for URGENT new prescription or refill of 'essential' medication (i.e., likelihood of harm to patient if not taken) and triager unable to fill per department policy    Negative: Prescription not at pharmacy and was prescribed today by PCP    Negative: Pharmacy calling with prescription questions and triager unable to answer question    Answer Assessment - Initial Assessment Questions  1. SYMPTOMS: \"Do you have any symptoms?\"      Increased anxiety since starting Lexapro, reports possible seizure on 4/9/22.  2. SEVERITY: If symptoms are present, ask \"Are they mild, moderate or severe?\"      Moderate anxiety since starting medication. Had anxiety before but now worsened now.    Protocols used: MEDICATION QUESTION CALL-A-OH    Myranda EASLEY RN    "

## 2022-04-18 ENCOUNTER — APPOINTMENT (OUTPATIENT)
Dept: RADIOLOGY | Facility: CLINIC | Age: 27
End: 2022-04-18
Attending: PHYSICIAN ASSISTANT
Payer: COMMERCIAL

## 2022-04-18 ENCOUNTER — HOSPITAL ENCOUNTER (EMERGENCY)
Facility: CLINIC | Age: 27
Discharge: HOME | End: 2022-04-18
Attending: STUDENT IN AN ORGANIZED HEALTH CARE EDUCATION/TRAINING PROGRAM | Admitting: STUDENT IN AN ORGANIZED HEALTH CARE EDUCATION/TRAINING PROGRAM
Payer: COMMERCIAL

## 2022-04-18 VITALS
TEMPERATURE: 97.7 F | OXYGEN SATURATION: 98 % | HEART RATE: 78 BPM | HEIGHT: 65 IN | DIASTOLIC BLOOD PRESSURE: 65 MMHG | RESPIRATION RATE: 17 BRPM | SYSTOLIC BLOOD PRESSURE: 107 MMHG | BODY MASS INDEX: 37.42 KG/M2 | WEIGHT: 224.6 LBS

## 2022-04-18 DIAGNOSIS — R07.81 PLEURITIC CHEST PAIN: ICD-10-CM

## 2022-04-18 DIAGNOSIS — R07.89 CHEST WALL PAIN: ICD-10-CM

## 2022-04-18 LAB
ANION GAP SERPL CALCULATED.3IONS-SCNC: 9 MMOL/L (ref 5–18)
BASOPHILS # BLD AUTO: 0 10E3/UL (ref 0–0.2)
BASOPHILS NFR BLD AUTO: 1 %
BUN SERPL-MCNC: 8 MG/DL (ref 8–22)
CALCIUM SERPL-MCNC: 9.8 MG/DL (ref 8.5–10.5)
CHLORIDE BLD-SCNC: 106 MMOL/L (ref 98–107)
CO2 SERPL-SCNC: 25 MMOL/L (ref 22–31)
CREAT SERPL-MCNC: 0.71 MG/DL (ref 0.6–1.1)
EOSINOPHIL # BLD AUTO: 0.1 10E3/UL (ref 0–0.7)
EOSINOPHIL NFR BLD AUTO: 1 %
ERYTHROCYTE [DISTWIDTH] IN BLOOD BY AUTOMATED COUNT: 12.9 % (ref 10–15)
GFR SERPL CREATININE-BSD FRML MDRD: >90 ML/MIN/1.73M2
GLUCOSE BLD-MCNC: 89 MG/DL (ref 70–125)
HCT VFR BLD AUTO: 43.3 % (ref 35–47)
HGB BLD-MCNC: 14.1 G/DL (ref 11.7–15.7)
HOLD SPECIMEN: NORMAL
HOLD SPECIMEN: NORMAL
IMM GRANULOCYTES # BLD: 0 10E3/UL
IMM GRANULOCYTES NFR BLD: 0 %
LYMPHOCYTES # BLD AUTO: 1.6 10E3/UL (ref 0.8–5.3)
LYMPHOCYTES NFR BLD AUTO: 27 %
MCH RBC QN AUTO: 27.7 PG (ref 26.5–33)
MCHC RBC AUTO-ENTMCNC: 32.6 G/DL (ref 31.5–36.5)
MCV RBC AUTO: 85 FL (ref 78–100)
MONOCYTES # BLD AUTO: 0.5 10E3/UL (ref 0–1.3)
MONOCYTES NFR BLD AUTO: 8 %
NEUTROPHILS # BLD AUTO: 3.7 10E3/UL (ref 1.6–8.3)
NEUTROPHILS NFR BLD AUTO: 63 %
NRBC # BLD AUTO: 0 10E3/UL
NRBC BLD AUTO-RTO: 0 /100
PLATELET # BLD AUTO: 319 10E3/UL (ref 150–450)
POTASSIUM BLD-SCNC: 4.3 MMOL/L (ref 3.5–5)
RBC # BLD AUTO: 5.09 10E6/UL (ref 3.8–5.2)
SODIUM SERPL-SCNC: 140 MMOL/L (ref 136–145)
TROPONIN I SERPL-MCNC: <0.01 NG/ML (ref 0–0.29)
WBC # BLD AUTO: 5.8 10E3/UL (ref 4–11)

## 2022-04-18 PROCEDURE — 80048 BASIC METABOLIC PNL TOTAL CA: CPT | Performed by: STUDENT IN AN ORGANIZED HEALTH CARE EDUCATION/TRAINING PROGRAM

## 2022-04-18 PROCEDURE — 93005 ELECTROCARDIOGRAM TRACING: CPT | Performed by: EMERGENCY MEDICINE

## 2022-04-18 PROCEDURE — 71046 X-RAY EXAM CHEST 2 VIEWS: CPT

## 2022-04-18 PROCEDURE — 85025 COMPLETE CBC W/AUTO DIFF WBC: CPT | Performed by: STUDENT IN AN ORGANIZED HEALTH CARE EDUCATION/TRAINING PROGRAM

## 2022-04-18 PROCEDURE — 84484 ASSAY OF TROPONIN QUANT: CPT | Performed by: STUDENT IN AN ORGANIZED HEALTH CARE EDUCATION/TRAINING PROGRAM

## 2022-04-18 PROCEDURE — 96374 THER/PROPH/DIAG INJ IV PUSH: CPT

## 2022-04-18 PROCEDURE — 80048 BASIC METABOLIC PNL TOTAL CA: CPT | Performed by: EMERGENCY MEDICINE

## 2022-04-18 PROCEDURE — 250N000011 HC RX IP 250 OP 636: Performed by: PHYSICIAN ASSISTANT

## 2022-04-18 PROCEDURE — 85025 COMPLETE CBC W/AUTO DIFF WBC: CPT | Performed by: EMERGENCY MEDICINE

## 2022-04-18 PROCEDURE — 36415 COLL VENOUS BLD VENIPUNCTURE: CPT | Performed by: EMERGENCY MEDICINE

## 2022-04-18 PROCEDURE — 93005 ELECTROCARDIOGRAM TRACING: CPT | Performed by: STUDENT IN AN ORGANIZED HEALTH CARE EDUCATION/TRAINING PROGRAM

## 2022-04-18 PROCEDURE — 99285 EMERGENCY DEPT VISIT HI MDM: CPT | Mod: 25

## 2022-04-18 PROCEDURE — 84484 ASSAY OF TROPONIN QUANT: CPT | Performed by: EMERGENCY MEDICINE

## 2022-04-18 PROCEDURE — 93005 ELECTROCARDIOGRAM TRACING: CPT

## 2022-04-18 RX ORDER — KETOROLAC TROMETHAMINE 15 MG/ML
15 INJECTION, SOLUTION INTRAMUSCULAR; INTRAVENOUS ONCE
Status: COMPLETED | OUTPATIENT
Start: 2022-04-18 | End: 2022-04-18

## 2022-04-18 RX ADMIN — KETOROLAC TROMETHAMINE 15 MG: 15 INJECTION, SOLUTION INTRAMUSCULAR; INTRAVENOUS at 17:20

## 2022-04-18 ASSESSMENT — ENCOUNTER SYMPTOMS
GASTROINTESTINAL NEGATIVE: 1
FATIGUE: 0
SHORTNESS OF BREATH: 1
HEADACHES: 0
CONSTITUTIONAL NEGATIVE: 1
PSYCHIATRIC NEGATIVE: 1
WEAKNESS: 0
CHEST TIGHTNESS: 0
TREMORS: 0
LIGHT-HEADEDNESS: 0
DIZZINESS: 0
MUSCULOSKELETAL NEGATIVE: 1
NUMBNESS: 0
SEIZURES: 0
COUGH: 0
FEVER: 0
WHEEZING: 0
CHILLS: 0

## 2022-04-18 NOTE — ED PROVIDER NOTES
"  Emergency Department Encounter     Evaluation Date & Time:   2022  4:42 PM    CHIEF COMPLAINT:  Chest Pain      Triage Note:While sitting at home an hour ago, developed substernal chest pain.  Burning/stabbing in nature.  Does not radiate.  PMH of \"fast heart rate\"    EKG, IV labs done in triage.    Able to speak in complete sentences.        Impression and Plan     ED COURSE & MEDICAL DECISION MAKIN Patient seen and evalauted  1715 Patient staffed with Dr. Ignacio.   6:58 PM Reviewed workup and labs with patient. Plan to discharge.     At the conclusion of the encounter I discussed the results of all the tests and the disposition. The questions were answered. The patient or family acknowledged understanding and was agreeable with the care plan.    FINAL IMPRESSION:    ICD-10-CM    1. Chest wall pain  R07.89    2. Pleuritic chest pain  R07.81        0 minutes of critical care time    Reassessments & Consults  ED Course as of 22 1858      1645 Patient is a 6-year-old female with no significant past medical history who presents to the ED for left-sided chest pain that started approximately 2 PM this afternoon.  Patient notes that the pain started in the anterior portion of her chest, and then radiated down her left arm.  She describes the pain as sharp and stabbing.  No previous cardiac history.  On examination patient is not tachycardic, pain is reproducible to palpation over the left anterior chest.  Differential includes atypical ACS, chest wall pain, chest wall injury, electrolyte abnormality, infectious etiology such as a viral, or bacterial pneumonia.  Will be to obtain basic laboratory studies including BMP, troponin, CBC.  Additionally we will get a chest x-ray, and EKG.  Patient will be given some Toradol here in the ER to see if this helps with her pain.   1728 Given that the patient is young and healthy, does not have any comorbidities, as well as PERC negative, chest " pain is reproducible, I do not think that delta troponin is warranted at this time.  Plan will be to see if Toradol fixes her pain, or improves her pain.  Should the Toradol improve her pain, I think is reasonable to discharge home with close follow-up with primary care physician.   1824 Chest XR,  PA & LAT  Lungs are clear. Heart and pulmonary vascularity are normal. No signs of acute disease  Dr. Ignacio discussed with the patient laboratory findings, as well as chest x-ray.  Patient's pain most somewhat improved after the Toradol.  Additionally given the rest of her negative work-up, her age, and lack of comorbidities, I do not think that this pain is due to anything cardiac in nature.  Additionally patient is PERC negative, not dyspneic or tachycardic.  Denies any fever, afebrile here.  More than likely patient's pain is pleuritic in nature.  Additionally pain on examination is reproducible with palpation, making it more likely that this is musculoskeletal in nature.  Recommend that she discharge home, continue take ibuprofen and Tylenol, and follow-up with her primary care doctor.  Discussed return precautions, patient expressed understanding.       MEDICATIONS GIVEN IN THE EMERGENCY DEPARTMENT:  Medications   ketorolac (TORADOL) injection 15 mg (15 mg Intravenous Given 4/18/22 1720)       NEW PRESCRIPTIONS STARTED AT TODAY'S ED VISIT:  Discharge Medication List as of 4/18/2022  6:50 PM          HPI     HPI     Lucero Collier is a 26 year old female with no significant past medical history who presents to this ED  for evaluation of chest pain.  Left sided chest pain started approximately 2 PM this afternoon.  Patient notes that the pain is sharp, over the anterior portion of her left chest, comes and goes.  Patient denies any radiation of pain to the jaw.  Denies any swelling in the legs.  Does note that she has felt mildly short of breath with the pain, however denies any shortness of breath when she is up  moving around.  Denies any fever, cough, cold, chills.  Patiently denies any dysuria or hematuria, changes in bowel or bladder, or abdominal pain.  Pain does worsen with movement of her arms.  Patient does not endorse any past cardiac history, or any other medical problems.  Denies any history of hypertension, diabetes, blood clots.  Patient is not oral contraceptives.  Denies any chance of pregnancy.    REVIEW OF SYSTEMS:  Review of Systems   Constitutional: Negative.  Negative for chills, fatigue and fever.   HENT: Negative.    Respiratory: Positive for shortness of breath. Negative for cough, chest tightness and wheezing.    Cardiovascular: Positive for chest pain.   Gastrointestinal: Negative.    Genitourinary: Negative.    Musculoskeletal: Negative.    Neurological: Negative for dizziness, tremors, seizures, syncope, weakness, light-headedness, numbness and headaches.   Psychiatric/Behavioral: Negative.        Medical History     Past Medical History:   Diagnosis Date     ADHD      Adverse reaction to pertussis vaccine      Allergies      Anxiety      Chronic headaches      Complex partial seizures (H)      Constipation      Depression      Depressive disorder      Developmental delay      Epilepsy (H)      GERD (gastroesophageal reflux disease)      History of tonsillectomy      Hypoglycemia      Kidney stone      Myopia      Obesity      Sinusitis, chronic      Sleep disorder      Smoking      Spells      Syncope      Urinary tract infection 11/99       Past Surgical History:   Procedure Laterality Date     ENDOSCOPY UPPER WITH PANCREATIC STIMULATION  4/06    Esophagitis 4/06     NISSEN FUNDOPLICATION  6/07    Dr. Meneses     NISSEN FUNDOPLICATION       PE TUBES  3/98     TONSILLECTOMY & ADENOIDECTOMY  4/02       Family History   Problem Relation Age of Onset     Diabetes Maternal Grandmother      Hypertension Maternal Grandmother      Diabetes Paternal Grandmother      Cardiovascular Paternal Grandmother       "Breast Cancer Paternal Grandmother 61        right side breast cancer     Genetic Disorder Other         nephew.-genetic defects      Obesity Mother      Supraventricular tachycardia Mother      Family History Negative Father        Social History     Tobacco Use     Smoking status: Current Every Day Smoker     Packs/day: 1.00     Types: Cigarettes     Smokeless tobacco: Never Used     Tobacco comment: 5 qd   Vaping Use     Vaping Use: Every day     Substances: Nicotine   Substance Use Topics     Alcohol use: Yes     Alcohol/week: 0.0 standard drinks     Comment: Alcoholic Drinks/day: Occasional     Drug use: No     Types: Oxycodone       amphetamine-dextroamphetamine (ADDERALL XR) 25 MG 24 hr capsule  [START ON 5/10/2022] amphetamine-dextroamphetamine (ADDERALL XR) 25 MG 24 hr capsule  [START ON 6/10/2022] amphetamine-dextroamphetamine (ADDERALL XR) 25 MG 24 hr capsule  amphetamine-dextroamphetamine (ADDERALL XR) 25 MG 24 hr capsule  escitalopram (LEXAPRO) 10 MG tablet  fluocinonide (LIDEX) 0.05 % external cream  IBUPROFEN PO  levonorgestrel (MIRENA) 20 MCG/24HR IUD  methocarbamol (ROBAXIN) 500 MG tablet  omeprazole (PRILOSEC) 20 MG DR capsule  pimecrolimus (ELIDEL) 1 % cream  QUEtiapine (SEROQUEL) 100 MG tablet  sertraline (ZOLOFT) 100 MG tablet  SUMAtriptan (IMITREX) 25 MG tablet        Physical Exam     First Vitals:  Patient Vitals for the past 24 hrs:   BP Temp Temp src Pulse Resp SpO2 Height Weight   04/18/22 1853 107/65 -- -- 78 17 98 % -- --   04/18/22 1800 114/64 -- -- 85 19 98 % -- --   04/18/22 1649 107/67 -- -- 88 18 98 % -- --   04/18/22 1440 131/75 97.7  F (36.5  C) Temporal 92 16 97 % 1.651 m (5' 5\") 101.9 kg (224 lb 9.6 oz)       PHYSICAL EXAM:   Physical Exam  Constitutional:       General: She is not in acute distress.     Appearance: She is well-developed. She is not toxic-appearing or diaphoretic.   HENT:      Head: Normocephalic and atraumatic.   Eyes:      Pupils: Pupils are equal, round, and " reactive to light.   Cardiovascular:      Rate and Rhythm: Normal rate and regular rhythm.      Heart sounds: Normal heart sounds. No murmur heard.    No friction rub. No gallop.   Pulmonary:      Effort: Pulmonary effort is normal. No respiratory distress.      Breath sounds: Normal breath sounds. No stridor.   Chest:      Chest wall: Tenderness present.      Comments: Palpation to the anterior chest wall on the left side reproduces the pain.  Abdominal:      General: Bowel sounds are normal. There is no abdominal bruit.      Palpations: Abdomen is soft. There is no mass.      Tenderness: There is no abdominal tenderness. There is no guarding or rebound.   Musculoskeletal:         General: Normal range of motion.      Cervical back: Normal range of motion.      Right lower leg: No tenderness. No edema.      Left lower leg: No tenderness. No edema.   Skin:     General: Skin is warm.   Neurological:      General: No focal deficit present.      Mental Status: She is alert.       Results     LAB:  All pertinent labs reviewed and interpreted  Labs Ordered and Resulted from Time of ED Arrival to Time of ED Departure   BASIC METABOLIC PANEL - Normal       Result Value    Sodium 140      Potassium 4.3      Chloride 106      Carbon Dioxide (CO2) 25      Anion Gap 9      Urea Nitrogen 8      Creatinine 0.71      Calcium 9.8      Glucose 89      GFR Estimate >90     TROPONIN I - Normal    Troponin I <0.01     CBC WITH PLATELETS AND DIFFERENTIAL    WBC Count 5.8      RBC Count 5.09      Hemoglobin 14.1      Hematocrit 43.3      MCV 85      MCH 27.7      MCHC 32.6      RDW 12.9      Platelet Count 319      % Neutrophils 63      % Lymphocytes 27      % Monocytes 8      % Eosinophils 1      % Basophils 1      % Immature Granulocytes 0      NRBCs per 100 WBC 0      Absolute Neutrophils 3.7      Absolute Lymphocytes 1.6      Absolute Monocytes 0.5      Absolute Eosinophils 0.1      Absolute Basophils 0.0      Absolute Immature  Granulocytes 0.0      Absolute NRBCs 0.0         RADIOLOGY:  Chest XR,  PA & LAT   Final Result   IMPRESSION: Lungs are clear. Heart and pulmonary vascularity are normal. No signs of acute disease.               ECG:  Performed at: 1437    Impression: Sinus Rhythm, normal EKG     Rate: 85  Rhythm: Sinus   MN Interval: 146  QRS Interval: 378  QTc Interval: 449  Comparison: Rate improved since previous, no longer tachycardic.    I have independently reviewed and interpreted the EKS(s) documented above    PROCEDURES:  Procedures:      [facility] Documentation     Mental Health Risk Assessment      PSS-3    Date and Time Over the past 2 weeks have you felt down, depressed, or hopeless? Over the past 2 weeks have you had thoughts of killing yourself? Have you ever attempted to kill yourself? When did this last happen? User   04/18/22 7854 no no yes more than 6 months ago [place]                Item Assessment   Suicidal Ideation None   Plan None   Intent None   Suicidal or self-harm behaviors None at this time   Risk Factors None endorsed at this time    Protective Factors Patient presenting with family, support from family at home.      CMS Diagnoses:         Chidi Zaman PA-C  Emergency Medicine  [facility] EMERGENCY ROOM       Chidi Zaman PA-C  04/18/22 0780

## 2022-04-18 NOTE — DISCHARGE INSTRUCTIONS
You were seen here in the ED for evaluation of left-sided chest pain your work-up was grossly normal.  Your EKG did not show any evidence of cardiac arrhythmia, or abnormality.  Your troponin test was within normal limits.  Additionally the rest of your laboratory testing, as well as your chest x-ray were within normal limits.  Given that you do not have any significant past cardiac, or other medical history I have low suspicion that this is due to your heart.  Additionally the pain was reproducible on examination with palpation to the area.  More than likely I think your diagnosis is consistent with pleuritic chest pain, or chest wall pain.  I recommend that you take Tylenol and ibuprofen at home for pain control.    Should he develop worsening chest pain, shortness of breath, fever, cough, cold, chills please return to the ED for further evaluation.    Recommend you follow-up with your primary care provider in approximately 1 to 2 weeks for reevaluation.  Recommend that you discuss with them the episodes of fast heartbeat that you experience.  They may consider further testing for a cardiac arrhythmia at that time.

## 2022-04-18 NOTE — ED PROVIDER NOTES
"Emergency Department Midlevel Supervisory Note     I personally saw the patient and performed a substantive portion of the visit including all aspects of the medical decision making.    ED Course:  5:15 PM Chidi Zaman PA-C staffed patient with me. I agree with their assessment and plan of management, and I will see the patient.  5:19 PM I met with the patient to introduce myself, gather additional history, perform my initial exam, and discuss the plan.     Brief HPI:     Lucero Collier is a 26 year old female who presents for evaluation of chest pain. Left sided chest pain started approximately 2 PM this afternoon.  Patient notes that the pain is sharp, over the anterior portion of her left chest, comes and goes.  Patient denies any radiation of pain to the jaw.  Denies any swelling in the legs.  Does note that she has felt mildly short of breath with the pain, however denies any shortness of breath when she is up moving around.  Denies any fever, cough, cold, chills. Pain does worsen with movement of her arms.  Patient does not endorse any past cardiac history, or any other medical problems. Denies any history of hypertension, diabetes, blood clots.  Patient is not oral contraceptives.  Denies any chance of pregnancy.    I, Kuldip Cisneros, am serving as a scribe to document services personally performed by Jesús Ignacio MD, based on my observations and the provider's statements to me.   I, Jesús Ignacio MD, attest that Kuldip Cisneros was acting in a scribe capacity, has observed my performance of the services and has documented them in accordance with my direction.    Brief Physical Exam: /65   Pulse 78   Temp 97.7  F (36.5  C) (Temporal)   Resp 17   Ht 1.651 m (5' 5\")   Wt 101.9 kg (224 lb 9.6 oz)   SpO2 98%   BMI 37.38 kg/m    Constitutional:  Alert, in no acute distress  EYES: Conjunctivae clear  HENT:  Atraumatic, normocephalic  Respiratory:  Respirations even, unlabored, in no acute " respiratory distress  Cardiovascular:  Regular rate and rhythm, good peripheral perfusion.  Some mild reproducible chest pain with palpation  GI: Soft, nondistended, nontender, no palpable masses, no rebound, no guarding   Musculoskeletal:  No edema. No cyanosis. Range of motion major extremities intact.    Integument: Warm, Dry, No erythema, No rash.   Neurologic:  Alert & oriented, no focal deficits noted  Psych: Normal mood and affect     MDM:  Presents for evaluation of some left-sided chest pain that started about 2 PM today, worse with palpation or with some range of motion of the left shoulder.  Heart and lungs clear.  EKG appears nonischemic.  Troponin negative.  Chest x-ray clear.  Symptoms improving after Toradol.  Fairly reassuring work-up and exam, suspect musculoskeletal chest pain.    1. Chest wall pain        Labs and Imaging:  I have reviewed the relevant laboratory and radiology studies    EKG:  Appears nonischemic, reviewed with Caleb.    Jesús Ignacio MD  [facility] EMERGENCY ROOM  [facility] [id number] [phone]     Jesús Ignacio MD  04/19/22 0002

## 2022-04-18 NOTE — ED TRIAGE NOTES
"While sitting at home an hour ago, developed substernal chest pain.  Burning/stabbing in nature.  Does not radiate.  PMH of \"fast heart rate\"    EKG, IV labs done in triage.    Able to speak in complete sentences.  "

## 2022-04-19 LAB
ATRIAL RATE - MUSE: 85 BPM
DIASTOLIC BLOOD PRESSURE - MUSE: NORMAL MMHG
INTERPRETATION ECG - MUSE: NORMAL
P AXIS - MUSE: 50 DEGREES
PR INTERVAL - MUSE: 146 MS
QRS DURATION - MUSE: 68 MS
QT - MUSE: 378 MS
QTC - MUSE: 449 MS
R AXIS - MUSE: 45 DEGREES
SYSTOLIC BLOOD PRESSURE - MUSE: NORMAL MMHG
T AXIS - MUSE: 63 DEGREES
VENTRICULAR RATE- MUSE: 85 BPM
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
## 2022-05-01 ENCOUNTER — APPOINTMENT (OUTPATIENT)
Dept: CT IMAGING | Facility: CLINIC | Age: 27
End: 2022-05-01
Attending: EMERGENCY MEDICINE
Payer: COMMERCIAL

## 2022-05-01 ENCOUNTER — HOSPITAL ENCOUNTER (EMERGENCY)
Facility: CLINIC | Age: 27
Discharge: HOME | End: 2022-05-01
Attending: EMERGENCY MEDICINE | Admitting: EMERGENCY MEDICINE
Payer: COMMERCIAL

## 2022-05-01 VITALS
OXYGEN SATURATION: 97 % | BODY MASS INDEX: 39.15 KG/M2 | HEIGHT: 65 IN | DIASTOLIC BLOOD PRESSURE: 66 MMHG | TEMPERATURE: 98 F | WEIGHT: 235 LBS | HEART RATE: 94 BPM | SYSTOLIC BLOOD PRESSURE: 112 MMHG | RESPIRATION RATE: 16 BRPM

## 2022-05-01 DIAGNOSIS — R10.9 ACUTE RIGHT FLANK PAIN: ICD-10-CM

## 2022-05-01 PROBLEM — G40.909 EPILEPTIC SEIZURE (H): Status: ACTIVE | Noted: 2022-05-01

## 2022-05-01 LAB
ALBUMIN SERPL-MCNC: 3.8 G/DL (ref 3.5–5)
ALBUMIN UR-MCNC: 20 MG/DL
ALP SERPL-CCNC: 62 U/L (ref 45–120)
ALT SERPL W P-5'-P-CCNC: 19 U/L (ref 0–45)
ANION GAP SERPL CALCULATED.3IONS-SCNC: 11 MMOL/L (ref 5–18)
APPEARANCE UR: ABNORMAL
AST SERPL W P-5'-P-CCNC: 35 U/L (ref 0–40)
BACTERIA #/AREA URNS HPF: ABNORMAL /HPF
BASOPHILS # BLD AUTO: 0.1 10E3/UL (ref 0–0.2)
BASOPHILS NFR BLD AUTO: 1 %
BILIRUB SERPL-MCNC: 0.4 MG/DL (ref 0–1)
BILIRUB UR QL STRIP: NEGATIVE
BUN SERPL-MCNC: 12 MG/DL (ref 8–22)
C REACTIVE PROTEIN LHE: 0.5 MG/DL (ref 0–0.8)
CALCIUM SERPL-MCNC: 9.1 MG/DL (ref 8.5–10.5)
CHLORIDE BLD-SCNC: 106 MMOL/L (ref 98–107)
CO2 SERPL-SCNC: 23 MMOL/L (ref 22–31)
COLOR UR AUTO: YELLOW
CREAT SERPL-MCNC: 0.72 MG/DL (ref 0.6–1.1)
EOSINOPHIL # BLD AUTO: 0 10E3/UL (ref 0–0.7)
EOSINOPHIL NFR BLD AUTO: 0 %
ERYTHROCYTE [DISTWIDTH] IN BLOOD BY AUTOMATED COUNT: 13 % (ref 10–15)
GFR SERPL CREATININE-BSD FRML MDRD: >90 ML/MIN/1.73M2
GLUCOSE BLD-MCNC: 106 MG/DL (ref 70–125)
GLUCOSE UR STRIP-MCNC: NEGATIVE MG/DL
HCG UR QL: NEGATIVE
HCT VFR BLD AUTO: 40.4 % (ref 35–47)
HGB BLD-MCNC: 13 G/DL (ref 11.7–15.7)
HGB UR QL STRIP: NEGATIVE
IMM GRANULOCYTES # BLD: 0 10E3/UL
IMM GRANULOCYTES NFR BLD: 0 %
INTERNAL QC OK POCT: NORMAL
KETONES UR STRIP-MCNC: NEGATIVE MG/DL
LEUKOCYTE ESTERASE UR QL STRIP: ABNORMAL
LIPASE SERPL-CCNC: 14 U/L (ref 0–52)
LYMPHOCYTES # BLD AUTO: 1.7 10E3/UL (ref 0.8–5.3)
LYMPHOCYTES NFR BLD AUTO: 16 %
MCH RBC QN AUTO: 27.8 PG (ref 26.5–33)
MCHC RBC AUTO-ENTMCNC: 32.2 G/DL (ref 31.5–36.5)
MCV RBC AUTO: 87 FL (ref 78–100)
MONOCYTES # BLD AUTO: 0.8 10E3/UL (ref 0–1.3)
MONOCYTES NFR BLD AUTO: 8 %
MUCOUS THREADS #/AREA URNS LPF: PRESENT /LPF
NEUTROPHILS # BLD AUTO: 8.1 10E3/UL (ref 1.6–8.3)
NEUTROPHILS NFR BLD AUTO: 75 %
NITRATE UR QL: NEGATIVE
NRBC # BLD AUTO: 0 10E3/UL
NRBC BLD AUTO-RTO: 0 /100
PH UR STRIP: 5.5 [PH] (ref 5–7)
PLATELET # BLD AUTO: 299 10E3/UL (ref 150–450)
POCT KIT EXPIRATION DATE: NORMAL
POCT KIT LOT NUMBER: NORMAL
POTASSIUM BLD-SCNC: 3.8 MMOL/L (ref 3.5–5)
PROT SERPL-MCNC: 7.1 G/DL (ref 6–8)
RBC # BLD AUTO: 4.67 10E6/UL (ref 3.8–5.2)
RBC URINE: 2 /HPF
SODIUM SERPL-SCNC: 140 MMOL/L (ref 136–145)
SP GR UR STRIP: 1.03 (ref 1–1.03)
SQUAMOUS EPITHELIAL: 9 /HPF
UROBILINOGEN UR STRIP-MCNC: <2 MG/DL
WBC # BLD AUTO: 10.8 10E3/UL (ref 4–11)
WBC URINE: 6 /HPF

## 2022-05-01 PROCEDURE — 96374 THER/PROPH/DIAG INJ IV PUSH: CPT

## 2022-05-01 PROCEDURE — 85004 AUTOMATED DIFF WBC COUNT: CPT | Performed by: EMERGENCY MEDICINE

## 2022-05-01 PROCEDURE — 80053 COMPREHEN METABOLIC PANEL: CPT | Performed by: EMERGENCY MEDICINE

## 2022-05-01 PROCEDURE — 36415 COLL VENOUS BLD VENIPUNCTURE: CPT | Performed by: EMERGENCY MEDICINE

## 2022-05-01 PROCEDURE — 258N000003 HC RX IP 258 OP 636: Performed by: EMERGENCY MEDICINE

## 2022-05-01 PROCEDURE — 83690 ASSAY OF LIPASE: CPT | Performed by: EMERGENCY MEDICINE

## 2022-05-01 PROCEDURE — 86140 C-REACTIVE PROTEIN: CPT | Performed by: EMERGENCY MEDICINE

## 2022-05-01 PROCEDURE — 250N000011 HC RX IP 250 OP 636: Performed by: EMERGENCY MEDICINE

## 2022-05-01 PROCEDURE — 74176 CT ABD & PELVIS W/O CONTRAST: CPT

## 2022-05-01 PROCEDURE — 96361 HYDRATE IV INFUSION ADD-ON: CPT

## 2022-05-01 PROCEDURE — 81001 URINALYSIS AUTO W/SCOPE: CPT | Performed by: EMERGENCY MEDICINE

## 2022-05-01 PROCEDURE — 81025 URINE PREGNANCY TEST: CPT | Performed by: EMERGENCY MEDICINE

## 2022-05-01 PROCEDURE — 99285 EMERGENCY DEPT VISIT HI MDM: CPT | Mod: 25

## 2022-05-01 PROCEDURE — 96375 TX/PRO/DX INJ NEW DRUG ADDON: CPT

## 2022-05-01 RX ORDER — ONDANSETRON 4 MG/1
4 TABLET, ORALLY DISINTEGRATING ORAL EVERY 8 HOURS PRN
Qty: 10 TABLET | Refills: 0 | Status: SHIPPED | OUTPATIENT
Start: 2022-05-01 | End: 2022-05-04

## 2022-05-01 RX ORDER — ONDANSETRON 2 MG/ML
4 INJECTION INTRAMUSCULAR; INTRAVENOUS EVERY 30 MIN PRN
Status: DISCONTINUED | OUTPATIENT
Start: 2022-05-01 | End: 2022-05-01 | Stop reason: HOSPADM

## 2022-05-01 RX ORDER — KETOROLAC TROMETHAMINE 15 MG/ML
15 INJECTION, SOLUTION INTRAMUSCULAR; INTRAVENOUS ONCE
Status: COMPLETED | OUTPATIENT
Start: 2022-05-01 | End: 2022-05-01

## 2022-05-01 RX ORDER — IBUPROFEN 600 MG/1
600 TABLET, FILM COATED ORAL EVERY 8 HOURS PRN
Qty: 20 TABLET | Refills: 0 | Status: SHIPPED | OUTPATIENT
Start: 2022-05-01 | End: 2022-05-09

## 2022-05-01 RX ORDER — SODIUM CHLORIDE 9 MG/ML
INJECTION, SOLUTION INTRAVENOUS CONTINUOUS
Status: DISCONTINUED | OUTPATIENT
Start: 2022-05-01 | End: 2022-05-01 | Stop reason: HOSPADM

## 2022-05-01 RX ORDER — HYDROMORPHONE HYDROCHLORIDE 1 MG/ML
0.5 INJECTION, SOLUTION INTRAMUSCULAR; INTRAVENOUS; SUBCUTANEOUS
Status: COMPLETED | OUTPATIENT
Start: 2022-05-01 | End: 2022-05-01

## 2022-05-01 RX ADMIN — KETOROLAC TROMETHAMINE 15 MG: 15 INJECTION, SOLUTION INTRAMUSCULAR; INTRAVENOUS at 04:33

## 2022-05-01 RX ADMIN — SODIUM CHLORIDE 1000 ML: 9 INJECTION, SOLUTION INTRAVENOUS at 04:32

## 2022-05-01 RX ADMIN — HYDROMORPHONE HYDROCHLORIDE 0.5 MG: 1 INJECTION, SOLUTION INTRAMUSCULAR; INTRAVENOUS; SUBCUTANEOUS at 05:36

## 2022-05-01 RX ADMIN — ONDANSETRON 4 MG: 2 INJECTION INTRAMUSCULAR; INTRAVENOUS at 04:37

## 2022-05-01 ASSESSMENT — ENCOUNTER SYMPTOMS
VOMITING: 0
HEMATURIA: 0
NAUSEA: 1
DYSURIA: 0
CHILLS: 0
FLANK PAIN: 1
FEVER: 0
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
## 2022-05-01 NOTE — ED PROVIDER NOTES
NAME: Lucero Collier  AGE: 26 year old female  YOB: 1995  MRN: [id number]  EVALUATION DATE & TIME: 5/1/2022  3:40 AM    PCP: Cristal Beltran Ra    ED PROVIDER: Real Smith M.D.    Chief Complaint   Patient presents with     Back Pain       FINAL IMPRESSION:  1. Acute right flank pain        MEDICAL DECISION MAKING:    3:51 AM I met with the patient, obtained history, performed an initial exam, and discussed options and plan for diagnostics and treatment here in the ED.    Patient was clinically assessed and consented to treatment. After assessment, medical decision making and workup were discussed with the patient. The patient was agreeable to plan for testing, workup, and treatment.  Pertinent Labs & Imaging studies reviewed. (See chart for details)       Lucero Collier is a 26 year old female who presents with back pain.   Differential diagnosis includes but not limited to kidney stone, pyelonephritis, infected kidney stone, hydronephrosis, biliary colic, cholecystitis, pancreatitis.  Patient with history of kidney stones and sudden onset of right flank to lower quadrant pain.  Unlikely appendicitis given the sudden onset and more consistent with spasms and ureteral pain from kidney stone.  Patient will be plan for labs, antiemetics, pain control, IV fluids and CT scan.  Patient comfortable with plan and will proceed with work-up.  CBC unremarkable for any leukocytosis or anemia.  Metabolic panel unremarkable and lipase was negative and pregnancy was negative.  CRP was also within normal limits and urinalysis did have a few squamous cells and white cells but likely more contamination than urinary tract given lack of dysuria along with sudden onset of this right flank pain.  Possible still kidney stone with very little hematuria.  Given the spasmatic and cyclic nature I suspect CT scan would be best imaging for and will proceed with this.  CT scan returned negative for any stones or acute  pathology.  There are some trace fluid which could be just physiologic.  She also had bilateral cysts of these appear to be functional or physiologic in nature and she is not tender in the adnexa on repeat palpation.  Right flank is still slightly sore but I feel this is likely related to possible psoas muscle irritation and patient now several hours from pain medication administration with no further pain likely a passed kidney stone.  Unfortunately I am not able to prove this with urine or CT scan but patient feeling better and we discussed discharge home.  Patient will be given precautions to follow-up with her primary doctor for an plan for recheck of symptoms as well as urine culture.    0 minutes of critical care time    MEDICATIONS GIVEN IN THE EMERGENCY:  Medications   0.9% sodium chloride BOLUS (0 mLs Intravenous Stopped 5/1/22 0610)   ketorolac (TORADOL) injection 15 mg (15 mg Intravenous Given 5/1/22 5013)   HYDROmorphone (PF) (DILAUDID) injection 0.5 mg (0.5 mg Intravenous Given 5/1/22 5536)       NEW PRESCRIPTIONS STARTED AT TODAY'S ER VISIT:  Discharge Medication List as of 5/1/2022  7:07 AM      START taking these medications    Details   !! ibuprofen (ADVIL/MOTRIN) 600 MG tablet Take 1 tablet (600 mg) by mouth every 8 hours as needed for moderate pain, Disp-20 tablet, R-0, Local Print      ondansetron (ZOFRAN ODT) 4 MG ODT tab Take 1 tablet (4 mg) by mouth every 8 hours as needed for nausea or vomiting, Disp-10 tablet, R-0, Local Print       !! - Potential duplicate medications found. Please discuss with provider.        =================================================================    HPI    Patient information was obtained from: Patient    Use of : N/A      Lucero Collier is a 26 year old female with a past medical history of epilepsy, GERD, HTN, nephrolithiasis, who presents for evaluation of right flank pain. Patient reports sudden onset of right flank pain around 12:00 AM this  morning. Pain is severe and constant, but worsens with intermittent waves of pain. Endorses associated nausea, no vomiting. She denies any dysuria or dark or bloody urine. She reports a history of kidney stones, but even current pain feels more severe. She is unsure of when she had her most recent stone. She denies any fevers or chills. Denies any chance of pregnancy. Denies any other concerns.    No known medication allergies.    REVIEW OF SYSTEMS  Review of Systems   Constitutional: Negative for chills and fever.   Gastrointestinal: Positive for nausea. Negative for vomiting.   Genitourinary: Positive for flank pain (right). Negative for dysuria and hematuria.   All other systems reviewed and are negative.     PAST MEDICAL HISTORY:  Past Medical History:   Diagnosis Date     ADHD     on Daytrana and clonidine     Adverse reaction to pertussis vaccine      Allergies     daily Claritin     Anxiety      Chronic headaches      Complex partial seizures (H)      Constipation      Depression      Depressive disorder      Developmental delay      Epilepsy (H)      GERD (gastroesophageal reflux disease)     UGI 3/07     History of tonsillectomy      Hypoglycemia     Endocrine eval neg 10/00     Kidney stone     6/02 detected on CT scan; resolved 12/04     Myopia      Obesity      Sinusitis, chronic      Sleep disorder     Since 2000; sleep clinic evaluation 4/00 Dr. Ramesh      Smoking      Spells      Syncope      Urinary tract infection 11/99    nl renal US & VCUG 11/99       PAST SURGICAL HISTORY:  Past Surgical History:   Procedure Laterality Date     ENDOSCOPY UPPER WITH PANCREATIC STIMULATION  4/06    Esophagitis 4/06     NISSEN FUNDOPLICATION  6/07    Dr. Meneses     NISSEN FUNDOPLICATION       PE TUBES  3/98     TONSILLECTOMY & ADENOIDECTOMY  4/02       CURRENT MEDICATIONS:    No current facility-administered medications for this encounter.    Current Outpatient Medications:      ibuprofen (ADVIL/MOTRIN) 600 MG  tablet, Take 1 tablet (600 mg) by mouth every 8 hours as needed for moderate pain, Disp: 20 tablet, Rfl: 0     ondansetron (ZOFRAN ODT) 4 MG ODT tab, Take 1 tablet (4 mg) by mouth every 8 hours as needed for nausea or vomiting, Disp: 10 tablet, Rfl: 0     amphetamine-dextroamphetamine (ADDERALL XR) 25 MG 24 hr capsule, Take 1 capsule (25 mg) by mouth daily, Disp: 30 capsule, Rfl: 0     [START ON 5/10/2022] amphetamine-dextroamphetamine (ADDERALL XR) 25 MG 24 hr capsule, Take 1 capsule (25 mg) by mouth daily, Disp: 30 capsule, Rfl: 0     [START ON 6/10/2022] amphetamine-dextroamphetamine (ADDERALL XR) 25 MG 24 hr capsule, Take 1 capsule (25 mg) by mouth daily, Disp: 30 capsule, Rfl: 0     amphetamine-dextroamphetamine (ADDERALL XR) 25 MG 24 hr capsule, Take 1 capsule (25 mg) by mouth every morning, Disp: 30 capsule, Rfl: 0     escitalopram (LEXAPRO) 10 MG tablet, Take 1 tablet (10 mg) by mouth daily, Disp: 90 tablet, Rfl: 0     fluocinonide (LIDEX) 0.05 % external cream, Apply sparingly to affected area twice daily as needed.  Do not apply to face., Disp: 60 g, Rfl: 0     IBUPROFEN PO, Take 600 mg by mouth every 6 hours as needed for moderate pain, Disp: , Rfl:      levonorgestrel (MIRENA) 20 MCG/24HR IUD, 1 each by Intrauterine route once, Disp: , Rfl:      methocarbamol (ROBAXIN) 500 MG tablet, Take 1-3 tablets (500-1,500 mg) by mouth 3 times daily as needed for muscle spasms, Disp: 60 tablet, Rfl: 0     omeprazole (PRILOSEC) 20 MG DR capsule, Take 1 capsule (20 mg) by mouth daily, Disp: 30 capsule, Rfl: 1     pimecrolimus (ELIDEL) 1 % cream, Apply topically 2 times daily Ok to use on face, Disp: 60 g, Rfl: 3     QUEtiapine (SEROQUEL) 100 MG tablet, Take 0.5 tablets (50 mg) by mouth At Bedtime, Disp: 90 tablet, Rfl: 0     sertraline (ZOLOFT) 100 MG tablet, Take 2 tablets (200 mg) by mouth daily, Disp: 180 tablet, Rfl: 1     SUMAtriptan (IMITREX) 25 MG tablet, TAKE 1-2 TABLETS AT ONSET OF MIGRAINE. MAY REPEAT IN 2  "HOURS. MAX 8 TABS/24 HOURS, Disp: 9 tablet, Rfl: 0    ALLERGIES:  No Known Allergies    FAMILY HISTORY:  Family History   Problem Relation Age of Onset     Diabetes Maternal Grandmother      Hypertension Maternal Grandmother      Diabetes Paternal Grandmother      Cardiovascular Paternal Grandmother      Breast Cancer Paternal Grandmother 61        right side breast cancer     Genetic Disorder Other         nephew.-genetic defects      Obesity Mother      Supraventricular tachycardia Mother      Family History Negative Father        SOCIAL HISTORY:   Social History     Socioeconomic History     Marital status: Single   Tobacco Use     Smoking status: Current Every Day Smoker     Packs/day: 1.00     Types: Cigarettes     Smokeless tobacco: Never Used     Tobacco comment: 5 qd   Vaping Use     Vaping Use: Every day     Substances: Nicotine   Substance and Sexual Activity     Alcohol use: Yes     Alcohol/week: 0.0 standard drinks     Comment: Alcoholic Drinks/day: Occasional     Drug use: No     Types: Oxycodone     Sexual activity: Yes     Partners: Male     Birth control/protection: I.U.D.       PHYSICAL EXAM:    Vitals: /66   Pulse 94   Temp 98  F (36.7  C) (Oral)   Resp 16   Ht 1.651 m (5' 5\")   Wt 106.6 kg (235 lb)   SpO2 97%   BMI 39.11 kg/m     Physical Exam  Vitals and nursing note reviewed.   Constitutional:       General: She is not in acute distress.     Appearance: Normal appearance. She is normal weight. She is not ill-appearing or toxic-appearing.   HENT:      Head: Normocephalic.      Mouth/Throat:      Mouth: Mucous membranes are dry.   Cardiovascular:      Rate and Rhythm: Normal rate and regular rhythm.      Heart sounds: Normal heart sounds.   Pulmonary:      Effort: Pulmonary effort is normal. No respiratory distress.      Breath sounds: Normal breath sounds.   Abdominal:      General: There is no distension.      Palpations: Abdomen is soft.      Tenderness: There is abdominal " tenderness in the right lower quadrant. There is right CVA tenderness. There is no left CVA tenderness, guarding or rebound.      Hernia: No hernia is present.   Musculoskeletal:      Right lower leg: No edema.      Left lower leg: No edema.   Skin:     General: Skin is warm and dry.      Coloration: Skin is not jaundiced or pale.   Neurological:      General: No focal deficit present.      Mental Status: She is alert.   Psychiatric:         Behavior: Behavior normal.        LAB:  All pertinent labs reviewed and interpreted.  Labs Ordered and Resulted from Time of ED Arrival to Time of ED Departure   ROUTINE UA WITH MICROSCOPIC REFLEX TO CULTURE - Abnormal       Result Value    Color Urine Yellow      Appearance Urine Turbid (*)     Glucose Urine Negative      Bilirubin Urine Negative      Ketones Urine Negative      Specific Gravity Urine 1.031 (*)     Blood Urine Negative      pH Urine 5.5      Protein Albumin Urine 20  (*)     Urobilinogen Urine <2.0      Nitrite Urine Negative      Leukocyte Esterase Urine 75 Navnete/uL (*)     Bacteria Urine Few (*)     Mucus Urine Present (*)     RBC Urine 2      WBC Urine 6 (*)     Squamous Epithelials Urine 9 (*)    COMPREHENSIVE METABOLIC PANEL - Normal    Sodium 140      Potassium 3.8      Chloride 106      Carbon Dioxide (CO2) 23      Anion Gap 11      Urea Nitrogen 12      Creatinine 0.72      Calcium 9.1      Glucose 106      Alkaline Phosphatase 62      AST 35      ALT 19      Protein Total 7.1      Albumin 3.8      Bilirubin Total 0.4      GFR Estimate >90     LIPASE - Normal    Lipase 14     CRP INFLAMMATION - Normal    CRP 0.5     HCG QUALITATIVE URINE POCT - Normal    HCG Qual Urine Negative      Internal QC Check POCT Valid      POCT Kit Lot Number [id number]      POCT Kit Expiration Date 07/31/2023     CBC WITH PLATELETS AND DIFFERENTIAL    WBC Count 10.8      RBC Count 4.67      Hemoglobin 13.0      Hematocrit 40.4      MCV 87      MCH 27.8      MCHC 32.2      RDW  13.0      Platelet Count 299      % Neutrophils 75      % Lymphocytes 16      % Monocytes 8      % Eosinophils 0      % Basophils 1      % Immature Granulocytes 0      NRBCs per 100 WBC 0      Absolute Neutrophils 8.1      Absolute Lymphocytes 1.7      Absolute Monocytes 0.8      Absolute Eosinophils 0.0      Absolute Basophils 0.1      Absolute Immature Granulocytes 0.0      Absolute NRBCs 0.0         RADIOLOGY:  CT Abdomen Pelvis w/o Contrast   Final Result   IMPRESSION:    1. A trace amount of nonspecific free fluid in the pelvis.   2. No other cause of acute pain identified in the abdomen or pelvis.   3. No renal or ureteral calculi or evidence of urinary obstruction.              I, Kuldip Cisneros, am serving as a scribe to document services personally performed by Dr. Real Smith  based on my observation and the provider's statements to me. I, Real Smith MD attest that Kuldip Cisneros is acting in a scribe capacity, has observed my performance of the services and has documented them in accordance with my direction.      Real Smith M.D.  Emergency Medicine  [facility]     Real Smith MD  05/01/22 2995

## 2022-05-18 ENCOUNTER — HOSPITAL ENCOUNTER (EMERGENCY)
Facility: CLINIC | Age: 27
Discharge: HOME | End: 2022-05-19
Attending: EMERGENCY MEDICINE | Admitting: EMERGENCY MEDICINE
Payer: COMMERCIAL

## 2022-05-18 ENCOUNTER — APPOINTMENT (OUTPATIENT)
Dept: CT IMAGING | Facility: CLINIC | Age: 27
End: 2022-05-18
Attending: EMERGENCY MEDICINE
Payer: COMMERCIAL

## 2022-05-18 DIAGNOSIS — R10.9 FLANK PAIN: ICD-10-CM

## 2022-05-18 LAB
ALBUMIN UR-MCNC: 20 MG/DL
ANION GAP SERPL CALCULATED.3IONS-SCNC: 11 MMOL/L (ref 5–18)
APPEARANCE UR: ABNORMAL
BASOPHILS # BLD AUTO: 0.1 10E3/UL (ref 0–0.2)
BASOPHILS NFR BLD AUTO: 1 %
BILIRUB UR QL STRIP: NEGATIVE
BUN SERPL-MCNC: 13 MG/DL (ref 8–22)
CALCIUM SERPL-MCNC: 9.5 MG/DL (ref 8.5–10.5)
CAOX CRY #/AREA URNS HPF: ABNORMAL /HPF
CHLORIDE BLD-SCNC: 105 MMOL/L (ref 98–107)
CO2 SERPL-SCNC: 25 MMOL/L (ref 22–31)
COLOR UR AUTO: YELLOW
CREAT SERPL-MCNC: 0.79 MG/DL (ref 0.6–1.1)
EOSINOPHIL # BLD AUTO: 0.1 10E3/UL (ref 0–0.7)
EOSINOPHIL NFR BLD AUTO: 1 %
ERYTHROCYTE [DISTWIDTH] IN BLOOD BY AUTOMATED COUNT: 12.3 % (ref 10–15)
GFR SERPL CREATININE-BSD FRML MDRD: >90 ML/MIN/1.73M2
GLUCOSE BLD-MCNC: 96 MG/DL (ref 70–125)
GLUCOSE UR STRIP-MCNC: NEGATIVE MG/DL
HCG UR QL: NEGATIVE
HCT VFR BLD AUTO: 41.1 % (ref 35–47)
HGB BLD-MCNC: 13.5 G/DL (ref 11.7–15.7)
HGB UR QL STRIP: NEGATIVE
IMM GRANULOCYTES # BLD: 0 10E3/UL
IMM GRANULOCYTES NFR BLD: 0 %
KETONES UR STRIP-MCNC: NEGATIVE MG/DL
LEUKOCYTE ESTERASE UR QL STRIP: ABNORMAL
LYMPHOCYTES # BLD AUTO: 3 10E3/UL (ref 0.8–5.3)
LYMPHOCYTES NFR BLD AUTO: 30 %
MCH RBC QN AUTO: 28 PG (ref 26.5–33)
MCHC RBC AUTO-ENTMCNC: 32.8 G/DL (ref 31.5–36.5)
MCV RBC AUTO: 85 FL (ref 78–100)
MONOCYTES # BLD AUTO: 0.8 10E3/UL (ref 0–1.3)
MONOCYTES NFR BLD AUTO: 8 %
MUCOUS THREADS #/AREA URNS LPF: PRESENT /LPF
NEUTROPHILS # BLD AUTO: 5.9 10E3/UL (ref 1.6–8.3)
NEUTROPHILS NFR BLD AUTO: 60 %
NITRATE UR QL: NEGATIVE
NRBC # BLD AUTO: 0 10E3/UL
NRBC BLD AUTO-RTO: 0 /100
PH UR STRIP: 5.5 [PH] (ref 5–7)
PLATELET # BLD AUTO: 340 10E3/UL (ref 150–450)
POTASSIUM BLD-SCNC: 4 MMOL/L (ref 3.5–5)
RBC # BLD AUTO: 4.82 10E6/UL (ref 3.8–5.2)
RBC URINE: 3 /HPF
SODIUM SERPL-SCNC: 141 MMOL/L (ref 136–145)
SP GR UR STRIP: 1.03 (ref 1–1.03)
SQUAMOUS EPITHELIAL: 9 /HPF
UROBILINOGEN UR STRIP-MCNC: <2 MG/DL
WBC # BLD AUTO: 9.9 10E3/UL (ref 4–11)
WBC URINE: 5 /HPF

## 2022-05-18 PROCEDURE — 250N000011 HC RX IP 250 OP 636: Performed by: EMERGENCY MEDICINE

## 2022-05-18 PROCEDURE — 85025 COMPLETE CBC W/AUTO DIFF WBC: CPT | Performed by: EMERGENCY MEDICINE

## 2022-05-18 PROCEDURE — 99285 EMERGENCY DEPT VISIT HI MDM: CPT | Mod: 25

## 2022-05-18 PROCEDURE — 74176 CT ABD & PELVIS W/O CONTRAST: CPT

## 2022-05-18 PROCEDURE — 81025 URINE PREGNANCY TEST: CPT | Performed by: EMERGENCY MEDICINE

## 2022-05-18 PROCEDURE — 36415 COLL VENOUS BLD VENIPUNCTURE: CPT | Performed by: EMERGENCY MEDICINE

## 2022-05-18 PROCEDURE — 258N000003 HC RX IP 258 OP 636: Performed by: EMERGENCY MEDICINE

## 2022-05-18 PROCEDURE — 96374 THER/PROPH/DIAG INJ IV PUSH: CPT

## 2022-05-18 PROCEDURE — 96375 TX/PRO/DX INJ NEW DRUG ADDON: CPT

## 2022-05-18 PROCEDURE — 82310 ASSAY OF CALCIUM: CPT | Performed by: EMERGENCY MEDICINE

## 2022-05-18 PROCEDURE — 81001 URINALYSIS AUTO W/SCOPE: CPT | Performed by: EMERGENCY MEDICINE

## 2022-05-18 RX ORDER — SODIUM CHLORIDE 9 MG/ML
INJECTION, SOLUTION INTRAVENOUS CONTINUOUS
Status: DISCONTINUED | OUTPATIENT
Start: 2022-05-19 | End: 2022-05-19 | Stop reason: HOSPADM

## 2022-05-18 RX ORDER — MORPHINE SULFATE 4 MG/ML
4 INJECTION, SOLUTION INTRAMUSCULAR; INTRAVENOUS
Status: COMPLETED | OUTPATIENT
Start: 2022-05-18 | End: 2022-05-18

## 2022-05-18 RX ORDER — ONDANSETRON 2 MG/ML
4 INJECTION INTRAMUSCULAR; INTRAVENOUS EVERY 30 MIN PRN
Status: DISCONTINUED | OUTPATIENT
Start: 2022-05-18 | End: 2022-05-19 | Stop reason: HOSPADM

## 2022-05-18 RX ADMIN — ONDANSETRON 4 MG: 2 INJECTION INTRAMUSCULAR; INTRAVENOUS at 23:36

## 2022-05-18 RX ADMIN — SODIUM CHLORIDE 1000 ML: 9 INJECTION, SOLUTION INTRAVENOUS at 23:37

## 2022-05-18 RX ADMIN — MORPHINE SULFATE 4 MG: 4 INJECTION, SOLUTION INTRAMUSCULAR; INTRAVENOUS at 23:36

## 2022-05-19 VITALS
HEIGHT: 65 IN | BODY MASS INDEX: 37.82 KG/M2 | HEART RATE: 93 BPM | RESPIRATION RATE: 18 BRPM | WEIGHT: 227 LBS | DIASTOLIC BLOOD PRESSURE: 80 MMHG | OXYGEN SATURATION: 99 % | SYSTOLIC BLOOD PRESSURE: 125 MMHG | TEMPERATURE: 98.3 F

## 2022-05-19 NOTE — ED PROVIDER NOTES
EMERGENCY DEPARTMENT ENCOUNTER      NAME: Lucero Collier  AGE: 26 year old female  YOB: 1995  MRN: [id number]  EVALUATION DATE & TIME: 5/18/2022 10:57 PM    PCP: Cristal Beltran Ra    ED PROVIDER: Yovany Sanchez M.D.      Chief Complaint   Patient presents with     Flank Pain         FINAL IMPRESSION:  1. Flank pain          ED COURSE & MEDICAL DECISION MAKING:    Pertinent Labs & Imaging studies reviewed. (See chart for details)  26 year old female presents to the Emergency Department for evaluation of flank pain. Patient appears non toxic with stable vitals signs, patient is afebrile with no tachycardia or hypoxia, no increased work of breathing.  Exam significant for right CVA tenderness, abdomen is otherwise benign with no focal tenderness, certainly no rigidity or distention, no rebound or guarding.  She denies any pelvic pain, vaginal bleeding or discharge, no blood in her urine or stools, no chest or pulmonary complaints.  Concern for nephrolithiasis, considered but less likely cystitis or pyelonephritis, considered appendicitis, no focal tenderness to suggest pancreatitis, cholecystitis, diverticulitis, obstruction, perforation, GI bleed, mesenteric ischemia, nothing to suggest atypical ACS, PE, dissection or ruptured AAA.  We will obtain screening labs, urine studies and CT imaging.  Patient was given pain medications and antiemetics.    Reassessment: Labs showed no acute concerning findings, urine showed leukocyte Estrace but no blood or nitrates, again she denies any urinary symptoms to suggest cystitis, will send for culture and treat only if positive.  CT imaging reported no acute concerning findings.  Repeat exam the patient is benign she continues to appear quite well and comfortable here.  With negative work-up and benign exam feel she is safe for discharge and close follow-up.  Will recommend conservative management with Tylenol or ibuprofen, plenty of fluids and close follow-up  with primary care in the next 5 to 7 days.  Discussed these findings recommendations with the patient felt reassured and comfortable discharge.  Return precautions provided.    11:13 PM I met the patient and performed my initial interview and exam.  12:57 AM I updated patient on findings. Repeat exam benign. We discussed the plan for discharge and the patient is agreeable. Reviewed supportive cares, symptomatic treatment, outpatient follow up, and reasons to return to the Emergency Department. All questions and concerns were addressed. Patient to be discharged by ED RN.      At the conclusion of the encounter I discussed the results of all of the tests and the disposition. The questions were answered and return precautions provided. The patient or family acknowledged understanding and was agreeable with the care plan.         MEDICATIONS GIVEN IN THE EMERGENCY:  Medications   0.9% sodium chloride BOLUS (0 mLs Intravenous Stopped 5/19/22 0102)     Followed by   sodium chloride 0.9% infusion (has no administration in time range)   ondansetron (ZOFRAN) injection 4 mg (4 mg Intravenous Given 5/18/22 2336)   morphine (PF) injection 4 mg (4 mg Intravenous Given 5/18/22 2336)       NEW PRESCRIPTIONS STARTED AT TODAY'S ER VISIT  Discharge Medication List as of 5/19/2022  1:03 AM               =================================================================    HPI    Patient information was obtained from: patient     Use of Intrepreter: N/A         Lucero SCHNEIDER Stone is a 26 year old female with history of epilepsy, GERD, hypertension, nephrolithiasis who presents to the ED for evaluation of flank pain.     Per chart review, patient was seen in the ED on 5/1 for evaluation of flank pain. Patient had sudden onset of right flank pain that radiated into her RLQ. Patient was given IV fluids and antiemetics. CT scan was negative for stones or acute pathology. Labs were unremarkable. Patient likely passed a kidney stone. Patient was  given precautions and discharged home.     Patient reports she had the onset of right flank pain about one hour ago. This pain shoots into her right abdomen. Patient tried taking at home muscle relaxer which did not help her symptoms. Patient denies any new activity to cause this pain. She has history of kidney stone. Patient reports history of Nissen fundoplication. Patient smokes cigarettes. She has not used alcohol recently. Denies drug use. Denies appetite changes, urinary changes, stool changes, vaginal bleeding, vaginal discharge, fever, chest pain, or any other complaints at this time.     REVIEW OF SYSTEMS   Constitutional:  Denies fever, chills  Respiratory:  Denies productive cough or increased work of breathing  Cardiovascular:  Denies chest pain, palpitations  GI: Positive for flank pain. Denies abdominal pain, nausea, vomiting, or change in bowel or bladder habits   Musculoskeletal:  Denies any new muscle/joint swelling  Skin:  Denies rash   Neurologic:  Denies focal weakness  All systems negative except as marked.     PAST MEDICAL HISTORY:  Past Medical History:   Diagnosis Date     ADHD     on Daytrana and clonidine     Adverse reaction to pertussis vaccine      Allergies     daily Claritin     Anxiety      Chronic headaches      Complex partial seizures (H)      Constipation      Depression      Depressive disorder      Developmental delay      Epilepsy (H)      GERD (gastroesophageal reflux disease)     UGI 3/07     History of tonsillectomy      Hypoglycemia     Endocrine eval neg 10/00     Kidney stone     6/02 detected on CT scan; resolved 12/04     Myopia      Obesity      Sinusitis, chronic      Sleep disorder     Since 2000; sleep clinic evaluation 4/00 Dr. Ramesh      Smoking      Spells      Syncope      Urinary tract infection 11/99    nl renal US & VCUG 11/99       PAST SURGICAL HISTORY:  Past Surgical History:   Procedure Laterality Date     ENDOSCOPY UPPER WITH PANCREATIC STIMULATION  4/06     Esophagitis 4/06     NISSEN FUNDOPLICATION  6/07    Dr. Meneses     NISSEN FUNDOPLICATION       PE TUBES  3/98     TONSILLECTOMY & ADENOIDECTOMY  4/02         CURRENT MEDICATIONS:    Prior to Admission medications    Medication Sig Start Date End Date Taking? Authorizing Provider   amphetamine-dextroamphetamine (ADDERALL XR) 25 MG 24 hr capsule Take 1 capsule (25 mg) by mouth daily 5/10/22 6/9/22  Cristal Beltran Ra, APRN CNP   amphetamine-dextroamphetamine (ADDERALL XR) 25 MG 24 hr capsule Take 1 capsule (25 mg) by mouth daily 6/10/22 7/10/22  Cristal Beltran Ra, APRN CNP   amphetamine-dextroamphetamine (ADDERALL XR) 25 MG 24 hr capsule Take 1 capsule (25 mg) by mouth every morning 3/10/22   Cristal Beltran Ra, APRN CNP   escitalopram (LEXAPRO) 10 MG tablet Take 1 tablet (10 mg) by mouth daily 4/5/22   Cristal Beltran Ra, APRN CNP   fluocinonide (LIDEX) 0.05 % external cream Apply sparingly to affected area twice daily as needed.  Do not apply to face. 6/10/19   Iris Rand PA-C   IBUPROFEN PO Take 600 mg by mouth every 6 hours as needed for moderate pain    Reported, Patient   levonorgestrel (MIRENA) 20 MCG/24HR IUD 1 each by Intrauterine route once 3/4/19 3/3/24  Reported, Patient   methocarbamol (ROBAXIN) 500 MG tablet Take 1-3 tablets (500-1,500 mg) by mouth 3 times daily as needed for muscle spasms 9/22/21   Chirag Short PA-C   omeprazole (PRILOSEC) 20 MG DR capsule Take 1 capsule (20 mg) by mouth daily 4/5/21   Cristal Beltran Ra, APRN CNP   pimecrolimus (ELIDEL) 1 % cream Apply topically 2 times daily Ok to use on face 2/7/14   Yakelin Combs MD   QUEtiapine (SEROQUEL) 100 MG tablet Take 0.5 tablets (50 mg) by mouth At Bedtime 3/10/22   Cristal Beltran Ra, APRN CNP   sertraline (ZOLOFT) 100 MG tablet Take 2 tablets (200 mg) by mouth daily 3/10/22   Cristal Beltran Ra, APRN CNP   SUMAtriptan (IMITREX) 25 MG tablet TAKE 1-2 TABLETS AT ONSET OF MIGRAINE. MAY REPEAT IN  "2 HOURS. MAX 8 TABS/24 HOURS 12/23/19   Iris Rand PA-C        ALLERGIES:  No Known Allergies    FAMILY HISTORY:  Family History   Problem Relation Age of Onset     Diabetes Maternal Grandmother      Hypertension Maternal Grandmother      Diabetes Paternal Grandmother      Cardiovascular Paternal Grandmother      Breast Cancer Paternal Grandmother 61        right side breast cancer     Genetic Disorder Other         nephew.-genetic defects      Obesity Mother      Supraventricular tachycardia Mother      Family History Negative Father        SOCIAL HISTORY:   Social History     Socioeconomic History     Marital status: Single   Tobacco Use     Smoking status: Current Every Day Smoker     Packs/day: 1.00     Types: Cigarettes     Smokeless tobacco: Never Used     Tobacco comment: 5 qd   Vaping Use     Vaping Use: Every day     Substances: Nicotine   Substance and Sexual Activity     Alcohol use: Yes     Alcohol/week: 0.0 standard drinks     Comment: Alcoholic Drinks/day: Occasional     Drug use: No     Types: Oxycodone     Sexual activity: Yes     Partners: Male     Birth control/protection: I.U.D.       VITALS:  Patient Vitals for the past 24 hrs:   BP Temp Temp src Pulse Resp SpO2 Height Weight   05/19/22 0100 125/80 -- -- 93 -- 99 % -- --   05/18/22 2250 138/89 98.3  F (36.8  C) Oral 99 18 98 % 1.651 m (5' 5\") 103 kg (227 lb)        PHYSICAL EXAM    Constitutional:  Awake, alert, in no apparent distress  HENT:  Normocephalic, Atraumatic. Bilateral external ears normal. Oropharynx moist. Nose normal. Neck- Normal range of motion with no guarding, No midline cervical tenderness, Supple, No stridor.   Eyes:  PERRL, EOMI with no signs of entrapment, Conjunctiva normal, No discharge.   Respiratory:  Normal breath sounds, No respiratory distress, No wheezing.    Cardiovascular:  Mild tachycardia. Normal rhythm, No appreciable rubs or gallops.   GI:  Soft, No tenderness, No distension, No palpable " masses  Musculoskeletal: Right sided CVA tenderness. Intact distal pulses, No edema. Good range of motion in all major joints. No major deformities noted.  Integument:  Warm, Dry, No erythema, No rash.   Neurologic:  Alert & oriented, Normal motor function, Normal sensory function, No focal deficits noted.   Psychiatric:  Affect normal, Judgment normal, Mood normal.     LAB:  All pertinent labs reviewed and interpreted.  Results for orders placed or performed during the hospital encounter of 05/18/22   CT Abdomen Pelvis w/o Contrast    Impression    IMPRESSION:   1.  No obstructive uropathy or other acute abnormality in the abdomen or pelvis.     UA with Microscopic reflex to Culture    Specimen: Urine, Clean Catch   Result Value Ref Range    Color Urine Yellow Colorless, Straw, Light Yellow, Yellow    Appearance Urine Turbid (A) Clear    Glucose Urine Negative Negative mg/dL    Bilirubin Urine Negative Negative    Ketones Urine Negative Negative mg/dL    Specific Gravity Urine 1.032 (H) 1.001 - 1.030    Blood Urine Negative Negative    pH Urine 5.5 5.0 - 7.0    Protein Albumin Urine 20  (A) Negative mg/dL    Urobilinogen Urine <2.0 <2.0 mg/dL    Nitrite Urine Negative Negative    Leukocyte Esterase Urine 25 Navneet/uL (A) Negative    Mucus Urine Present (A) None Seen /LPF    Calcium Oxalate Crystals Urine Few (A) None Seen /HPF    RBC Urine 3 (H) <=2 /HPF    WBC Urine 5 <=5 /HPF    Squamous Epithelials Urine 9 (H) <=1 /HPF   HCG qualitative urine   Result Value Ref Range    hCG Urine Qualitative Negative Negative   Basic metabolic panel   Result Value Ref Range    Sodium 141 136 - 145 mmol/L    Potassium 4.0 3.5 - 5.0 mmol/L    Chloride 105 98 - 107 mmol/L    Carbon Dioxide (CO2) 25 22 - 31 mmol/L    Anion Gap 11 5 - 18 mmol/L    Urea Nitrogen 13 8 - 22 mg/dL    Creatinine 0.79 0.60 - 1.10 mg/dL    Calcium 9.5 8.5 - 10.5 mg/dL    Glucose 96 70 - 125 mg/dL    GFR Estimate >90 >60 mL/min/1.73m2   CBC with platelets and  differential   Result Value Ref Range    WBC Count 9.9 4.0 - 11.0 10e3/uL    RBC Count 4.82 3.80 - 5.20 10e6/uL    Hemoglobin 13.5 11.7 - 15.7 g/dL    Hematocrit 41.1 35.0 - 47.0 %    MCV 85 78 - 100 fL    MCH 28.0 26.5 - 33.0 pg    MCHC 32.8 31.5 - 36.5 g/dL    RDW 12.3 10.0 - 15.0 %    Platelet Count 340 150 - 450 10e3/uL    % Neutrophils 60 %    % Lymphocytes 30 %    % Monocytes 8 %    % Eosinophils 1 %    % Basophils 1 %    % Immature Granulocytes 0 %    NRBCs per 100 WBC 0 <1 /100    Absolute Neutrophils 5.9 1.6 - 8.3 10e3/uL    Absolute Lymphocytes 3.0 0.8 - 5.3 10e3/uL    Absolute Monocytes 0.8 0.0 - 1.3 10e3/uL    Absolute Eosinophils 0.1 0.0 - 0.7 10e3/uL    Absolute Basophils 0.1 0.0 - 0.2 10e3/uL    Absolute Immature Granulocytes 0.0 <=0.4 10e3/uL    Absolute NRBCs 0.0 10e3/uL       RADIOLOGY:  CT Abdomen Pelvis w/o Contrast   Final Result   IMPRESSION:    1.  No obstructive uropathy or other acute abnormality in the abdomen or pelvis.              I, Werner Machado, am serving as a scribe to document services personally performed by Yovany Sanchez MD, based on my observation and the provider's statements to me. I, Yovany Sanchez MD attest that Werner Machado is acting in a scribe capacity, has observed my performance of the services and has documented them in accordance with my direction.    Yovany Sanchez M.D.  Emergency Medicine  [facility] EMERGENCY ROOM  [address] [address] [phone]  Dept: [phone]     Yovany Sanchez MD  05/19/22 3933

## 2022-05-19 NOTE — ED TRIAGE NOTES
1 hour of severe sharp right flank pain wrapping around to the front     Triage Assessment     Row Name 05/18/22 7505       Triage Assessment (Adult)    Airway WDL WDL       Respiratory WDL    Respiratory WDL WDL       Skin Circulation/Temperature WDL    Skin Circulation/Temperature WDL WDL       Cardiac WDL    Cardiac WDL WDL       Peripheral/Neurovascular WDL    Peripheral Neurovascular WDL WDL       Cognitive/Neuro/Behavioral WDL    Cognitive/Neuro/Behavioral WDL WDL

## 2022-05-20 ENCOUNTER — PATIENT OUTREACH (OUTPATIENT)
Dept: CARE COORDINATION | Facility: CLINIC | Age: 27
End: 2022-05-20
Payer: COMMERCIAL

## 2022-05-20 DIAGNOSIS — Z71.89 OTHER SPECIFIED COUNSELING: ICD-10-CM

## 2022-05-20 NOTE — PROGRESS NOTES
Clinic Care Coordination Contact  [facility]/Voicemail     Clinical Data: Care Coordinator Outreach - TCM      Outreach attempted x 1.  Left message on patient's voicemail, providing [facility]'s 24/7 scheduling and nurse triage phone number [phone] ([phone]) for questions/concerns.      Plan:  Care Coordinator will try to reach patient again in 1-2 business days.       Cristina Farr, MILEY  [facility], [facility]

## 2022-05-21 NOTE — PROGRESS NOTES
Clinic Care Coordination Contact  [facility]/Voicemail       Clinical Data: Care Coordinator Outreach  Outreach attempted x 2.  Left message on patient's voicemail with call back information and requested return call.  Plan: Care Coordinator will do no further outreaches at this time.    Marta Hughes  Community Health Worker  [facility]  [phone]

## 2022-08-21 ENCOUNTER — HEALTH MAINTENANCE LETTER (OUTPATIENT)
Age: 27
End: 2022-08-21

## 2022-09-08 DIAGNOSIS — F33.2 SEVERE RECURRENT MAJOR DEPRESSION WITHOUT PSYCHOTIC FEATURES (H): Primary | ICD-10-CM

## 2022-09-08 DIAGNOSIS — Z13.220 SCREENING FOR HYPERLIPIDEMIA: ICD-10-CM

## 2022-09-08 DIAGNOSIS — Z79.899 HISTORY OF LONG-TERM TREATMENT WITH HIGH-RISK MEDICATION: ICD-10-CM

## 2022-10-04 ENCOUNTER — TELEPHONE (OUTPATIENT)
Dept: FAMILY MEDICINE | Facility: CLINIC | Age: 27
End: 2022-10-04

## 2022-10-04 DIAGNOSIS — F90.2 ATTENTION DEFICIT HYPERACTIVITY DISORDER (ADHD), COMBINED TYPE: ICD-10-CM

## 2022-10-04 RX ORDER — DEXTROAMPHETAMINE SACCHARATE, AMPHETAMINE ASPARTATE MONOHYDRATE, DEXTROAMPHETAMINE SULFATE AND AMPHETAMINE SULFATE 6.25; 6.25; 6.25; 6.25 MG/1; MG/1; MG/1; MG/1
25 CAPSULE, EXTENDED RELEASE ORAL DAILY
Qty: 30 CAPSULE | Refills: 0 | Status: SHIPPED | OUTPATIENT
Start: 2022-11-04 | End: 2022-12-04

## 2022-10-04 RX ORDER — DEXTROAMPHETAMINE SACCHARATE, AMPHETAMINE ASPARTATE MONOHYDRATE, DEXTROAMPHETAMINE SULFATE AND AMPHETAMINE SULFATE 6.25; 6.25; 6.25; 6.25 MG/1; MG/1; MG/1; MG/1
25 CAPSULE, EXTENDED RELEASE ORAL DAILY
Qty: 30 CAPSULE | Refills: 0 | Status: SHIPPED | OUTPATIENT
Start: 2022-12-05 | End: 2023-01-04

## 2022-10-04 RX ORDER — DEXTROAMPHETAMINE SACCHARATE, AMPHETAMINE ASPARTATE MONOHYDRATE, DEXTROAMPHETAMINE SULFATE AND AMPHETAMINE SULFATE 6.25; 6.25; 6.25; 6.25 MG/1; MG/1; MG/1; MG/1
25 CAPSULE, EXTENDED RELEASE ORAL EVERY MORNING
Qty: 30 CAPSULE | Refills: 0 | Status: CANCELLED | OUTPATIENT
Start: 2022-10-04

## 2022-10-04 RX ORDER — DEXTROAMPHETAMINE SACCHARATE, AMPHETAMINE ASPARTATE MONOHYDRATE, DEXTROAMPHETAMINE SULFATE AND AMPHETAMINE SULFATE 6.25; 6.25; 6.25; 6.25 MG/1; MG/1; MG/1; MG/1
25 CAPSULE, EXTENDED RELEASE ORAL DAILY
Qty: 30 CAPSULE | Refills: 0 | Status: SHIPPED | OUTPATIENT
Start: 2022-10-04 | End: 2022-10-20

## 2022-10-04 NOTE — TELEPHONE ENCOUNTER
Reason for Call:  Other call back    Detailed comments: PT HAS APT ON 12/13/22 AND WILL NEED MORE MEDS BEFORE THAT IF SHE CAN GET A REFILL TILL 12/13/22 APT THAT WOULD BE GREAT     Phone Number Patient can be reached at: Home number on file [phone] (home)    Best Time: ANYTIME    Can we leave a detailed message on this number? YES    Call taken on 10/4/2022 at 10:40 AM by Alexander Corral

## 2022-10-04 NOTE — TELEPHONE ENCOUNTER
Call from [facility] @ [employer] in regards to mutual patient. Calling regarding lab orders from 9/8 and if there is results as patient states that they have been drawn. After further investigation, the labs in question from 9/8 are still future orders. Informed cintia [facility], will get patient scheduled for the lab draw.     Raman Masters RN on 10/4/2022 at 9:32 AM

## 2022-10-20 DIAGNOSIS — F90.2 ATTENTION DEFICIT HYPERACTIVITY DISORDER (ADHD), COMBINED TYPE: ICD-10-CM

## 2022-10-20 RX ORDER — DEXTROAMPHETAMINE SACCHARATE, AMPHETAMINE ASPARTATE MONOHYDRATE, DEXTROAMPHETAMINE SULFATE AND AMPHETAMINE SULFATE 6.25; 6.25; 6.25; 6.25 MG/1; MG/1; MG/1; MG/1
25 CAPSULE, EXTENDED RELEASE ORAL DAILY
Qty: 30 CAPSULE | Refills: 0 | Status: SHIPPED | OUTPATIENT
Start: 2022-10-20 | End: 2023-03-31

## 2022-10-20 NOTE — TELEPHONE ENCOUNTER
Patient calling, Adderall not in stock in her pharmacy. Requests to have it sent to [email or web address] in [place].     Medications teed up for provider to review and approve.     Raman Masters RN on 10/20/2022 at 11:13 AM

## 2022-11-20 ENCOUNTER — HEALTH MAINTENANCE LETTER (OUTPATIENT)
Age: 27
End: 2022-11-20

## 2022-11-22 DIAGNOSIS — F90.2 ATTENTION DEFICIT HYPERACTIVITY DISORDER (ADHD), COMBINED TYPE: ICD-10-CM

## 2022-11-22 RX ORDER — DEXTROAMPHETAMINE SACCHARATE, AMPHETAMINE ASPARTATE MONOHYDRATE, DEXTROAMPHETAMINE SULFATE AND AMPHETAMINE SULFATE 6.25; 6.25; 6.25; 6.25 MG/1; MG/1; MG/1; MG/1
25 CAPSULE, EXTENDED RELEASE ORAL EVERY MORNING
Qty: 30 CAPSULE | Refills: 0 | Status: SHIPPED | OUTPATIENT
Start: 2022-11-22 | End: 2023-02-22

## 2022-11-22 NOTE — TELEPHONE ENCOUNTER
Patient called requesting a refill for ADDERALL XR. They have a med check appointment scheduled with their PCP on 1/26.     Phone: [phone]  OK to leave a detailed message    Celina Napoles       written material

## 2022-12-11 ENCOUNTER — OFFICE VISIT (OUTPATIENT)
Dept: FAMILY MEDICINE | Facility: CLINIC | Age: 27
End: 2022-12-11
Payer: COMMERCIAL

## 2022-12-11 VITALS
DIASTOLIC BLOOD PRESSURE: 87 MMHG | BODY MASS INDEX: 38.89 KG/M2 | RESPIRATION RATE: 20 BRPM | WEIGHT: 233.7 LBS | HEART RATE: 88 BPM | TEMPERATURE: 98.5 F | SYSTOLIC BLOOD PRESSURE: 120 MMHG | OXYGEN SATURATION: 96 %

## 2022-12-11 DIAGNOSIS — R05.1 ACUTE COUGH: Primary | ICD-10-CM

## 2022-12-11 LAB
FLUAV AG SPEC QL IA: NEGATIVE
FLUBV AG SPEC QL IA: NEGATIVE

## 2022-12-11 PROCEDURE — 87804 INFLUENZA ASSAY W/OPTIC: CPT

## 2022-12-11 PROCEDURE — 99213 OFFICE O/P EST LOW 20 MIN: CPT

## 2022-12-11 RX ORDER — BENZONATATE 200 MG/1
200 CAPSULE ORAL 3 TIMES DAILY PRN
Qty: 21 CAPSULE | Refills: 0 | Status: SHIPPED | OUTPATIENT
Start: 2022-12-11 | End: 2023-01-26

## 2022-12-11 NOTE — PROGRESS NOTES
ASSESSMENT:  (R05.1) Acute cough  (primary encounter diagnosis)  Plan: Influenza A & B Antigen - Clinic Collect,         benzonatate (TESSALON) 200 MG capsule    PLAN:  Informed the patient that the influenza test is negative. Instructed her to get plenty of rest, drink fluids, take Tessalon as prescribed and use Tylenol as needed for pain and fever with a maximum dose of Tylenol being 4000 mg in a 24-hour period of time.  Work note provided.  Discussed the need to return to clinic with any new or worsening symptoms.  Patient acknowledged her understanding of the above plan.    Real Olguin, SHELBY CNP    SUBJECTIVE:  Lucero Collier is a 27 year old female who presents to the clinic today with a chief complaint of cough  for 2 day(s).  Her cough is described as nonproductive, productive green.   The patient's symptoms are moderate and not changing over the course of time.  Associated symptoms include congestion, fever, rhinorrhea, ear pain and sore throat. The patient's symptoms are exacerbated by lying down  Patient has been using nothing  to improve symptoms.    At home COVID test negative.     ROS  Review of systems negative except as stated above.    OBJECTIVE:  /87 (BP Location: Right arm, Patient Position: Sitting, Cuff Size: Adult Regular)   Pulse 88   Temp 98.5  F (36.9  C) (Oral)   Resp 20   Wt 106 kg (233 lb 11.2 oz)   SpO2 96%   BMI 38.89 kg/m    GENERAL APPEARANCE: healthy, alert and no distress  EYES: EOMI,  PERRL, conjunctiva clear  HENT: ear canals and TM's normal.  Nose and mouth without ulcers, erythema or lesions  NECK: supple, nontender, no lymphadenopathy  RESP: lungs clear to auscultation - no rales, rhonchi or wheezes  CV: regular rates and rhythm, normal S1 S2, no murmur noted  NEURO: Normal strength and tone, sensory exam grossly normal,  normal speech and mentation  SKIN: no suspicious lesions or rashes  PSYCH: mentation appears normal

## 2022-12-11 NOTE — LETTER
December 11, 2022      Lucero Collier  [address] [place] [id number]        To Whom It May Concern:    Lucero Collier  was seen on December 11, 2022.  Please excuse her from work until December 13th due to illness.        Sincerely,        SHELBY Lane CNP

## 2022-12-11 NOTE — PATIENT INSTRUCTIONS
Influenza test is negative. Get plenty of rest and drink fluids.  Take Tessalon as prescribed.  Can use Tylenol as needed for pain and fever.  Maximum dose of Tylenol is 4000mg in a 24 hour period of time.

## 2022-12-13 ENCOUNTER — MYC MEDICAL ADVICE (OUTPATIENT)
Dept: FAMILY MEDICINE | Facility: CLINIC | Age: 27
End: 2022-12-13

## 2022-12-13 ENCOUNTER — VIRTUAL VISIT (OUTPATIENT)
Dept: PEDIATRICS | Facility: CLINIC | Age: 27
End: 2022-12-13
Payer: COMMERCIAL

## 2022-12-13 DIAGNOSIS — J20.9 ACUTE BRONCHITIS, UNSPECIFIED ORGANISM: Primary | ICD-10-CM

## 2022-12-13 DIAGNOSIS — G40.209 PARTIAL SYMPTOMATIC EPILEPSY WITH COMPLEX PARTIAL SEIZURES, NOT INTRACTABLE, WITHOUT STATUS EPILEPTICUS (H): ICD-10-CM

## 2022-12-13 DIAGNOSIS — R06.2 WHEEZING: ICD-10-CM

## 2022-12-13 DIAGNOSIS — E66.01 MORBID OBESITY (H): ICD-10-CM

## 2022-12-13 PROCEDURE — 99213 OFFICE O/P EST LOW 20 MIN: CPT | Mod: TEL | Performed by: NURSE PRACTITIONER

## 2022-12-13 RX ORDER — ALBUTEROL SULFATE 90 UG/1
2 AEROSOL, METERED RESPIRATORY (INHALATION) EVERY 6 HOURS PRN
Qty: 18 G | Refills: 0 | Status: SHIPPED | OUTPATIENT
Start: 2022-12-13

## 2022-12-13 ASSESSMENT — PATIENT HEALTH QUESTIONNAIRE - PHQ9
10. IF YOU CHECKED OFF ANY PROBLEMS, HOW DIFFICULT HAVE THESE PROBLEMS MADE IT FOR YOU TO DO YOUR WORK, TAKE CARE OF THINGS AT HOME, OR GET ALONG WITH OTHER PEOPLE: SOMEWHAT DIFFICULT
SUM OF ALL RESPONSES TO PHQ QUESTIONS 1-9: 16
SUM OF ALL RESPONSES TO PHQ QUESTIONS 1-9: 16

## 2022-12-13 ASSESSMENT — ANXIETY QUESTIONNAIRES
2. NOT BEING ABLE TO STOP OR CONTROL WORRYING: MORE THAN HALF THE DAYS
6. BECOMING EASILY ANNOYED OR IRRITABLE: MORE THAN HALF THE DAYS
4. TROUBLE RELAXING: MORE THAN HALF THE DAYS
5. BEING SO RESTLESS THAT IT IS HARD TO SIT STILL: MORE THAN HALF THE DAYS
7. FEELING AFRAID AS IF SOMETHING AWFUL MIGHT HAPPEN: MORE THAN HALF THE DAYS
GAD7 TOTAL SCORE: 14
3. WORRYING TOO MUCH ABOUT DIFFERENT THINGS: MORE THAN HALF THE DAYS
8. IF YOU CHECKED OFF ANY PROBLEMS, HOW DIFFICULT HAVE THESE MADE IT FOR YOU TO DO YOUR WORK, TAKE CARE OF THINGS AT HOME, OR GET ALONG WITH OTHER PEOPLE?: SOMEWHAT DIFFICULT
1. FEELING NERVOUS, ANXIOUS, OR ON EDGE: MORE THAN HALF THE DAYS
IF YOU CHECKED OFF ANY PROBLEMS ON THIS QUESTIONNAIRE, HOW DIFFICULT HAVE THESE PROBLEMS MADE IT FOR YOU TO DO YOUR WORK, TAKE CARE OF THINGS AT HOME, OR GET ALONG WITH OTHER PEOPLE: SOMEWHAT DIFFICULT
GAD7 TOTAL SCORE: 14
GAD7 TOTAL SCORE: 14
7. FEELING AFRAID AS IF SOMETHING AWFUL MIGHT HAPPEN: MORE THAN HALF THE DAYS

## 2022-12-13 NOTE — TELEPHONE ENCOUNTER
Pt transferred to triage.   Reports symptoms started 12/10. Worsening on 12/11 so pt went to >   Given tessalon and influenza NEG.     Pt feels that symptoms are about the same.   Feeling SOB w/ activity.   No swelling noted.   Pt able to talk in full sentences without issues.   Pt reports wheezing+  Some chest heaviness w/ the upper respiratory  When she is having a coughing spell - she feels like she is gasping for air.   Pt is a smoker + (has not smoked x 4 days)  Fever 12/10-12/11 100.2 (only 24 hours)    Covid test 12/10- NEG    Pt is looking for inhaler/nebs/steroid something to help with the breathing. No pulse ox at home either.     Scheduled in VIRT visit today   Next 5 appointments (look out 90 days)    Dec 13, 2022  1:30 PM  (Arrive by 1:10 PM)  Provider Visit with Elizabeth Ozuna NP  [facility] ([facility] ) [address]  [facility] [address] [phone]   Jan 26, 2023  1:30 PM  (Arrive by 1:10 PM)  Provider Visit with SHELBY Summers Ra, CNP  [facility] ([facility] ) [address] [place] [id number] [phone]        Alisha ROBLEDO RN

## 2022-12-13 NOTE — LETTER
[facility]  [address]  [address]  [facility] [address]  Phone: [phone]  Fax: [phone]    12/13/22    Lucero Collier  [address] [place] [id number]      To whom it may concern:     Lucero had a visit on 12/13/22. Please excuse her from work on 12/13/22 due to illness.     Sincerely,      Elizabeth Ozuna, NP

## 2022-12-13 NOTE — PROGRESS NOTES
Lucero is a 27 year old who is being evaluated via a billable telephone visit.      How would you like to obtain your AVS? MyChart    Assessment & Plan   Acute bronchitis, unspecified organism  Wheezing  No new or worsening symptoms since UC visit. Recommend she repeat home COVID test: to let me know if positive as she meets criteria for antivirals. Otherwise follow-up if not improving in the next 2 days for repeat assessment/consideration of CXR. We discussed emergent s/s.  - albuterol (PROAIR HFA/PROVENTIL HFA/VENTOLIN HFA) 108 (90 Base) MCG/ACT inhaler; Inhale 2 puffs into the lungs every 6 hours as needed for shortness of breath, wheezing or cough    Morbid obesity (H)  To let me know if COVID test is pos, she would meet criteria for antivirals dt hx of morbid obesity and other chronic medical conditions (HTN).    Partial symptomatic epilepsy with complex partial seizures, not intractable, without status epilepticus (H)  No recent seizure like activity.      Patient Instructions   I also recommend Delsym (over the counter cough suppressant)    Use your albuterol inhaler scheduled every 4 -6 hours for the next 2-3 days, then use every 4-6 hours as needed for cough, wheeze, or shortness of breath.        Return in about 2 days (around 12/15/2022), or if symptoms worsen or fail to improve.    Elizabeth Ozuna NP  [facility]    Kristin Barker is a 27 year old, presenting for the following health issues:  URI    URI    History of Present Illness       Reason for visit:  Follow up    She eats 0-1 servings of fruits and vegetables daily.She consumes 4 sweetened beverage(s) daily.She exercises with enough effort to increase her heart rate 9 or less minutes per day.  She exercises with enough effort to increase her heart rate 3 or less days per week.   She is taking medications regularly.    Today's PHQ-9         PHQ-9 Total Score: 16    PHQ-9 Q9 Thoughts of better off dead/self-harm past 2  weeks :   Not at all    How difficult have these problems made it for you to do your work, take care of things at home, or get along with other people: Somewhat difficult  Today's CLARY-7 Score: 14     UC visit on 12/11. Neg flu test. Given tessalon.   Symptom onset Saturday 11/10: cough, body aches, chills.  Neg flu test.  Neg home COVID test 2 days ago.  Works with elderly.  Continues to have cough, chills, body aches.  Temp yesterday 100.2F, no fever today.  Feels short of breath with exertion/coughing fits, not new or worsening since UC visit.  OCc wheeze with coughing fits.  Denies chest pain.  Eating and drinking ok.  Didn't find tessalon helpful    99.6 temp today  Home O2 sats 96%    Review of Systems         Objective         Vitals:  No vitals were obtained today due to virtual visit.    Physical Exam   RESP: able to speak in full sentences without issue, infrequent dry cough heard during our visit              Virtual visit location: Off-site  Telephone visit: 11 minutes

## 2022-12-13 NOTE — PATIENT INSTRUCTIONS
I also recommend Delsym (over the counter cough suppressant)    Use your albuterol inhaler scheduled every 4 -6 hours for the next 2-3 days, then use every 4-6 hours as needed for cough, wheeze, or shortness of breath.

## 2023-01-05 DIAGNOSIS — Z79.899 ENCOUNTER FOR LONG-TERM (CURRENT) USE OF HIGH-RISK MEDICATION: ICD-10-CM

## 2023-01-05 DIAGNOSIS — F33.2 SEVERE RECURRENT MAJOR DEPRESSION WITHOUT PSYCHOTIC FEATURES (H): Primary | ICD-10-CM

## 2023-01-25 ENCOUNTER — LAB (OUTPATIENT)
Dept: LAB | Facility: CLINIC | Age: 28
End: 2023-01-25
Payer: COMMERCIAL

## 2023-01-25 DIAGNOSIS — Z79.899 ENCOUNTER FOR LONG-TERM (CURRENT) USE OF HIGH-RISK MEDICATION: ICD-10-CM

## 2023-01-25 DIAGNOSIS — F33.2 SEVERE RECURRENT MAJOR DEPRESSION WITHOUT PSYCHOTIC FEATURES (H): ICD-10-CM

## 2023-01-25 LAB
ALBUMIN SERPL BCG-MCNC: 4.1 G/DL (ref 3.5–5.2)
ALP SERPL-CCNC: 59 U/L (ref 35–104)
ALT SERPL W P-5'-P-CCNC: 13 U/L (ref 10–35)
ANION GAP SERPL CALCULATED.3IONS-SCNC: 14 MMOL/L (ref 7–15)
AST SERPL W P-5'-P-CCNC: 19 U/L (ref 10–35)
BASOPHILS # BLD AUTO: 0 10E3/UL (ref 0–0.2)
BASOPHILS NFR BLD AUTO: 1 %
BILIRUB SERPL-MCNC: 0.4 MG/DL
BUN SERPL-MCNC: 12.3 MG/DL (ref 6–20)
CALCIUM SERPL-MCNC: 9.1 MG/DL (ref 8.6–10)
CHLORIDE SERPL-SCNC: 104 MMOL/L (ref 98–107)
CHOLEST SERPL-MCNC: 161 MG/DL
CREAT SERPL-MCNC: 0.57 MG/DL (ref 0.51–0.95)
DEPRECATED HCO3 PLAS-SCNC: 20 MMOL/L (ref 22–29)
EOSINOPHIL # BLD AUTO: 0.1 10E3/UL (ref 0–0.7)
EOSINOPHIL NFR BLD AUTO: 2 %
ERYTHROCYTE [DISTWIDTH] IN BLOOD BY AUTOMATED COUNT: 12.9 % (ref 10–15)
GFR SERPL CREATININE-BSD FRML MDRD: >90 ML/MIN/1.73M2
GLUCOSE SERPL-MCNC: 97 MG/DL (ref 70–99)
HBA1C MFR BLD: 5.5 % (ref 0–5.6)
HCT VFR BLD AUTO: 38.1 % (ref 35–47)
HDLC SERPL-MCNC: 49 MG/DL
HGB BLD-MCNC: 12.7 G/DL (ref 11.7–15.7)
LDLC SERPL CALC-MCNC: 99 MG/DL
LYMPHOCYTES # BLD AUTO: 1.8 10E3/UL (ref 0.8–5.3)
LYMPHOCYTES NFR BLD AUTO: 25 %
MCH RBC QN AUTO: 28.6 PG (ref 26.5–33)
MCHC RBC AUTO-ENTMCNC: 33.3 G/DL (ref 31.5–36.5)
MCV RBC AUTO: 86 FL (ref 78–100)
MONOCYTES # BLD AUTO: 0.7 10E3/UL (ref 0–1.3)
MONOCYTES NFR BLD AUTO: 10 %
NEUTROPHILS # BLD AUTO: 4.5 10E3/UL (ref 1.6–8.3)
NEUTROPHILS NFR BLD AUTO: 63 %
NONHDLC SERPL-MCNC: 112 MG/DL
PLATELET # BLD AUTO: 302 10E3/UL (ref 150–450)
POTASSIUM SERPL-SCNC: 4.2 MMOL/L (ref 3.4–5.3)
PROT SERPL-MCNC: 6.9 G/DL (ref 6.4–8.3)
RBC # BLD AUTO: 4.44 10E6/UL (ref 3.8–5.2)
SODIUM SERPL-SCNC: 138 MMOL/L (ref 136–145)
TRIGL SERPL-MCNC: 64 MG/DL
TSH SERPL DL<=0.005 MIU/L-ACNC: 1.7 UIU/ML (ref 0.3–4.2)
WBC # BLD AUTO: 7.1 10E3/UL (ref 4–11)

## 2023-01-25 PROCEDURE — 83036 HEMOGLOBIN GLYCOSYLATED A1C: CPT

## 2023-01-25 PROCEDURE — 82306 VITAMIN D 25 HYDROXY: CPT

## 2023-01-25 PROCEDURE — 80050 GENERAL HEALTH PANEL: CPT

## 2023-01-25 PROCEDURE — 80061 LIPID PANEL: CPT

## 2023-01-25 PROCEDURE — 36415 COLL VENOUS BLD VENIPUNCTURE: CPT

## 2023-01-26 ENCOUNTER — OFFICE VISIT (OUTPATIENT)
Dept: FAMILY MEDICINE | Facility: CLINIC | Age: 28
End: 2023-01-26
Payer: COMMERCIAL

## 2023-01-26 VITALS
WEIGHT: 246.8 LBS | RESPIRATION RATE: 15 BRPM | HEART RATE: 84 BPM | DIASTOLIC BLOOD PRESSURE: 76 MMHG | SYSTOLIC BLOOD PRESSURE: 122 MMHG | HEIGHT: 65 IN | TEMPERATURE: 97.8 F | OXYGEN SATURATION: 98 % | BODY MASS INDEX: 41.12 KG/M2

## 2023-01-26 DIAGNOSIS — R00.2 PALPITATIONS: Primary | ICD-10-CM

## 2023-01-26 DIAGNOSIS — Z11.3 SCREEN FOR STD (SEXUALLY TRANSMITTED DISEASE): ICD-10-CM

## 2023-01-26 DIAGNOSIS — Z79.899 ENCOUNTER FOR LONG-TERM (CURRENT) USE OF MEDICATIONS: ICD-10-CM

## 2023-01-26 DIAGNOSIS — Z12.4 CERVICAL CANCER SCREENING: ICD-10-CM

## 2023-01-26 DIAGNOSIS — G40.219 PARTIAL SYMPTOMATIC EPILEPSY WITH COMPLEX PARTIAL SEIZURES, INTRACTABLE, WITHOUT STATUS EPILEPTICUS (H): ICD-10-CM

## 2023-01-26 DIAGNOSIS — E66.01 MORBID OBESITY (H): ICD-10-CM

## 2023-01-26 DIAGNOSIS — F32.2 SEVERE SINGLE CURRENT EPISODE OF MAJOR DEPRESSIVE DISORDER, WITHOUT PSYCHOTIC FEATURES (H): ICD-10-CM

## 2023-01-26 PROBLEM — G40.909 EPILEPTIC SEIZURE (H): Status: RESOLVED | Noted: 2022-05-01 | Resolved: 2023-01-26

## 2023-01-26 PROCEDURE — 99214 OFFICE O/P EST MOD 30 MIN: CPT | Performed by: NURSE PRACTITIONER

## 2023-01-26 PROCEDURE — G0145 SCR C/V CYTO,THINLAYER,RESCR: HCPCS | Performed by: NURSE PRACTITIONER

## 2023-01-26 PROCEDURE — 87491 CHLMYD TRACH DNA AMP PROBE: CPT | Performed by: NURSE PRACTITIONER

## 2023-01-26 PROCEDURE — 87591 N.GONORRHOEAE DNA AMP PROB: CPT | Performed by: NURSE PRACTITIONER

## 2023-01-26 RX ORDER — CETIRIZINE HYDROCHLORIDE 10 MG/1
TABLET ORAL
COMMUNITY
Start: 2023-01-04

## 2023-01-26 RX ORDER — PRAZOSIN HYDROCHLORIDE 1 MG/1
1 CAPSULE ORAL AT BEDTIME
COMMUNITY
Start: 2022-09-08 | End: 2024-08-12

## 2023-01-26 RX ORDER — VENLAFAXINE HYDROCHLORIDE 75 MG/1
CAPSULE, EXTENDED RELEASE ORAL
COMMUNITY
Start: 2022-09-13 | End: 2023-01-26

## 2023-01-26 RX ORDER — CYCLOBENZAPRINE HCL 5 MG
TABLET ORAL
COMMUNITY
Start: 2022-12-01 | End: 2023-01-26

## 2023-01-26 RX ORDER — VENLAFAXINE HYDROCHLORIDE 150 MG/1
150 TABLET, EXTENDED RELEASE ORAL EVERY MORNING
COMMUNITY
Start: 2023-01-05 | End: 2023-10-23

## 2023-01-26 RX ORDER — VENLAFAXINE HYDROCHLORIDE 37.5 MG/1
CAPSULE, EXTENDED RELEASE ORAL
COMMUNITY
Start: 2022-09-08 | End: 2023-01-26

## 2023-01-26 ASSESSMENT — PAIN SCALES - GENERAL: PAINLEVEL: NO PAIN (0)

## 2023-01-26 ASSESSMENT — PATIENT HEALTH QUESTIONNAIRE - PHQ9
SUM OF ALL RESPONSES TO PHQ QUESTIONS 1-9: 9
SUM OF ALL RESPONSES TO PHQ QUESTIONS 1-9: 9
10. IF YOU CHECKED OFF ANY PROBLEMS, HOW DIFFICULT HAVE THESE PROBLEMS MADE IT FOR YOU TO DO YOUR WORK, TAKE CARE OF THINGS AT HOME, OR GET ALONG WITH OTHER PEOPLE: SOMEWHAT DIFFICULT

## 2023-01-26 NOTE — PROGRESS NOTES
"  Assessment & Plan     Encounter for long-term (current) use of medications      Cervical cancer screening  - Pap screen reflex to HPV if ASCUS - recommend age 25 - 29    Palpitations  Echo in 2019.  Repeat Zio patch.  Labs pending.  - Adult Leadless EKG Monitor 3 to 7 Days; Future    Screen for STD (sexually transmitted disease)  - Neisseria gonorrhoeae PCR; Future  - Chlamydia trachomatis PCR; Future    Severe single current episode of major depressive disorder, without psychotic features (H)  Followed by psychiatry.    Morbid obesity (H)                 BMI:   Estimated body mass index is 41.07 kg/m  as calculated from the following:    Height as of this encounter: 1.651 m (5' 5\").    Weight as of this encounter: 111.9 kg (246 lb 12.8 oz).       Depression Screening Follow Up    PHQ 1/26/2023   PHQ-9 Total Score 9   Q9: Thoughts of better off dead/self-harm past 2 weeks Several days   F/U: Thoughts of suicide or self-harm No   F/U: Self harm-plan -   F/U: Self-harm action -   F/U: Safety concerns No                 Follow Up    Follow Up Actions Taken      Discussed the following ways the patient can remain in a safe environment:        No follow-ups on file.    SHELBY Summers Ra CNP  [facility] Barker is a 27 year old, presenting for the following health issues:  Refill Request      History of Present Illness       Reason for visit:  Medication and heart rate    She eats 0-1 servings of fruits and vegetables daily.She consumes 2 sweetened beverage(s) daily.She exercises with enough effort to increase her heart rate 30 to 60 minutes per day.  She exercises with enough effort to increase her heart rate 3 or less days per week.   She is taking medications regularly.    Today's PHQ-9         PHQ-9 Total Score: 9    PHQ-9 Q9 Thoughts of better off dead/self-harm past 2 weeks :   Several days  Thoughts of suicide or self harm: (P) No  Self-harm Plan:     Self-harm " "Action:       Safety concerns for self or others: (P) No    How difficult have these problems made it for you to do your work, take care of things at home, or get along with other people: Somewhat difficult           Concern - Heartrate  Onset: A couple months  Description: She has been concerned about her heart rate after her apple watched warned her that she had a HR as high as 160 just from sitting.  It can range from 130-160. She noticed that when HR gets high enough she gets dizzy and feels faint, lightheaded.    Symptoms started 6 months ago.  Receives notifications from apple watch that her HR is high.  HR is 120-150 when checked.  She has been sitting relaxing when this happens.  She does exercise regularly, walking on treadmill.  Symptoms do not occur when active.  She has not smoked for 3 months and this has not helped symptoms.  Symptoms occur 2-3 times per week, up to twice daily.  Episodes usually last for 10-20 minutes.    Eating and drinking as usual.  Sleeping as usual.  Normal urinary and bowel habits.  No unexplained weight change.  Breathing is normal but during the episode she will occasionally notice shortness of breath.  Rare etoh use.  No drug use.  No otc medications.  She does not menstruate- mirena in place.    Mental health has been stable.  No medication changes over the past 6 months.  Following with [facility].  Therapy in [place].  Med management in [place].          Review of Systems   Constitutional, HEENT, cardiovascular, pulmonary, gi and gu systems are negative, except as otherwise noted.      Objective    /76 (BP Location: Right arm, Patient Position: Sitting, Cuff Size: Adult Large)   Pulse 84   Temp 97.8  F (36.6  C) (Oral)   Resp 15   Ht 1.651 m (5' 5\")   Wt 111.9 kg (246 lb 12.8 oz)   LMP  (LMP Unknown)   SpO2 98%   BMI 41.07 kg/m    Body mass index is 41.07 kg/m .  Physical Exam   GENERAL: healthy, alert and no distress  NECK: no adenopathy, no " asymmetry, masses, or scars and thyroid normal to palpation  RESP: lungs clear to auscultation - no rales, rhonchi or wheezes  CV: regular rate and rhythm, normal S1 S2, no S3 or S4, no murmur, click or rub, no peripheral edema and peripheral pulses strong  ABDOMEN: soft, nontender, no hepatosplenomegaly, no masses and bowel sounds normal   (female): normal female external genitalia, normal urethral meatus , vaginal mucosa pink, moist, well rugated and normal cervix  PSYCH: mentation appears normal, affect normal/bright

## 2023-01-27 LAB
C TRACH DNA SPEC QL PROBE+SIG AMP: NEGATIVE
N GONORRHOEA DNA SPEC QL NAA+PROBE: NEGATIVE

## 2023-01-29 ENCOUNTER — MYC MEDICAL ADVICE (OUTPATIENT)
Dept: FAMILY MEDICINE | Facility: CLINIC | Age: 28
End: 2023-01-29
Payer: COMMERCIAL

## 2023-01-30 LAB
BKR LAB AP GYN ADEQUACY: NORMAL
BKR LAB AP GYN INTERPRETATION: NORMAL
BKR LAB AP HPV REFLEX: NORMAL
BKR LAB AP PREVIOUS ABNORMAL: NORMAL
DEPRECATED CALCIDIOL+CALCIFEROL SERPL-MC: <19 UG/L (ref 20–75)
PATH REPORT.COMMENTS IMP SPEC: NORMAL
PATH REPORT.COMMENTS IMP SPEC: NORMAL
PATH REPORT.RELEVANT HX SPEC: NORMAL
VITAMIN D2 SERPL-MCNC: 14 UG/L
VITAMIN D3 SERPL-MCNC: <5 UG/L

## 2023-02-01 ENCOUNTER — TELEPHONE (OUTPATIENT)
Dept: FAMILY MEDICINE | Facility: CLINIC | Age: 28
End: 2023-02-01
Payer: COMMERCIAL

## 2023-02-01 NOTE — TELEPHONE ENCOUNTER
Pt calls.    She said she is supposed to get a heart monitor.  Gave her the number to call for the zio patch.     See order of 1/26/23 adult leadless EKG.

## 2023-02-22 ENCOUNTER — MYC REFILL (OUTPATIENT)
Dept: FAMILY MEDICINE | Facility: CLINIC | Age: 28
End: 2023-02-22
Payer: COMMERCIAL

## 2023-02-22 ENCOUNTER — MYC MEDICAL ADVICE (OUTPATIENT)
Dept: FAMILY MEDICINE | Facility: CLINIC | Age: 28
End: 2023-02-22
Payer: COMMERCIAL

## 2023-02-22 DIAGNOSIS — F90.2 ATTENTION DEFICIT HYPERACTIVITY DISORDER (ADHD), COMBINED TYPE: ICD-10-CM

## 2023-02-22 NOTE — TELEPHONE ENCOUNTER
Routing refill request to provider for review/approval because:  Drug not on the [facility] refill protocol     Luis Enrique FALL RN 2/22/2023 at 2:43 PM

## 2023-02-23 ENCOUNTER — TELEPHONE (OUTPATIENT)
Dept: FAMILY MEDICINE | Facility: CLINIC | Age: 28
End: 2023-02-23
Payer: COMMERCIAL

## 2023-02-23 NOTE — TELEPHONE ENCOUNTER
Medication Question or Refill        What medication are you calling about (include dose and sig)?: adderall 25XR     Preferred Pharmacy: CLEMENT millers  [id number] - [place] - [address]  [address] [address]  Phone: [phone] Fax: [phone]    [id number] - [place] - [address]  [address] [address]  Phone: [phone] Fax: [phone]    [id number] - [place] - [address]  [address] [facility] [address]  Phone: [phone] Fax: [phone]      Controlled Substance Agreement on file:   CSA -- Patient Level:    CSA: None found at the patient level.       Who prescribed the medication?: Dr. Beltran     Do you need a refill? Yes completely out     When did you use the medication last? 2/22/23    Patient offered an appointment? Yes: already had appt     Do you have any questions or concerns?  No      [facility] or would you prefer to receive a phone call?:   Patient would prefer a phone call   Okay to leave a detailed message?: Yes at Cell number on file:    Telephone Information:   Mobile [phone]

## 2023-02-24 RX ORDER — DEXTROAMPHETAMINE SACCHARATE, AMPHETAMINE ASPARTATE MONOHYDRATE, DEXTROAMPHETAMINE SULFATE AND AMPHETAMINE SULFATE 6.25; 6.25; 6.25; 6.25 MG/1; MG/1; MG/1; MG/1
25 CAPSULE, EXTENDED RELEASE ORAL EVERY MORNING
Qty: 30 CAPSULE | Refills: 0 | Status: SHIPPED | OUTPATIENT
Start: 2023-02-24 | End: 2023-03-30

## 2023-02-27 ENCOUNTER — HOSPITAL ENCOUNTER (OUTPATIENT)
Dept: CARDIOLOGY | Facility: CLINIC | Age: 28
Discharge: HOME | End: 2023-02-27
Attending: NURSE PRACTITIONER | Admitting: NURSE PRACTITIONER
Payer: COMMERCIAL

## 2023-02-27 DIAGNOSIS — R00.2 PALPITATIONS: ICD-10-CM

## 2023-02-27 PROCEDURE — 93242 EXT ECG>48HR<7D RECORDING: CPT

## 2023-02-27 PROCEDURE — 93244 EXT ECG>48HR<7D REV&INTERPJ: CPT | Performed by: INTERNAL MEDICINE

## 2023-03-02 ENCOUNTER — MYC MEDICAL ADVICE (OUTPATIENT)
Dept: FAMILY MEDICINE | Facility: CLINIC | Age: 28
End: 2023-03-02
Payer: COMMERCIAL

## 2023-03-30 ENCOUNTER — MYC REFILL (OUTPATIENT)
Dept: FAMILY MEDICINE | Facility: CLINIC | Age: 28
End: 2023-03-30
Payer: COMMERCIAL

## 2023-03-30 DIAGNOSIS — F90.2 ATTENTION DEFICIT HYPERACTIVITY DISORDER (ADHD), COMBINED TYPE: ICD-10-CM

## 2023-03-31 RX ORDER — DEXTROAMPHETAMINE SACCHARATE, AMPHETAMINE ASPARTATE MONOHYDRATE, DEXTROAMPHETAMINE SULFATE AND AMPHETAMINE SULFATE 6.25; 6.25; 6.25; 6.25 MG/1; MG/1; MG/1; MG/1
25 CAPSULE, EXTENDED RELEASE ORAL EVERY MORNING
Qty: 30 CAPSULE | Refills: 0 | Status: SHIPPED | OUTPATIENT
Start: 2023-03-31 | End: 2024-08-12

## 2023-03-31 RX ORDER — VENLAFAXINE HYDROCHLORIDE 150 MG/1
150 TABLET, EXTENDED RELEASE ORAL EVERY MORNING
OUTPATIENT
Start: 2023-03-31

## 2023-03-31 NOTE — TELEPHONE ENCOUNTER
Routing refill request to provider for review/approval because:  Drug not on the [facility] refill protocol   Medication is reported/historical    Regina Gerardo RN

## 2023-03-31 NOTE — TELEPHONE ENCOUNTER
Reviewed PDMP    02/23/2023  1   11/22/2022  Adderall Xr 25 MG Capsule  30.00  30 [place]   [id number]   [place] ([id number][place]   0/0   [place]   12/27/2022  1   10/04/2022  Dextroamp-Amphet Er 25 MG Cap  30.00  30 Ma [place]   [id number]   [place] ([id number][place]   [place]   10/20/2022  3   10/20/2022  Dextroamp-Amphet Er 25 MG Cap  30.00  30 [place]   [id number]   [place] ([id number][place]   [place]     Will fill adderall but venlafaxine is prescribed by psychiatry so she should contact them for refills. Sent NXE message.    Nicki De León PA-C

## 2023-05-03 ENCOUNTER — MYC MEDICAL ADVICE (OUTPATIENT)
Dept: FAMILY MEDICINE | Facility: CLINIC | Age: 28
End: 2023-05-03
Payer: COMMERCIAL

## 2023-05-03 DIAGNOSIS — F90.2 ATTENTION DEFICIT HYPERACTIVITY DISORDER (ADHD), COMBINED TYPE: ICD-10-CM

## 2023-05-03 RX ORDER — DEXTROAMPHETAMINE SACCHARATE, AMPHETAMINE ASPARTATE MONOHYDRATE, DEXTROAMPHETAMINE SULFATE AND AMPHETAMINE SULFATE 6.25; 6.25; 6.25; 6.25 MG/1; MG/1; MG/1; MG/1
25 CAPSULE, EXTENDED RELEASE ORAL EVERY MORNING
Qty: 30 CAPSULE | Refills: 0 | Status: CANCELLED | OUTPATIENT
Start: 2023-05-03

## 2023-05-03 NOTE — TELEPHONE ENCOUNTER
Adderall XR 25 mg    Last Written Prescription Date:  3/31/23  Last Fill Quantity: 30,  # refills: 0   Last office visit: 1/26/2023 ; last virtual visit: 5/13/2022 with prescribing provider  Future Office Visit:      OV? VRT? To discuss changes to dosages.    Regina Gerardo RN

## 2023-05-04 NOTE — TELEPHONE ENCOUNTER
If she is interested in dose change let's do an e visit followed by a virtual visit 1 month later.  DANIELA

## 2023-05-09 ENCOUNTER — VIRTUAL VISIT (OUTPATIENT)
Dept: FAMILY MEDICINE | Facility: CLINIC | Age: 28
End: 2023-05-09
Payer: COMMERCIAL

## 2023-05-09 DIAGNOSIS — F90.2 ATTENTION DEFICIT HYPERACTIVITY DISORDER (ADHD), COMBINED TYPE: Primary | ICD-10-CM

## 2023-05-09 PROCEDURE — 99213 OFFICE O/P EST LOW 20 MIN: CPT | Mod: VID | Performed by: NURSE PRACTITIONER

## 2023-05-09 RX ORDER — DEXTROAMPHETAMINE SACCHARATE, AMPHETAMINE ASPARTATE, DEXTROAMPHETAMINE SULFATE AND AMPHETAMINE SULFATE 1.25; 1.25; 1.25; 1.25 MG/1; MG/1; MG/1; MG/1
5 TABLET ORAL DAILY
Qty: 30 TABLET | Refills: 0 | Status: SHIPPED | OUTPATIENT
Start: 2023-05-09 | End: 2024-01-15

## 2023-05-09 RX ORDER — DEXTROAMPHETAMINE SACCHARATE, AMPHETAMINE ASPARTATE MONOHYDRATE, DEXTROAMPHETAMINE SULFATE AND AMPHETAMINE SULFATE 7.5; 7.5; 7.5; 7.5 MG/1; MG/1; MG/1; MG/1
30 CAPSULE, EXTENDED RELEASE ORAL DAILY
Qty: 30 CAPSULE | Refills: 0 | Status: SHIPPED | OUTPATIENT
Start: 2023-05-09 | End: 2023-10-30

## 2023-05-09 ASSESSMENT — PATIENT HEALTH QUESTIONNAIRE - PHQ9
SUM OF ALL RESPONSES TO PHQ QUESTIONS 1-9: 13
SUM OF ALL RESPONSES TO PHQ QUESTIONS 1-9: 13
10. IF YOU CHECKED OFF ANY PROBLEMS, HOW DIFFICULT HAVE THESE PROBLEMS MADE IT FOR YOU TO DO YOUR WORK, TAKE CARE OF THINGS AT HOME, OR GET ALONG WITH OTHER PEOPLE: SOMEWHAT DIFFICULT

## 2023-05-09 NOTE — PROGRESS NOTES
"Lucero is a 27 year old who is being evaluated via a billable video visit.      How would you like to obtain your AVS? MyChart  If the video visit is dropped, the invitation should be resent by: Text to cell phone: [phone]  Will anyone else be joining your video visit? No        Assessment & Plan     Attention deficit hyperactivity disorder (ADHD), combined type  Increase dose of xr am dose and add as needed pm dose.  - amphetamine-dextroamphetamine (ADDERALL XR) 30 MG 24 hr capsule; Take 1 capsule (30 mg) by mouth daily  - amphetamine-dextroamphetamine (ADDERALL) 5 MG tablet; Take 1 tablet (5 mg) by mouth daily             BMI:   Estimated body mass index is 41.07 kg/m  as calculated from the following:    Height as of 1/26/23: 1.651 m (5' 5\").    Weight as of 1/26/23: 111.9 kg (246 lb 12.8 oz).       Follow up in 3 weeks.    SHELBY Summers Ra, CNP [facility]    Subjective   Lucero is a 27 year old, presenting for the following health issues:  Recheck Medication        5/9/2023     2:13 PM   Additional Questions   Roomed by TATIANA DEY CMA   Accompanied by SELF         5/9/2023     2:13 PM   Patient Reported Additional Medications   Patient reports taking the following new medications NA     History of Present Illness       Reason for visit:  Adderall refill    She eats 0-1 servings of fruits and vegetables daily.She consumes 2 sweetened beverage(s) daily.She exercises with enough effort to increase her heart rate 9 or less minutes per day.  She exercises with enough effort to increase her heart rate 3 or less days per week.   She is taking medications regularly.    Today's PHQ-9         PHQ-9 Total Score: 13    PHQ-9 Q9 Thoughts of better off dead/self-harm past 2 weeks :   Not at all    How difficult have these problems made it for you to do your work, take care of things at home, or get along with other people: Somewhat difficult       Doing well.  Mood is stable.  No sleep " issues.  No changes to psych meds.  She feels her adderall is not working as well as it could and also that it seems to stop working too soon.  She takes it daily at the same time.    No side effects.      Review of Systems   Constitutional, HEENT, cardiovascular, pulmonary, gi and gu systems are negative, except as otherwise noted.      Objective           Vitals:  No vitals were obtained today due to virtual visit.    Physical Exam   GENERAL: Healthy, alert and no distress  EYES: Eyes grossly normal to inspection.  No discharge or erythema, or obvious scleral/conjunctival abnormalities.  RESP: No audible wheeze, cough, or visible cyanosis.  No visible retractions or increased work of breathing.    SKIN: Visible skin clear. No significant rash, abnormal pigmentation or lesions.  NEURO: Cranial nerves grossly intact.  Mentation and speech appropriate for age.  PSYCH: Mentation appears normal, affect normal/bright, judgement and insight intact, normal speech and appearance well-groomed.                Video-Visit Details    Type of service:  Video Visit     Originating Location (pt. Location): Home  Distant Location (provider location):  Off-site  Platform used for Video Visit: Jin

## 2023-06-01 ENCOUNTER — VIRTUAL VISIT (OUTPATIENT)
Dept: FAMILY MEDICINE | Facility: CLINIC | Age: 28
End: 2023-06-01
Attending: NURSE PRACTITIONER
Payer: COMMERCIAL

## 2023-06-01 DIAGNOSIS — F90.2 ATTENTION DEFICIT HYPERACTIVITY DISORDER (ADHD), COMBINED TYPE: Primary | ICD-10-CM

## 2023-06-01 PROCEDURE — 99213 OFFICE O/P EST LOW 20 MIN: CPT | Mod: VID | Performed by: NURSE PRACTITIONER

## 2023-06-01 RX ORDER — DEXTROAMPHETAMINE SACCHARATE, AMPHETAMINE ASPARTATE MONOHYDRATE, DEXTROAMPHETAMINE SULFATE AND AMPHETAMINE SULFATE 7.5; 7.5; 7.5; 7.5 MG/1; MG/1; MG/1; MG/1
30 CAPSULE, EXTENDED RELEASE ORAL DAILY
Qty: 30 CAPSULE | Refills: 0 | Status: SHIPPED | OUTPATIENT
Start: 2023-08-09 | End: 2023-09-08

## 2023-06-01 RX ORDER — DEXTROAMPHETAMINE SACCHARATE, AMPHETAMINE ASPARTATE, DEXTROAMPHETAMINE SULFATE AND AMPHETAMINE SULFATE 1.25; 1.25; 1.25; 1.25 MG/1; MG/1; MG/1; MG/1
5 TABLET ORAL DAILY
Qty: 30 TABLET | Refills: 0 | Status: SHIPPED | OUTPATIENT
Start: 2023-07-09 | End: 2023-08-08

## 2023-06-01 RX ORDER — DEXTROAMPHETAMINE SACCHARATE, AMPHETAMINE ASPARTATE, DEXTROAMPHETAMINE SULFATE AND AMPHETAMINE SULFATE 1.25; 1.25; 1.25; 1.25 MG/1; MG/1; MG/1; MG/1
5 TABLET ORAL DAILY
Qty: 30 TABLET | Refills: 0 | Status: SHIPPED | OUTPATIENT
Start: 2023-06-09 | End: 2023-07-09

## 2023-06-01 RX ORDER — DEXTROAMPHETAMINE SACCHARATE, AMPHETAMINE ASPARTATE MONOHYDRATE, DEXTROAMPHETAMINE SULFATE AND AMPHETAMINE SULFATE 7.5; 7.5; 7.5; 7.5 MG/1; MG/1; MG/1; MG/1
30 CAPSULE, EXTENDED RELEASE ORAL DAILY
Qty: 30 CAPSULE | Refills: 0 | Status: SHIPPED | OUTPATIENT
Start: 2023-06-09 | End: 2023-07-09

## 2023-06-01 RX ORDER — DEXTROAMPHETAMINE SACCHARATE, AMPHETAMINE ASPARTATE MONOHYDRATE, DEXTROAMPHETAMINE SULFATE AND AMPHETAMINE SULFATE 7.5; 7.5; 7.5; 7.5 MG/1; MG/1; MG/1; MG/1
30 CAPSULE, EXTENDED RELEASE ORAL DAILY
Qty: 30 CAPSULE | Refills: 0 | Status: SHIPPED | OUTPATIENT
Start: 2023-07-09 | End: 2023-08-08

## 2023-06-01 RX ORDER — DEXTROAMPHETAMINE SACCHARATE, AMPHETAMINE ASPARTATE, DEXTROAMPHETAMINE SULFATE AND AMPHETAMINE SULFATE 1.25; 1.25; 1.25; 1.25 MG/1; MG/1; MG/1; MG/1
5 TABLET ORAL DAILY
Qty: 30 TABLET | Refills: 0 | Status: SHIPPED | OUTPATIENT
Start: 2023-08-09 | End: 2023-09-08

## 2023-06-01 ASSESSMENT — ANXIETY QUESTIONNAIRES
2. NOT BEING ABLE TO STOP OR CONTROL WORRYING: SEVERAL DAYS
5. BEING SO RESTLESS THAT IT IS HARD TO SIT STILL: SEVERAL DAYS
4. TROUBLE RELAXING: SEVERAL DAYS
3. WORRYING TOO MUCH ABOUT DIFFERENT THINGS: SEVERAL DAYS
GAD7 TOTAL SCORE: 7
7. FEELING AFRAID AS IF SOMETHING AWFUL MIGHT HAPPEN: NOT AT ALL
IF YOU CHECKED OFF ANY PROBLEMS ON THIS QUESTIONNAIRE, HOW DIFFICULT HAVE THESE PROBLEMS MADE IT FOR YOU TO DO YOUR WORK, TAKE CARE OF THINGS AT HOME, OR GET ALONG WITH OTHER PEOPLE: SOMEWHAT DIFFICULT
GAD7 TOTAL SCORE: 7
6. BECOMING EASILY ANNOYED OR IRRITABLE: MORE THAN HALF THE DAYS
1. FEELING NERVOUS, ANXIOUS, OR ON EDGE: SEVERAL DAYS

## 2023-06-01 NOTE — PROGRESS NOTES
"Lucero is a 27 year old who is being evaluated via a billable video visit.      How would you like to obtain your AVS? MyChart  If the video visit is dropped, the invitation should be resent by: Text to cell phone: [phone]  Will anyone else be joining your video visit? No        Assessment & Plan     Attention deficit hyperactivity disorder (ADHD), combined type  Improved with dose change.  No concerns.  Request an additional 3 months when needed.  Visit in 6 months.  - amphetamine-dextroamphetamine (ADDERALL XR) 30 MG 24 hr capsule; Take 1 capsule (30 mg) by mouth daily for 30 days  - amphetamine-dextroamphetamine (ADDERALL XR) 30 MG 24 hr capsule; Take 1 capsule (30 mg) by mouth daily for 30 days  - amphetamine-dextroamphetamine (ADDERALL XR) 30 MG 24 hr capsule; Take 1 capsule (30 mg) by mouth daily for 30 days  - amphetamine-dextroamphetamine (ADDERALL) 5 MG tablet; Take 1 tablet (5 mg) by mouth daily for 30 days  - amphetamine-dextroamphetamine (ADDERALL) 5 MG tablet; Take 1 tablet (5 mg) by mouth daily for 30 days  - amphetamine-dextroamphetamine (ADDERALL) 5 MG tablet; Take 1 tablet (5 mg) by mouth daily for 30 days       BMI:   Estimated body mass index is 41.07 kg/m  as calculated from the following:    Height as of 1/26/23: 1.651 m (5' 5\").    Weight as of 1/26/23: 111.9 kg (246 lb 12.8 oz).           SHELBY Summers Ra, CNP  [facility]   Lucero is a 27 year old, presenting for the following health issues:  Recheck Medication        6/1/2023     1:49 PM   Additional Questions   Roomed by RL   Accompanied by Self         6/1/2023     1:49 PM   Patient Reported Additional Medications   Patient reports taking the following new medications None     History of Present Illness       Reason for visit:  Medication    She eats 2-3 servings of fruits and vegetables daily.She consumes 2 sweetened beverage(s) daily.She exercises with enough effort to increase her heart " rate 10 to 19 minutes per day.         Doing well.  Taking meds as directed.  No side effects.  Increased dose and booster dose in the pm have been helpful.        Review of Systems   Constitutional, HEENT, cardiovascular, pulmonary, gi and gu systems are negative, except as otherwise noted.      Objective           Vitals:  No vitals were obtained today due to virtual visit.    Physical Exam   GENERAL: Healthy, alert and no distress  EYES: Eyes grossly normal to inspection.  No discharge or erythema, or obvious scleral/conjunctival abnormalities.  RESP: No audible wheeze, cough, or visible cyanosis.  No visible retractions or increased work of breathing.    SKIN: Visible skin clear. No significant rash, abnormal pigmentation or lesions.  NEURO: Cranial nerves grossly intact.  Mentation and speech appropriate for age.  PSYCH: Mentation appears normal, affect normal/bright, judgement and insight intact, normal speech and appearance well-groomed.                Video-Visit Details    Type of service:  Video Visit     Originating Location (pt. Location): Home  Distant Location (provider location):  On-site  Platform used for Video Visit: Jin

## 2023-08-31 ENCOUNTER — LAB REQUISITION (OUTPATIENT)
Dept: LAB | Facility: CLINIC | Age: 28
End: 2023-08-31

## 2023-08-31 DIAGNOSIS — Z11.3 ENCOUNTER FOR SCREENING FOR INFECTIONS WITH A PREDOMINANTLY SEXUAL MODE OF TRANSMISSION: ICD-10-CM

## 2023-08-31 DIAGNOSIS — Z13.1 ENCOUNTER FOR SCREENING FOR DIABETES MELLITUS: ICD-10-CM

## 2023-08-31 DIAGNOSIS — Z13.220 ENCOUNTER FOR SCREENING FOR LIPOID DISORDERS: ICD-10-CM

## 2023-08-31 LAB
CHOLEST SERPL-MCNC: 150 MG/DL
HBA1C MFR BLD: 5.6 %
HDLC SERPL-MCNC: 40 MG/DL
LDLC SERPL CALC-MCNC: 94 MG/DL
NONHDLC SERPL-MCNC: 110 MG/DL
TRIGL SERPL-MCNC: 79 MG/DL

## 2023-08-31 PROCEDURE — 86780 TREPONEMA PALLIDUM: CPT | Performed by: NURSE PRACTITIONER

## 2023-08-31 PROCEDURE — 87340 HEPATITIS B SURFACE AG IA: CPT | Performed by: NURSE PRACTITIONER

## 2023-08-31 PROCEDURE — 87389 HIV-1 AG W/HIV-1&-2 AB AG IA: CPT | Performed by: NURSE PRACTITIONER

## 2023-08-31 PROCEDURE — 83036 HEMOGLOBIN GLYCOSYLATED A1C: CPT | Performed by: NURSE PRACTITIONER

## 2023-08-31 PROCEDURE — 86803 HEPATITIS C AB TEST: CPT | Performed by: NURSE PRACTITIONER

## 2023-08-31 PROCEDURE — 80061 LIPID PANEL: CPT | Performed by: NURSE PRACTITIONER

## 2023-09-01 LAB
HBV SURFACE AG SERPL QL IA: NONREACTIVE
HCV AB SERPL QL IA: NONREACTIVE
HIV 1+2 AB+HIV1 P24 AG SERPL QL IA: NONREACTIVE
T PALLIDUM AB SER QL: NONREACTIVE

## 2023-09-16 ENCOUNTER — HEALTH MAINTENANCE LETTER (OUTPATIENT)
Age: 28
End: 2023-09-16

## 2023-10-15 ENCOUNTER — APPOINTMENT (OUTPATIENT)
Dept: ULTRASOUND IMAGING | Facility: CLINIC | Age: 28
End: 2023-10-15
Attending: EMERGENCY MEDICINE
Payer: MEDICAID

## 2023-10-15 ENCOUNTER — HOSPITAL ENCOUNTER (EMERGENCY)
Facility: CLINIC | Age: 28
Discharge: HOME | End: 2023-10-15
Attending: EMERGENCY MEDICINE | Admitting: EMERGENCY MEDICINE
Payer: MEDICAID

## 2023-10-15 VITALS
BODY MASS INDEX: 41.4 KG/M2 | RESPIRATION RATE: 17 BRPM | SYSTOLIC BLOOD PRESSURE: 127 MMHG | WEIGHT: 248.5 LBS | HEIGHT: 65 IN | OXYGEN SATURATION: 97 % | HEART RATE: 79 BPM | TEMPERATURE: 98.7 F | DIASTOLIC BLOOD PRESSURE: 77 MMHG

## 2023-10-15 DIAGNOSIS — R10.9 FLANK PAIN: ICD-10-CM

## 2023-10-15 LAB
ALBUMIN SERPL BCG-MCNC: 3.8 G/DL (ref 3.5–5.2)
ALBUMIN UR-MCNC: 50 MG/DL
ALP SERPL-CCNC: 56 U/L (ref 35–104)
ALT SERPL W P-5'-P-CCNC: 9 U/L (ref 0–50)
ANION GAP SERPL CALCULATED.3IONS-SCNC: 10 MMOL/L (ref 7–15)
APPEARANCE UR: CLEAR
AST SERPL W P-5'-P-CCNC: 18 U/L (ref 0–45)
BASO+EOS+MONOS # BLD AUTO: ABNORMAL 10*3/UL
BASO+EOS+MONOS NFR BLD AUTO: ABNORMAL %
BASOPHILS # BLD AUTO: 0.1 10E3/UL (ref 0–0.2)
BASOPHILS NFR BLD AUTO: 0 %
BILIRUB SERPL-MCNC: 0.3 MG/DL
BILIRUB UR QL STRIP: NEGATIVE
BUN SERPL-MCNC: 10.7 MG/DL (ref 6–20)
CALCIUM SERPL-MCNC: 8.5 MG/DL (ref 8.6–10)
CHLORIDE SERPL-SCNC: 108 MMOL/L (ref 98–107)
COLOR UR AUTO: YELLOW
CREAT SERPL-MCNC: 0.77 MG/DL (ref 0.51–0.95)
DEPRECATED HCO3 PLAS-SCNC: 21 MMOL/L (ref 22–29)
EGFRCR SERPLBLD CKD-EPI 2021: >90 ML/MIN/1.73M2
EOSINOPHIL # BLD AUTO: 0 10E3/UL (ref 0–0.7)
EOSINOPHIL NFR BLD AUTO: 0 %
ERYTHROCYTE [DISTWIDTH] IN BLOOD BY AUTOMATED COUNT: 12.6 % (ref 10–15)
GLUCOSE SERPL-MCNC: 117 MG/DL (ref 70–99)
GLUCOSE UR STRIP-MCNC: NEGATIVE MG/DL
HCG SERPL QL: NEGATIVE
HCT VFR BLD AUTO: 36.7 % (ref 35–47)
HGB BLD-MCNC: 11.9 G/DL (ref 11.7–15.7)
HGB UR QL STRIP: ABNORMAL
IMM GRANULOCYTES # BLD: 0 10E3/UL
IMM GRANULOCYTES NFR BLD: 0 %
KETONES UR STRIP-MCNC: NEGATIVE MG/DL
LEUKOCYTE ESTERASE UR QL STRIP: ABNORMAL
LIPASE SERPL-CCNC: 18 U/L (ref 13–60)
LYMPHOCYTES # BLD AUTO: 1.3 10E3/UL (ref 0.8–5.3)
LYMPHOCYTES NFR BLD AUTO: 11 %
MCH RBC QN AUTO: 27.4 PG (ref 26.5–33)
MCHC RBC AUTO-ENTMCNC: 32.4 G/DL (ref 31.5–36.5)
MCV RBC AUTO: 85 FL (ref 78–100)
MONOCYTES # BLD AUTO: 0.7 10E3/UL (ref 0–1.3)
MONOCYTES NFR BLD AUTO: 6 %
MUCOUS THREADS #/AREA URNS LPF: PRESENT /LPF
NEUTROPHILS # BLD AUTO: 9.4 10E3/UL (ref 1.6–8.3)
NEUTROPHILS NFR BLD AUTO: 83 %
NITRATE UR QL: NEGATIVE
NRBC # BLD AUTO: 0 10E3/UL
NRBC BLD AUTO-RTO: 0 /100
PH UR STRIP: 6.5 [PH] (ref 5–7)
PLATELET # BLD AUTO: 287 10E3/UL (ref 150–450)
POTASSIUM SERPL-SCNC: 4.2 MMOL/L (ref 3.4–5.3)
PROT SERPL-MCNC: 6.5 G/DL (ref 6.4–8.3)
RBC # BLD AUTO: 4.34 10E6/UL (ref 3.8–5.2)
RBC URINE: 11 /HPF
SODIUM SERPL-SCNC: 139 MMOL/L (ref 135–145)
SP GR UR STRIP: >1.03 (ref 1–1.03)
SQUAMOUS EPITHELIAL: 5 /HPF
UROBILINOGEN UR STRIP-MCNC: 3 MG/DL
WBC # BLD AUTO: 11.5 10E3/UL (ref 4–11)
WBC URINE: 4 /HPF

## 2023-10-15 PROCEDURE — 81001 URINALYSIS AUTO W/SCOPE: CPT | Performed by: EMERGENCY MEDICINE

## 2023-10-15 PROCEDURE — 85025 COMPLETE CBC W/AUTO DIFF WBC: CPT | Performed by: EMERGENCY MEDICINE

## 2023-10-15 PROCEDURE — 80053 COMPREHEN METABOLIC PANEL: CPT | Performed by: EMERGENCY MEDICINE

## 2023-10-15 PROCEDURE — 36415 COLL VENOUS BLD VENIPUNCTURE: CPT | Performed by: EMERGENCY MEDICINE

## 2023-10-15 PROCEDURE — 83690 ASSAY OF LIPASE: CPT | Performed by: EMERGENCY MEDICINE

## 2023-10-15 PROCEDURE — 84703 CHORIONIC GONADOTROPIN ASSAY: CPT | Performed by: EMERGENCY MEDICINE

## 2023-10-15 PROCEDURE — 99285 EMERGENCY DEPT VISIT HI MDM: CPT | Mod: 25

## 2023-10-15 PROCEDURE — 76830 TRANSVAGINAL US NON-OB: CPT

## 2023-10-15 PROCEDURE — 250N000013 HC RX MED GY IP 250 OP 250 PS 637: Performed by: EMERGENCY MEDICINE

## 2023-10-15 PROCEDURE — 250N000011 HC RX IP 250 OP 636: Performed by: EMERGENCY MEDICINE

## 2023-10-15 PROCEDURE — 96374 THER/PROPH/DIAG INJ IV PUSH: CPT

## 2023-10-15 RX ORDER — CYCLOBENZAPRINE HCL 10 MG
10 TABLET ORAL 3 TIMES DAILY PRN
Qty: 18 TABLET | Refills: 0 | Status: SHIPPED | OUTPATIENT
Start: 2023-10-15 | End: 2023-10-21

## 2023-10-15 RX ORDER — CYCLOBENZAPRINE HCL 10 MG
10 TABLET ORAL ONCE
Status: COMPLETED | OUTPATIENT
Start: 2023-10-15 | End: 2023-10-15

## 2023-10-15 RX ORDER — NAPROXEN 500 MG/1
500 TABLET ORAL 2 TIMES DAILY WITH MEALS
Qty: 16 TABLET | Refills: 0 | Status: SHIPPED | OUTPATIENT
Start: 2023-10-15 | End: 2023-10-23

## 2023-10-15 RX ORDER — KETOROLAC TROMETHAMINE 15 MG/ML
15 INJECTION, SOLUTION INTRAMUSCULAR; INTRAVENOUS ONCE
Status: COMPLETED | OUTPATIENT
Start: 2023-10-15 | End: 2023-10-15

## 2023-10-15 RX ADMIN — CYCLOBENZAPRINE 10 MG: 10 TABLET, FILM COATED ORAL at 05:04

## 2023-10-15 RX ADMIN — KETOROLAC TROMETHAMINE 15 MG: 15 INJECTION INTRAMUSCULAR; INTRAVENOUS at 05:02

## 2023-10-15 ASSESSMENT — ACTIVITIES OF DAILY LIVING (ADL): ADLS_ACUITY_SCORE: 35

## 2023-10-15 NOTE — DISCHARGE SUMMARY
Discharge education completed, IV removed, pt mother will be ride home, printed med scripts sent with pt

## 2023-10-15 NOTE — ED PROVIDER NOTES
EMERGENCY DEPARTMENT ENCOUNTER      NAME: Lucero Collier  AGE: 28 year old female  YOB: 1995  MRN: [id number]  EVALUATION DATE & TIME: 10/15/2023  4:23 AM    PCP: Cristal Beltran Ra    ED PROVIDER: Yovany Sanchez M.D.      Chief Complaint   Patient presents with    Flank Pain     Right sided         FINAL IMPRESSION:  1. Flank pain          ED COURSE & MEDICAL DECISION MAKIN:39 AM I met with the patient for the initial interview and physical examination. Discussed plan for treatment and workup in the ED.    6:39 AM repeat exam is benign.  Discussed findings and discharge close follow-up.      Pertinent Labs & Imaging studies reviewed. (See chart for details)  28 year old female presents to the Emergency Department for evaluation of flank pain. Patient appears non toxic with stable vitals signs, patient afebrile with no tachycardia or hypoxia, no increased work of breathing. Overall exam is benign.  Lungs are clear, abdomen is benign, patient has no true CVA tenderness, most of her pain is located in the right lateral posterior back, worse with palpation.  She denies any falls or trauma.  Review of the medical record shows patient was seen in the emergency department of [facility] on 10/13/2023, CT imaging at that time reported no nephroureterolithiasis or hydronephrosis, reported small to moderate-sized sliding-type hiatal hernia, unremarkable gallbladder, no other acute process.  Patient discharged with MiraLAX, patient states that she had a bowel movement yesterday with no change to her symptoms.  Again she denies any falls or trauma, no skin changes, fevers.  Certainly considered musculoskeletal etiology, with no true CVA tenderness, benign recent CT imaging, certainly nothing to suggest appendicitis, nephrolithiasis or pyelonephritis.  She has no focal tenderness on exam to suggest pancreatitis, cholecystitis, no fever or jaundice to suggest a  sending cholangitis.  Denies any vaginal bleeding or discharge, pain is atypical but did consider possible pelvic etiology of tubo-ovarian abscess, ovarian cyst or torsion, no vaginal bleeding or discharge with nothing to suggest PID or STI.  We will obtain screening labs, urine studies and ultrasound imaging.    Reassessment: Labs by my independent interpretation showed no acute concerning findings, no significantly elevated white blood cell count or fever here to suggest infection.  Pregnancy was negative.  No elevated alk phos, LFTs, bilirubin or lipase to suggest biliary or pancreatic obstruction.  Urine showed blood and leukocytes but no nitrates, again the patient denies urinary symptoms whatsoever, will send for culture and treat only if positive.  Ultrasound imaging returned and reported no acute concerning findings, did discuss with patient and family findings of cystic lesion in the right ovary compatible with regressing cyst or unruptured follicle.  Following our interventions patient states that she felt improved.  With reassuring ultrasound and recent reassuring CT imaging, suspect most likely musculoskeletal etiology.  Will discharge patient on prescription for naproxen and Flexeril, advised no other NSAIDs while taking naproxen and no driving on Flexeril.  Will recommend she follows up with her primary care provider in the next 5 to 7 days for continued outpatient management evaluation.  Discussed all these findings recommendations with the patient felt reassured and comfortable discharge.  Return precautions provided.    Medical Decision Making    History:  Supplemental history from: Documented in chart, if applicable and Family Member/Significant Other  External Record(s) reviewed: Documented in chart, if applicable. and Other: 10/13/23 [place] ED Visit    Work Up:  Chart documentation includes differential considered and any EKGs or imaging independently interpreted by provider, where specified.  In  additional to work up documented, I considered the following work up: Documented in chart, if applicable.    External consultation:  Discussion of management with another provider: Documented in chart, if applicable    Complicating factors:  Care impacted by chronic illness: Hypertension and Mental Health  Care affected by social determinants of health: N/A    Disposition considerations: Discharge. I prescribed additional prescription strength medication(s) as charted. See documentation for any additional details.          At the conclusion of the encounter I discussed the results of all of the tests and the disposition. The questions were answered and return precautions provided. The patient or family acknowledged understanding and was agreeable with the care plan.         MEDICATIONS GIVEN IN THE EMERGENCY:  Medications   ketorolac (TORADOL) injection 15 mg (15 mg Intravenous $Given 10/15/23 0502)   cyclobenzaprine (FLEXERIL) tablet 10 mg (10 mg Oral $Given 10/15/23 0504)       NEW PRESCRIPTIONS STARTED AT TODAY'S ER VISIT  Discharge Medication List as of 10/15/2023  6:51 AM        START taking these medications    Details   cyclobenzaprine (FLEXERIL) 10 MG tablet Take 1 tablet (10 mg) by mouth 3 times daily as needed for muscle spasms, Disp-18 tablet, R-0, Local Print      naproxen (NAPROSYN) 500 MG tablet Take 1 tablet (500 mg) by mouth 2 times daily (with meals) for 8 days, Disp-16 tablet, R-0, Local Print                  =================================================================    HPI    Patient information was obtained from: the patient     Use of Intrepreter: N/A         Lucero SCHNEIDER Stone is a 28 year old female with a history of GERD, anxiety, hypertension, epilepsy, and kidney stones who presents to the ED via walk in for evaluation of flank pain.     Per Chart Review:   10/13/23 The patient presented to [place] ED for evaluation of flank pain. Her labs were notable for gross hematuria. Her abdominal  CT did not show a ureteral stone or other acute finding. It was notable for a hiatal hernia and tiny gallstones. She was discharged home with miralax.     The patient reports the onset of sharp right sided flank pain four days ago (10/11) which radiates into her abdomen and groin. She denies any palliating or provoking factors. She has taken tylenol at home, which does not provide relief. Her last dose was at 1200 (~4.5 hours ago). She states that she has had pain like this in the past but it has always resolved with tylenol. 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 She notes that she was seen in the ED on 10/13 and told her pain was due to constipation. Yesterday (10/14) she had a bowel movement, but her pain has persisted. The patient denies a history of abdominal surgeries. She denies diarrhea, black or bloody stool, dysuria, chest pain, vaginal bleeding or discharge, rash, and any other symptoms or complaints at this time. She denies a chance of pregnancy or new twisting activities. She denies any recent falls or trauma.     REVIEW OF SYSTEMS   Constitutional:  Denies fever, chills  Respiratory:  Denies productive cough or increased work of breathing  Cardiovascular:  Denies chest pain, palpitations  GI:  Denies nausea, vomiting, or change in bowel or bladder habits. Endorses right flank pain, right abdominal pain, right inguinal pain.   Musculoskeletal:  Denies any new muscle/joint swelling  Skin:  Denies rash   Neurologic:  Denies focal weakness  All systems negative except as marked.     PAST MEDICAL HISTORY:  Past Medical History:   Diagnosis Date    ADHD     on Daytrana and clonidine    Adverse reaction to pertussis vaccine     Allergies     daily Claritin    Anxiety     Chronic headaches     Complex partial seizures (H)     Constipation     Depression     Depressive disorder     Developmental delay     Epilepsy (H)     GERD (gastroesophageal reflux disease)     UGI 3/07     History of tonsillectomy     Hypoglycemia     Endocrine eval neg 10/00    Kidney stone     6/02 detected on CT scan; resolved 12/04    Myopia     Obesity     Sinusitis, chronic     Sleep disorder     Since 2000; sleep clinic evaluation 4/00 Dr. Ramesh     Smoking     Spells     Syncope     Urinary tract infection 11/99    nl renal US & VCUG 11/99       PAST SURGICAL HISTORY:  Past Surgical History:   Procedure Laterality Date    ENDOSCOPY UPPER WITH PANCREATIC STIMULATION  4/06    Esophagitis 4/06    NISSEN FUNDOPLICATION  6/07    Dr. Meneses    NISSEN FUNDOPLICATION      PE TUBES  3/98    TONSILLECTOMY & ADENOIDECTOMY  4/02         CURRENT MEDICATIONS:    Prior to Admission medications    Medication Sig Start Date End Date Taking? Authorizing Provider   albuterol (PROAIR HFA/PROVENTIL HFA/VENTOLIN HFA) 108 (90 Base) MCG/ACT inhaler Inhale 2 puffs into the lungs every 6 hours as needed for shortness of breath, wheezing or cough 12/13/22   Elizabeth Ozuna, NP   amphetamine-dextroamphetamine (ADDERALL XR) 25 MG 24 hr capsule Take 1 capsule (25 mg) by mouth every morning 3/31/23   Nicki De León PA-C   amphetamine-dextroamphetamine (ADDERALL XR) 30 MG 24 hr capsule Take 1 capsule (30 mg) by mouth daily 5/9/23   Cristal Beltran Ra, APRN CNP   amphetamine-dextroamphetamine (ADDERALL) 5 MG tablet Take 1 tablet (5 mg) by mouth daily 5/9/23   Cristal Beltran Ra, APRN CNP   cetirizine (ZYRTEC) 10 MG tablet TAKE ONE TABLET BY MOUTH NIGHTLY FOR 30 DAYS 1/4/23   Reported, Patient   fluocinonide (LIDEX) 0.05 % external cream Apply sparingly to affected area twice daily as needed.  Do not apply to face. 6/10/19   OestrIris rodarte PA-C   IBUPROFEN PO Take 600 mg by mouth every 6 hours as needed for moderate pain    Reported, Patient   levonorgestrel (MIRENA) 20 MCG/24HR IUD 1 each by Intrauterine route once 3/4/19 3/3/24  Reported, Patient   methocarbamol (ROBAXIN) 500 MG tablet Take 1-3 tablets (500-1,500 mg) by  mouth 3 times daily as needed for muscle spasms 9/22/21   Chirag Short PA-C   omeprazole (PRILOSEC) 20 MG DR capsule Take 1 capsule (20 mg) by mouth daily 4/5/21   Cristal Beltran Ra, APRN CNP   pimecrolimus (ELIDEL) 1 % cream Apply topically 2 times daily Ok to use on face 2/7/14   Yakelin Combs MD   prazosin (MINIPRESS) 1 MG capsule Take 1 mg by mouth At Bedtime 9/8/22   Reported, Patient   SUMAtriptan (IMITREX) 25 MG tablet TAKE 1-2 TABLETS AT ONSET OF MIGRAINE. MAY REPEAT IN 2 HOURS. MAX 8 TABS/24 HOURS 12/23/19   Iris Rand PA-C   venlafaxine (EFFEXOR-ER) 150 MG 24 hr tablet Take 150 mg by mouth every morning 1/5/23   Reported, Patient        ALLERGIES:  No Known Allergies    FAMILY HISTORY:  Family History   Problem Relation Age of Onset    Diabetes Maternal Grandmother     Hypertension Maternal Grandmother     Diabetes Paternal Grandmother     Cardiovascular Paternal Grandmother     Breast Cancer Paternal Grandmother 61        right side breast cancer    Genetic Disorder Other         nephew.-genetic defects     Obesity Mother     Supraventricular tachycardia Mother     Family History Negative Father        SOCIAL HISTORY:   Social History     Socioeconomic History    Marital status: Single   Tobacco Use    Smoking status: Every Day     Packs/day: 1     Types: Cigarettes    Smokeless tobacco: Never    Tobacco comments:     5 qd   Vaping Use    Vaping Use: Every day    Substances: Nicotine   Substance and Sexual Activity    Alcohol use: Yes     Alcohol/week: 0.0 standard drinks of alcohol     Comment: Alcoholic Drinks/day: Occasional    Drug use: No     Types: Oxycodone    Sexual activity: Yes     Partners: Male     Birth control/protection: I.U.D.       VITALS:  Patient Vitals for the past 24 hrs:   BP Temp Temp src Pulse Resp SpO2 Height Weight   10/15/23 0600 127/77 -- -- 79 -- 97 % -- --   10/15/23 0545 111/56 -- -- 87 -- 96 % -- --   10/15/23 0530 125/63 -- -- 87 -- 96  "% -- --   10/15/23 0515 132/70 -- -- 86 -- 96 % -- --   10/15/23 0502 131/75 -- -- 80 -- 97 % -- --   10/15/23 0500 131/75 98.7  F (37.1  C) Oral 85 -- 97 % -- --   10/15/23 0416 133/80 97.9  F (36.6  C) Temporal 84 17 98 % 1.651 m (5' 5\") 112.7 kg (248 lb 8 oz)        PHYSICAL EXAM    Constitutional:  Awake, alert, in no apparent distress  HENT:  Normocephalic, Atraumatic. Bilateral external ears normal. Oropharynx moist. Nose normal. Neck- Normal range of motion with no guarding, No midline cervical tenderness, Supple, No stridor.   Eyes:  PERRL, EOMI with no signs of entrapment, Conjunctiva normal, No discharge.   Respiratory:  Normal breath sounds, No respiratory distress, No wheezing.    Cardiovascular:  Normal heart rate, Normal rhythm, No appreciable rubs or gallops.   GI:  Soft, No tenderness, No distension, No palpable masses  Gu: No CVA tenderness  Musculoskeletal:  Intact distal pulses, No edema. Good range of motion in all major joints. No tenderness to palpation or major deformities noted.  Integument:  Warm, Dry, No erythema, No rash.   Neurologic:  Alert & oriented, Normal motor function, Normal sensory function, No focal deficits noted.   Psychiatric:  Affect normal, Judgment normal, Mood normal.     LAB:  All pertinent labs reviewed and interpreted.  Results for orders placed or performed during the hospital encounter of 10/15/23   US Pelvis Cmplt w Transvag & Doppler LmtPel Duplex Limited    Impression    IMPRESSION:      1.  Normal pelvic ultrasound. A mildly lobulated cystic lesion in the right ovary is most compatible with a regressing cyst or ruptured follicle.         Comprehensive metabolic panel   Result Value Ref Range    Sodium 139 135 - 145 mmol/L    Potassium 4.2 3.4 - 5.3 mmol/L    Carbon Dioxide (CO2) 21 (L) 22 - 29 mmol/L    Anion Gap 10 7 - 15 mmol/L    Urea Nitrogen 10.7 6.0 - 20.0 mg/dL    Creatinine 0.77 0.51 - 0.95 mg/dL    GFR Estimate >90 >60 mL/min/1.73m2    Calcium 8.5 (L) " 8.6 - 10.0 mg/dL    Chloride 108 (H) 98 - 107 mmol/L    Glucose 117 (H) 70 - 99 mg/dL    Alkaline Phosphatase 56 35 - 104 U/L    AST 18 0 - 45 U/L    ALT 9 0 - 50 U/L    Protein Total 6.5 6.4 - 8.3 g/dL    Albumin 3.8 3.5 - 5.2 g/dL    Bilirubin Total 0.3 <=1.2 mg/dL   Result Value Ref Range    Lipase 18 13 - 60 U/L   UA with Microscopic reflex to Culture    Specimen: Urine, Midstream   Result Value Ref Range    Color Urine Yellow Colorless, Straw, Light Yellow, Yellow    Appearance Urine Clear Clear    Glucose Urine Negative Negative mg/dL    Bilirubin Urine Negative Negative    Ketones Urine Negative Negative mg/dL    Specific Gravity Urine >1.030 (H) 1.001 - 1.030    Blood Urine 0.03 mg/dL (A) Negative    pH Urine 6.5 5.0 - 7.0    Protein Albumin Urine 50 (A) Negative mg/dL    Urobilinogen Urine 3.0 (A) <2.0 mg/dL    Nitrite Urine Negative Negative    Leukocyte Esterase Urine 25 [place]/uL (A) Negative    Mucus Urine Present (A) None Seen /LPF    RBC Urine 11 (H) <=2 /HPF    WBC Urine 4 <=5 /HPF    Squamous Epithelials Urine 5 (H) <=1 /HPF   HCG qualitative Blood   Result Value Ref Range    hCG Serum Qualitative Negative Negative   CBC with platelets and differential   Result Value Ref Range    WBC Count 11.5 (H) 4.0 - 11.0 10e3/uL    RBC Count 4.34 3.80 - 5.20 10e6/uL    Hemoglobin 11.9 11.7 - 15.7 g/dL    Hematocrit 36.7 35.0 - 47.0 %    MCV 85 78 - 100 fL    MCH 27.4 26.5 - 33.0 pg    MCHC 32.4 31.5 - 36.5 g/dL    RDW 12.6 10.0 - 15.0 %    Platelet Count 287 150 - 450 10e3/uL    % Neutrophils 83 %    % Lymphocytes 11 %    % Monocytes 6 %    Mids % (Monos, Eos, Basos)      % Eosinophils 0 %    % Basophils 0 %    % Immature Granulocytes 0 %    NRBCs per 100 WBC 0 <1 /100    Absolute Neutrophils 9.4 (H) 1.6 - 8.3 10e3/uL    Absolute Lymphocytes 1.3 0.8 - 5.3 10e3/uL    Absolute Monocytes 0.7 0.0 - 1.3 10e3/uL    Mids Abs (Monos, Eos, Basos)      Absolute Eosinophils 0.0 0.0 - 0.7 10e3/uL    Absolute Basophils 0.1 0.0  - 0.2 10e3/uL    Absolute Immature Granulocytes 0.0 <=0.4 10e3/uL    Absolute NRBCs 0.0 10e3/uL       RADIOLOGY:  US Pelvis Cmplt w Transvag & Doppler LmtPel Duplex Limited   Final Result   IMPRESSION:        1.  Normal pelvic ultrasound. A mildly lobulated cystic lesion in the right ovary is most compatible with a regressing cyst or ruptured follicle.                      EKG:    None.     PROCEDURES:   None.        I, Rosalee Chen, am serving as a scribe to document services personally performed by Yovany Sanchez MD, based on my observation and the provider's statements to me. I, Yovany Sanchez MD attest that Rosalee April is acting in a scribe capacity, has observed my performance of the services and has documented them in accordance with my direction.    Yovany Sanchez M.D.  Emergency Medicine  [facility] EMERGENCY ROOM  [address] [id number] [phone]  Dept: [phone]      Yovany Sanchez MD  10/15/23 0711

## 2023-10-15 NOTE — DISCHARGE INSTRUCTIONS
Take the naproxen and Flexeril as prescribed.  Do not take additional NSAIDs such as ibuprofen or Motrin while taking naproxen.  Do not drive while on Flexeril.  Please return for any worsening or concerning symptoms.

## 2023-10-15 NOTE — ED TRIAGE NOTES
Pt reports that since Wednesday she has had right flank pain that radiates down into the right groin. She was seen @ [place] ED on Wednesday and dx with constipation. Pt has not taken the Mag citrate d/t she states that she is not constipated. Last BM was yesterday morning.       Triage Assessment (Adult)       Row Name 10/15/23 0419          Triage Assessment    Airway WDL WDL        Respiratory WDL    Respiratory WDL WDL        Skin Circulation/Temperature WDL    Skin Circulation/Temperature WDL WDL        Cardiac WDL    Cardiac WDL WDL        Peripheral/Neurovascular WDL    Peripheral Neurovascular WDL WDL        Cognitive/Neuro/Behavioral WDL    Cognitive/Neuro/Behavioral WDL WDL

## 2023-10-17 ENCOUNTER — PATIENT OUTREACH (OUTPATIENT)
Dept: CARE COORDINATION | Facility: CLINIC | Age: 28
End: 2023-10-17
Payer: MEDICAID

## 2023-10-17 NOTE — PROGRESS NOTES
Clinic Care Coordination Contact  Community Health Worker Initial Outreach    CHW Initial Information Gathering:  Referral Source: ED Follow-Up  Preferred Hospital: [facility]  [phone]  Preferred Urgent Care: Other ([facility])  No PCP office visit in Past Year: No       Patient accepts CC: No, due to the patient stating that at this time there are no concerns or questions for CCC to assist with. Patient will be sent Care Coordination introduction letter for future reference.     CAITLIN Higgins  [phone]  Connected [facility] Center  [facility]

## 2023-10-17 NOTE — LETTER
[facility] CARE COORDINATION  [address] [place] [address]    October 17, 2023    Lucero Collier  [address] [address]      Dear Lucero,        I am a  clinic community health worker who works with SHELBY Summers Ra, CNP with the [facility]. I wanted to thank you for spending the time to talk with me.  Below is a description of clinic care coordination and how we can further assist you.       The clinic care coordination team is made up of a registered nurse, , financial resource worker and community health worker who understand the health care system. The goal of clinic care coordination is to help you manage your health and improve access to the health care system. Our team works alongside your provider to assist you in determining your health and social needs. We can help you obtain health care and community resources, providing you with necessary information and education. We can work with you through any barriers and develop a care plan that helps coordinate and strengthen the communication between you and your care team.  Our services are voluntary and are offered without charge to you personally.    If you wish to connect with the Clinic Care Coordination Team, please let your [facility] Primary Care Provider or Clinic Care Team know and they can place a referral. The Clinic Care Coordination team will then reach out by phone to further support you.    We are focused on providing you with the highest-quality healthcare experience possible.    Sincerely,   Your care team with [facility]

## 2023-10-23 ENCOUNTER — MYC REFILL (OUTPATIENT)
Dept: FAMILY MEDICINE | Facility: CLINIC | Age: 28
End: 2023-10-23
Payer: MEDICAID

## 2023-10-23 DIAGNOSIS — F43.23 ADJUSTMENT DISORDER WITH MIXED ANXIETY AND DEPRESSED MOOD: Primary | ICD-10-CM

## 2023-10-24 RX ORDER — VENLAFAXINE HYDROCHLORIDE 150 MG/1
150 TABLET, EXTENDED RELEASE ORAL EVERY MORNING
Qty: 90 TABLET | Refills: 0 | Status: SHIPPED | OUTPATIENT
Start: 2023-10-24 | End: 2023-11-21

## 2023-10-30 DIAGNOSIS — F90.2 ATTENTION DEFICIT HYPERACTIVITY DISORDER (ADHD), COMBINED TYPE: ICD-10-CM

## 2023-10-30 RX ORDER — DEXTROAMPHETAMINE SACCHARATE, AMPHETAMINE ASPARTATE MONOHYDRATE, DEXTROAMPHETAMINE SULFATE AND AMPHETAMINE SULFATE 7.5; 7.5; 7.5; 7.5 MG/1; MG/1; MG/1; MG/1
30 CAPSULE, EXTENDED RELEASE ORAL DAILY
Qty: 30 CAPSULE | Refills: 0 | Status: SHIPPED | OUTPATIENT
Start: 2023-10-30 | End: 2024-01-15

## 2023-11-20 ENCOUNTER — TELEPHONE (OUTPATIENT)
Dept: FAMILY MEDICINE | Facility: CLINIC | Age: 28
End: 2023-11-20
Payer: MEDICAID

## 2023-11-20 DIAGNOSIS — F43.23 ADJUSTMENT DISORDER WITH MIXED ANXIETY AND DEPRESSED MOOD: ICD-10-CM

## 2023-11-21 RX ORDER — VENLAFAXINE HYDROCHLORIDE 150 MG/1
150 CAPSULE, EXTENDED RELEASE ORAL DAILY
Qty: 90 CAPSULE | Refills: 0 | Status: SHIPPED | OUTPATIENT
Start: 2023-11-21 | End: 2024-08-12

## 2024-01-15 ENCOUNTER — MYC REFILL (OUTPATIENT)
Dept: FAMILY MEDICINE | Facility: CLINIC | Age: 29
End: 2024-01-15
Payer: COMMERCIAL

## 2024-01-15 DIAGNOSIS — F90.2 ATTENTION DEFICIT HYPERACTIVITY DISORDER (ADHD), COMBINED TYPE: ICD-10-CM

## 2024-01-15 NOTE — LETTER
January 30, 2024      Lucero Collier  [address] [address]        Barbara Barker,    We recently received a call from your pharmacy requesting a refill request for your medications. We are contacting you today to notify you that you are due for an IN PERSON OFFICE VISIT for further refills.     We have authorized a one time refill of your medication to allow time for you to schedule your appointment.     Please call [phone] to schedule an appointment or if you have MyChart you can schedule with your provider as well.     Taking care of your health is important to us, and ongoing visits with your provider are vital to your care. We look forward to seeing you in the near future.     Thank you for using [facility] for your medical needs.     Sincerely,        SHELBY Summers Ra CNP

## 2024-01-16 RX ORDER — DEXTROAMPHETAMINE SACCHARATE, AMPHETAMINE ASPARTATE, DEXTROAMPHETAMINE SULFATE AND AMPHETAMINE SULFATE 1.25; 1.25; 1.25; 1.25 MG/1; MG/1; MG/1; MG/1
5 TABLET ORAL DAILY
Qty: 30 TABLET | Refills: 0 | Status: SHIPPED | OUTPATIENT
Start: 2024-01-16 | End: 2024-08-12

## 2024-01-16 RX ORDER — DEXTROAMPHETAMINE SACCHARATE, AMPHETAMINE ASPARTATE MONOHYDRATE, DEXTROAMPHETAMINE SULFATE AND AMPHETAMINE SULFATE 7.5; 7.5; 7.5; 7.5 MG/1; MG/1; MG/1; MG/1
30 CAPSULE, EXTENDED RELEASE ORAL DAILY
Qty: 30 CAPSULE | Refills: 0 | Status: SHIPPED | OUTPATIENT
Start: 2024-01-16 | End: 2024-08-12

## 2024-01-23 ENCOUNTER — LAB REQUISITION (OUTPATIENT)
Dept: LAB | Facility: CLINIC | Age: 29
End: 2024-01-23
Payer: COMMERCIAL

## 2024-01-23 DIAGNOSIS — Z32.01 ENCOUNTER FOR PREGNANCY TEST, RESULT POSITIVE: ICD-10-CM

## 2024-01-23 LAB — HCG INTACT+B SERPL-ACNC: <1 MIU/ML

## 2024-01-23 PROCEDURE — 84702 CHORIONIC GONADOTROPIN TEST: CPT | Mod: ORL | Performed by: OBSTETRICS & GYNECOLOGY

## 2024-01-23 NOTE — TELEPHONE ENCOUNTER
LVM requesting a call back for an appt. One more attempt will be made.     Deepti Saldaña  Lead

## 2024-04-01 ENCOUNTER — LAB REQUISITION (OUTPATIENT)
Dept: LAB | Facility: CLINIC | Age: 29
End: 2024-04-01
Payer: COMMERCIAL

## 2024-04-01 DIAGNOSIS — Z36.89 ENCOUNTER FOR OTHER SPECIFIED ANTENATAL SCREENING: ICD-10-CM

## 2024-04-01 PROCEDURE — 87086 URINE CULTURE/COLONY COUNT: CPT | Mod: ORL | Performed by: NURSE PRACTITIONER

## 2024-04-03 LAB — BACTERIA UR CULT: NO GROWTH

## 2024-04-29 ENCOUNTER — TRANSFERRED RECORDS (OUTPATIENT)
Dept: HEALTH INFORMATION MANAGEMENT | Facility: CLINIC | Age: 29
End: 2024-04-29

## 2024-04-29 ENCOUNTER — LAB REQUISITION (OUTPATIENT)
Dept: LAB | Facility: CLINIC | Age: 29
End: 2024-04-29
Payer: COMMERCIAL

## 2024-04-29 DIAGNOSIS — Z34.91 ENCOUNTER FOR SUPERVISION OF NORMAL PREGNANCY, UNSPECIFIED, FIRST TRIMESTER: ICD-10-CM

## 2024-04-29 LAB
BASOPHILS # BLD AUTO: 0.1 10E3/UL (ref 0–0.2)
BASOPHILS NFR BLD AUTO: 1 %
EOSINOPHIL # BLD AUTO: 0 10E3/UL (ref 0–0.7)
EOSINOPHIL NFR BLD AUTO: 0 %
ERYTHROCYTE [DISTWIDTH] IN BLOOD BY AUTOMATED COUNT: 13.2 % (ref 10–15)
HBA1C MFR BLD: 5.3 %
HCT VFR BLD AUTO: 36.6 % (ref 35–47)
HGB BLD-MCNC: 12.5 G/DL (ref 11.7–15.7)
HIV 1+2 AB+HIV1 P24 AG SERPL QL IA: NONREACTIVE
IMM GRANULOCYTES # BLD: 0 10E3/UL
IMM GRANULOCYTES NFR BLD: 0 %
LYMPHOCYTES # BLD AUTO: 1.9 10E3/UL (ref 0.8–5.3)
LYMPHOCYTES NFR BLD AUTO: 21 %
MCH RBC QN AUTO: 28.9 PG (ref 26.5–33)
MCHC RBC AUTO-ENTMCNC: 34.2 G/DL (ref 31.5–36.5)
MCV RBC AUTO: 85 FL (ref 78–100)
MONOCYTES # BLD AUTO: 0.8 10E3/UL (ref 0–1.3)
MONOCYTES NFR BLD AUTO: 9 %
NEUTROPHILS # BLD AUTO: 6.3 10E3/UL (ref 1.6–8.3)
NEUTROPHILS NFR BLD AUTO: 69 %
NRBC # BLD AUTO: 0 10E3/UL
NRBC BLD AUTO-RTO: 0 /100
PLATELET # BLD AUTO: 283 10E3/UL (ref 150–450)
RBC # BLD AUTO: 4.33 10E6/UL (ref 3.8–5.2)
WBC # BLD AUTO: 9.1 10E3/UL (ref 4–11)

## 2024-04-29 PROCEDURE — 87491 CHLMYD TRACH DNA AMP PROBE: CPT | Mod: ORL | Performed by: STUDENT IN AN ORGANIZED HEALTH CARE EDUCATION/TRAINING PROGRAM

## 2024-04-29 PROCEDURE — 83036 HEMOGLOBIN GLYCOSYLATED A1C: CPT | Mod: ORL | Performed by: STUDENT IN AN ORGANIZED HEALTH CARE EDUCATION/TRAINING PROGRAM

## 2024-04-29 PROCEDURE — 80081 OBSTETRIC PANEL INC HIV TSTG: CPT | Mod: ORL | Performed by: STUDENT IN AN ORGANIZED HEALTH CARE EDUCATION/TRAINING PROGRAM

## 2024-04-29 PROCEDURE — 86803 HEPATITIS C AB TEST: CPT | Mod: ORL | Performed by: STUDENT IN AN ORGANIZED HEALTH CARE EDUCATION/TRAINING PROGRAM

## 2024-04-30 ENCOUNTER — TRANSCRIBE ORDERS (OUTPATIENT)
Dept: OTHER | Age: 29
End: 2024-04-30

## 2024-04-30 DIAGNOSIS — G40.909 NONINTRACTABLE EPILEPSY WITHOUT STATUS EPILEPTICUS, UNSPECIFIED EPILEPSY TYPE (H): Primary | ICD-10-CM

## 2024-04-30 LAB
ABO/RH(D): NORMAL
ANTIBODY SCREEN: NEGATIVE
C TRACH DNA SPEC QL PROBE+SIG AMP: NEGATIVE
HBV SURFACE AG SERPL QL IA: NONREACTIVE
HCV AB SERPL QL IA: NONREACTIVE
N GONORRHOEA DNA SPEC QL NAA+PROBE: NEGATIVE
RPR SER QL: NONREACTIVE
RUBV IGG SERPL QL IA: 1.24 INDEX
RUBV IGG SERPL QL IA: POSITIVE
SPECIMEN EXPIRATION DATE: NORMAL

## 2024-06-03 ENCOUNTER — HOSPITAL ENCOUNTER (EMERGENCY)
Facility: CLINIC | Age: 29
Discharge: HOME | End: 2024-06-03
Admitting: PHYSICIAN ASSISTANT
Payer: COMMERCIAL

## 2024-06-03 VITALS
WEIGHT: 240 LBS | DIASTOLIC BLOOD PRESSURE: 78 MMHG | BODY MASS INDEX: 39.94 KG/M2 | HEART RATE: 106 BPM | OXYGEN SATURATION: 97 % | TEMPERATURE: 98.2 F | SYSTOLIC BLOOD PRESSURE: 136 MMHG | RESPIRATION RATE: 18 BRPM

## 2024-06-03 DIAGNOSIS — R10.9 ABDOMINAL PAIN: ICD-10-CM

## 2024-06-03 DIAGNOSIS — B34.9 VIRAL ILLNESS: ICD-10-CM

## 2024-06-03 LAB
ALBUMIN SERPL BCG-MCNC: 3.8 G/DL (ref 3.5–5.2)
ALBUMIN UR-MCNC: NEGATIVE MG/DL
ALP SERPL-CCNC: 65 U/L (ref 40–150)
ALT SERPL W P-5'-P-CCNC: 17 U/L (ref 0–50)
ANION GAP SERPL CALCULATED.3IONS-SCNC: 12 MMOL/L (ref 7–15)
APPEARANCE UR: CLEAR
AST SERPL W P-5'-P-CCNC: 20 U/L (ref 0–45)
BASOPHILS # BLD AUTO: 0 10E3/UL (ref 0–0.2)
BASOPHILS NFR BLD AUTO: 0 %
BILIRUB DIRECT SERPL-MCNC: <0.2 MG/DL (ref 0–0.3)
BILIRUB SERPL-MCNC: 0.3 MG/DL
BILIRUB UR QL STRIP: NEGATIVE
BUN SERPL-MCNC: 5.3 MG/DL (ref 6–20)
CALCIUM SERPL-MCNC: 8.6 MG/DL (ref 8.6–10)
CHLORIDE SERPL-SCNC: 106 MMOL/L (ref 98–107)
COLOR UR AUTO: YELLOW
CREAT SERPL-MCNC: 0.41 MG/DL (ref 0.51–0.95)
DEPRECATED HCO3 PLAS-SCNC: 19 MMOL/L (ref 22–29)
EGFRCR SERPLBLD CKD-EPI 2021: >90 ML/MIN/1.73M2
EOSINOPHIL # BLD AUTO: 0.1 10E3/UL (ref 0–0.7)
EOSINOPHIL NFR BLD AUTO: 1 %
ERYTHROCYTE [DISTWIDTH] IN BLOOD BY AUTOMATED COUNT: 13.2 % (ref 10–15)
FLUAV RNA SPEC QL NAA+PROBE: NEGATIVE
FLUBV RNA RESP QL NAA+PROBE: NEGATIVE
GLUCOSE SERPL-MCNC: 86 MG/DL (ref 70–99)
GLUCOSE UR STRIP-MCNC: NEGATIVE MG/DL
HCT VFR BLD AUTO: 34.7 % (ref 35–47)
HGB BLD-MCNC: 11.8 G/DL (ref 11.7–15.7)
HGB UR QL STRIP: NEGATIVE
HOLD SPECIMEN: NORMAL
IMM GRANULOCYTES # BLD: 0 10E3/UL
IMM GRANULOCYTES NFR BLD: 0 %
KETONES UR STRIP-MCNC: ABNORMAL MG/DL
LEUKOCYTE ESTERASE UR QL STRIP: ABNORMAL
LIPASE SERPL-CCNC: 16 U/L (ref 13–60)
LYMPHOCYTES # BLD AUTO: 1.4 10E3/UL (ref 0.8–5.3)
LYMPHOCYTES NFR BLD AUTO: 14 %
MCH RBC QN AUTO: 28.2 PG (ref 26.5–33)
MCHC RBC AUTO-ENTMCNC: 34 G/DL (ref 31.5–36.5)
MCV RBC AUTO: 83 FL (ref 78–100)
MONOCYTES # BLD AUTO: 0.8 10E3/UL (ref 0–1.3)
MONOCYTES NFR BLD AUTO: 8 %
MUCOUS THREADS #/AREA URNS LPF: PRESENT /LPF
NEUTROPHILS # BLD AUTO: 7.9 10E3/UL (ref 1.6–8.3)
NEUTROPHILS NFR BLD AUTO: 77 %
NITRATE UR QL: NEGATIVE
NRBC # BLD AUTO: 0 10E3/UL
NRBC BLD AUTO-RTO: 0 /100
PH UR STRIP: 6.5 [PH] (ref 5–7)
PLATELET # BLD AUTO: 288 10E3/UL (ref 150–450)
POTASSIUM SERPL-SCNC: 3.9 MMOL/L (ref 3.4–5.3)
PROT SERPL-MCNC: 6.7 G/DL (ref 6.4–8.3)
RBC # BLD AUTO: 4.19 10E6/UL (ref 3.8–5.2)
RBC URINE: 1 /HPF
RSV RNA SPEC NAA+PROBE: NEGATIVE
SARS-COV-2 RNA RESP QL NAA+PROBE: NEGATIVE
SODIUM SERPL-SCNC: 137 MMOL/L (ref 135–145)
SP GR UR STRIP: 1.02 (ref 1–1.03)
SQUAMOUS EPITHELIAL: 9 /HPF
UROBILINOGEN UR STRIP-MCNC: 3 MG/DL
WBC # BLD AUTO: 10.2 10E3/UL (ref 4–11)
WBC URINE: 4 /HPF

## 2024-06-03 PROCEDURE — 99283 EMERGENCY DEPT VISIT LOW MDM: CPT

## 2024-06-03 PROCEDURE — 36415 COLL VENOUS BLD VENIPUNCTURE: CPT | Performed by: EMERGENCY MEDICINE

## 2024-06-03 PROCEDURE — 87086 URINE CULTURE/COLONY COUNT: CPT | Performed by: EMERGENCY MEDICINE

## 2024-06-03 PROCEDURE — 87637 SARSCOV2&INF A&B&RSV AMP PRB: CPT | Performed by: PHYSICIAN ASSISTANT

## 2024-06-03 PROCEDURE — 81001 URINALYSIS AUTO W/SCOPE: CPT | Performed by: EMERGENCY MEDICINE

## 2024-06-03 PROCEDURE — 250N000013 HC RX MED GY IP 250 OP 250 PS 637: Performed by: PHYSICIAN ASSISTANT

## 2024-06-03 PROCEDURE — 83690 ASSAY OF LIPASE: CPT | Performed by: EMERGENCY MEDICINE

## 2024-06-03 PROCEDURE — 85025 COMPLETE CBC W/AUTO DIFF WBC: CPT | Performed by: EMERGENCY MEDICINE

## 2024-06-03 PROCEDURE — 80053 COMPREHEN METABOLIC PANEL: CPT | Performed by: EMERGENCY MEDICINE

## 2024-06-03 RX ORDER — ACETAMINOPHEN 325 MG/1
975 TABLET ORAL ONCE
Status: COMPLETED | OUTPATIENT
Start: 2024-06-03 | End: 2024-06-03

## 2024-06-03 RX ADMIN — ACETAMINOPHEN 975 MG: 325 TABLET ORAL at 15:11

## 2024-06-03 ASSESSMENT — COLUMBIA-SUICIDE SEVERITY RATING SCALE - C-SSRS
2. HAVE YOU ACTUALLY HAD ANY THOUGHTS OF KILLING YOURSELF IN THE PAST MONTH?: NO
6. HAVE YOU EVER DONE ANYTHING, STARTED TO DO ANYTHING, OR PREPARED TO DO ANYTHING TO END YOUR LIFE?: NO
1. IN THE PAST MONTH, HAVE YOU WISHED YOU WERE DEAD OR WISHED YOU COULD GO TO SLEEP AND NOT WAKE UP?: NO

## 2024-06-03 NOTE — ED PROVIDER NOTES
EMERGENCY DEPARTMENT ENCOUNTER   NAME: Lucero Collier ; AGE: 28 year old female ; YOB: 1995 ; MRN: [id number] ; PCP: Cristal Beltran Ra     Evaluation Date & Time: No admission date for patient encounter.    ED Provider: Lidia Mckeon PA-C    CHIEF COMPLAINT     Abdominal Pain      FINAL ASSESSMENT       ICD-10-CM    1. Viral illness  B34.9       2. Abdominal pain  R10.9           ED COURSE, MEDICAL DECISION MAKING, PLAN     ED course     2:30 PM I met with the patient, obtained history, performed an initial exam, and discussed options and plan for diagnostics and treatment here in the ED.  4:18 PM Patient reevaluated and updated.  Discussed discharge and follow-up plans.  RN to discharge.  ______________________________________________________________________    Lucero Collier is a 28 year old female with pertinent medical history of currently ~16 weeks pregnant and GERD presenting for diffuse left sided and middle abdominal pain that has been present over the last couple of days. Was seen in UC yesterday for sore throat, cough, runny nose, and mentioned the abdominal pain. Had US that showed viable pregnancy without complication.     Exam reveals diffuse left sided and middle abdominal pain. Some nasal congestion. No pharyngitis. HR slightly elevated to 106, otherwise vitally normal.  Likely benign etiology.  Possibly related to some anxiety as well.    Laboratory workup here is unremarkable including CBC, BMP, hepatic function panel, lipase, and UA.  Viral swabs negative.     Fetal heart tones detected with bedside Doppler.  Rate of 140.    Interventions here included: 975 mg of oral Tylenol.  On recheck she has had minimal improvement in pain.    No clear etiology for patient's middle and left-sided abdominal pain.  I suspect it is likely related to the viral illness she is dealing with.  Low suspicion for pregnancy related complication.  Patient had an ultrasound completed yesterday showing a  normal viable intrauterine pregnancy and she was having this abdominal pain then.  She also has normal fetal heart tones here on Doppler.  No localized abdominal pain.  Pain is rather diffuse.  Considered repeating ultrasound here today, however she was having this pain yesterday before getting the ultrasound which was unremarkable.  She continues to have no vaginal bleeding.  Lower concern for other intra-abdominal pathology such as appendicitis, diverticulitis, SBO.  Again she has no localized tenderness.  Bowel movements have been normal.  Abdomen is soft and nondistended.  This does not appear to be a UTI as her urine here is noninfectious and yesterday's urine also looks noninfectious.  Low concern for sepsis as she has not been hypotensive and afebrile here.  No evidence of a bacterial infection.  Normal white blood cell count.  No red flag s/s present to suggest an acutely serious or life threatening condition that would warrant hospitalization.     Recommended continuing with Tylenol.  She can consider using a warm compress over the abdomen.  Recommended pushing fluids.  [place] diet as tolerated.    Recommended following up with OB/GYN ASAP.    Indications for re-evaluation in the ER discussed.  Strict return precautions provided.    Patient understanding and agreeable with the plan and will discharge to home in good condition.     ______________________________________________________________________    *All pertinent lab & imaging studies independently reviewed. (See chart for details)   *Discussed the results of all the tests and plan with patient and family/guardians.   *The patient and/or family/guardian acknowledged understanding and was agreeable with the care plan.      HISTORY OF PRESENT ILLNESS   Patient information was obtained from: Patient   Use of Intrepreter: N/A     Lucero Collier is a 28 year old  female with a pertinent history of currently ~16 weeks pregnant and GERD who presents to the  ED by private car for evaluation of abdominal pain.    Patient reports experiencing left sided abdominal pain for the past couple of days. She notes that she was seen at urgent care yesterday for the same abdominal pain, along with other upper respiratory symptoms. She notes that the abdominal pain was originally intermittent but has now worsened, rating it 5-7/10. She currently endorses having a sore throat, cough, and runny nose.  She states that she has had intermittent fevers up to 102.  Last fever was last night.    Patient denies any recent bowel issues, urinary symptoms, or vaginal bleeding/discharge. No other complaints at this time.     Per chart review, patient visited [facility] for evaluation of sore throat and abdominal cramping. US OB showed no abnormalities to account for symptoms or pain.  Strep test was negative.  UA unremarkable for infection.  Patient eventually discharged home, follow-up care and return precautions were discussed.      MEDICAL HISTORY     Past Medical History:   Diagnosis Date    ADHD     Adverse reaction to pertussis vaccine     Allergies     Anxiety     Chronic headaches     Complex partial seizures (H)     Constipation     Depression     Depressive disorder     Developmental delay     Epilepsy (H)     GERD (gastroesophageal reflux disease)     History of tonsillectomy     Hypoglycemia     Kidney stone     Myopia     Obesity     Sinusitis, chronic     Sleep disorder     Smoking     Spells     Syncope     Urinary tract infection 11/99       Past Surgical History:   Procedure Laterality Date    ENDOSCOPY UPPER WITH PANCREATIC STIMULATION  4/06    Esophagitis 4/06    NISSEN FUNDOPLICATION  6/07    Dr. Meneses    NISSEN FUNDOPLICATION      PE TUBES  3/98    TONSILLECTOMY & ADENOIDECTOMY  4/02       Family History   Problem Relation Age of Onset    Diabetes Maternal Grandmother     Hypertension Maternal Grandmother     Diabetes Paternal Grandmother     Cardiovascular Paternal  Grandmother     Breast Cancer Paternal Grandmother 61        right side breast cancer    Genetic Disorder Other         nephew.-genetic defects     Obesity Mother     Supraventricular tachycardia Mother     Family History Negative Father        Social History     Tobacco Use    Smoking status: Every Day     Current packs/day: 1.00     Types: Cigarettes    Smokeless tobacco: Never    Tobacco comments:     5 qd   Vaping Use    Vaping status: Every Day    Substances: Nicotine   Substance Use Topics    Alcohol use: Yes     Alcohol/week: 0.0 standard drinks of alcohol     Comment: Alcoholic Drinks/day: Occasional    Drug use: No     Types: Oxycodone       albuterol (PROAIR HFA/PROVENTIL HFA/VENTOLIN HFA) 108 (90 Base) MCG/ACT inhaler  amphetamine-dextroamphetamine (ADDERALL XR) 25 MG 24 hr capsule  amphetamine-dextroamphetamine (ADDERALL XR) 30 MG 24 hr capsule  amphetamine-dextroamphetamine (ADDERALL) 5 MG tablet  cetirizine (ZYRTEC) 10 MG tablet  fluocinonide (LIDEX) 0.05 % external cream  IBUPROFEN PO  methocarbamol (ROBAXIN) 500 MG tablet  omeprazole (PRILOSEC) 20 MG DR capsule  pimecrolimus (ELIDEL) 1 % cream  prazosin (MINIPRESS) 1 MG capsule  SUMAtriptan (IMITREX) 25 MG tablet  venlafaxine (EFFEXOR XR) 150 MG 24 hr capsule          PHYSICAL EXAM     First Vitals:  Patient Vitals for the past 24 hrs:   BP Temp Temp src Pulse Resp SpO2 Weight   06/03/24 1315 136/78 98.2  F (36.8  C) Oral 106 18 97 % 108.9 kg (240 lb)         PHYSICAL EXAM:   Constitutional: No acute distress.  Neuro: Awake and alert. No focal deficits.  Psych: Calm and cooperative.  Eyes: PERRL. EOMI. Conjunctivae clear. No crusting or mattering of the lids or lashes.   Ears: TMs visualized and are normal. External canals clear.    Nose: Nasal congestion present.  Mouth: Pink and moist. No erythema or edema of the posterior pharynx. No exudates. No trismus or muffled voice. Handling own secretions. No uvula deviation.    Lymph: No cervical or  tonsillar lymphadenopathy.  Cardio: . Adequate perfusion to extremities. Regular rhythm. No murmurs.  Pulmonary: Oxygenating well on RA. No labored breathing. CTA b/l.  Abdomen: Diffuse left-sided abdominal pain and middle abdominal pain on palpation. BS present. Soft and non-distended.   Back: Appears to move freely. No CVA tenderness.   Upper extremities: Moves freely.   Lower extremities: Moves freely. No edema.   Skin: Natural color, warm, dry, intact.       RESULTS     LAB:  All pertinent labs reviewed and interpreted  Labs Ordered and Resulted from Time of ED Arrival to Time of ED Departure   ROUTINE UA WITH MICROSCOPIC REFLEX TO CULTURE - Abnormal       Result Value    Color Urine Yellow      Appearance Urine Clear      Glucose Urine Negative      Bilirubin Urine Negative      Ketones Urine Trace (*)     Specific Gravity Urine 1.017      Blood Urine Negative      pH Urine 6.5      Protein Albumin Urine Negative      Urobilinogen Urine 3.0 (*)     Nitrite Urine Negative      Leukocyte Esterase Urine 250 [place]/uL (*)     Mucus Urine Present (*)     RBC Urine 1      WBC Urine 4      Squamous Epithelials Urine 9 (*)    BASIC METABOLIC PANEL - Abnormal    Sodium 137      Potassium 3.9      Chloride 106      Carbon Dioxide (CO2) 19 (*)     Anion Gap 12      Urea Nitrogen 5.3 (*)     Creatinine 0.41 (*)     GFR Estimate >90      Calcium 8.6      Glucose 86     CBC WITH PLATELETS AND DIFFERENTIAL - Abnormal    WBC Count 10.2      RBC Count 4.19      Hemoglobin 11.8      Hematocrit 34.7 (*)     MCV 83      MCH 28.2      MCHC 34.0      RDW 13.2      Platelet Count 288      % Neutrophils 77      % Lymphocytes 14      % Monocytes 8      % Eosinophils 1      % Basophils 0      % Immature Granulocytes 0      NRBCs per 100 WBC 0      Absolute Neutrophils 7.9      Absolute Lymphocytes 1.4      Absolute Monocytes 0.8      Absolute Eosinophils 0.1      Absolute Basophils 0.0      Absolute Immature Granulocytes 0.0       Absolute NRBCs 0.0     HEPATIC FUNCTION PANEL - Normal    Protein Total 6.7      Albumin 3.8      Bilirubin Total 0.3      Alkaline Phosphatase 65      AST 20      ALT 17      Bilirubin Direct <0.20     LIPASE - Normal    Lipase 16     INFLUENZA A/B, RSV, & SARS-COV2 PCR - Normal    Influenza A PCR Negative      Influenza B PCR Negative      RSV PCR Negative      SARS CoV2 PCR Negative     URINE CULTURE       RADIOLOGY:  No orders to display       ECG:    N/A      PROCEDURES     None         MEDICAL DECISION MAKING:  Obtained supplemental history:Supplemental history obtained?: No  Reviewed external records: External records reviewed?: Outpatient Record: [facility] 06/02/2024  Care impacted by chronic illness:Other: GERD  Care significantly affected by social determinants of health:N/A  Did you consider but not order tests?: Work up considered but not performed and documented in chart, if applicable  Did you interpret images independently?: Independent interpretation of ECG and images noted in documentation, when applicable.  Consultation discussion with other provider:Did you involve another provider (consultant, MH, pharmacy, etc.)?: No  Discharge. No recommendations on prescription strength medication(s). See documentation for any additional details.      FINAL IMPRESSION:    ICD-10-CM    1. Viral illness  B34.9       2. Abdominal pain  R10.9             MEDICATIONS GIVEN IN THE EMERGENCY DEPARTMENT:  Medications   acetaminophen (TYLENOL) tablet 975 mg (975 mg Oral $Given 6/3/24 1511)         NEW PRESCRIPTIONS STARTED AT TODAY'S ED VISIT:  Discharge Medication List as of 6/3/2024  4:41 PM               Daksha MEJIA, am serving as a scribe to document services personally performed by Lidia Mckeon PA-C, based on my observation and the provider's statements to me. ILidia PA-C attest that Daksha De La Rosa is acting in a scribe capacity, has observed my performance of the services and has documented  them in accordance with my direction.     Some or all of this documentation has been completed using dictation software and mild grammatical errors may be present. Please contact me with any concerns regarding this.       Lidia Mckeon PA-C  Emergency Medicine   [facility] EMERGENCY ROOM       Lidia Mckeon PA-C  06/03/24 6457

## 2024-06-03 NOTE — Clinical Note
Lucero Collier was seen and treated in our emergency department on 6/3/2024.  She may return to work on 06/05/2024.       If you have any questions or concerns, please don't hesitate to call.      Lidia Mckeon PA-C

## 2024-06-03 NOTE — DISCHARGE INSTRUCTIONS
At this point we do not have a clear reason for your pain.  I suspect it is part of a viral process that you are dealing with.  Very low concern that it is related to your pregnancy.  If you are having any worsening symptoms, vaginal bleeding, abnormal vaginal discharge, ongoing fevers, vomiting, etc. please do not hesitate to return to the ER.  Please reach out to your OB for follow-up as well.

## 2024-06-03 NOTE — ED TRIAGE NOTES
17weeks pregnant.  Had fever last night with NO abd pain.  This am she developed abd pain.  Denies bleeding.  Took APAP at 0230     Triage Assessment (Adult)       Row Name 06/03/24 1313          Triage Assessment    Airway WDL WDL        Respiratory WDL    Respiratory WDL WDL        Skin Circulation/Temperature WDL    Skin Circulation/Temperature WDL WDL        Cardiac WDL    Cardiac WDL WDL        Peripheral/Neurovascular WDL    Peripheral Neurovascular WDL WDL        Cognitive/Neuro/Behavioral WDL    Cognitive/Neuro/Behavioral WDL WDL

## 2024-06-04 ENCOUNTER — NURSE TRIAGE (OUTPATIENT)
Dept: FAMILY MEDICINE | Facility: CLINIC | Age: 29
End: 2024-06-04
Payer: COMMERCIAL

## 2024-06-04 LAB — BACTERIA UR CULT: NORMAL

## 2024-06-04 NOTE — TELEPHONE ENCOUNTER
Nurse Triage SBAR    Is this a 2nd Level Triage? YES, LICENSED PRACTITIONER REVIEW IS REQUIRED    Situation: patient calls and reports sore throat makes it difficult to swallow    Background: Patient was seen in UC and ER for URI and abdominal pain respectfully, viral panel came back negative and strep came back negative.    Assessment: Patient has been having a fever for the last 3 days, and has severe sore throat. Patient reports difficulty swallowing. Fever is 101.2, only taking tylenol last took at 4:30 PM. Patient reports being 17 weeks pregnant. Sore throat pain is 5/6. Productive cough, greenish in color. Patient reports she has runny nose, body aches, abdominal pain as well. Denies nausea, vomiting and diarrhea. Spitting a lot more because it's more comfortable and sometimes when she does swallow spit she feels that she chokes on the spit. Patient staying hydrated. Unable to see tonsils, patient has had tonsils removed when she was younger.     Protocol Recommended Disposition:   Go To ED/UCC Now (Or To Office With PCP Approval)    Recommendation: Recommended UC, office closes within the hour. Patient agreeable, will go now for evaluation.          Does the patient meet one of the following criteria for ADS visit consideration? 16+ years old, with an [facility] PCP     TIP  Providers, please consider if this condition is appropriate for management at one of our Acute and Diagnostic Services sites.     If patient is a good candidate, please use dotphrase <dot>triageresponse and select Refer to ADS to document.  Reason for Disposition   Drooling or spitting out saliva (because can't swallow)    Additional Information   Negative: SEVERE difficulty breathing (e.g., struggling for each breath, speaks in single words)   Negative: Sounds like a life-threatening emergency to the triager    Protocols used: Sore Throat-A-[place]

## 2024-06-11 NOTE — PATIENT INSTRUCTIONS
Return or call immediately for any unexplained fever, abdominal or pelvic pain, excessive bleeding, possibility of pregnancy, foul-smelling discharge, sense that the IUD has been expelled.         room air

## 2024-06-14 ENCOUNTER — LAB REQUISITION (OUTPATIENT)
Dept: LAB | Facility: CLINIC | Age: 29
End: 2024-06-14
Payer: COMMERCIAL

## 2024-06-14 DIAGNOSIS — Z3A.18 18 WEEKS GESTATION OF PREGNANCY: ICD-10-CM

## 2024-06-14 PROCEDURE — 82105 ALPHA-FETOPROTEIN SERUM: CPT | Mod: ORL | Performed by: STUDENT IN AN ORGANIZED HEALTH CARE EDUCATION/TRAINING PROGRAM

## 2024-06-17 LAB
# FETUSES US: NORMAL
AFP MOM SERPL: 1.36
AFP SERPL-MCNC: 46 NG/ML
AGE - REPORTED: 27.4 YR
CURRENT SMOKER: NO
FAMILY MEMBER DISEASES HX: NO
GA METHOD: NORMAL
GA: NORMAL WK
IDDM PATIENT QL: NO
INTEGRATED SCN PATIENT-IMP: NORMAL
SPECIMEN DRAWN SERPL: NORMAL

## 2024-07-08 ENCOUNTER — TRANSFERRED RECORDS (OUTPATIENT)
Dept: HEALTH INFORMATION MANAGEMENT | Facility: CLINIC | Age: 29
End: 2024-07-08
Payer: COMMERCIAL

## 2024-07-08 ENCOUNTER — MEDICAL CORRESPONDENCE (OUTPATIENT)
Dept: HEALTH INFORMATION MANAGEMENT | Facility: CLINIC | Age: 29
End: 2024-07-08
Payer: COMMERCIAL

## 2024-08-08 ENCOUNTER — VIRTUAL VISIT (OUTPATIENT)
Dept: NEUROLOGY | Facility: CLINIC | Age: 29
End: 2024-08-08
Attending: STUDENT IN AN ORGANIZED HEALTH CARE EDUCATION/TRAINING PROGRAM
Payer: COMMERCIAL

## 2024-08-08 DIAGNOSIS — G40.909 NONINTRACTABLE EPILEPSY WITHOUT STATUS EPILEPTICUS, UNSPECIFIED EPILEPSY TYPE (H): ICD-10-CM

## 2024-08-08 NOTE — NURSING NOTE
Is the patient currently in the state of [place]? YES    Visit mode:VIDEO    If the visit is dropped, the patient can be reconnected by: TELEPHONE VISIT: Phone number: [phone]    Will anyone else be joining the visit? NO      How would you like to obtain your AVS? MyChart    Are changes needed to the allergy or medication list? NO    Reason for visit: New Patient      Gregoria Dye [facility]

## 2024-08-08 NOTE — LETTER
2024       RE: Lucero Collier  : 1995   MRN: [id number]      Dear Colleague,    Thank you for referring your patient, Lucero Collier, to the [facility]. Please see a copy of my visit note below.    No show.    Again, thank you for allowing me to participate in the care of your patient.      Sincerely,    Janelle Sin MD

## 2024-08-12 ENCOUNTER — OFFICE VISIT (OUTPATIENT)
Dept: CARDIOLOGY | Facility: CLINIC | Age: 29
End: 2024-08-12
Payer: COMMERCIAL

## 2024-08-12 VITALS
BODY MASS INDEX: 44.43 KG/M2 | OXYGEN SATURATION: 99 % | WEIGHT: 267 LBS | SYSTOLIC BLOOD PRESSURE: 129 MMHG | DIASTOLIC BLOOD PRESSURE: 83 MMHG | HEART RATE: 113 BPM

## 2024-08-12 DIAGNOSIS — R00.0 TACHYCARDIA: Primary | ICD-10-CM

## 2024-08-12 LAB
ATRIAL RATE - MUSE: 107 BPM
DIASTOLIC BLOOD PRESSURE - MUSE: NORMAL MMHG
INTERPRETATION ECG - MUSE: NORMAL
P AXIS - MUSE: 38 DEGREES
PR INTERVAL - MUSE: 144 MS
QRS DURATION - MUSE: 78 MS
QT - MUSE: 362 MS
QTC - MUSE: 483 MS
R AXIS - MUSE: 41 DEGREES
SYSTOLIC BLOOD PRESSURE - MUSE: NORMAL MMHG
T AXIS - MUSE: 40 DEGREES
VENTRICULAR RATE- MUSE: 107 BPM

## 2024-08-12 PROCEDURE — 99203 OFFICE O/P NEW LOW 30 MIN: CPT | Performed by: INTERNAL MEDICINE

## 2024-08-12 PROCEDURE — 93000 ELECTROCARDIOGRAM COMPLETE: CPT | Performed by: INTERNAL MEDICINE

## 2024-08-12 RX ORDER — METRONIDAZOLE 500 MG/1
500 TABLET ORAL
Status: ON HOLD | COMMUNITY
Start: 2024-08-12 | End: 2024-08-31

## 2024-08-12 RX ORDER — BUPROPION HYDROCHLORIDE 150 MG/1
1 TABLET ORAL DAILY
COMMUNITY

## 2024-08-12 RX ORDER — VITAMIN A, VITAMIN C, VITAMIN D-3, VITAMIN E, VITAMIN B-1, VITAMIN B-2, NIACIN, VITAMIN B-6, CALCIUM, IRON, ZINC, COPPER 4000; 120; 400; 22; 1.84; 3; 20; 10; 1; 12; 200; 27; 25; 2 [IU]/1; MG/1; [IU]/1; MG/1; MG/1; MG/1; MG/1; MG/1; MG/1; UG/1; MG/1; MG/1; MG/1; MG/1
TABLET ORAL DAILY
COMMUNITY

## 2024-08-12 RX ORDER — HYDROXYZINE HYDROCHLORIDE 25 MG/1
TABLET, FILM COATED ORAL
Status: ON HOLD | COMMUNITY
End: 2024-08-31

## 2024-08-12 RX ORDER — METOPROLOL TARTRATE 25 MG/1
25 TABLET, FILM COATED ORAL PRN
Qty: 30 TABLET | Refills: 3 | Status: SHIPPED | OUTPATIENT
Start: 2024-08-12

## 2024-08-12 RX ORDER — PROMETHAZINE HYDROCHLORIDE 25 MG/1
1 TABLET ORAL EVERY 4 HOURS PRN
COMMUNITY

## 2024-08-12 NOTE — LETTER
8/12/2024    Cristal Beltran, APRN CNP  [address] [address] [facility] [address]    RE: Lucero Collier       Dear Colleague,     I had the pleasure of seeing Luc[facility] in the [facility].      Click to link to [facility] HEART CARE NOTE    Thank you,  , for asking me to see Lucero Collier in consultation at [facility] to evaluate palpitations.      Assessment/Plan:   Sinus tachycardia: The patient developed palpitations over last 2 months.  Her ECG showed sinus sinus tachycardia, no other obvious abnormality.  Most likely she has volume increased with pregnancy and less exercise activities.  This is her first pregnancy.  Echocardiogram is requested for evaluation of heart function and structure to guide her delivery.  Discussed the management of palpitations.  Metoprolol titrated to 25 mg as needed is prescribed for symptomatic palpitations.  Discussed avoid medication as possible.  The patient agreed with the plan.  We will follow-up her echo findings.  No obvious of fluid accumulation such as crackles in lungs or leg edema.  No indication of diuretics at this time.    Thank you for the opportunity to be involved in the care of Lucero Collier. If you have any questions, please feel free to contact me.  I will see the patient again as needed    Much or all of the text in this note was generated through the use of Dragon Dictate voice-to-text software. Errors in spelling or words which seem out of context are unintentional.   Sound alike errors, in particular, may have escaped editing.       History of Present Illness:   It is my pleasure to see Lucero Collier at the [facility] for evaluation of New Patient. Lucero Collier is a 29 year old female with a medical history of ADHD, obesity.    The patient is 27 weeks of pregnancy, referred to cardiology clinic for the evaluation of tachycardia.  She states that she  developed palpitations over last 2 months.  She described her palpitations as fast heartbeat.  Sometimes she feels shortness of breath on exertion.  She has occasional lightheadedness.  No syncope.  She had no chest pain, orthopnea, PND or leg edema.  Her blood pressure is in normal range.  Her pulse is 113 today.  Currently she is not on cardiac medications.    Twelve-lead ECG in clinic today, personally reviewed, sinus tachycardia, otherwise normal ECG.    Past Medical History:     Patient Active Problem List   Diagnosis     ADHD (attention deficit hyperactivity disorder)     Constipation     Myopia     Spells     Chronic headaches     GERD (gastroesophageal reflux disease)     Adverse reaction to pertussis vaccine     Learning problem     Overweight     Sleep disturbance     Adjustment disorder with mixed anxiety and depressed mood     Migraine without status migrainosus, not intractable, unspecified migraine type     Anxiety     Severe single current episode of major depressive disorder, without psychotic features (H)     Depression     HTN (hypertension)     Obesity (BMI 35.0-39.9) with comorbidity (H)       Past Surgical History:     Past Surgical History:   Procedure Laterality Date     ENDOSCOPY UPPER WITH PANCREATIC STIMULATION  4/06    Esophagitis 4/06     NISSEN FUNDOPLICATION  6/07    Dr. Meneses     NISSEN FUNDOPLICATION       PE TUBES  3/98     TONSILLECTOMY & ADENOIDECTOMY  4/02       Family History:     Family History   Problem Relation Age of Onset     Diabetes Maternal Grandmother      Hypertension Maternal Grandmother      Diabetes Paternal Grandmother      Cardiovascular Paternal Grandmother      Breast Cancer Paternal Grandmother 61        right side breast cancer     Genetic Disorder Other         nephew.-genetic defects      Obesity Mother      Supraventricular tachycardia Mother      Family History Negative Father        Social History:    reports that she has been smoking cigarettes. She has  never used smokeless tobacco. She reports current alcohol use. She reports that she does not use drugs.    Review of Systems:   12 systems are reviewed negative except for in HPI.    Meds:     Current Outpatient Medications:      albuterol (PROAIR HFA/PROVENTIL HFA/VENTOLIN HFA) 108 (90 Base) MCG/ACT inhaler, Inhale 2 puffs into the lungs every 6 hours as needed for shortness of breath, wheezing or cough, Disp: 18 g, Rfl: 0     buPROPion (WELLBUTRIN XL) 150 MG 24 hr tablet, Take 1 tablet by mouth daily, Disp: , Rfl:      cetirizine (ZYRTEC) 10 MG tablet, TAKE ONE TABLET BY MOUTH NIGHTLY FOR 30 DAYS, Disp: , Rfl:      hydrOXYzine HCl (ATARAX) 25 MG tablet, TAKE ONE TABLET BY MOUTH TWO TIMES A DAY AS NEEDED FOR ANXIETY, Disp: , Rfl:      metoprolol tartrate (LOPRESSOR) 25 MG tablet, Take 1 tablet (25 mg) by mouth as needed (Take 25 mg daily as needed for palpitations), Disp: 30 tablet, Rfl: 3     metroNIDAZOLE (FLAGYL) 500 MG tablet, 500 mg, Disp: , Rfl:      omeprazole (PRILOSEC) 20 MG DR capsule, Take 1 capsule (20 mg) by mouth daily, Disp: 30 capsule, Rfl: 1     pimecrolimus (ELIDEL) 1 % cream, Apply topically 2 times daily Ok to use on face, Disp: 60 g, Rfl: 3     Prenatal Vit-Fe Fumarate-FA (PRENATAL MULTIVITAMIN  PLUS IRON) 27-1 MG TABS, Take by mouth daily, Disp: , Rfl:      promethazine (PHENERGAN) 25 MG tablet, Take 1 tablet by mouth every 4 hours as needed, Disp: , Rfl:      fluocinonide (LIDEX) 0.05 % external cream, Apply sparingly to affected area twice daily as needed.  Do not apply to face. (Patient not taking: Reported on 8/12/2024), Disp: 60 g, Rfl: 0     Allergies:   Patient has no known allergies.    Objective:      Physical Exam  121.1 kg (267 lb)     Body mass index is 44.43 kg/m .  /83 (BP Location: Left arm, Patient Position: Sitting, Cuff Size: Adult Large)   Pulse 113   Wt 121.1 kg (267 lb)   LMP 02/06/2024 (Approximate)   SpO2 99%   BMI 44.43 kg/m      General Appearance:   Awake,  Alert, No acute distress.   HEENT:  Pupil equal, reactive to light. No scleral icterus; the mucous membranes were moist. No oral ulcers or thrush.    Neck: No cervical bruits. No JVD. No thyromegaly. No lymph node enlargement or tenderness.   Chest: The spine was straight. The chest was symmetric.   Lungs:   Respirations unlabored. Lungs are clear to auscultation. No crackles. No wheezing.   Cardiovascular:   RRR, normal first and second heart sounds with no murmurs. No rubs or gallops.    Abdomen:  Obese. Soft. No tenderness. Non-distended. Bowels sounds are present   Extremities: Equal posterior tibial pulses. No leg edema.   Skin: No rashes or ulcers. Warm, Dry.   Musculoskeletal: No tenderness. No deformity.   Neurologic: Mood and affect are appropriate. No focal deficits.         EKG:  Personally reivewed  Sinus tachycardia  Otherwise normal ECG    Lab Review   Lab Results   Component Value Date     06/03/2024     02/16/2021    CO2 19 06/03/2024    CO2 25 05/18/2022    CO2 23 02/16/2021    BUN 5.3 06/03/2024    BUN 13 05/18/2022    BUN 8 02/16/2021     Lab Results   Component Value Date    WBC 10.2 06/03/2024    WBC 6.9 02/16/2021    HGB 11.8 06/03/2024    HGB 11.9 02/16/2021    HCT 34.7 06/03/2024    HCT 36.6 02/16/2021    MCV 83 06/03/2024    MCV 86 02/16/2021     06/03/2024     02/16/2021     Lab Results   Component Value Date    CHOL 150 08/31/2023    CHOL 129 07/27/2015    TRIG 79 08/31/2023    TRIG 70 07/27/2015    HDL 40 08/31/2023    HDL 41 07/27/2015    LDL 94 08/31/2023    LDL 74 07/27/2015     LDL Cholesterol Calculated   Date Value Ref Range Status   08/31/2023 94 <=100 mg/dL Final   07/27/2015 74 0 - 129 mg/dL Final     Comment:     LDL Cholesterol is the primary guide to therapy: LDL-cholesterol goal in high   risk patients is <100 mg/dL and in very high risk patients is <70 mg/dL.       Lab Results   Component Value Date    TROPONINI <0.01 04/18/2022     No results found  "for: \"BNP\"  Lab Results   Component Value Date    TSH 1.70 01/25/2023    TSH 2.81 02/16/2021                   Thank you for allowing me to participate in the care of your patient.      Sincerely,     Da Burgess MD     [facility]  cc:   Referred Self, MD  No address on file      "

## 2024-08-12 NOTE — PROGRESS NOTES
Click to link to [facility] HEART CARE NOTE    Thank you,  , for asking me to see Lucero Collier in consultation at [facility] to evaluate palpitations.      Assessment/Plan:   Sinus tachycardia: The patient developed palpitations over last 2 months.  Her ECG showed sinus sinus tachycardia, no other obvious abnormality.  Most likely she has volume increased with pregnancy and less exercise activities.  This is her first pregnancy.  Echocardiogram is requested for evaluation of heart function and structure to guide her delivery.  Discussed the management of palpitations.  Metoprolol titrated to 25 mg as needed is prescribed for symptomatic palpitations.  Discussed avoid medication as possible.  The patient agreed with the plan.  We will follow-up her echo findings.  No obvious of fluid accumulation such as crackles in lungs or leg edema.  No indication of diuretics at this time.    Thank you for the opportunity to be involved in the care of Lucero Collier. If you have any questions, please feel free to contact me.  I will see the patient again as needed    Much or all of the text in this note was generated through the use of Dragon Dictate voice-to-text software. Errors in spelling or words which seem out of context are unintentional.   Sound alike errors, in particular, may have escaped editing.       History of Present Illness:   It is my pleasure to see Lucero Collier at the [facility] clinic for evaluation of New Patient. Lucero Collier is a 29 year old female with a medical history of ADHD, obesity.    The patient is 27 weeks of pregnancy, referred to cardiology clinic for the evaluation of tachycardia.  She states that she developed palpitations over last 2 months.  She described her palpitations as fast heartbeat.  Sometimes she feels shortness of breath on exertion.  She has occasional lightheadedness.  No syncope.  She had no  chest pain, orthopnea, PND or leg edema.  Her blood pressure is in normal range.  Her pulse is 113 today.  Currently she is not on cardiac medications.    Twelve-lead ECG in clinic today, personally reviewed, sinus tachycardia, otherwise normal ECG.    Past Medical History:     Patient Active Problem List   Diagnosis    ADHD (attention deficit hyperactivity disorder)    Constipation    Myopia    Spells    Chronic headaches    GERD (gastroesophageal reflux disease)    Adverse reaction to pertussis vaccine    Learning problem    Overweight    Sleep disturbance    Adjustment disorder with mixed anxiety and depressed mood    Migraine without status migrainosus, not intractable, unspecified migraine type    Anxiety    Severe single current episode of major depressive disorder, without psychotic features (H)    Depression    HTN (hypertension)    Obesity (BMI 35.0-39.9) with comorbidity (H)       Past Surgical History:     Past Surgical History:   Procedure Laterality Date    ENDOSCOPY UPPER WITH PANCREATIC STIMULATION  4/06    Esophagitis 4/06    NISSEN FUNDOPLICATION  6/07    Dr. Meneses    NISSEN FUNDOPLICATION      PE TUBES  3/98    TONSILLECTOMY & ADENOIDECTOMY  4/02       Family History:     Family History   Problem Relation Age of Onset    Diabetes Maternal Grandmother     Hypertension Maternal Grandmother     Diabetes Paternal Grandmother     Cardiovascular Paternal Grandmother     Breast Cancer Paternal Grandmother 61        right side breast cancer    Genetic Disorder Other         nephew.-genetic defects     Obesity Mother     Supraventricular tachycardia Mother     Family History Negative Father        Social History:    reports that she has been smoking cigarettes. She has never used smokeless tobacco. She reports current alcohol use. She reports that she does not use drugs.    Review of Systems:   12 systems are reviewed negative except for in HPI.    Meds:     Current Outpatient Medications:     albuterol  (PROAIR HFA/PROVENTIL HFA/VENTOLIN HFA) 108 (90 Base) MCG/ACT inhaler, Inhale 2 puffs into the lungs every 6 hours as needed for shortness of breath, wheezing or cough, Disp: 18 g, Rfl: 0    buPROPion (WELLBUTRIN XL) 150 MG 24 hr tablet, Take 1 tablet by mouth daily, Disp: , Rfl:     cetirizine (ZYRTEC) 10 MG tablet, TAKE ONE TABLET BY MOUTH NIGHTLY FOR 30 DAYS, Disp: , Rfl:     hydrOXYzine HCl (ATARAX) 25 MG tablet, TAKE ONE TABLET BY MOUTH TWO TIMES A DAY AS NEEDED FOR ANXIETY, Disp: , Rfl:     metoprolol tartrate (LOPRESSOR) 25 MG tablet, Take 1 tablet (25 mg) by mouth as needed (Take 25 mg daily as needed for palpitations), Disp: 30 tablet, Rfl: 3    metroNIDAZOLE (FLAGYL) 500 MG tablet, 500 mg, Disp: , Rfl:     omeprazole (PRILOSEC) 20 MG DR capsule, Take 1 capsule (20 mg) by mouth daily, Disp: 30 capsule, Rfl: 1    pimecrolimus (ELIDEL) 1 % cream, Apply topically 2 times daily Ok to use on face, Disp: 60 g, Rfl: 3    Prenatal Vit-Fe Fumarate-FA (PRENATAL MULTIVITAMIN  PLUS IRON) 27-1 MG TABS, Take by mouth daily, Disp: , Rfl:     promethazine (PHENERGAN) 25 MG tablet, Take 1 tablet by mouth every 4 hours as needed, Disp: , Rfl:     fluocinonide (LIDEX) 0.05 % external cream, Apply sparingly to affected area twice daily as needed.  Do not apply to face. (Patient not taking: Reported on 8/12/2024), Disp: 60 g, Rfl: 0     Allergies:   Patient has no known allergies.    Objective:      Physical Exam  121.1 kg (267 lb)     Body mass index is 44.43 kg/m .  /83 (BP Location: Left arm, Patient Position: Sitting, Cuff Size: Adult Large)   Pulse 113   Wt 121.1 kg (267 lb)   LMP 02/06/2024 (Approximate)   SpO2 99%   BMI 44.43 kg/m      General Appearance:   Awake, Alert, No acute distress.   HEENT:  Pupil equal, reactive to light. No scleral icterus; the mucous membranes were moist. No oral ulcers or thrush.    Neck: No cervical bruits. No JVD. No thyromegaly. No lymph node enlargement or tenderness.   Chest:  "The spine was straight. The chest was symmetric.   Lungs:   Respirations unlabored. Lungs are clear to auscultation. No crackles. No wheezing.   Cardiovascular:   RRR, normal first and second heart sounds with no murmurs. No rubs or gallops.    Abdomen:  Obese. Soft. No tenderness. Non-distended. Bowels sounds are present   Extremities: Equal posterior tibial pulses. No leg edema.   Skin: No rashes or ulcers. Warm, Dry.   Musculoskeletal: No tenderness. No deformity.   Neurologic: Mood and affect are appropriate. No focal deficits.         EKG:  Personally reivewed  Sinus tachycardia  Otherwise normal ECG    Lab Review   Lab Results   Component Value Date     06/03/2024     02/16/2021    CO2 19 06/03/2024    CO2 25 05/18/2022    CO2 23 02/16/2021    BUN 5.3 06/03/2024    BUN 13 05/18/2022    BUN 8 02/16/2021     Lab Results   Component Value Date    WBC 10.2 06/03/2024    WBC 6.9 02/16/2021    HGB 11.8 06/03/2024    HGB 11.9 02/16/2021    HCT 34.7 06/03/2024    HCT 36.6 02/16/2021    MCV 83 06/03/2024    MCV 86 02/16/2021     06/03/2024     02/16/2021     Lab Results   Component Value Date    CHOL 150 08/31/2023    CHOL 129 07/27/2015    TRIG 79 08/31/2023    TRIG 70 07/27/2015    HDL 40 08/31/2023    HDL 41 07/27/2015    LDL 94 08/31/2023    LDL 74 07/27/2015     LDL Cholesterol Calculated   Date Value Ref Range Status   08/31/2023 94 <=100 mg/dL Final   07/27/2015 74 0 - 129 mg/dL Final     Comment:     LDL Cholesterol is the primary guide to therapy: LDL-cholesterol goal in high   risk patients is <100 mg/dL and in very high risk patients is <70 mg/dL.       Lab Results   Component Value Date    TROPONINI <0.01 04/18/2022     No results found for: \"BNP\"  Lab Results   Component Value Date    TSH 1.70 01/25/2023    TSH 2.81 02/16/2021               "

## 2024-08-19 ENCOUNTER — HOSPITAL ENCOUNTER (OUTPATIENT)
Dept: CARDIOLOGY | Facility: CLINIC | Age: 29
Discharge: HOME | End: 2024-08-19
Attending: INTERNAL MEDICINE | Admitting: INTERNAL MEDICINE
Payer: COMMERCIAL

## 2024-08-19 DIAGNOSIS — R00.0 TACHYCARDIA: ICD-10-CM

## 2024-08-19 LAB — LVEF ECHO: NORMAL

## 2024-08-19 PROCEDURE — 93306 TTE W/DOPPLER COMPLETE: CPT

## 2024-08-19 PROCEDURE — 93306 TTE W/DOPPLER COMPLETE: CPT | Mod: 26 | Performed by: INTERNAL MEDICINE

## 2024-08-23 ENCOUNTER — LAB REQUISITION (OUTPATIENT)
Dept: LAB | Facility: CLINIC | Age: 29
End: 2024-08-23
Payer: COMMERCIAL

## 2024-08-23 DIAGNOSIS — Z11.3 ENCOUNTER FOR SCREENING FOR INFECTIONS WITH A PREDOMINANTLY SEXUAL MODE OF TRANSMISSION: ICD-10-CM

## 2024-08-23 DIAGNOSIS — Z36.89 ENCOUNTER FOR OTHER SPECIFIED ANTENATAL SCREENING: ICD-10-CM

## 2024-08-23 LAB
ERYTHROCYTE [DISTWIDTH] IN BLOOD BY AUTOMATED COUNT: 13.2 % (ref 10–15)
HCT VFR BLD AUTO: 35 % (ref 35–47)
HGB BLD-MCNC: 11.2 G/DL (ref 11.7–15.7)
MCH RBC QN AUTO: 27.4 PG (ref 26.5–33)
MCHC RBC AUTO-ENTMCNC: 32 G/DL (ref 31.5–36.5)
MCV RBC AUTO: 86 FL (ref 78–100)
PLATELET # BLD AUTO: 283 10E3/UL (ref 150–450)
RBC # BLD AUTO: 4.09 10E6/UL (ref 3.8–5.2)
WBC # BLD AUTO: 10.3 10E3/UL (ref 4–11)

## 2024-08-23 PROCEDURE — 86780 TREPONEMA PALLIDUM: CPT | Mod: ORL | Performed by: NURSE PRACTITIONER

## 2024-08-23 PROCEDURE — 85027 COMPLETE CBC AUTOMATED: CPT | Mod: ORL | Performed by: NURSE PRACTITIONER

## 2024-08-24 LAB — T PALLIDUM AB SER QL: NONREACTIVE

## 2024-08-28 ENCOUNTER — HOSPITAL ENCOUNTER (EMERGENCY)
Facility: CLINIC | Age: 29
End: 2024-08-28
Payer: COMMERCIAL

## 2024-08-28 ENCOUNTER — HOSPITAL ENCOUNTER (OUTPATIENT)
Facility: CLINIC | Age: 29
Discharge: HOME | End: 2024-08-28
Attending: STUDENT IN AN ORGANIZED HEALTH CARE EDUCATION/TRAINING PROGRAM | Admitting: STUDENT IN AN ORGANIZED HEALTH CARE EDUCATION/TRAINING PROGRAM
Payer: COMMERCIAL

## 2024-08-28 ENCOUNTER — APPOINTMENT (OUTPATIENT)
Dept: ULTRASOUND IMAGING | Facility: CLINIC | Age: 29
End: 2024-08-28
Attending: STUDENT IN AN ORGANIZED HEALTH CARE EDUCATION/TRAINING PROGRAM
Payer: COMMERCIAL

## 2024-08-28 ENCOUNTER — TRANSFERRED RECORDS (OUTPATIENT)
Dept: HEALTH INFORMATION MANAGEMENT | Facility: CLINIC | Age: 29
End: 2024-08-28

## 2024-08-28 VITALS — TEMPERATURE: 98.2 F | DIASTOLIC BLOOD PRESSURE: 78 MMHG | HEART RATE: 90 BPM | SYSTOLIC BLOOD PRESSURE: 132 MMHG

## 2024-08-28 PROBLEM — Z36.89 ENCOUNTER FOR TRIAGE IN PREGNANT PATIENT: Status: ACTIVE | Noted: 2024-08-28

## 2024-08-28 PROCEDURE — G0463 HOSPITAL OUTPT CLINIC VISIT: HCPCS | Mod: 25

## 2024-08-28 PROCEDURE — 76819 FETAL BIOPHYS PROFIL W/O NST: CPT

## 2024-08-28 RX ORDER — LIDOCAINE 40 MG/G
CREAM TOPICAL
Status: DISCONTINUED | OUTPATIENT
Start: 2024-08-28 | End: 2024-08-28 | Stop reason: HOSPADM

## 2024-08-28 ASSESSMENT — ACTIVITIES OF DAILY LIVING (ADL)
ADLS_ACUITY_SCORE: 18
ADLS_ACUITY_SCORE: 18

## 2024-08-28 NOTE — DISCHARGE INSTRUCTIONS
Learning About When to Call Your Doctor During Pregnancy (After 20 Weeks)  Overview  It's common to have concerns about what might be a problem when you're pregnant. Most pregnancies don't have any serious problems. But it's still important to know when to call your doctor if you have certain symptoms or signs of labor.  These are general suggestions. Your doctor may give you some more information about when to call.  When to call your doctor (after 20 weeks)  Call 911  anytime you think you may need emergency care. For example, call if:  You have severe vaginal bleeding. This means you are soaking through a pad each hour for 2 or more hours.  You have sudden, severe pain in your belly.  You have chest pain, are short of breath, or cough up blood.  You passed out (lost consciousness).  You have a seizure.  You see or feel the umbilical cord.  You think you are about to deliver your baby and can't make it safely to the hospital or birthing center.  Call your doctor now or seek immediate medical care if:  You have vaginal bleeding.  You have belly pain.  You have a fever.  You are dizzy or lightheaded, or you feel like you may faint.  You have signs of a blood clot in your leg (called a deep vein thrombosis), such as:  Pain in the calf, back of the knee, thigh, or groin.  Swelling in your leg or groin.  A color change on the leg or groin. The skin may be reddish or purplish, depending on your usual skin color.  You have symptoms of preeclampsia, such as:  Sudden swelling of your face, hands, or feet.  New vision problems (such as dimness, blurring, or seeing spots).  A severe headache.  You have a sudden release of fluid from your vagina. (You think your water broke.)  You've been having regular contractions for an hour. This means that you've had at least 6 contractions within 1 hour, even after you change your position and drink fluids.  You notice that your baby has stopped moving or is moving less than  "normal.  You have signs of heart failure, such as:  New or increased shortness of breath.  New or worse swelling in your legs, ankles, or feet.  Sudden weight gain, such as more than 2 to 3 pounds in a day or 5 pounds in a week.  Feeling so tired or weak that you cannot do your usual activities.  You have symptoms of a urinary tract infection. These may include:  Pain or burning when you urinate.  A frequent need to urinate without being able to pass much urine.  Pain in the flank, which is just below the rib cage and above the waist on either side of the back.  Blood in your urine.  Watch closely for changes in your health, and be sure to contact your doctor if:  You have vaginal discharge that smells bad.  You feel sad, anxious, or hopeless for more than a few days.  You have skin changes, such as a rash, itching, or a yellow color to your skin.  You have other concerns about your pregnancy.  If you have labor signs at 37 weeks or more  If you have signs of labor at 37 weeks or more, your doctor may tell you to call when your labor becomes more active. Symptoms of active labor include:  Contractions that are regular.  Contractions that are less than 5 minutes apart.  Contractions that are hard to talk through.  Follow-up care is a key part of your treatment and safety. Be sure to make and go to all appointments, and call your doctor if you are having problems. It's also a good idea to know your test results and keep a list of the medicines you take.  Where can you learn more?  Go to https://www.[employer]net/patiented  Enter N531 in the search box to learn more about \"Learning About When to Call Your Doctor During Pregnancy (After 20 Weeks).\"  Current as of: July 10, 2023  Content Version: 14.1    0873-1529 [employer].   Care instructions adapted under license by your healthcare professional. If you have questions about a medical condition or this instruction, always ask your healthcare " professional. [employer] disclaims any warranty or liability for your use of this information.

## 2024-08-28 NOTE — PROGRESS NOTES
Data: Patient assessed in the [facility] for decreased fetal movement. Cervical exam deferred. Membranes intact. Contractions are not present. See flowsheets for fetal assessment documentation.     Action: Presumed adequate fetal oxygenation documented. Discharge instructions reviewed. Patient instructed to report change in fetal movement, vaginal leaking of fluid or bleeding, abdominal pain, or any concerns related to the pregnancy to provider/clinic.      Response: Orders to discharge home per Dr Almanza. Patient verbalized understanding of education and agreement with plan. Discharged to home at 0635.

## 2024-08-28 NOTE — PROGRESS NOTES
Data: Patient presented to [facility]: 2024  4:23 AM.  Reason for maternal/fetal assessment is decreased fetal movement stating she has not felt baby move since last evening. Patient denies uterine contractions, leaking of fluid, or vaginal bleeding. Patient reports fetal movement is absent since last night. Patient is a 29w2d .  Prenatal record reviewed. Pregnancy has been uncomplicated.    Vital signs wnl. Support person is not present.     Action: Verbal consent for EFM. Triage assessment completed.     Response: Patient verbalized agreement with plan. OB resident at bedside during initial triage assessment. Orders for NST and BPP.

## 2024-08-28 NOTE — LETTER
[facility]  [address] [place] [id number]  Phone: [phone]  Fax: [phone]    August 28, 2024        Lucero Collier  [address] [place] [id number]          To whom it may concern:    RE: Lucero Collier    The patient was seen and evaluated today at our hospital. Please excuse her from work for Wednesday, August 28th.    Please contact me for questions or concerns.      Sincerely,        Anya Villavicencio MD

## 2024-08-28 NOTE — PROGRESS NOTES
OB Triage Note        Assessment and Plan:     Lucero Collier is a 29 year old  at [id number] days presenting with decreased fetal movement with 8/10 BPP (0 for fetal breathing movement) including reactive NST. Fetal movement is still reduced but was felt while NST was being conducted. Overall assessment is reassuring. Patient has next prenatal appointment .  Patient Active Problem List   Diagnosis    ADHD (attention deficit hyperactivity disorder)    Constipation    Myopia    Spells    Chronic headaches    GERD (gastroesophageal reflux disease)    Adverse reaction to pertussis vaccine    Learning problem    Overweight    Sleep disturbance    Adjustment disorder with mixed anxiety and depressed mood    Migraine without status migrainosus, not intractable, unspecified migraine type    Anxiety    Severe single current episode of major depressive disorder, without psychotic features (H)    Depression    HTN (hypertension)    Obesity (BMI 35.0-39.9) with comorbidity (H)    Encounter for triage in pregnant patient     Discharge home after reassuring BPP with follow-up at prenatal appointment in clinic.     Patient discussed with attending physician, Dr. Almanza , who agrees with the plan.      Anya Villavicencio MD PGY-1 2024  [facility]       Subjective:     Lucero Collier is a  29 year old female at 29+2 weeks who presents to OB triage with decreased fetal movement. She describes being able to feel fetal movement quite regularly but becamee concerned as has not felt movement since 8PM last night. She also describes some back pain and decreased urination. Last bowel movement was two days ago, slightly loose.   She denies vaginal bleeding or leakage of fluid.     She has received prenatal care with Dr. Almanza at [facility].    Patient reports recently passing GCT.  Medical history significant for umbilical cord cyst, pre-pregnancy BMI  "of 40, anxiety/depression on Wellbutrin from 12 weeks, ADHD on Adderall prior to pregnancy, epiliepsy not on medication last seizure 5 years ago and no show to recent neurology appointment. Episodes of palpitations and seen by cardiology 8/12 with EKG showing sinus tachycardia, normal Echo and metoprolol 25mg prn prescribed.    Estimated Date of Delivery: Nov 11, 2024 Patient's last menstrual period was 02/06/2024 (approximate).         Prenatal labs:   Lab Results   Component Value Date    AS Negative 04/29/2024    HEPBANG Nonreactive 04/29/2024    CHPCRT Negative 11/27/2020    GCPCRT Negative 11/27/2020    HGB 11.2 (L) 08/23/2024          Review of Systems:   EYES: no acute vision problems or changes  ENT: no ear problems, no mouth problems, no throat problems  RESP: no significant cough, no shortness of breath  CV: no chest pain, no new or worsening peripheral edema  GI: no nausea, no vomiting, no constipation, looser than normal stool on last bowel movement 2 days ago (consistent with baseline frequency)  : no frequency, no dysuria, no hematuria  NEURO: no weakness, no dizziness, no headaches         Physical Exam:   Vitals:   /78 (BP Location: Right arm)   Pulse 90   Temp 98.2  F (36.8  C) (Oral)   LMP 02/06/2024 (Approximate)   0 lbs 0 oz  Estimated body mass index is 44.43 kg/m  as calculated from the following:    Height as of 10/15/23: 1.651 m (5' 5\").    Weight as of 8/12/24: 121.1 kg (267 lb).    GEN: Awake, alert in no apparent distress   HEENT: grossly normal  RESPIRATORY: clear to auscultation bilaterally, no increased work of breathing  BACK:  no costovertebral angle tenderness   CARDIOVASCULAR: RRR, no murmur  ABDOMEN: no tenderness to palpation  EXT:  no edema or calf tenderness    NST interpretation:  Baseline rate 145 normal  Accelerations present  Decelerations not present  Interpretation: reactive    Labs today:  Results for orders placed or performed during the hospital encounter of " 08/28/24   US Fetal Biophys Prof w/o Non Stress Test     Status: None    Narrative    EXAM: US OB FETAL BIOPHY PROFILE W/O NST SINGLE W/LTD  LOCATION: [facility]  DATE: 8/28/2024    INDICATION: decreased fetal movement  COMPARISON: 08/02/2024    FINDINGS:  Single living fetus, vertex presentation.    HEART RATE: 143 bpm.  SDP 2.9 cm.  PLACENTA: . Left wall.    0/2 fetal breathing  2/2 fetal movements  2/2 fetal tone  2/2 amniotic fluid    Total biophysical profile 6/8      Impression    IMPRESSION:  1.  Single living intrauterine gestation in vertex presentation.  2.  6/8 biophysical profile. 0 for fetal breathing movement.

## 2024-08-31 ENCOUNTER — HOSPITAL ENCOUNTER (OUTPATIENT)
Facility: CLINIC | Age: 29
Discharge: HOME | End: 2024-08-31
Attending: OBSTETRICS & GYNECOLOGY | Admitting: OBSTETRICS & GYNECOLOGY
Payer: COMMERCIAL

## 2024-08-31 VITALS
SYSTOLIC BLOOD PRESSURE: 138 MMHG | HEART RATE: 110 BPM | HEIGHT: 65 IN | DIASTOLIC BLOOD PRESSURE: 82 MMHG | WEIGHT: 263 LBS | RESPIRATION RATE: 16 BRPM | BODY MASS INDEX: 43.82 KG/M2 | TEMPERATURE: 98.2 F

## 2024-08-31 LAB
ALBUMIN UR-MCNC: 30 MG/DL
APPEARANCE UR: CLEAR
BILIRUB UR QL STRIP: NEGATIVE
CLUE CELLS: ABNORMAL
COLOR UR AUTO: YELLOW
GLUCOSE UR STRIP-MCNC: NEGATIVE MG/DL
HGB UR QL STRIP: NEGATIVE
KETONES UR STRIP-MCNC: NEGATIVE MG/DL
LEUKOCYTE ESTERASE UR QL STRIP: ABNORMAL
MUCOUS THREADS #/AREA URNS LPF: PRESENT /LPF
NITRATE UR QL: NEGATIVE
PH UR STRIP: 8.5 [PH] (ref 5–7)
RBC URINE: 1 /HPF
SP GR UR STRIP: 1.03 (ref 1–1.03)
SQUAMOUS EPITHELIAL: 4 /HPF
TRICHOMONAS, WET PREP: ABNORMAL
UROBILINOGEN UR STRIP-MCNC: 2 MG/DL
WBC URINE: 5 /HPF
WBC'S/HIGH POWER FIELD, WET PREP: ABNORMAL
YEAST, WET PREP: ABNORMAL

## 2024-08-31 PROCEDURE — 87210 SMEAR WET MOUNT SALINE/INK: CPT | Performed by: OBSTETRICS & GYNECOLOGY

## 2024-08-31 PROCEDURE — G0463 HOSPITAL OUTPT CLINIC VISIT: HCPCS

## 2024-08-31 PROCEDURE — 81001 URINALYSIS AUTO W/SCOPE: CPT | Performed by: OBSTETRICS & GYNECOLOGY

## 2024-08-31 PROCEDURE — 250N000013 HC RX MED GY IP 250 OP 250 PS 637: Performed by: OBSTETRICS & GYNECOLOGY

## 2024-08-31 RX ORDER — NITROFURANTOIN 25; 75 MG/1; MG/1
100 CAPSULE ORAL ONCE
Status: DISCONTINUED | OUTPATIENT
Start: 2024-08-31 | End: 2024-08-31

## 2024-08-31 RX ORDER — LIDOCAINE 40 MG/G
CREAM TOPICAL
Status: DISCONTINUED | OUTPATIENT
Start: 2024-08-31 | End: 2024-08-31 | Stop reason: HOSPADM

## 2024-08-31 RX ORDER — NITROFURANTOIN 25; 75 MG/1; MG/1
100 CAPSULE ORAL ONCE
Status: COMPLETED | OUTPATIENT
Start: 2024-08-31 | End: 2024-08-31

## 2024-08-31 RX ADMIN — NITROFURANTOIN (MONOHYDRATE/MACROCRYSTALS) 100 MG: 75; 25 CAPSULE ORAL at 17:08

## 2024-08-31 ASSESSMENT — ACTIVITIES OF DAILY LIVING (ADL)
ADLS_ACUITY_SCORE: 18
ADLS_ACUITY_SCORE: 18

## 2024-08-31 NOTE — DISCHARGE INSTRUCTIONS
Data: Patient assessed in the Birthplace for  rule out UTI/BV,yeast infection . Cervical exam deferred. Membranes intact. Contractions are not present. See flowsheets for fetal assessment documentation.     Action: Presumed adequate fetal oxygenation documented. Discharge instructions reviewed. Patient instructed to report change in fetal movement, vaginal leaking of fluid or bleeding, abdominal pain, or any concerns related to the pregnancy to provider/clinic.      Response: Orders to discharge home. Patient verbalized understanding of education and agreement with plan. Pt to follow up in clinic as scheduled. Prescription for Macrobid called to pharmacy.

## 2024-09-04 ENCOUNTER — HOSPITAL ENCOUNTER (OUTPATIENT)
Facility: HOSPITAL | Age: 29
Discharge: HOME | End: 2024-09-04
Attending: STUDENT IN AN ORGANIZED HEALTH CARE EDUCATION/TRAINING PROGRAM | Admitting: STUDENT IN AN ORGANIZED HEALTH CARE EDUCATION/TRAINING PROGRAM
Payer: COMMERCIAL

## 2024-09-04 VITALS
DIASTOLIC BLOOD PRESSURE: 71 MMHG | WEIGHT: 263 LBS | HEART RATE: 114 BPM | TEMPERATURE: 98 F | SYSTOLIC BLOOD PRESSURE: 123 MMHG | HEIGHT: 65 IN | RESPIRATION RATE: 18 BRPM | BODY MASS INDEX: 43.82 KG/M2

## 2024-09-04 LAB
ALBUMIN UR-MCNC: NEGATIVE MG/DL
AMORPH CRY #/AREA URNS HPF: ABNORMAL /HPF
APPEARANCE UR: ABNORMAL
BILIRUB UR QL STRIP: NEGATIVE
CLUE CELLS: ABNORMAL
COLOR UR AUTO: YELLOW
GLUCOSE UR STRIP-MCNC: NEGATIVE MG/DL
HGB UR QL STRIP: NEGATIVE
KETONES UR STRIP-MCNC: NEGATIVE MG/DL
LEUKOCYTE ESTERASE UR QL STRIP: NEGATIVE
NITRATE UR QL: NEGATIVE
PH UR STRIP: 7.5 [PH] (ref 5–7)
RBC URINE: 0 /HPF
SP GR UR STRIP: 1.02 (ref 1–1.03)
SQUAMOUS EPITHELIAL: 8 /HPF
TRICHOMONAS, WET PREP: ABNORMAL
UROBILINOGEN UR STRIP-MCNC: <2 MG/DL
WBC URINE: 2 /HPF
WBC'S/HIGH POWER FIELD, WET PREP: ABNORMAL
YEAST #/AREA URNS HPF: ABNORMAL /HPF
YEAST, WET PREP: ABNORMAL

## 2024-09-04 PROCEDURE — 250N000013 HC RX MED GY IP 250 OP 250 PS 637: Performed by: OBSTETRICS & GYNECOLOGY

## 2024-09-04 PROCEDURE — 87210 SMEAR WET MOUNT SALINE/INK: CPT | Performed by: OBSTETRICS & GYNECOLOGY

## 2024-09-04 PROCEDURE — 81001 URINALYSIS AUTO W/SCOPE: CPT | Performed by: STUDENT IN AN ORGANIZED HEALTH CARE EDUCATION/TRAINING PROGRAM

## 2024-09-04 RX ORDER — LIDOCAINE 40 MG/G
CREAM TOPICAL
Status: DISCONTINUED | OUTPATIENT
Start: 2024-09-04 | End: 2024-09-05 | Stop reason: HOSPADM

## 2024-09-04 RX ORDER — FLUCONAZOLE 200 MG/1
200 TABLET ORAL ONCE
Status: COMPLETED | OUTPATIENT
Start: 2024-09-04 | End: 2024-09-04

## 2024-09-04 RX ORDER — FLUCONAZOLE 200 MG/1
200 TABLET ORAL ONCE
Status: DISCONTINUED | OUTPATIENT
Start: 2024-09-04 | End: 2024-09-04

## 2024-09-04 RX ADMIN — FLUCONAZOLE 200 MG: 200 TABLET ORAL at 23:11

## 2024-09-04 ASSESSMENT — ACTIVITIES OF DAILY LIVING (ADL)
ADLS_ACUITY_SCORE: 18

## 2024-09-05 NOTE — H&P
[facility]    History and Physical       Date of Admission:  2024    History of Present Illness   Lucero Collier is a 29 year old female  30w2d  Estimated Date of Delivery: 2024 is calculated from Patient's last menstrual period was 2024 (approximate). is seen in labor and delivery triage for cramping and back pain.  She reports worsening back pain this week however she became more worried when she had more cramping today.  She admits to cramping every 30 mins.  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  The back pain is constant tonight.     She was treated for a UTI 4 days ago after being seen in triage at [facility].  She has been taking Macrobid since that time.     She reports more lower extremity swelling over the past week. She tired compression stockings but felt that they were too tight around the calf, causing too much pain.      OB COMPLICATIONS:  Prenatal course was essentially uncomplicated      Past Medical History    Past Medical History:   Diagnosis Date    ADHD     on Daytrana and clonidine    Adverse reaction to pertussis vaccine     Allergies     daily Claritin    Anxiety     Chronic headaches     Complex partial seizures (H)     Constipation     Depression     Depressive disorder     Developmental delay     Epilepsy (H)     GERD (gastroesophageal reflux disease)     UGI 3/07    History of tonsillectomy     Hypoglycemia     Endocrine eval neg 10/00    Kidney stone      detected on CT scan; resolved     Myopia     Obesity     Sinusitis, chronic     Sleep disorder     Since ; sleep clinic evaluation  Dr. Ramesh     Smoking     Spells     Syncope     Urinary tract infection     nl renal US & VCUG        Past Surgical History   Past Surgical History:   Procedure Laterality Date    ENDOSCOPY  UPPER WITH PANCREATIC STIMULATION      Esophagitis     NISSEN FUNDOPLICATION      Dr. Meneses    NISSEN FUNDOPLICATION      PE TUBES  3/98    TONSILLECTOMY & ADENOIDECTOMY         OB History    Para Term  AB Living   1 0 0 0 0 0   SAB IAB Ectopic Multiple Live Births   0 0 0 0 0      # Outcome Date GA Lbr Maury/2nd Weight Sex Type Anes PTL Lv   1 Current               Social History   Social History     Tobacco Use    Smoking status: Former     Types: Cigarettes    Smokeless tobacco: Never   Vaping Use    Vaping status: Every Day    Substances: Nicotine   Substance Use Topics    Alcohol use: Not Currently     Comment: Alcoholic Drinks/day: Occasional    Drug use: No     Types: Oxycodone      Family History   Family History   Problem Relation Age of Onset    Diabetes Maternal Grandmother     Hypertension Maternal Grandmother     Diabetes Paternal Grandmother     Cardiovascular Paternal Grandmother     Breast Cancer Paternal Grandmother 61        right side breast cancer    Genetic Disorder Other         nephew.-genetic defects     Obesity Mother     Supraventricular tachycardia Mother     Family History Negative Father         Prior to Admission Medications   Prior to Admission Medications   Prescriptions Last Dose Informant Patient Reported? Taking?   Prenatal Vit-Fe Fumarate-FA (PRENATAL MULTIVITAMIN  PLUS IRON) 27-1 MG TABS 9/3/2024  Yes Yes   Sig: Take by mouth daily   albuterol (PROAIR HFA/PROVENTIL HFA/VENTOLIN HFA) 108 (90 Base) MCG/ACT inhaler Past Month  No Yes   Sig: Inhale 2 puffs into the lungs every 6 hours as needed for shortness of breath, wheezing or cough   buPROPion (WELLBUTRIN XL) 150 MG 24 hr tablet Unknown  Yes Yes   Sig: Take 1 tablet by mouth daily   cetirizine (ZYRTEC) 10 MG tablet 9/3/2024  Yes Yes   Sig: TAKE ONE TABLET BY MOUTH NIGHTLY FOR 30 DAYS   metoprolol tartrate (LOPRESSOR) 25 MG tablet   No No   Sig: Take 1 tablet (25 mg) by mouth as needed (Take 25 mg  daily as needed for palpitations)   omeprazole (PRILOSEC) 20 MG DR capsule   No No   Sig: Take 1 capsule (20 mg) by mouth daily   pimecrolimus (ELIDEL) 1 % cream   No No   Sig: Apply topically 2 times daily Ok to use on face   promethazine (PHENERGAN) 25 MG tablet Past Month  Yes Yes   Sig: Take 1 tablet by mouth every 4 hours as needed      Facility-Administered Medications: None     Allergies   No Known Allergies    Physical Exam   Vital Signs with Ranges  Temp:  [98  F (36.7  C)] 98  F (36.7  C)  Pulse:  [114] 114  Resp:  [18] 18  BP: (123)/(71) 123/71    Gen: no acute distress, resting comfortably   CV: acyanotic   Pulm: unlabored respirations    Abd: gravid, soft, nontender   Extremities: soft, nontender     Recent Labs   Lab Test 04/29/24  1207   AS Negative     Recent Labs   Lab Test 04/29/24  1207   HEPBANG Nonreactive   HIAGAB Nonreactive   RUQIGG Positive      Latest Reference Range & Units 08/31/24 16:03 09/04/24 21:14   Color Urine Colorless, Straw, Light Yellow, Yellow  Yellow Yellow   Appearance Urine Clear  Clear Turbid !   Glucose Urine Negative mg/dL Negative Negative   Bilirubin Urine Negative  Negative Negative   Ketones Urine Negative mg/dL Negative Negative   Specific Gravity Urine 1.001 - 1.030  1.027 1.017   pH Urine 5.0 - 7.0  8.5 (H) 7.5 (H)   Protein Albumin Urine Negative mg/dL 30 ! Negative   Urobilinogen mg/dL <2.0 mg/dL 2.0 ! <2.0   Nitrite Urine Negative  Negative Negative   Blood Urine Negative  Negative Negative   Leukocyte Esterase Urine Negative  25 [place]/uL ! Negative   WBC Urine <=5 /HPF 5 2   RBC Urine <=2 /HPF 1 0   Yeast Urine None Seen /HPF  Few !   Squamous Epithelial /HPF Urine <=1 /HPF 4 (H) 8 (H)   Mucus Urine None Seen /LPF Present !    Amorphous Crystals None Seen /HPF  Few !   !: Data is abnormal  (H): Data is abnormally high      Component  Ref Range & Units  9:38 PM 4 d ago 3 yr ago     Trichomonas  Absent Absent Absent No Trichomonas seen R    Yeast  Absent Absent  Absent No yeast seen R    Clue Cells  Absent Absent Absent No Clue cells seen R    WBCs/high power field  None 1+ Abnormal  1+ Abnormal       NST: reactive  TOCO:   no contractions      Assessment & Plan   Lucero Collier is a 29 year old female who presents with back pain and cramping  IUP @ 30w2d .  NST reactive.  Category  I  Back pain  Cramping  Lower extremity edema    PLAN:   Discussed exercises to help with SI joint back pain  Continue Macrobid, urine culture pending today  ?yeast on UA today, 200mg Diflucan given once prior to discharge  MALISSA hose given  Discharge home today      Yudith Damico, DO

## 2024-09-05 NOTE — DISCHARGE INSTRUCTIONS
EARLY LABOR DISCHARGE INSTRUCTIONS    You were seen for: Labor Assessment  You had (Test or Medicine): EFM and lab    Refer to Any Day Now handout for tips on how to labor at home    WHEN TO CALL YOUR PROVIDER:  If this is your first baby: Your contractions (tightening) are 5 minutes apart, last more than 1 minute, and have been consistently getting stronger for 1 hour or more  If this is your second baby or beyond: Your contractions are less than 10 minutes apart and have been consistently getting stronger for 1 hour or more  Feeling your baby move less than usual  Temperature of 100.4 F (38 C) or higher  New fluid leaking from your vagina  Other signs your provider asked you to look for in your body  Vaginal bleeding (bright red blood)  Swelling in your face or more swelling in your hands or legs  Headaches that don't get better after taking Tylenol (acetaminophen)  Changes in your vision (blurry or seeing spots or stars)  Nausea (sick to your stomach) and vomiting (throwing up)  Heartburn that doesn't go away  Sudden, bad belly pain that is unlike your contractions    IF YOU ARRIVED WITH YOUR WATER ALREADY BROKEN (MEMBRANES RUPTURED):  Avoid placing anything in vagina, including intercourse (sex)  Check your temperature every 3 hours when awake  Call your provider/clinic if:  Your temperature is 100.4 F (38 C) or higher  Your fluid becomes not clear or is smelly  Your baby is moving less than usual  You don't go into labor within 24 hours of your water breaking  You have other concerns  Follow up plan: continue on antibiotics      FOLLOW UP:  As scheduled in the clinic    Counting Your Baby's Kicks: Care Instructions  Overview     Counting your baby's kicks is one way your doctor can tell that your baby is healthy. You will probably feel your baby move for the first time between 16 and 22 weeks. The movement may feel like flutters rather than kicks. Your baby may move more at certain times of the day. When you  "are active, you may notice less kicking than when you are resting. At your prenatal visits, your doctor will ask whether the baby is active.  In your last trimester, your doctor may ask you to count the number of times you feel your baby move.  Follow-up care is a key part of your treatment and safety. Be sure to make and go to all appointments, and call your doctor if you are having problems. It's also a good idea to know your test results and keep a list of the medicines you take.  How do you count fetal kicks?  A common method of checking your baby's movement is to note the length of time it takes to count 10 movements (such as kicks, flutters, or rolls).  Pick your baby's most active time of day to count. This may be any time from morning to evening.  If you don't feel 10 movements in an hour, have something to eat or drink and count for another hour. If you don't feel at least 10 movements in the 2-hour period, call your doctor.  Do not use an at-home Doppler heart monitor in place of counting fetal movements.  When should you call for help?   Call your doctor now or seek immediate medical care if:    You feel fewer than 10 movements in a 2-hour period.     You noticed that your baby has stopped moving or is moving less than normal.   Watch closely for changes in your health, and be sure to contact your doctor if you have any problems.  Where can you learn more?  Go to https://www.[employer].net/patiented  Enter U048 in the search box to learn more about \"Counting Your Baby's Kicks: Care Instructions.\"  Current as of: July 10, 2023               Content Version: 14.0    1220-5475 [employer].   Care instructions adapted under license by your healthcare professional. If you have questions about a medical condition or this instruction, always ask your healthcare professional. [employer] disclaims any warranty or liability for your use of this information.          "

## 2024-09-05 NOTE — PROCEDURES
NST Procedure note    Date: 9/4/2024    Indication: back pain and cramping    Procedure: Fetal non-stress test    Results: Reactive    Yudith Damico,   9/4/2024 10:25 PM

## 2024-09-05 NOTE — PROGRESS NOTES
Data: Patient assessed in the Birthplace for  back pain and cramping . Cervical exam deferred. Membranes intact. Contractions are  ,   minutes apart, and last   seconds. Uterine assessment is no contractions during contractions and   at rest. See flowsheets for fetal assessment documentation.     Action:  Presumed adequate fetal oxygenation documented. Early labor precautions and discharge instructions reviewed, including when to notify provider if warning signs present. Patient instructed to report decrease in fetal movement, vaginal bleeding, changes in membrane status, abdominal pain, or any concerns related to the pregnancy to patient's provider/clinic.     Response: Orders to discharge home per Dr Damico. Plan for patient is to re-evaluate labor status by following up with provider as scheduled. Continue on antibiotics. Patient verbalized understanding of education and agreement with plan. Discharged to home at 2315.

## 2024-09-11 ENCOUNTER — APPOINTMENT (OUTPATIENT)
Dept: ULTRASOUND IMAGING | Facility: CLINIC | Age: 29
End: 2024-09-11
Attending: OBSTETRICS & GYNECOLOGY
Payer: COMMERCIAL

## 2024-09-11 ENCOUNTER — HOSPITAL ENCOUNTER (OUTPATIENT)
Facility: CLINIC | Age: 29
Discharge: HOME | End: 2024-09-11
Attending: OBSTETRICS & GYNECOLOGY | Admitting: OBSTETRICS & GYNECOLOGY
Payer: COMMERCIAL

## 2024-09-11 VITALS
TEMPERATURE: 98.9 F | DIASTOLIC BLOOD PRESSURE: 71 MMHG | HEART RATE: 94 BPM | HEIGHT: 65 IN | BODY MASS INDEX: 44.98 KG/M2 | RESPIRATION RATE: 18 BRPM | SYSTOLIC BLOOD PRESSURE: 121 MMHG | WEIGHT: 270 LBS

## 2024-09-11 PROCEDURE — G0463 HOSPITAL OUTPT CLINIC VISIT: HCPCS | Mod: 25

## 2024-09-11 PROCEDURE — 76819 FETAL BIOPHYS PROFIL W/O NST: CPT

## 2024-09-11 RX ORDER — LIDOCAINE 40 MG/G
CREAM TOPICAL
Status: DISCONTINUED | OUTPATIENT
Start: 2024-09-11 | End: 2024-09-11 | Stop reason: HOSPADM

## 2024-09-11 ASSESSMENT — ACTIVITIES OF DAILY LIVING (ADL)
ADLS_ACUITY_SCORE: 18
ADLS_ACUITY_SCORE: 18

## 2024-09-11 NOTE — PROGRESS NOTES
Data: Patient assessed in the Birthplace for decreased fetal movement. Cervical exam deferred. Membranes intact. Contractions are not present. See flowsheets for fetal assessment documentation.     Action: Presumed adequate fetal oxygenation documented. Discharge instructions reviewed. Patient instructed to report change in fetal movement, vaginal leaking of fluid or bleeding, abdominal pain, or any concerns related to the pregnancy to provider/clinic.      Response: Orders to discharge home per Dr Banuelos. Patient verbalized understanding of education and agreement with plan. Discharged to home at 05:20.    BPP 8/8   Reactive tracing

## 2024-09-11 NOTE — PROGRESS NOTES
Data: Patient presented to Birthplace: 2024  3:42 AM.  Reason for maternal/fetal assessment is decreased fetal movement. Patient reports having not felt fetal movement for the last 24 hours. Patient denies uterine contractions, leaking of vaginal fluid/rupture of membranes, vaginal bleeding, abdominal pain, pelvic pressure, nausea, vomiting, headache, visual disturbances, epigastric or RUQ pain, significant edema. Patient reports fetal movement is absent. Patient is a 31w2d .  Prenatal record reviewed. Pregnancy has been uncomplicated.    Vital signs wnl. Support person is not present.     Action: Verbal consent for EFM. Triage assessment completed.     Response: Patient verbalized agreement with plan. Will contact Dr Banuelos with update and further orders.

## 2024-09-13 ENCOUNTER — HOSPITAL ENCOUNTER (OUTPATIENT)
Facility: CLINIC | Age: 29
Discharge: HOME | End: 2024-09-13
Attending: STUDENT IN AN ORGANIZED HEALTH CARE EDUCATION/TRAINING PROGRAM | Admitting: OBSTETRICS & GYNECOLOGY
Payer: COMMERCIAL

## 2024-09-13 VITALS
WEIGHT: 270 LBS | BODY MASS INDEX: 44.98 KG/M2 | TEMPERATURE: 98.6 F | HEIGHT: 65 IN | SYSTOLIC BLOOD PRESSURE: 132 MMHG | RESPIRATION RATE: 18 BRPM | DIASTOLIC BLOOD PRESSURE: 67 MMHG

## 2024-09-13 LAB
ALBUMIN UR-MCNC: 10 MG/DL
APPEARANCE UR: ABNORMAL
BILIRUB UR QL STRIP: NEGATIVE
CAOX CRY #/AREA URNS HPF: ABNORMAL /HPF
CLUE CELLS: ABNORMAL
COLOR UR AUTO: ABNORMAL
GLUCOSE UR STRIP-MCNC: NEGATIVE MG/DL
HGB UR QL STRIP: NEGATIVE
KETONES UR STRIP-MCNC: NEGATIVE MG/DL
LEUKOCYTE ESTERASE UR QL STRIP: ABNORMAL
MUCOUS THREADS #/AREA URNS LPF: PRESENT /LPF
NITRATE UR QL: NEGATIVE
PH UR STRIP: 6.5 [PH] (ref 5–7)
RBC URINE: 2 /HPF
SP GR UR STRIP: 1.02 (ref 1–1.03)
SQUAMOUS EPITHELIAL: 12 /HPF
TRICHOMONAS, WET PREP: ABNORMAL
UROBILINOGEN UR STRIP-MCNC: <2 MG/DL
WBC URINE: 9 /HPF
WBC'S/HIGH POWER FIELD, WET PREP: ABNORMAL
YEAST, WET PREP: ABNORMAL

## 2024-09-13 PROCEDURE — 87210 SMEAR WET MOUNT SALINE/INK: CPT

## 2024-09-13 PROCEDURE — 87086 URINE CULTURE/COLONY COUNT: CPT

## 2024-09-13 PROCEDURE — 81001 URINALYSIS AUTO W/SCOPE: CPT

## 2024-09-13 RX ORDER — LIDOCAINE 40 MG/G
CREAM TOPICAL
Status: DISCONTINUED | OUTPATIENT
Start: 2024-09-13 | End: 2024-09-13 | Stop reason: HOSPADM

## 2024-09-13 ASSESSMENT — ACTIVITIES OF DAILY LIVING (ADL)
ADLS_ACUITY_SCORE: 35
ADLS_ACUITY_SCORE: 35

## 2024-09-13 NOTE — PROGRESS NOTES
Data: Patient assessed in the Birthplace for  dysuria . Cervical exam deferred. Membranes intact. Contractions are not present. See flowsheets for fetal assessment documentation.     Action: Presumed adequate fetal oxygenation documented. Discharge instructions reviewed. Patient instructed to report change in fetal movement, vaginal leaking of fluid or bleeding, abdominal pain, or any concerns related to the pregnancy to provider/clinic.      Response: Orders to discharge home per Dr Chavira. Patient verbalized understanding of education and agreement with plan. Discharged to home at 1805.

## 2024-09-13 NOTE — DISCHARGE INSTRUCTIONS
EARLY LABOR DISCHARGE INSTRUCTIONS    You were seen for: Labor Assessment  You had (Test or Medicine): UA/wet prep    Refer to Any Day Now handout for tips on how to labor at home    WHEN TO CALL YOUR PROVIDER:  If this is your first baby: Your contractions (tightening) are 5 minutes apart, last more than 1 minute, and have been consistently getting stronger for 1 hour or more  If this is your second baby or beyond: Your contractions are less than 10 minutes apart and have been consistently getting stronger for 1 hour or more  Feeling your baby move less than usual  Temperature of 100.4 F (38 C) or higher  New fluid leaking from your vagina  Other signs your provider asked you to look for in your body  Vaginal bleeding (bright red blood)  Swelling in your face or more swelling in your hands or legs  Headaches that don't get better after taking Tylenol (acetaminophen)  Changes in your vision (blurry or seeing spots or stars)  Nausea (sick to your stomach) and vomiting (throwing up)  Heartburn that doesn't go away  Sudden, bad belly pain that is unlike your contractions    IF YOU ARRIVED WITH YOUR WATER ALREADY BROKEN (MEMBRANES RUPTURED):  Avoid placing anything in vagina, including intercourse (sex)  Check your temperature every 3 hours when awake  Call your provider/clinic if:  Your temperature is 100.4 F (38 C) or higher  Your fluid becomes not clear or is smelly  Your baby is moving less than usual  You don't go into labor within 24 hours of your water breaking  You have other concerns  Follow up plan: as scheduled in clinic      FOLLOW UP:  As scheduled in the clinic    Provider/clinic number: [phone] option 3

## 2024-09-13 NOTE — PROGRESS NOTES
OB Triage Note        Assessment and Plan:     Lucero Collier is a 29 year old  at 31w4d with dysuria and recent antibiotics for UTI found to have recurrent UTI. She recently finished a round of Macrobid, but continues to have worsening dysuria.     Patient Active Problem List   Diagnosis    ADHD (attention deficit hyperactivity disorder)    Constipation    Myopia    Spells    Chronic headaches    GERD (gastroesophageal reflux disease)    Adverse reaction to pertussis vaccine    Learning problem    Overweight    Sleep disturbance    Adjustment disorder with mixed anxiety and depressed mood    Migraine without status migrainosus, not intractable, unspecified migraine type    Anxiety    Severe single current episode of major depressive disorder, without psychotic features (H)    Depression    HTN (hypertension)    Obesity (BMI 35.0-39.9) with comorbidity (H)    Encounter for triage in pregnant patient       Recurrent UTI - Prescribed Keflex 250 mg Q6H for 7 days.     Patient discussed with attending physician, Dr. Yudith Damico  , who agrees with the plan.      Sloan Chavira DO PGY1 2024  [facility]       Subjective:     Lucero Collier is a  29 year old female at 31w4d with a current prenatal history significant for umbilical cord cyst, pre-pregnancy BMI 40, anxiety/depression on Wellbutrin, ADHD, epilepsy not on medication but follows with neurology who presents to OB triage with dysuria and itching.    Lucero Collier is a patient of Dr. Almanza with [facility].     She denies contractions. She denies fluid leakage. She denies bleeding per vagina. Fetal movement is normal. She has noticed increased dysuria and itching over the course of the day today. She also has had increased lower back pain which is persistent in nature. Notably she finished a course of Macrobid on 24 for a UTI and has had BV treated during this pregnancy as well. She  "states her symptoms are similar to her previous UTI.    Estimated Date of Delivery: Nov 11, 2024 Patient's last menstrual period was 02/06/2024 (approximate).       Her prenatal course has been uncomplicated.    She has not received the RSV vaccine.     Prenatal labs:   Lab Results   Component Value Date    AS Negative 04/29/2024    HEPBANG Nonreactive 04/29/2024    CHPCRT Negative 11/27/2020    GCPCRT Negative 11/27/2020    HGB 11.2 (L) 08/23/2024          Review of Systems:   CONSTITUTIONAL: no fatigue, no unexpected change in weight  SKIN: no worrisome rashes or lesions  EYES: no acute vision problems or changes  RESP: no significant cough, no shortness of breath  CV: no chest pain, no palpitations, no new or worsening peripheral edema  GI: no nausea, no vomiting, no constipation, no diarrhea   female: dysuria, frequency, and Hx urinary tract infection  ENDOCRINE: no temperature intolerance, no skin/hair changes  PSYCHIATRIC: NEGATIVE for changes in mood or trouble with sleep         Physical Exam:   Vitals:   /67 (BP Location: Left arm, Patient Position: Semi-Jain's, Cuff Size: Adult Regular)   Temp 98.6  F (37  C) (Oral)   Resp 18   Ht 1.651 m (5' 5\")   Wt 122.5 kg (270 lb)   LMP 02/06/2024 (Approximate)   BMI 44.93 kg/m    270 lbs 0 oz  Estimated body mass index is 44.93 kg/m  as calculated from the following:    Height as of this encounter: 1.651 m (5' 5\").    Weight as of this encounter: 122.5 kg (270 lb).    GEN: Awake, alert in no apparent distress   HEENT: grossly normal  NECK: no lymphadenopathy or thryoidomegaly  RESPIRATORY: clear to auscultation bilaterally, no increased work of breathing  BACK: CVA tenderness increased with palpation  CARDIOVASCULAR: RRR, no murmur  ABDOMEN: gravid, obese  Cervix: deferred  EXT:  no edema or calf tenderness    NST interpretation:  Baseline rate 150 normal  Accelerations present  Decelerations not present  Interpretation: reactive    Labs " today:  Results for orders placed or performed during the hospital encounter of 09/13/24   UA with Microscopic reflex to Culture     Status: Abnormal    Specimen: Urine, Clean Catch   Result Value Ref Range    Color Urine Light Yellow Colorless, Straw, Light Yellow, Yellow    Appearance Urine Turbid (A) Clear    Glucose Urine Negative Negative mg/dL    Bilirubin Urine Negative Negative    Ketones Urine Negative Negative mg/dL    Specific Gravity Urine 1.021 1.001 - 1.030    Blood Urine Negative Negative    pH Urine 6.5 5.0 - 7.0    Protein Albumin Urine 10 (A) Negative mg/dL    Urobilinogen Urine <2.0 <2.0 mg/dL    Nitrite Urine Negative Negative    Leukocyte Esterase Urine 500 [place]/uL (A) Negative    Mucus Urine Present (A) None Seen /LPF    Calcium Oxalate Crystals Urine Few (A) None Seen /HPF    RBC Urine 2 <=2 /HPF    WBC Urine 9 (H) <=5 /HPF    Squamous Epithelials Urine 12 (H) <=1 /HPF    Narrative    Urine Culture ordered based on laboratory criteria   Wet preparation     Status: Abnormal    Specimen: Vagina; Swab   Result Value Ref Range    Trichomonas Absent Absent    Yeast Absent Absent    Clue Cells Absent Absent    WBCs/high power field 1+ (A) None

## 2024-09-14 NOTE — PROCEDURES
NST Procedure note    Date: 9/13/2024    Indication: UTI symptoms/triage visit    Procedure: Fetal non-stress test    Results: Reactive    Yudith Damico DO  9/13/2024 7:10 PM

## 2024-09-15 LAB — BACTERIA UR CULT: NORMAL

## 2024-09-20 ENCOUNTER — HOSPITAL ENCOUNTER (OUTPATIENT)
Facility: CLINIC | Age: 29
Discharge: HOME | End: 2024-09-20
Attending: OBSTETRICS & GYNECOLOGY | Admitting: STUDENT IN AN ORGANIZED HEALTH CARE EDUCATION/TRAINING PROGRAM
Payer: COMMERCIAL

## 2024-09-20 VITALS — DIASTOLIC BLOOD PRESSURE: 70 MMHG | SYSTOLIC BLOOD PRESSURE: 133 MMHG

## 2024-09-20 LAB
ALBUMIN MFR UR ELPH: 14.1 MG/DL
ALBUMIN SERPL BCG-MCNC: 3.5 G/DL (ref 3.5–5.2)
ALBUMIN UR-MCNC: NEGATIVE MG/DL
ALP SERPL-CCNC: 85 U/L (ref 40–150)
ALT SERPL W P-5'-P-CCNC: 11 U/L (ref 0–50)
AMORPH CRY #/AREA URNS HPF: ABNORMAL /HPF
ANION GAP SERPL CALCULATED.3IONS-SCNC: 19 MMOL/L (ref 7–15)
APPEARANCE UR: ABNORMAL
AST SERPL W P-5'-P-CCNC: 15 U/L (ref 0–45)
BACTERIA #/AREA URNS HPF: ABNORMAL /HPF
BILIRUB SERPL-MCNC: 0.2 MG/DL
BILIRUB UR QL STRIP: NEGATIVE
BUN SERPL-MCNC: 5 MG/DL (ref 6–20)
CALCIUM SERPL-MCNC: 8.7 MG/DL (ref 8.8–10.4)
CHLORIDE SERPL-SCNC: 106 MMOL/L (ref 98–107)
CLUE CELLS: ABNORMAL
COLOR UR AUTO: ABNORMAL
CREAT SERPL-MCNC: 0.37 MG/DL (ref 0.51–0.95)
CREAT UR-MCNC: 89.9 MG/DL
EGFRCR SERPLBLD CKD-EPI 2021: >90 ML/MIN/1.73M2
ERYTHROCYTE [DISTWIDTH] IN BLOOD BY AUTOMATED COUNT: 13.3 % (ref 10–15)
GLUCOSE SERPL-MCNC: 125 MG/DL (ref 70–99)
GLUCOSE UR STRIP-MCNC: NEGATIVE MG/DL
HCO3 SERPL-SCNC: 16 MMOL/L (ref 22–29)
HCT VFR BLD AUTO: 33.1 % (ref 35–47)
HGB BLD-MCNC: 11.2 G/DL (ref 11.7–15.7)
HGB UR QL STRIP: NEGATIVE
HOLD SPECIMEN: NORMAL
KETONES UR STRIP-MCNC: NEGATIVE MG/DL
LEUKOCYTE ESTERASE UR QL STRIP: ABNORMAL
MCH RBC QN AUTO: 27.8 PG (ref 26.5–33)
MCHC RBC AUTO-ENTMCNC: 33.8 G/DL (ref 31.5–36.5)
MCV RBC AUTO: 82 FL (ref 78–100)
MUCOUS THREADS #/AREA URNS LPF: PRESENT /LPF
NITRATE UR QL: NEGATIVE
PH UR STRIP: 6.5 [PH] (ref 5–7)
PLATELET # BLD AUTO: 270 10E3/UL (ref 150–450)
POTASSIUM SERPL-SCNC: 3.7 MMOL/L (ref 3.4–5.3)
PROT SERPL-MCNC: 6.1 G/DL (ref 6.4–8.3)
PROT/CREAT 24H UR: 0.16 MG/MG CR (ref 0–0.2)
RBC # BLD AUTO: 4.03 10E6/UL (ref 3.8–5.2)
RBC URINE: <1 /HPF
SODIUM SERPL-SCNC: 141 MMOL/L (ref 135–145)
SP GR UR STRIP: 1.02 (ref 1–1.03)
SQUAMOUS EPITHELIAL: 16 /HPF
TRICHOMONAS, WET PREP: ABNORMAL
UROBILINOGEN UR STRIP-MCNC: <2 MG/DL
WBC # BLD AUTO: 11.2 10E3/UL (ref 4–11)
WBC URINE: 5 /HPF
WBC'S/HIGH POWER FIELD, WET PREP: ABNORMAL
YEAST, WET PREP: ABNORMAL

## 2024-09-20 PROCEDURE — 81001 URINALYSIS AUTO W/SCOPE: CPT | Performed by: STUDENT IN AN ORGANIZED HEALTH CARE EDUCATION/TRAINING PROGRAM

## 2024-09-20 PROCEDURE — 82040 ASSAY OF SERUM ALBUMIN: CPT | Performed by: STUDENT IN AN ORGANIZED HEALTH CARE EDUCATION/TRAINING PROGRAM

## 2024-09-20 PROCEDURE — 85027 COMPLETE CBC AUTOMATED: CPT | Performed by: STUDENT IN AN ORGANIZED HEALTH CARE EDUCATION/TRAINING PROGRAM

## 2024-09-20 PROCEDURE — 36415 COLL VENOUS BLD VENIPUNCTURE: CPT | Performed by: STUDENT IN AN ORGANIZED HEALTH CARE EDUCATION/TRAINING PROGRAM

## 2024-09-20 PROCEDURE — 87210 SMEAR WET MOUNT SALINE/INK: CPT | Performed by: STUDENT IN AN ORGANIZED HEALTH CARE EDUCATION/TRAINING PROGRAM

## 2024-09-20 PROCEDURE — G0463 HOSPITAL OUTPT CLINIC VISIT: HCPCS

## 2024-09-20 PROCEDURE — 84156 ASSAY OF PROTEIN URINE: CPT | Performed by: STUDENT IN AN ORGANIZED HEALTH CARE EDUCATION/TRAINING PROGRAM

## 2024-09-20 RX ORDER — LIDOCAINE 40 MG/G
CREAM TOPICAL
Status: DISCONTINUED | OUTPATIENT
Start: 2024-09-20 | End: 2024-09-20 | Stop reason: HOSPADM

## 2024-09-20 RX ORDER — NIFEDIPINE 10 MG/1
10-20 CAPSULE ORAL
Status: DISCONTINUED | OUTPATIENT
Start: 2024-09-20 | End: 2024-09-20 | Stop reason: HOSPADM

## 2024-09-20 RX ORDER — LABETALOL HYDROCHLORIDE 5 MG/ML
20 INJECTION, SOLUTION INTRAVENOUS
Status: DISCONTINUED | OUTPATIENT
Start: 2024-09-20 | End: 2024-09-20 | Stop reason: HOSPADM

## 2024-09-20 RX ORDER — SODIUM CHLORIDE, SODIUM LACTATE, POTASSIUM CHLORIDE, CALCIUM CHLORIDE 600; 310; 30; 20 MG/100ML; MG/100ML; MG/100ML; MG/100ML
10-125 INJECTION, SOLUTION INTRAVENOUS CONTINUOUS
Status: DISCONTINUED | OUTPATIENT
Start: 2024-09-20 | End: 2024-09-20 | Stop reason: HOSPADM

## 2024-09-20 ASSESSMENT — ACTIVITIES OF DAILY LIVING (ADL)
ADLS_ACUITY_SCORE: 20
ADLS_ACUITY_SCORE: 20

## 2024-09-20 NOTE — PROGRESS NOTES
Data: Patient presented to [facility]: 2024  2:40 PM.  Reason for maternal/fetal assessment is elevated blood pressures. Patient reports at clinic appointment had 1 severe range blood pressure. Patient denies uterine contractions, leaking of vaginal fluid/rupture of membranes, vaginal bleeding, abdominal pain, pelvic pressure, nausea, vomiting, headache, visual disturbances, epigastric or RUQ pain, significant edema. Patient reports fetal movement is normal. Patient is a 32w4d .  Prenatal record reviewed. Pregnancy has been uncomplicated. Patient is currently being treated for a UTI.    Vital signs  blood pressures elevated . Support person is present.     Action: Verbal consent for EFM. Triage assessment completed.     Response: Patient verbalized agreement with plan. Will contact Dr. Almanza with update and further orders.

## 2024-09-20 NOTE — PROCEDURES
NST Procedure note    Date: 9/20/2024    Indication: Elevated BP    Procedure: Fetal non-stress test    Results: Reactive    Kay Almanza MD  9/20/2024 3:46 PM

## 2024-09-20 NOTE — DISCHARGE INSTRUCTIONS
Call your clinic if you have:  5 or more contractions in one hour  Leaking of fluid from your vaginal area  Decreased fetal movement (follow kick count instructions)  Bleeding from your vaginal area  Swelling in your face, or increased swelling in your hands or legs  Headaches or vision problems such as blurring or seeing spots or starts  Nausea or vomiting lasting for more than 24 hours  An increase in weight (5lbs/week)  Epigastric pain (sometimes confused with heartburn that does not go away or a very bad stomach ache)

## 2024-09-20 NOTE — PROGRESS NOTES
Data: Patient assessed in the [facility]place for elevated blood pressures. Cervical exam deferred. Membranes intact. Contractions are not present. See flowsheets for fetal assessment documentation. Wet p[rep negative, labs WNL. Blood pressures elevated but not severe range. Patient asymptomatic.    Action: Presumed adequate fetal oxygenation documented. Discharge instructions reviewed. Patient instructed to report change in fetal movement, vaginal leaking of fluid or bleeding, abdominal pain, or any concerns related to the pregnancy to provider/clinic.      Response: Orders to discharge home per Dr. Almanza. Patient verbalized understanding of education and agreement with plan. Discharged to home at 1655.

## 2024-10-04 ENCOUNTER — HOSPITAL ENCOUNTER (OUTPATIENT)
Facility: CLINIC | Age: 29
Discharge: HOME | End: 2024-10-04
Attending: OBSTETRICS & GYNECOLOGY | Admitting: OBSTETRICS & GYNECOLOGY
Payer: COMMERCIAL

## 2024-10-04 ENCOUNTER — APPOINTMENT (OUTPATIENT)
Dept: ULTRASOUND IMAGING | Facility: CLINIC | Age: 29
End: 2024-10-04
Attending: STUDENT IN AN ORGANIZED HEALTH CARE EDUCATION/TRAINING PROGRAM
Payer: COMMERCIAL

## 2024-10-04 VITALS — SYSTOLIC BLOOD PRESSURE: 130 MMHG | DIASTOLIC BLOOD PRESSURE: 79 MMHG | TEMPERATURE: 98.1 F | RESPIRATION RATE: 18 BRPM

## 2024-10-04 LAB
ALBUMIN MFR UR ELPH: 6.5 MG/DL
ALBUMIN SERPL BCG-MCNC: 3.5 G/DL (ref 3.5–5.2)
ALP SERPL-CCNC: 103 U/L (ref 40–150)
ALT SERPL W P-5'-P-CCNC: 11 U/L (ref 0–50)
ANION GAP SERPL CALCULATED.3IONS-SCNC: 13 MMOL/L (ref 7–15)
AST SERPL W P-5'-P-CCNC: 17 U/L (ref 0–45)
BILIRUB SERPL-MCNC: 0.2 MG/DL
BUN SERPL-MCNC: 5 MG/DL (ref 6–20)
CALCIUM SERPL-MCNC: 9.1 MG/DL (ref 8.8–10.4)
CHLORIDE SERPL-SCNC: 105 MMOL/L (ref 98–107)
CREAT SERPL-MCNC: 0.36 MG/DL (ref 0.51–0.95)
CREAT UR-MCNC: 65.7 MG/DL
EGFRCR SERPLBLD CKD-EPI 2021: >90 ML/MIN/1.73M2
ERYTHROCYTE [DISTWIDTH] IN BLOOD BY AUTOMATED COUNT: 13.1 % (ref 10–15)
GLUCOSE SERPL-MCNC: 97 MG/DL (ref 70–99)
HCO3 SERPL-SCNC: 19 MMOL/L (ref 22–29)
HCT VFR BLD AUTO: 34.1 % (ref 35–47)
HGB BLD-MCNC: 11.5 G/DL (ref 11.7–15.7)
HOLD SPECIMEN: NORMAL
HOLD SPECIMEN: NORMAL
MCH RBC QN AUTO: 27.6 PG (ref 26.5–33)
MCHC RBC AUTO-ENTMCNC: 33.7 G/DL (ref 31.5–36.5)
MCV RBC AUTO: 82 FL (ref 78–100)
PLATELET # BLD AUTO: 261 10E3/UL (ref 150–450)
POTASSIUM SERPL-SCNC: 3.8 MMOL/L (ref 3.4–5.3)
PROT SERPL-MCNC: 6.5 G/DL (ref 6.4–8.3)
PROT/CREAT 24H UR: 0.1 MG/MG CR (ref 0–0.2)
RBC # BLD AUTO: 4.16 10E6/UL (ref 3.8–5.2)
SODIUM SERPL-SCNC: 137 MMOL/L (ref 135–145)
WBC # BLD AUTO: 12.1 10E3/UL (ref 4–11)

## 2024-10-04 PROCEDURE — 36415 COLL VENOUS BLD VENIPUNCTURE: CPT | Performed by: STUDENT IN AN ORGANIZED HEALTH CARE EDUCATION/TRAINING PROGRAM

## 2024-10-04 PROCEDURE — 76819 FETAL BIOPHYS PROFIL W/O NST: CPT | Mod: 59

## 2024-10-04 PROCEDURE — 85014 HEMATOCRIT: CPT | Performed by: STUDENT IN AN ORGANIZED HEALTH CARE EDUCATION/TRAINING PROGRAM

## 2024-10-04 PROCEDURE — 84156 ASSAY OF PROTEIN URINE: CPT | Performed by: STUDENT IN AN ORGANIZED HEALTH CARE EDUCATION/TRAINING PROGRAM

## 2024-10-04 PROCEDURE — 80053 COMPREHEN METABOLIC PANEL: CPT | Performed by: STUDENT IN AN ORGANIZED HEALTH CARE EDUCATION/TRAINING PROGRAM

## 2024-10-04 PROCEDURE — G0463 HOSPITAL OUTPT CLINIC VISIT: HCPCS | Mod: 25

## 2024-10-04 RX ORDER — LIDOCAINE 40 MG/G
CREAM TOPICAL
Status: DISCONTINUED | OUTPATIENT
Start: 2024-10-04 | End: 2024-10-04 | Stop reason: HOSPADM

## 2024-10-04 ASSESSMENT — ACTIVITIES OF DAILY LIVING (ADL)
ADLS_ACUITY_SCORE: 33
ADLS_ACUITY_SCORE: 35
ADLS_ACUITY_SCORE: 35

## 2024-10-04 NOTE — PROGRESS NOTES
Data: Patient assessed in the [facility] for hypertension disorder of pregnancy, elevated blood pressures, and BPP 4/8 . Cervical exam deferred. Membranes intact. Contractions are not present. See flowsheets for fetal assessment documentation.     Action: Presumed adequate fetal oxygenation documented. Discharge instructions reviewed. Patient instructed to report change in fetal movement, vaginal leaking of fluid or bleeding, abdominal pain, or any concerns related to the pregnancy to provider/clinic.      Response: Orders to discharge home per Dr. Banuelos. Patient verbalized understanding of education and agreement with plan. Discharged to home at 1815.

## 2024-10-04 NOTE — PROGRESS NOTES
Triage Note:    S: Lucero is a 29 year old G1 at 34 weeks and 4 days with known gestational HTN diagnosed at 32 weeks.  She had one severe-range BP at 32 weeks but not sustained on repeat.      She has followed in clinic with weekly visits and  testing.  Today she was seen for her routine visit and BPP was 8 (points for fluid and breathing, off for tone and movement).  She also noted decrease in FM.    O:  BP in clinic today was 142/84    FHT baseline 150's, mod v, +a, no d   Pope: quiet    A/P:  IUP at 34 weeks  BPP in clinic , NST reactive so now 6/10  Pre-Eclampsia labs obtained and pending, thus far has not had any severe features  Will repeat US portion of BPP now given currently reassuring fetal status, we had discussed in clinic that if BPP obtained during fetal sleep cycle then may affect results, if repeat BPP is reassuring then would plan for discharge to home.    MD Pili

## 2024-10-04 NOTE — PROGRESS NOTES
Update    BPP was 8/8, nl fluid    NST is reactive and reassuring    Bps have been normal. Labs are normal.     Plan to discharge home. [facility] discussed.     Valarie Banuelos MD

## 2024-10-04 NOTE — PROCEDURES
NST Procedure note    Date: 10/4/2024    Indication: decreased fetal movement, gHTN    Procedure: Fetal non-stress test    Results: Reactive    Valarie Banuelos MD  10/4/2024 6:09 PM

## 2024-10-06 ENCOUNTER — HOSPITAL ENCOUNTER (OUTPATIENT)
Facility: CLINIC | Age: 29
Discharge: HOME | End: 2024-10-06
Attending: OBSTETRICS & GYNECOLOGY | Admitting: OBSTETRICS & GYNECOLOGY
Payer: COMMERCIAL

## 2024-10-06 ENCOUNTER — APPOINTMENT (OUTPATIENT)
Dept: ULTRASOUND IMAGING | Facility: CLINIC | Age: 29
End: 2024-10-06
Attending: OBSTETRICS & GYNECOLOGY
Payer: COMMERCIAL

## 2024-10-06 VITALS
RESPIRATION RATE: 16 BRPM | TEMPERATURE: 98.7 F | HEART RATE: 100 BPM | DIASTOLIC BLOOD PRESSURE: 72 MMHG | SYSTOLIC BLOOD PRESSURE: 138 MMHG

## 2024-10-06 LAB
ALBUMIN UR-MCNC: NEGATIVE MG/DL
APPEARANCE UR: ABNORMAL
BILIRUB UR QL STRIP: NEGATIVE
COLOR UR AUTO: ABNORMAL
GLUCOSE UR STRIP-MCNC: NEGATIVE MG/DL
HGB UR QL STRIP: NEGATIVE
KETONES UR STRIP-MCNC: NEGATIVE MG/DL
LEUKOCYTE ESTERASE UR QL STRIP: ABNORMAL
MUCOUS THREADS #/AREA URNS LPF: PRESENT /LPF
NITRATE UR QL: NEGATIVE
PH UR STRIP: 6.5 [PH] (ref 5–7)
RBC URINE: 1 /HPF
SP GR UR STRIP: 1.01 (ref 1–1.03)
SQUAMOUS EPITHELIAL: 11 /HPF
UROBILINOGEN UR STRIP-MCNC: <2 MG/DL
WBC URINE: 5 /HPF
YEAST #/AREA URNS HPF: ABNORMAL /HPF

## 2024-10-06 PROCEDURE — 76819 FETAL BIOPHYS PROFIL W/O NST: CPT

## 2024-10-06 PROCEDURE — G0463 HOSPITAL OUTPT CLINIC VISIT: HCPCS

## 2024-10-06 PROCEDURE — 81001 URINALYSIS AUTO W/SCOPE: CPT | Performed by: OBSTETRICS & GYNECOLOGY

## 2024-10-06 PROCEDURE — 87086 URINE CULTURE/COLONY COUNT: CPT | Performed by: OBSTETRICS & GYNECOLOGY

## 2024-10-06 RX ORDER — LIDOCAINE 40 MG/G
CREAM TOPICAL
Status: DISCONTINUED | OUTPATIENT
Start: 2024-10-06 | End: 2024-10-06 | Stop reason: HOSPADM

## 2024-10-06 ASSESSMENT — ACTIVITIES OF DAILY LIVING (ADL)
ADLS_ACUITY_SCORE: 35
ADLS_ACUITY_SCORE: 35

## 2024-10-07 NOTE — PROGRESS NOTES
BPP preliminary results show 8/8. Obtained orders for discharge from MD. Pt given discharge instructions. All questions answered, pt understanding education provided. Pt will follow up in clinic on Friday, unless she continues to have more DFM or if B/P's reach severe range.

## 2024-10-07 NOTE — DISCHARGE INSTRUCTIONS
Learning About When to Call Your Doctor During Pregnancy (After 20 Weeks)  Overview  It's common to have concerns about what might be a problem when you're pregnant. Most pregnancies don't have any serious problems. But it's still important to know when to call your doctor if you have certain symptoms or signs of labor.  These are general suggestions. Your doctor may give you some more information about when to call.  When to call your doctor (after 20 weeks)  Call 911  anytime you think you may need emergency care. For example, call if:  You have severe vaginal bleeding. This means you are soaking through a pad each hour for 2 or more hours.  You have sudden, severe pain in your belly.  You have chest pain, are short of breath, or cough up blood.  You passed out (lost consciousness).  You have a seizure.  You see or feel the umbilical cord.  You think you are about to deliver your baby and can't make it safely to the hospital or birthing center.  Call your doctor now or seek immediate medical care if:  You have vaginal bleeding.  You have belly pain.  You have a fever.  You are dizzy or lightheaded, or you feel like you may faint.  You have signs of a blood clot in your leg (called a deep vein thrombosis), such as:  Pain in the calf, back of the knee, thigh, or groin.  Swelling in your leg or groin.  A color change on the leg or groin. The skin may be reddish or purplish, depending on your usual skin color.  You have symptoms of preeclampsia, such as:  Sudden swelling of your face, hands, or feet.  New vision problems (such as dimness, blurring, or seeing spots).  A severe headache.  You have a sudden release of fluid from your vagina. (You think your water broke.)  You've been having regular contractions for an hour. This means that you've had at least 6 contractions within 1 hour, even after you change your position and drink fluids.  You notice that your baby has stopped moving or is moving less than  "normal.  You have signs of heart failure, such as:  New or increased shortness of breath.  New or worse swelling in your legs, ankles, or feet.  Sudden weight gain, such as more than 2 to 3 pounds in a day or 5 pounds in a week.  Feeling so tired or weak that you cannot do your usual activities.  You have symptoms of a urinary tract infection. These may include:  Pain or burning when you urinate.  A frequent need to urinate without being able to pass much urine.  Pain in the flank, which is just below the rib cage and above the waist on either side of the back.  Blood in your urine.  Watch closely for changes in your health, and be sure to contact your doctor if:  You have vaginal discharge that smells bad.  You feel sad, anxious, or hopeless for more than a few days.  You have skin changes, such as a rash, itching, or a yellow color to your skin.  You have other concerns about your pregnancy.  If you have labor signs at 37 weeks or more  If you have signs of labor at 37 weeks or more, your doctor may tell you to call when your labor becomes more active. Symptoms of active labor include:  Contractions that are regular.  Contractions that are less than 5 minutes apart.  Contractions that are hard to talk through.  Follow-up care is a key part of your treatment and safety. Be sure to make and go to all appointments, and call your doctor if you are having problems. It's also a good idea to know your test results and keep a list of the medicines you take.  Follow up in your clinic as scheduled or if you are concerned with further decreased fetal movement.  Use Monistat 7 (Over the counter) for vaginal itching  Where can you learn more?  Go to https://www.[employer].net/patiented  Enter N531 in the search box to learn more about \"Learning About When to Call Your Doctor During Pregnancy (After 20 Weeks).\"  Current as of: July 10, 2023  Content Version: 14.2 2024 [facility] [employer].   Care instructions adapted under " license by your healthcare professional. If you have questions about a medical condition or this instruction, always ask your healthcare professional. [employer] disclaims any warranty or liability for your use of this information.

## 2024-10-07 NOTE — PROGRESS NOTES
Pt arrived at [facility] triage with c/o DFM. Pt escorted to triage and placed on EFM. FHTs found immediately. Pt still unable to feel baby move despite audible movement. [facility] aware of pt and has ordered a BPP at this time.

## 2024-10-08 ENCOUNTER — HOSPITAL ENCOUNTER (OUTPATIENT)
Facility: CLINIC | Age: 29
Discharge: HOME | End: 2024-10-08
Attending: STUDENT IN AN ORGANIZED HEALTH CARE EDUCATION/TRAINING PROGRAM | Admitting: STUDENT IN AN ORGANIZED HEALTH CARE EDUCATION/TRAINING PROGRAM
Payer: COMMERCIAL

## 2024-10-08 ENCOUNTER — HOSPITAL ENCOUNTER (OUTPATIENT)
Facility: CLINIC | Age: 29
End: 2024-10-08
Admitting: STUDENT IN AN ORGANIZED HEALTH CARE EDUCATION/TRAINING PROGRAM
Payer: COMMERCIAL

## 2024-10-08 VITALS — SYSTOLIC BLOOD PRESSURE: 131 MMHG | RESPIRATION RATE: 16 BRPM | DIASTOLIC BLOOD PRESSURE: 78 MMHG | TEMPERATURE: 98 F

## 2024-10-08 LAB
ALBUMIN MFR UR ELPH: 12.6 MG/DL
ALBUMIN SERPL BCG-MCNC: 3.4 G/DL (ref 3.5–5.2)
ALP SERPL-CCNC: 102 U/L (ref 40–150)
ALT SERPL W P-5'-P-CCNC: 8 U/L (ref 0–50)
ANION GAP SERPL CALCULATED.3IONS-SCNC: 14 MMOL/L (ref 7–15)
AST SERPL W P-5'-P-CCNC: 13 U/L (ref 0–45)
BACTERIA UR CULT: NORMAL
BILIRUB SERPL-MCNC: 0.2 MG/DL
BUN SERPL-MCNC: 4.5 MG/DL (ref 6–20)
CALCIUM SERPL-MCNC: 8.3 MG/DL (ref 8.8–10.4)
CHLORIDE SERPL-SCNC: 107 MMOL/L (ref 98–107)
CREAT SERPL-MCNC: 0.38 MG/DL (ref 0.51–0.95)
CREAT UR-MCNC: 131 MG/DL
EGFRCR SERPLBLD CKD-EPI 2021: >90 ML/MIN/1.73M2
ERYTHROCYTE [DISTWIDTH] IN BLOOD BY AUTOMATED COUNT: 13.2 % (ref 10–15)
GLUCOSE SERPL-MCNC: 88 MG/DL (ref 70–99)
HCO3 SERPL-SCNC: 15 MMOL/L (ref 22–29)
HCT VFR BLD AUTO: 34.5 % (ref 35–47)
HGB BLD-MCNC: 11.7 G/DL (ref 11.7–15.7)
MCH RBC QN AUTO: 27.7 PG (ref 26.5–33)
MCHC RBC AUTO-ENTMCNC: 33.9 G/DL (ref 31.5–36.5)
MCV RBC AUTO: 82 FL (ref 78–100)
PLATELET # BLD AUTO: 246 10E3/UL (ref 150–450)
POTASSIUM SERPL-SCNC: 3.7 MMOL/L (ref 3.4–5.3)
PROT SERPL-MCNC: 6.3 G/DL (ref 6.4–8.3)
PROT/CREAT 24H UR: 0.1 MG/MG CR (ref 0–0.2)
RBC # BLD AUTO: 4.23 10E6/UL (ref 3.8–5.2)
SODIUM SERPL-SCNC: 136 MMOL/L (ref 135–145)
WBC # BLD AUTO: 11.6 10E3/UL (ref 4–11)

## 2024-10-08 PROCEDURE — 80053 COMPREHEN METABOLIC PANEL: CPT | Performed by: OBSTETRICS & GYNECOLOGY

## 2024-10-08 PROCEDURE — 84156 ASSAY OF PROTEIN URINE: CPT | Performed by: OBSTETRICS & GYNECOLOGY

## 2024-10-08 PROCEDURE — 36415 COLL VENOUS BLD VENIPUNCTURE: CPT | Performed by: OBSTETRICS & GYNECOLOGY

## 2024-10-08 PROCEDURE — 85014 HEMATOCRIT: CPT | Performed by: OBSTETRICS & GYNECOLOGY

## 2024-10-08 PROCEDURE — G0463 HOSPITAL OUTPT CLINIC VISIT: HCPCS

## 2024-10-08 RX ORDER — LIDOCAINE 40 MG/G
CREAM TOPICAL
Status: DISCONTINUED | OUTPATIENT
Start: 2024-10-08 | End: 2024-10-08 | Stop reason: HOSPADM

## 2024-10-08 RX ORDER — SODIUM CHLORIDE, SODIUM LACTATE, POTASSIUM CHLORIDE, CALCIUM CHLORIDE 600; 310; 30; 20 MG/100ML; MG/100ML; MG/100ML; MG/100ML
10-125 INJECTION, SOLUTION INTRAVENOUS CONTINUOUS
Status: CANCELLED | OUTPATIENT
Start: 2024-10-08

## 2024-10-08 RX ORDER — LIDOCAINE 40 MG/G
CREAM TOPICAL
Status: CANCELLED | OUTPATIENT
Start: 2024-10-08

## 2024-10-08 ASSESSMENT — ACTIVITIES OF DAILY LIVING (ADL)
ADLS_ACUITY_SCORE: 20

## 2024-10-08 NOTE — DISCHARGE INSTRUCTIONS
Learning About When to Call Your Doctor During Pregnancy (After 20 Weeks)  Overview  It's common to have concerns about what might be a problem when you're pregnant. Most pregnancies don't have any serious problems. But it's still important to know when to call your doctor if you have certain symptoms or signs of labor.  These are general suggestions. Your doctor may give you some more information about when to call.  When to call your doctor (after 20 weeks)  Call 911  anytime you think you may need emergency care. For example, call if:  You have severe vaginal bleeding. This means you are soaking through a pad each hour for 2 or more hours.  You have sudden, severe pain in your belly.  You have chest pain, are short of breath, or cough up blood.  You passed out (lost consciousness).  You have a seizure.  You see or feel the umbilical cord.  You think you are about to deliver your baby and can't make it safely to the hospital or birthing center.  Call your doctor now or seek immediate medical care if:  You have vaginal bleeding.  You have belly pain.  You have a fever.  You are dizzy or lightheaded, or you feel like you may faint.  You have signs of a blood clot in your leg (called a deep vein thrombosis), such as:  Pain in the calf, back of the knee, thigh, or groin.  Swelling in your leg or groin.  A color change on the leg or groin. The skin may be reddish or purplish, depending on your usual skin color.  You have symptoms of preeclampsia, such as:  Sudden swelling of your face, hands, or feet.  New vision problems (such as dimness, blurring, or seeing spots).  A severe headache.  You have a sudden release of fluid from your vagina. (You think your water broke.)  You've been having regular contractions for an hour. This means that you've had at least 6 contractions within 1 hour, even after you change your position and drink fluids.  You notice that your baby has stopped moving or is moving less than  "normal.  You have signs of heart failure, such as:  New or increased shortness of breath.  New or worse swelling in your legs, ankles, or feet.  Sudden weight gain, such as more than 2 to 3 pounds in a day or 5 pounds in a week.  Feeling so tired or weak that you cannot do your usual activities.  You have symptoms of a urinary tract infection. These may include:  Pain or burning when you urinate.  A frequent need to urinate without being able to pass much urine.  Pain in the flank, which is just below the rib cage and above the waist on either side of the back.  Blood in your urine.  Watch closely for changes in your health, and be sure to contact your doctor if:  You have vaginal discharge that smells bad.  You feel sad, anxious, or hopeless for more than a few days.  You have skin changes, such as a rash, itching, or a yellow color to your skin.  You have other concerns about your pregnancy.  If you have labor signs at 37 weeks or more  If you have signs of labor at 37 weeks or more, your doctor may tell you to call when your labor becomes more active. Symptoms of active labor include:  Contractions that are regular.  Contractions that are less than 5 minutes apart.  Contractions that are hard to talk through.  Follow-up care is a key part of your treatment and safety. Be sure to make and go to all appointments, and call your doctor if you are having problems. It's also a good idea to know your test results and keep a list of the medicines you take.  Where can you learn more?  Go to https://www.[employer].net/patiented  Enter N531 in the search box to learn more about \"Learning About When to Call Your Doctor During Pregnancy (After 20 Weeks).\"  Current as of: July 10, 2023  Content Version: 14.2 2024 [facility].   Care instructions adapted under license by your healthcare professional. If you have questions about a medical condition or this instruction, always ask your healthcare professional. " [employer] disclaims any warranty or liability for your use of this information.

## 2024-10-09 NOTE — PROGRESS NOTES
"Data: Patient assessed in the [facility] for decreased fetal movement and \"150s/80s BP at home\" . Cervical exam deferred. Membranes intact. Contractions are v. Contactions are  , x1 minutes apart, and last 170 seconds. Uterine assessment is mild by palpation during contractions and soft by palpation at rest. See flowsheets for fetal assessment documentation.     Action: Presumed adequate fetal oxygenation documented. Discharge instructions reviewed. Patient instructed to report change in fetal movement, vaginal leaking of fluid or bleeding, abdominal pain, or any concerns related to the pregnancy to provider/clinic.      Response: Orders to discharge home per ***. Patient verbalized understanding of education and agreement with plan. Discharged to home at ***.           "

## 2024-10-09 NOTE — PROGRESS NOTES
Data: Patient assessed in the [facility]place for decreased fetal movement and increase BP at home (150/60) . Cervical exam deferred. Membranes intact. Contractions are very occasional. Contactions are  , x1 minutes apart, and last 170 seconds. Uterine assessment is mild by palpation during contractions and soft by palpation at rest. See flowsheets for fetal assessment documentation.     Action: Presumed adequate fetal oxygenation documented. Discharge instructions reviewed. Patient instructed to report change in fetal movement, vaginal leaking of fluid or bleeding, abdominal pain, or any concerns related to the pregnancy to provider/clinic.      Response: Orders to discharge home per Angie. Patient verbalized understanding of education and agreement with plan. Discharged to home at 1900.

## 2024-10-11 ENCOUNTER — LAB REQUISITION (OUTPATIENT)
Dept: LAB | Facility: CLINIC | Age: 29
End: 2024-10-11
Payer: COMMERCIAL

## 2024-10-11 DIAGNOSIS — Z3A.35 35 WEEKS GESTATION OF PREGNANCY: ICD-10-CM

## 2024-10-11 PROCEDURE — 87653 STREP B DNA AMP PROBE: CPT | Mod: ORL | Performed by: STUDENT IN AN ORGANIZED HEALTH CARE EDUCATION/TRAINING PROGRAM

## 2024-10-12 LAB — GP B STREP DNA SPEC QL NAA+PROBE: NEGATIVE

## 2024-10-14 ENCOUNTER — HOSPITAL ENCOUNTER (OUTPATIENT)
Facility: CLINIC | Age: 29
Discharge: HOME | End: 2024-10-14
Attending: OBSTETRICS & GYNECOLOGY | Admitting: OBSTETRICS & GYNECOLOGY
Payer: COMMERCIAL

## 2024-10-14 VITALS — RESPIRATION RATE: 18 BRPM | DIASTOLIC BLOOD PRESSURE: 65 MMHG | SYSTOLIC BLOOD PRESSURE: 133 MMHG | TEMPERATURE: 98.6 F

## 2024-10-14 LAB
ALBUMIN MFR UR ELPH: 27.9 MG/DL
ALBUMIN SERPL BCG-MCNC: 3.2 G/DL (ref 3.5–5.2)
ALP SERPL-CCNC: 101 U/L (ref 40–150)
ALT SERPL W P-5'-P-CCNC: 9 U/L (ref 0–50)
ANION GAP SERPL CALCULATED.3IONS-SCNC: 15 MMOL/L (ref 7–15)
AST SERPL W P-5'-P-CCNC: 13 U/L (ref 0–45)
BILIRUB SERPL-MCNC: 0.2 MG/DL
BUN SERPL-MCNC: 5.3 MG/DL (ref 6–20)
CALCIUM SERPL-MCNC: 8.4 MG/DL (ref 8.8–10.4)
CHLORIDE SERPL-SCNC: 107 MMOL/L (ref 98–107)
CREAT SERPL-MCNC: 0.38 MG/DL (ref 0.51–0.95)
CREAT UR-MCNC: 241 MG/DL
EGFRCR SERPLBLD CKD-EPI 2021: >90 ML/MIN/1.73M2
ERYTHROCYTE [DISTWIDTH] IN BLOOD BY AUTOMATED COUNT: 13.2 % (ref 10–15)
GLUCOSE SERPL-MCNC: 88 MG/DL (ref 70–99)
HCO3 SERPL-SCNC: 16 MMOL/L (ref 22–29)
HCT VFR BLD AUTO: 33.5 % (ref 35–47)
HGB BLD-MCNC: 11.2 G/DL (ref 11.7–15.7)
MCH RBC QN AUTO: 27.5 PG (ref 26.5–33)
MCHC RBC AUTO-ENTMCNC: 33.4 G/DL (ref 31.5–36.5)
MCV RBC AUTO: 82 FL (ref 78–100)
PLATELET # BLD AUTO: 265 10E3/UL (ref 150–450)
POTASSIUM SERPL-SCNC: 3.9 MMOL/L (ref 3.4–5.3)
PROT SERPL-MCNC: 6 G/DL (ref 6.4–8.3)
PROT/CREAT 24H UR: 0.12 MG/MG CR (ref 0–0.2)
RBC # BLD AUTO: 4.08 10E6/UL (ref 3.8–5.2)
SODIUM SERPL-SCNC: 138 MMOL/L (ref 135–145)
WBC # BLD AUTO: 11.1 10E3/UL (ref 4–11)

## 2024-10-14 PROCEDURE — G0463 HOSPITAL OUTPT CLINIC VISIT: HCPCS

## 2024-10-14 PROCEDURE — 80053 COMPREHEN METABOLIC PANEL: CPT | Performed by: OBSTETRICS & GYNECOLOGY

## 2024-10-14 PROCEDURE — 84156 ASSAY OF PROTEIN URINE: CPT | Performed by: OBSTETRICS & GYNECOLOGY

## 2024-10-14 PROCEDURE — 36415 COLL VENOUS BLD VENIPUNCTURE: CPT | Performed by: OBSTETRICS & GYNECOLOGY

## 2024-10-14 PROCEDURE — 85027 COMPLETE CBC AUTOMATED: CPT | Performed by: OBSTETRICS & GYNECOLOGY

## 2024-10-14 RX ORDER — LIDOCAINE 40 MG/G
CREAM TOPICAL
Status: DISCONTINUED | OUTPATIENT
Start: 2024-10-14 | End: 2024-10-14 | Stop reason: HOSPADM

## 2024-10-14 RX ORDER — VITAMIN A, VITAMIN C, VITAMIN D-3, VITAMIN E, VITAMIN B-1, VITAMIN B-2, NIACIN, VITAMIN B-6, CALCIUM, IRON, ZINC, COPPER 4000; 120; 400; 22; 1.84; 3; 20; 10; 1; 12; 200; 27; 25; 2 [IU]/1; MG/1; [IU]/1; MG/1; MG/1; MG/1; MG/1; MG/1; MG/1; UG/1; MG/1; MG/1; MG/1; MG/1
1 TABLET ORAL DAILY
COMMUNITY

## 2024-10-14 RX ORDER — SODIUM CHLORIDE, SODIUM LACTATE, POTASSIUM CHLORIDE, CALCIUM CHLORIDE 600; 310; 30; 20 MG/100ML; MG/100ML; MG/100ML; MG/100ML
10-125 INJECTION, SOLUTION INTRAVENOUS CONTINUOUS
Status: DISCONTINUED | OUTPATIENT
Start: 2024-10-14 | End: 2024-10-14 | Stop reason: HOSPADM

## 2024-10-14 ASSESSMENT — ACTIVITIES OF DAILY LIVING (ADL)
ADLS_ACUITY_SCORE: 35
ADLS_ACUITY_SCORE: 20
ADLS_ACUITY_SCORE: 20

## 2024-10-14 NOTE — CARE PLAN
Data: Patient presented to [facility]: 10/14/2024 12:05 PM.  Reason for maternal/fetal assessment is decreased fetal movement and hypertension disorder of pregnancy, elevated blood pressures. Patient reports BP of 161/92 at home this am, she was advised for BP that high to arrive in triage. Patient denies uterine contractions, leaking of vaginal fluid/rupture of membranes, vaginal bleeding, abdominal pain, pelvic pressure, nausea, vomiting, headache, visual disturbances, significant edema. Patient reports fetal movement is decreased. Patient is a 36w0d . Prenatal record reviewed. Pregnancy has been complicated by obesity (pre-pregnancy BMI >=35). Support person is present.     Fetal HR baseline was 145, variability is moderate (amplitude range 6 to 25 bpm). Accelerations: increase 15 bpm above baseline lasting 15 seconds. Decelerations:  . Uterine assessment is no contractions during contractions and   at rest. Cervical exam deferred. Fetal presentation   per [place] and pt stated history . Membranes: intact.    Vital signs wnl. Patient reports no pain and is coping.     Action: Verbal consent for EFM. Triage assessment completed. Patient may meet criteria for discharge.     Response: Patient verbalized understanding of triage assessment. Will contact DR Cipriano MANTILLA with assessment and consideration of discharge vs admission.

## 2024-10-14 NOTE — CARE PLAN
Data: Patient assessed in the [facility]place for decreased fetal movement and hypertension disorder of pregnancy. Cervical exam deferred. Membranes intact. Contractions are not present. See flowsheets for fetal assessment documentation.     Action: Presumed adequate fetal oxygenation documented. Discharge instructions reviewed. Patient instructed to report change in fetal movement, vaginal leaking of fluid or bleeding, abdominal pain, or any concerns related to the pregnancy to provider/clinic.      Response: Orders to discharge home per Dr Cota. Patient verbalized understanding of education and agreement with plan. Discharged to home at 1405.

## 2024-10-14 NOTE — PROCEDURES
NST Procedure note    Date: 10/14/2024    Indication: decreased fetal movement, elevated blood pressure.     Procedure: Fetal non-stress test    Results: Silvestre Cota MD  10/14/2024 12:52 PM

## 2024-10-14 NOTE — DISCHARGE INSTRUCTIONS
Learning About When to Call Your Doctor During Pregnancy (After 20 Weeks)  Overview  It's common to have concerns about what might be a problem when you're pregnant. Most pregnancies don't have any serious problems. But it's still important to know when to call your doctor if you have certain symptoms or signs of labor.  These are general suggestions. Your doctor may give you some more information about when to call.  When to call your doctor (after 20 weeks)  Call 911  anytime you think you may need emergency care. For example, call if:  You have severe vaginal bleeding. This means you are soaking through a pad each hour for 2 or more hours.  You have sudden, severe pain in your belly.  You have chest pain, are short of breath, or cough up blood.  You passed out (lost consciousness).  You have a seizure.  You see or feel the umbilical cord.  You think you are about to deliver your baby and can't make it safely to the hospital or birthing center.  Call your doctor now or seek immediate medical care if:  You have vaginal bleeding.  You have belly pain.  You have a fever.  You are dizzy or lightheaded, or you feel like you may faint.  You have signs of a blood clot in your leg (called a deep vein thrombosis), such as:  Pain in the calf, back of the knee, thigh, or groin.  Swelling in your leg or groin.  A color change on the leg or groin. The skin may be reddish or purplish, depending on your usual skin color.  You have symptoms of preeclampsia, such as:  Sudden swelling of your face, hands, or feet.  New vision problems (such as dimness, blurring, or seeing spots).  A severe headache.  You have a sudden release of fluid from your vagina. (You think your water broke.)  You've been having regular contractions for an hour. This means that you've had at least 6 contractions within 1 hour, even after you change your position and drink fluids.  You notice that your baby has stopped moving or is moving less than  "normal.  You have signs of heart failure, such as:  New or increased shortness of breath.  New or worse swelling in your legs, ankles, or feet.  Sudden weight gain, such as more than 2 to 3 pounds in a day or 5 pounds in a week.  Feeling so tired or weak that you cannot do your usual activities.  You have symptoms of a urinary tract infection. These may include:  Pain or burning when you urinate.  A frequent need to urinate without being able to pass much urine.  Pain in the flank, which is just below the rib cage and above the waist on either side of the back.  Blood in your urine.  Watch closely for changes in your health, and be sure to contact your doctor if:  You have vaginal discharge that smells bad.  You feel sad, anxious, or hopeless for more than a few days.  You have skin changes, such as a rash, itching, or a yellow color to your skin.  You have other concerns about your pregnancy.  If you have labor signs at 37 weeks or more  If you have signs of labor at 37 weeks or more, your doctor may tell you to call when your labor becomes more active. Symptoms of active labor include:  Contractions that are regular.  Contractions that are less than 5 minutes apart.  Contractions that are hard to talk through.  Follow-up care is a key part of your treatment and safety. Be sure to make and go to all appointments, and call your doctor if you are having problems. It's also a good idea to know your test results and keep a list of the medicines you take. FOLLOW UP THIS WEEK WITH PROVIDER IN CLINIC  Where can you learn more?  Go to https://www.healthL[employer].net/patiented  Enter N531 in the search box to learn more about \"Learning About When to Call Your Doctor During Pregnancy (After 20 Weeks).\"  Current as of: July 10, 2023  Content Version: 14.2 2024 [facility] [employer] To Get Approved For.   Care instructions adapted under license by your healthcare professional. If you have questions about a medical condition or this instruction, " always ask your healthcare professional. [employer] disclaims any warranty or liability for your use of this information.

## 2024-10-18 ENCOUNTER — HOSPITAL ENCOUNTER (OUTPATIENT)
Facility: CLINIC | Age: 29
Discharge: HOME | End: 2024-10-18
Attending: STUDENT IN AN ORGANIZED HEALTH CARE EDUCATION/TRAINING PROGRAM | Admitting: STUDENT IN AN ORGANIZED HEALTH CARE EDUCATION/TRAINING PROGRAM
Payer: COMMERCIAL

## 2024-10-18 VITALS — SYSTOLIC BLOOD PRESSURE: 122 MMHG | DIASTOLIC BLOOD PRESSURE: 70 MMHG

## 2024-10-18 LAB
ALBUMIN MFR UR ELPH: 11.3 MG/DL
ALBUMIN SERPL BCG-MCNC: 3.4 G/DL (ref 3.5–5.2)
ALP SERPL-CCNC: 106 U/L (ref 40–150)
ALT SERPL W P-5'-P-CCNC: 8 U/L (ref 0–50)
ANION GAP SERPL CALCULATED.3IONS-SCNC: 15 MMOL/L (ref 7–15)
APTT PPP: 29 SECONDS (ref 22–38)
AST SERPL W P-5'-P-CCNC: 14 U/L (ref 0–45)
BILIRUB SERPL-MCNC: 0.2 MG/DL
BUN SERPL-MCNC: 4.3 MG/DL (ref 6–20)
CALCIUM SERPL-MCNC: 8.5 MG/DL (ref 8.8–10.4)
CHLORIDE SERPL-SCNC: 106 MMOL/L (ref 98–107)
CREAT SERPL-MCNC: 0.36 MG/DL (ref 0.51–0.95)
CREAT UR-MCNC: 92 MG/DL
EGFRCR SERPLBLD CKD-EPI 2021: >90 ML/MIN/1.73M2
ERYTHROCYTE [DISTWIDTH] IN BLOOD BY AUTOMATED COUNT: 13.3 % (ref 10–15)
GLUCOSE SERPL-MCNC: 81 MG/DL (ref 70–99)
HCO3 SERPL-SCNC: 16 MMOL/L (ref 22–29)
HCT VFR BLD AUTO: 33.4 % (ref 35–47)
HGB BLD-MCNC: 11.2 G/DL (ref 11.7–15.7)
INR PPP: 0.93 (ref 0.85–1.15)
MCH RBC QN AUTO: 27.5 PG (ref 26.5–33)
MCHC RBC AUTO-ENTMCNC: 33.5 G/DL (ref 31.5–36.5)
MCV RBC AUTO: 82 FL (ref 78–100)
PLATELET # BLD AUTO: 274 10E3/UL (ref 150–450)
POTASSIUM SERPL-SCNC: 3.7 MMOL/L (ref 3.4–5.3)
PROT SERPL-MCNC: 6.3 G/DL (ref 6.4–8.3)
PROT/CREAT 24H UR: 0.12 MG/MG CR (ref 0–0.2)
RBC # BLD AUTO: 4.07 10E6/UL (ref 3.8–5.2)
SODIUM SERPL-SCNC: 137 MMOL/L (ref 135–145)
WBC # BLD AUTO: 12.1 10E3/UL (ref 4–11)

## 2024-10-18 PROCEDURE — 82947 ASSAY GLUCOSE BLOOD QUANT: CPT | Performed by: OBSTETRICS & GYNECOLOGY

## 2024-10-18 PROCEDURE — 85027 COMPLETE CBC AUTOMATED: CPT | Performed by: OBSTETRICS & GYNECOLOGY

## 2024-10-18 PROCEDURE — 84156 ASSAY OF PROTEIN URINE: CPT | Performed by: OBSTETRICS & GYNECOLOGY

## 2024-10-18 PROCEDURE — 36415 COLL VENOUS BLD VENIPUNCTURE: CPT | Performed by: OBSTETRICS & GYNECOLOGY

## 2024-10-18 PROCEDURE — 85730 THROMBOPLASTIN TIME PARTIAL: CPT | Performed by: OBSTETRICS & GYNECOLOGY

## 2024-10-18 PROCEDURE — 250N000013 HC RX MED GY IP 250 OP 250 PS 637

## 2024-10-18 PROCEDURE — 85610 PROTHROMBIN TIME: CPT | Performed by: OBSTETRICS & GYNECOLOGY

## 2024-10-18 PROCEDURE — G0463 HOSPITAL OUTPT CLINIC VISIT: HCPCS

## 2024-10-18 RX ORDER — BETAMETHASONE SODIUM PHOSPHATE AND BETAMETHASONE ACETATE 3; 3 MG/ML; MG/ML
12 INJECTION, SUSPENSION INTRA-ARTICULAR; INTRALESIONAL; INTRAMUSCULAR; SOFT TISSUE EVERY 24 HOURS
Status: DISCONTINUED | OUTPATIENT
Start: 2024-10-18 | End: 2024-10-18

## 2024-10-18 RX ORDER — OXYCODONE HYDROCHLORIDE 5 MG/1
5 TABLET ORAL ONCE
Status: DISCONTINUED | OUTPATIENT
Start: 2024-10-18 | End: 2024-10-18 | Stop reason: HOSPADM

## 2024-10-18 RX ORDER — ACETAMINOPHEN 325 MG/1
650 TABLET ORAL EVERY 4 HOURS PRN
Status: DISCONTINUED | OUTPATIENT
Start: 2024-10-18 | End: 2024-10-18 | Stop reason: HOSPADM

## 2024-10-18 RX ORDER — LIDOCAINE 40 MG/G
CREAM TOPICAL
Status: DISCONTINUED | OUTPATIENT
Start: 2024-10-18 | End: 2024-10-18 | Stop reason: HOSPADM

## 2024-10-18 RX ORDER — ACETAMINOPHEN 650 MG/1
650 SUPPOSITORY RECTAL EVERY 4 HOURS PRN
Status: DISCONTINUED | OUTPATIENT
Start: 2024-10-18 | End: 2024-10-18 | Stop reason: HOSPADM

## 2024-10-18 RX ADMIN — ACETAMINOPHEN 650 MG: 325 TABLET ORAL at 18:31

## 2024-10-18 ASSESSMENT — ACTIVITIES OF DAILY LIVING (ADL)
ADLS_ACUITY_SCORE: 35

## 2024-10-18 NOTE — PROGRESS NOTES
OB Triage Note        Assessment and Plan:     Lucero Collier is a 29 year old  at 36w4d not in labor with hx of depression on Wellbutrin, epilepsy (no seizure for past 5 yrs, not on medication), prenatal BMI >40, umbilical cord cyst, and pregnancy induced hypertension diagnosed at 32w who presents for a persistent headache and mild range blood pressures. Pre-E labs are all wnl but with the new headache that is not responsive to Tylenol, this is concerning for pre-eclampsia with severe features. Will attempt to improve HA with stronger pain medication and monitor closely. Of note, patient has IOL scheduled for next week on 10/23.    Patient Active Problem List   Diagnosis    ADHD (attention deficit hyperactivity disorder)    Constipation    Myopia    Spells    Chronic headaches    GERD (gastroesophageal reflux disease)    Adverse reaction to pertussis vaccine    Learning problem    Overweight    Sleep disturbance    Adjustment disorder with mixed anxiety and depressed mood    Migraine without status migrainosus, not intractable, unspecified migraine type    Anxiety    Severe single current episode of major depressive disorder, without psychotic features (H)    Depression    HTN (hypertension)    Obesity (BMI 35.0-39.9) with comorbidity (H)    Encounter for triage in pregnant patient       Oxycodone 5mg for HA  Betamethasone x2 for fetal lung maturity  Monitor BP and HA    Patient discussed with attending physician, Dr. Pat Richards  , who agrees with the plan.      Jessie Ovalle MD PGY-1 10/18/2024  [facility]       Subjective:     Lucero Collier is a  29 year old female at 36w4d with a current prenatal history significant for BMI >40, umbilical cord cyst, and PIH diagnosed at 32 w who presents to OB triage with a persistent headache since yesterday that does not respond to Tylenol. She has also been monitoring her blood pressure at home every  "morning and evening and has had readings in the 150s/80s. She has come to triage several times due to elevated BP, but this is the first time she has also had a headache.       Lucero Collier is a patient of Dr. Almanza at [facility].    She denies regular contractions. She denies fluid leakage. She reports vaginal spotting last Friday (10/11) after a cervical check in the clinic. No vaginal bleeding since then. Fetal movement is normal -- she has an anterior placenta so has never felt a lot of movement.  Estimated Date of Delivery: Nov 11, 2024 Patient's last menstrual period was 02/06/2024 (approximate).       Her prenatal course has been complicated by PIH.    She has not received the RSV vaccine.     Prenatal labs:   Lab Results   Component Value Date    AS Negative 04/29/2024    HEPBANG Nonreactive 04/29/2024    CHPCRT Negative 11/27/2020    GCPCRT Negative 11/27/2020    HGB 11.2 (L) 10/18/2024          Review of Systems:   CONSTITUTIONAL: no fatigue, no unexpected change in weight  SKIN: no worrisome rashes or lesions  EYES: no acute vision problems or changes  RESP: no significant cough, no shortness of breath  CV: no chest pain, no new or worsening peripheral edema  GI: +nausea, no vomiting  : no dysuria  NEURO: +headache         Physical Exam:   Vitals:   LMP 02/06/2024 (Approximate)   0 lbs 0 oz  Estimated body mass index is 44.93 kg/m  as calculated from the following:    Height as of 9/13/24: 1.651 m (5' 5\").    Weight as of 9/13/24: 122.5 kg (270 lb).    GEN: Awake, alert in no apparent distress   HEENT: grossly normal  RESPIRATORY: clear to auscultation bilaterally, no increased work of breathing  CARDIOVASCULAR: RRR, no murmur  ABDOMEN: gravid, soft, nontender  EXT: edema in bilaterally lower extremities    NST interpretation:  Baseline rate 145 normal  Accelerations present  Decelerations not present  Interpretation: reactive    Labs today:  Results for orders placed or performed during the " hospital encounter of 10/18/24   CBC with platelets     Status: Abnormal   Result Value Ref Range    WBC Count 12.1 (H) 4.0 - 11.0 10e3/uL    RBC Count 4.07 3.80 - 5.20 10e6/uL    Hemoglobin 11.2 (L) 11.7 - 15.7 g/dL    Hematocrit 33.4 (L) 35.0 - 47.0 %    MCV 82 78 - 100 fL    MCH 27.5 26.5 - 33.0 pg    MCHC 33.5 31.5 - 36.5 g/dL    RDW 13.3 10.0 - 15.0 %    Platelet Count 274 150 - 450 10e3/uL   Comprehensive metabolic panel     Status: Abnormal   Result Value Ref Range    Sodium 137 135 - 145 mmol/L    Potassium 3.7 3.4 - 5.3 mmol/L    Carbon Dioxide (CO2) 16 (L) 22 - 29 mmol/L    Anion Gap 15 7 - 15 mmol/L    Urea Nitrogen 4.3 (L) 6.0 - 20.0 mg/dL    Creatinine 0.36 (L) 0.51 - 0.95 mg/dL    GFR Estimate >90 >60 mL/min/1.73m2    Calcium 8.5 (L) 8.8 - 10.4 mg/dL    Chloride 106 98 - 107 mmol/L    Glucose 81 70 - 99 mg/dL    Alkaline Phosphatase 106 40 - 150 U/L    AST 14 0 - 45 U/L    ALT 8 0 - 50 U/L    Protein Total 6.3 (L) 6.4 - 8.3 g/dL    Albumin 3.4 (L) 3.5 - 5.2 g/dL    Bilirubin Total 0.2 <=1.2 mg/dL   INR     Status: Normal   Result Value Ref Range    INR 0.93 0.85 - 1.15   Partial thromboplastin time     Status: Normal   Result Value Ref Range    aPTT 29 22 - 38 Seconds   Protein  random urine     Status: None   Result Value Ref Range    Total Protein Urine mg/dL 11.3   mg/dL    Total Protein Urine mg/mg Creat 0.12 0.00 - 0.20 mg/mg Cr    Creatinine Urine mg/dL 92.0 mg/dL

## 2024-10-19 NOTE — PROGRESS NOTES
Patient stating her headache has improved.  Discharge orders received.  Patient does have an induction scheduled for next week.  Report given to MILEY Augustin.  Care turned over.

## 2024-10-19 NOTE — PROGRESS NOTES
Data: Patient assessed in the [facility] for hypertension disorder of pregnancy. Cervical exam deferred. Membranes intact. Contractions are not present. See flowsheets for fetal assessment documentation.     Action: Presumed adequate fetal oxygenation documented. Discharge instructions reviewed. Patient instructed to report change in fetal movement, vaginal leaking of fluid or bleeding, abdominal pain, or any concerns related to the pregnancy to provider/clinic.      Response: Orders to discharge home per provider. Patient verbalized understanding of education and agreement with plan. Discharged to home at 1929.    Demetria Ribera RN

## 2024-10-19 NOTE — DISCHARGE INSTRUCTIONS
MARCY TRIAGE DISCHARGE INSTRUCTIONS    You were seen for: elevated blood pressure, persistent headache  You had (Test or Medicine): electronic fetal monitoring, labs    WHEN TO CALL YOUR PROVIDER:  If this is your first baby: Your contractions (tightening) are 5 minutes apart, last more than 1 minute, and have been consistently getting stronger for 1 hour or more  If this is your second baby or beyond: Your contractions are less than 10 minutes apart and have been consistently getting stronger for 1 hour or more  Feeling your baby move less than usual  Temperature of 100.4 F (38 C) or higher  Leaking of fluid from your vaginal area  Vaginal bleeding (bright red blood)  Swelling in your face or more swelling in your hands or legs  An increase in weight (5lbs or more/week)  Headaches that don't get better after taking Tylenol (acetaminophen)  Changes in your vision (blurry or seeing spots or stars)  Nausea (sick to your stomach) and vomiting (throwing up)  Epigastric pain (sometimes confused with heartburn that does not go away or a very bad stomach ache)  Other signs your provider asked you to look for in your body      FOLLOW UP:  As scheduled in the clinic    If you have any questions, please follow up with your provider.  Provider/clinic number: Jose [phone]

## 2024-10-23 ENCOUNTER — HOSPITAL ENCOUNTER (INPATIENT)
Facility: CLINIC | Age: 29
LOS: 6 days | Discharge: HOME | End: 2024-10-29
Attending: STUDENT IN AN ORGANIZED HEALTH CARE EDUCATION/TRAINING PROGRAM | Admitting: OBSTETRICS & GYNECOLOGY
Payer: COMMERCIAL

## 2024-10-23 DIAGNOSIS — I10 HYPERTENSION, UNSPECIFIED TYPE: ICD-10-CM

## 2024-10-23 DIAGNOSIS — Z98.891 S/P PRIMARY LOW TRANSVERSE C-SECTION: Primary | ICD-10-CM

## 2024-10-23 DIAGNOSIS — D62 ANEMIA DUE TO BLOOD LOSS, ACUTE: ICD-10-CM

## 2024-10-23 PROBLEM — Z34.90 ENCOUNTER FOR INDUCTION OF LABOR: Status: ACTIVE | Noted: 2024-10-23

## 2024-10-23 LAB
ABO/RH(D): NORMAL
ALBUMIN MFR UR ELPH: 24.4 MG/DL
ALBUMIN SERPL BCG-MCNC: 3.4 G/DL (ref 3.5–5.2)
ALP SERPL-CCNC: 106 U/L (ref 40–150)
ALT SERPL W P-5'-P-CCNC: 10 U/L (ref 0–50)
ANION GAP SERPL CALCULATED.3IONS-SCNC: 13 MMOL/L (ref 7–15)
ANTIBODY SCREEN: NEGATIVE
AST SERPL W P-5'-P-CCNC: 16 U/L (ref 0–45)
BILIRUB SERPL-MCNC: 0.2 MG/DL
BUN SERPL-MCNC: 5 MG/DL (ref 6–20)
CALCIUM SERPL-MCNC: 8.6 MG/DL (ref 8.8–10.4)
CHLORIDE SERPL-SCNC: 107 MMOL/L (ref 98–107)
CREAT SERPL-MCNC: 0.47 MG/DL (ref 0.51–0.95)
CREAT UR-MCNC: 152 MG/DL
EGFRCR SERPLBLD CKD-EPI 2021: >90 ML/MIN/1.73M2
ERYTHROCYTE [DISTWIDTH] IN BLOOD BY AUTOMATED COUNT: 13.5 % (ref 10–15)
GLUCOSE SERPL-MCNC: 108 MG/DL (ref 70–99)
HCO3 SERPL-SCNC: 19 MMOL/L (ref 22–29)
HCT VFR BLD AUTO: 33.2 % (ref 35–47)
HGB BLD-MCNC: 11 G/DL (ref 11.7–15.7)
HGB BLD-MCNC: 11.3 G/DL (ref 11.7–15.7)
MCH RBC QN AUTO: 27.2 PG (ref 26.5–33)
MCHC RBC AUTO-ENTMCNC: 33.1 G/DL (ref 31.5–36.5)
MCV RBC AUTO: 82 FL (ref 78–100)
PLATELET # BLD AUTO: 264 10E3/UL (ref 150–450)
POTASSIUM SERPL-SCNC: 3.6 MMOL/L (ref 3.4–5.3)
PROT SERPL-MCNC: 6.1 G/DL (ref 6.4–8.3)
PROT/CREAT 24H UR: 0.16 MG/MG CR (ref 0–0.2)
RBC # BLD AUTO: 4.05 10E6/UL (ref 3.8–5.2)
SODIUM SERPL-SCNC: 139 MMOL/L (ref 135–145)
SPECIMEN EXPIRATION DATE: NORMAL
T PALLIDUM AB SER QL: NONREACTIVE
WBC # BLD AUTO: 11.3 10E3/UL (ref 4–11)

## 2024-10-23 PROCEDURE — 86780 TREPONEMA PALLIDUM: CPT

## 2024-10-23 PROCEDURE — 36415 COLL VENOUS BLD VENIPUNCTURE: CPT | Performed by: OBSTETRICS & GYNECOLOGY

## 2024-10-23 PROCEDURE — 250N000011 HC RX IP 250 OP 636: Performed by: OBSTETRICS & GYNECOLOGY

## 2024-10-23 PROCEDURE — 250N000013 HC RX MED GY IP 250 OP 250 PS 637

## 2024-10-23 PROCEDURE — 85027 COMPLETE CBC AUTOMATED: CPT

## 2024-10-23 PROCEDURE — 999N000248 HC STATISTIC IV INSERT WITH US BY RN

## 2024-10-23 PROCEDURE — 250N000013 HC RX MED GY IP 250 OP 250 PS 637: Performed by: OBSTETRICS & GYNECOLOGY

## 2024-10-23 PROCEDURE — 85018 HEMOGLOBIN: CPT | Performed by: OBSTETRICS & GYNECOLOGY

## 2024-10-23 PROCEDURE — 86900 BLOOD TYPING SEROLOGIC ABO: CPT

## 2024-10-23 PROCEDURE — 86901 BLOOD TYPING SEROLOGIC RH(D): CPT

## 2024-10-23 PROCEDURE — 36415 COLL VENOUS BLD VENIPUNCTURE: CPT

## 2024-10-23 PROCEDURE — 80053 COMPREHEN METABOLIC PANEL: CPT | Performed by: OBSTETRICS & GYNECOLOGY

## 2024-10-23 PROCEDURE — 120N000001 HC R&B MED SURG/OB

## 2024-10-23 PROCEDURE — 84156 ASSAY OF PROTEIN URINE: CPT | Performed by: OBSTETRICS & GYNECOLOGY

## 2024-10-23 RX ORDER — LIDOCAINE 40 MG/G
CREAM TOPICAL
Status: DISCONTINUED | OUTPATIENT
Start: 2024-10-23 | End: 2024-10-26

## 2024-10-23 RX ORDER — METOCLOPRAMIDE 10 MG/1
10 TABLET ORAL EVERY 6 HOURS PRN
Status: DISCONTINUED | OUTPATIENT
Start: 2024-10-23 | End: 2024-10-25

## 2024-10-23 RX ORDER — MISOPROSTOL 200 UG/1
400 TABLET ORAL
Status: DISCONTINUED | OUTPATIENT
Start: 2024-10-23 | End: 2024-10-26 | Stop reason: HOSPADM

## 2024-10-23 RX ORDER — SODIUM CHLORIDE, SODIUM LACTATE, POTASSIUM CHLORIDE, CALCIUM CHLORIDE 600; 310; 30; 20 MG/100ML; MG/100ML; MG/100ML; MG/100ML
10-125 INJECTION, SOLUTION INTRAVENOUS CONTINUOUS
Status: DISCONTINUED | OUTPATIENT
Start: 2024-10-23 | End: 2024-10-28

## 2024-10-23 RX ORDER — CARBOPROST TROMETHAMINE 250 UG/ML
250 INJECTION, SOLUTION INTRAMUSCULAR
Status: DISCONTINUED | OUTPATIENT
Start: 2024-10-23 | End: 2024-10-26 | Stop reason: HOSPADM

## 2024-10-23 RX ORDER — LOPERAMIDE HYDROCHLORIDE 2 MG/1
2 CAPSULE ORAL
Status: DISCONTINUED | OUTPATIENT
Start: 2024-10-23 | End: 2024-10-26 | Stop reason: HOSPADM

## 2024-10-23 RX ORDER — MISOPROSTOL 100 UG/1
25 TABLET ORAL
Status: COMPLETED | OUTPATIENT
Start: 2024-10-23 | End: 2024-10-24

## 2024-10-23 RX ORDER — OXYTOCIN/0.9 % SODIUM CHLORIDE 30/500 ML
100-340 PLASTIC BAG, INJECTION (ML) INTRAVENOUS CONTINUOUS PRN
Status: DISCONTINUED | OUTPATIENT
Start: 2024-10-23 | End: 2024-10-29 | Stop reason: HOSPADM

## 2024-10-23 RX ORDER — CITRIC ACID/SODIUM CITRATE 334-500MG
30 SOLUTION, ORAL ORAL
Status: DISCONTINUED | OUTPATIENT
Start: 2024-10-23 | End: 2024-10-26 | Stop reason: HOSPADM

## 2024-10-23 RX ORDER — PROCHLORPERAZINE MALEATE 10 MG
10 TABLET ORAL EVERY 6 HOURS PRN
Status: DISCONTINUED | OUTPATIENT
Start: 2024-10-23 | End: 2024-10-26 | Stop reason: HOSPADM

## 2024-10-23 RX ORDER — METHYLERGONOVINE MALEATE 0.2 MG/ML
200 INJECTION INTRAVENOUS
Status: DISCONTINUED | OUTPATIENT
Start: 2024-10-23 | End: 2024-10-26 | Stop reason: HOSPADM

## 2024-10-23 RX ORDER — ONDANSETRON 4 MG/1
4 TABLET, ORALLY DISINTEGRATING ORAL EVERY 6 HOURS PRN
Status: DISCONTINUED | OUTPATIENT
Start: 2024-10-23 | End: 2024-10-26 | Stop reason: HOSPADM

## 2024-10-23 RX ORDER — TRANEXAMIC ACID 10 MG/ML
1 INJECTION, SOLUTION INTRAVENOUS EVERY 30 MIN PRN
Status: DISCONTINUED | OUTPATIENT
Start: 2024-10-23 | End: 2024-10-26 | Stop reason: HOSPADM

## 2024-10-23 RX ORDER — HYDROXYZINE HYDROCHLORIDE 25 MG/1
50 TABLET, FILM COATED ORAL
Status: DISCONTINUED | OUTPATIENT
Start: 2024-10-23 | End: 2024-10-26 | Stop reason: HOSPADM

## 2024-10-23 RX ORDER — OXYTOCIN 10 [USP'U]/ML
10 INJECTION, SOLUTION INTRAMUSCULAR; INTRAVENOUS
Status: DISCONTINUED | OUTPATIENT
Start: 2024-10-23 | End: 2024-10-26 | Stop reason: HOSPADM

## 2024-10-23 RX ORDER — OXYTOCIN/0.9 % SODIUM CHLORIDE 30/500 ML
340 PLASTIC BAG, INJECTION (ML) INTRAVENOUS CONTINUOUS PRN
Status: DISCONTINUED | OUTPATIENT
Start: 2024-10-23 | End: 2024-10-26 | Stop reason: HOSPADM

## 2024-10-23 RX ORDER — NALOXONE HYDROCHLORIDE 0.4 MG/ML
0.2 INJECTION, SOLUTION INTRAMUSCULAR; INTRAVENOUS; SUBCUTANEOUS
Status: DISCONTINUED | OUTPATIENT
Start: 2024-10-23 | End: 2024-10-26 | Stop reason: HOSPADM

## 2024-10-23 RX ORDER — IBUPROFEN 800 MG/1
800 TABLET, FILM COATED ORAL
Status: COMPLETED | OUTPATIENT
Start: 2024-10-23 | End: 2024-10-26

## 2024-10-23 RX ORDER — TERBUTALINE SULFATE 1 MG/ML
0.25 INJECTION, SOLUTION SUBCUTANEOUS
Status: DISCONTINUED | OUTPATIENT
Start: 2024-10-23 | End: 2024-10-26 | Stop reason: HOSPADM

## 2024-10-23 RX ORDER — NALOXONE HYDROCHLORIDE 0.4 MG/ML
0.4 INJECTION, SOLUTION INTRAMUSCULAR; INTRAVENOUS; SUBCUTANEOUS
Status: DISCONTINUED | OUTPATIENT
Start: 2024-10-23 | End: 2024-10-26 | Stop reason: HOSPADM

## 2024-10-23 RX ORDER — ONDANSETRON 2 MG/ML
4 INJECTION INTRAMUSCULAR; INTRAVENOUS EVERY 6 HOURS PRN
Status: DISCONTINUED | OUTPATIENT
Start: 2024-10-23 | End: 2024-10-26 | Stop reason: HOSPADM

## 2024-10-23 RX ORDER — METOCLOPRAMIDE HYDROCHLORIDE 5 MG/ML
10 INJECTION INTRAMUSCULAR; INTRAVENOUS EVERY 6 HOURS PRN
Status: DISCONTINUED | OUTPATIENT
Start: 2024-10-23 | End: 2024-10-25

## 2024-10-23 RX ORDER — KETOROLAC TROMETHAMINE 30 MG/ML
30 INJECTION, SOLUTION INTRAMUSCULAR; INTRAVENOUS
Status: COMPLETED | OUTPATIENT
Start: 2024-10-23 | End: 2024-10-26

## 2024-10-23 RX ORDER — LABETALOL HYDROCHLORIDE 5 MG/ML
20-80 INJECTION, SOLUTION INTRAVENOUS EVERY 10 MIN PRN
Status: DISCONTINUED | OUTPATIENT
Start: 2024-10-23 | End: 2024-10-29 | Stop reason: HOSPADM

## 2024-10-23 RX ORDER — OXYTOCIN 10 [USP'U]/ML
10 INJECTION, SOLUTION INTRAMUSCULAR; INTRAVENOUS
Status: DISCONTINUED | OUTPATIENT
Start: 2024-10-23 | End: 2024-10-29 | Stop reason: HOSPADM

## 2024-10-23 RX ORDER — MISOPROSTOL 200 UG/1
800 TABLET ORAL
Status: DISCONTINUED | OUTPATIENT
Start: 2024-10-23 | End: 2024-10-26 | Stop reason: HOSPADM

## 2024-10-23 RX ORDER — MORPHINE SULFATE 10 MG/ML
10 INJECTION, SOLUTION INTRAMUSCULAR; INTRAVENOUS
Status: COMPLETED | OUTPATIENT
Start: 2024-10-23 | End: 2024-10-23

## 2024-10-23 RX ORDER — HYDRALAZINE HYDROCHLORIDE 20 MG/ML
10 INJECTION INTRAMUSCULAR; INTRAVENOUS
Status: DISCONTINUED | OUTPATIENT
Start: 2024-10-23 | End: 2024-10-29 | Stop reason: HOSPADM

## 2024-10-23 RX ORDER — LOPERAMIDE HYDROCHLORIDE 2 MG/1
4 CAPSULE ORAL
Status: DISCONTINUED | OUTPATIENT
Start: 2024-10-23 | End: 2024-10-26 | Stop reason: HOSPADM

## 2024-10-23 RX ORDER — ACETAMINOPHEN 325 MG/1
650 TABLET ORAL EVERY 4 HOURS PRN
Status: DISCONTINUED | OUTPATIENT
Start: 2024-10-23 | End: 2024-10-26 | Stop reason: HOSPADM

## 2024-10-23 RX ORDER — LIDOCAINE 40 MG/G
CREAM TOPICAL
Status: ACTIVE | OUTPATIENT
Start: 2024-10-23 | End: 2024-10-26

## 2024-10-23 RX ADMIN — Medication 25 MCG: at 21:53

## 2024-10-23 RX ADMIN — Medication 25 MCG: at 17:31

## 2024-10-23 RX ADMIN — Medication 25 MCG: at 13:30

## 2024-10-23 RX ADMIN — Medication 25 MCG: at 11:14

## 2024-10-23 RX ADMIN — ACETAMINOPHEN 650 MG: 325 TABLET, FILM COATED ORAL at 19:51

## 2024-10-23 RX ADMIN — MORPHINE SULFATE 10 MG: 10 INJECTION, SOLUTION INTRAMUSCULAR; INTRAVENOUS at 21:18

## 2024-10-23 RX ADMIN — Medication 25 MCG: at 09:13

## 2024-10-23 RX ADMIN — HYDROXYZINE HYDROCHLORIDE 50 MG: 25 TABLET, FILM COATED ORAL at 21:16

## 2024-10-23 RX ADMIN — ACETAMINOPHEN 650 MG: 325 TABLET, FILM COATED ORAL at 13:30

## 2024-10-23 RX ADMIN — Medication 25 MCG: at 19:45

## 2024-10-23 RX ADMIN — Medication 25 MCG: at 15:31

## 2024-10-23 ASSESSMENT — ACTIVITIES OF DAILY LIVING (ADL)
ADLS_ACUITY_SCORE: 0

## 2024-10-23 NOTE — PLAN OF CARE
Problem: Labor  Goal: Acceptable Pain Control  Outcome: Progressing   Goal Outcome Evaluation:                  Pain is less than or equal to 3 with oral pain meds

## 2024-10-23 NOTE — H&P
[facility] LABOR & DELIVERY   HISTORY AND PHYSICAL UPDATE ADMISSION EXAM      NAME:Lucero Collier  : 1995   MRN: [id number]   GESTATIONAL AGE: 37w2d    ADMISSION DATE: 10/23/2024     PCP:  Cristal Beltran Ra       Pregnancy History: This is a 28yo  @ 37w2d who presents for IOL secondary to GHTN.  Her cervix in the clinic was noted to be 1cm.    OBSTETRIC HISTORY:    OB History    Para Term  AB Living   1 0 0 0 0 0   SAB IAB Ectopic Multiple Live Births   0 0 0 0 0      # Outcome Date GA Lbr Maury/2nd Weight Sex Type Anes PTL Lv   1 Current                EDC: Estimated Date of Delivery: 2024    EGA: 37w2d      Prenatal Complications   Patient Active Problem List   Diagnosis    ADHD (attention deficit hyperactivity disorder)    Constipation    Myopia    Spells    Chronic headaches    GERD (gastroesophageal reflux disease)    Adverse reaction to pertussis vaccine    Learning problem    Overweight    Sleep disturbance    Adjustment disorder with mixed anxiety and depressed mood    Migraine without status migrainosus, not intractable, unspecified migraine type    Anxiety    Severe single current episode of major depressive disorder, without psychotic features (H)    Depression    HTN (hypertension)    Obesity (BMI 35.0-39.9) with comorbidity (H)    Encounter for triage in pregnant patient    Encounter for induction of labor       Exam:      LMP 2024 (Approximate)     Fetal heart Rate Tracing: reactive and reassuring  Contractions: acontractile    HEENT  negative  Heart       Lungs      Abdomen   Abdomen soft, non-tender. BS normal. No masses, organomegaly  Extremities  Normal, Warm, No cyanosis, no clubbing, and No edema    Vaginal exam: deferred    Assessment: 28yo  @ 37w2d - Induction for GHTN   History of seizure disorder - last 5yrs ago; no meds  Prior Nissen surgery    Plan: Admit - see IP orders  cervical ripening with misoprostol  Anticipate      Prenatal record reviewed.    Gissel Whitt DO on 10/23/2024 at 7:53 AM

## 2024-10-24 PROCEDURE — 3E033VJ INTRODUCTION OF OTHER HORMONE INTO PERIPHERAL VEIN, PERCUTANEOUS APPROACH: ICD-10-PCS | Performed by: OBSTETRICS & GYNECOLOGY

## 2024-10-24 PROCEDURE — 250N000011 HC RX IP 250 OP 636: Performed by: OBSTETRICS & GYNECOLOGY

## 2024-10-24 PROCEDURE — 258N000003 HC RX IP 258 OP 636: Performed by: OBSTETRICS & GYNECOLOGY

## 2024-10-24 PROCEDURE — 120N000001 HC R&B MED SURG/OB

## 2024-10-24 PROCEDURE — 258N000003 HC RX IP 258 OP 636

## 2024-10-24 PROCEDURE — 250N000013 HC RX MED GY IP 250 OP 250 PS 637

## 2024-10-24 PROCEDURE — 250N000009 HC RX 250: Performed by: OBSTETRICS & GYNECOLOGY

## 2024-10-24 PROCEDURE — 250N000013 HC RX MED GY IP 250 OP 250 PS 637: Performed by: OBSTETRICS & GYNECOLOGY

## 2024-10-24 RX ORDER — LIDOCAINE 40 MG/G
CREAM TOPICAL
Status: DISCONTINUED | OUTPATIENT
Start: 2024-10-24 | End: 2024-10-26 | Stop reason: HOSPADM

## 2024-10-24 RX ORDER — OXYCODONE HYDROCHLORIDE 5 MG/1
5 TABLET ORAL ONCE
Status: COMPLETED | OUTPATIENT
Start: 2024-10-24 | End: 2024-10-24

## 2024-10-24 RX ORDER — MORPHINE SULFATE 10 MG/ML
15 INJECTION, SOLUTION INTRAMUSCULAR; INTRAVENOUS ONCE
Status: COMPLETED | OUTPATIENT
Start: 2024-10-24 | End: 2024-10-24

## 2024-10-24 RX ORDER — OXYTOCIN/0.9 % SODIUM CHLORIDE 30/500 ML
1-24 PLASTIC BAG, INJECTION (ML) INTRAVENOUS CONTINUOUS
Status: DISCONTINUED | OUTPATIENT
Start: 2024-10-24 | End: 2024-10-26 | Stop reason: HOSPADM

## 2024-10-24 RX ORDER — SODIUM CHLORIDE, SODIUM LACTATE, POTASSIUM CHLORIDE, CALCIUM CHLORIDE 600; 310; 30; 20 MG/100ML; MG/100ML; MG/100ML; MG/100ML
INJECTION, SOLUTION INTRAVENOUS CONTINUOUS PRN
Status: DISCONTINUED | OUTPATIENT
Start: 2024-10-24 | End: 2024-10-26 | Stop reason: HOSPADM

## 2024-10-24 RX ADMIN — HYDROXYZINE HYDROCHLORIDE 50 MG: 25 TABLET, FILM COATED ORAL at 22:59

## 2024-10-24 RX ADMIN — SODIUM CHLORIDE, POTASSIUM CHLORIDE, SODIUM LACTATE AND CALCIUM CHLORIDE: 600; 310; 30; 20 INJECTION, SOLUTION INTRAVENOUS at 13:14

## 2024-10-24 RX ADMIN — ACETAMINOPHEN 650 MG: 325 TABLET, FILM COATED ORAL at 14:16

## 2024-10-24 RX ADMIN — MORPHINE SULFATE 15 MG: 10 INJECTION, SOLUTION INTRAMUSCULAR; INTRAVENOUS at 23:00

## 2024-10-24 RX ADMIN — Medication 25 MCG: at 08:42

## 2024-10-24 RX ADMIN — OXYCODONE HYDROCHLORIDE 5 MG: 5 TABLET ORAL at 17:40

## 2024-10-24 RX ADMIN — ACETAMINOPHEN 650 MG: 325 TABLET, FILM COATED ORAL at 08:42

## 2024-10-24 RX ADMIN — Medication 2 MILLI-UNITS/MIN: at 13:20

## 2024-10-24 RX ADMIN — SODIUM CHLORIDE, POTASSIUM CHLORIDE, SODIUM LACTATE AND CALCIUM CHLORIDE 500 ML: 600; 310; 30; 20 INJECTION, SOLUTION INTRAVENOUS at 16:17

## 2024-10-24 RX ADMIN — Medication 25 MCG: at 00:20

## 2024-10-24 RX ADMIN — Medication 25 MCG: at 04:32

## 2024-10-24 RX ADMIN — Medication 25 MCG: at 06:35

## 2024-10-24 RX ADMIN — Medication 25 MCG: at 02:30

## 2024-10-24 RX ADMIN — ACETAMINOPHEN 650 MG: 325 TABLET, FILM COATED ORAL at 02:32

## 2024-10-24 NOTE — PLAN OF CARE
Goal Outcome Evaluation:             Pt vital stable. Up independently. Back pain from prior to admission requiring tylenol. Pt plan for pitocin. Pt denies wanting an epidural for pain management. Expressed wanting nitrous and IV pain medicine for pain when active labor begins.

## 2024-10-24 NOTE — PROGRESS NOTES
Pt up independently. Pt having back pain and lower abdominal pain intermittently. Pain has increased. CAT 2 strip this afternoon after pitocin. F/U with resident. Completed fluid bolus. Late noted 1/2 pit. And then stopped pitocin per direction of resident. Will restart Pitocin at 2 in 1 hour.

## 2024-10-24 NOTE — PROGRESS NOTES
Labor Progress Note    Assessment/Plan  29 year old  at 37w3d gestational age admitted for IOL secondary to GHTN.     - Continue to monitor FHR  - S/P 12 doses of cytotec, plan for low dose pitocin   - Tylenol for pain control   - Anticipate     Subjective  Pain well controlled with tylenol, felt mostly in the lower back. No HA, vision changes, RUQ pain. LE edema stable and nonpainful.     Objective  Vital signs in last 24 hours  Temp:  [98.1  F (36.7  C)-98.3  F (36.8  C)] 98.1  F (36.7  C)  Pulse:  [90] 90  Resp:  [16] 16  BP: (128-144)/(70-77) 137/72  SpO2:  [97 %] 97 %      Physical Exam  General:   Abd: Gravid, soft, nontender  Cervix: deferred until after last cytotec dose  FHR: Baseline 150/moderate variability/+ accels/questionable variable decels (kali sensitivity changing); Category 1 tracing  [place]: Contractions Q 5-15 min  Extremities: Mild peripheral edema    Pertinent Labs   Lab Results   Component Value Date    ABORH O POS 10/23/2024    HGB 11.0 10/23/2024    HGB 11.9 2021        Patient discussed with attending physician, Isidro Marquez OB who agrees with this plan.     Tania Allen MD PGY1 10/24/2024  [facility]

## 2024-10-24 NOTE — PROGRESS NOTES
BRIEF PROGRESS NOTE    S: Patient experiencing increased pain, particularly back pain associated with contractions. Would like to avoid an epidural, currently getting tylenol. She is receiving 6 of pitocin with recurrent variable decelerations and fetal tachycardia in the 160s. Patient has gotten 500 mL bolus of LR without resolution of decelerations/tachycardia.     O: Temp: 98.1  F (36.7  C) Temp src: Oral BP: 129/80     Resp: 20 SpO2: 97 % O2 Device: None (Room air)    FHR: Baseline 160 +accelerations, +variable decelerations, contractions q 10 minutes -- Category II    A/P:   - Pain control: PO oxycodone 5 mg once, may consider additional doses if pain controlled  - Induction: pause pitocin until 1835, then plan to restart at half of previous dose  - Continue to monitor FHR    Tania Allen MD PGY1  [facility]   October 24, 2024

## 2024-10-25 ENCOUNTER — ANESTHESIA (OUTPATIENT)
Dept: OBGYN | Facility: CLINIC | Age: 29
End: 2024-10-25
Payer: COMMERCIAL

## 2024-10-25 ENCOUNTER — ANESTHESIA EVENT (OUTPATIENT)
Dept: OBGYN | Facility: CLINIC | Age: 29
End: 2024-10-25
Payer: COMMERCIAL

## 2024-10-25 LAB — HGB BLD-MCNC: 12 G/DL (ref 11.7–15.7)

## 2024-10-25 PROCEDURE — 250N000011 HC RX IP 250 OP 636: Mod: JZ | Performed by: OBSTETRICS & GYNECOLOGY

## 2024-10-25 PROCEDURE — 250N000013 HC RX MED GY IP 250 OP 250 PS 637: Performed by: OBSTETRICS & GYNECOLOGY

## 2024-10-25 PROCEDURE — 250N000013 HC RX MED GY IP 250 OP 250 PS 637

## 2024-10-25 PROCEDURE — 3E0R3BZ INTRODUCTION OF ANESTHETIC AGENT INTO SPINAL CANAL, PERCUTANEOUS APPROACH: ICD-10-PCS | Performed by: ANESTHESIOLOGY

## 2024-10-25 PROCEDURE — 999N000249 HC STATISTIC C-SECTION ON UNIT: Performed by: OBSTETRICS & GYNECOLOGY

## 2024-10-25 PROCEDURE — 85018 HEMOGLOBIN: CPT | Performed by: OBSTETRICS & GYNECOLOGY

## 2024-10-25 PROCEDURE — 272N000001 HC OR GENERAL SUPPLY STERILE: Performed by: OBSTETRICS & GYNECOLOGY

## 2024-10-25 PROCEDURE — 360N000076 HC SURGERY LEVEL 3, PER MIN: Performed by: OBSTETRICS & GYNECOLOGY

## 2024-10-25 PROCEDURE — 00HU33Z INSERTION OF INFUSION DEVICE INTO SPINAL CANAL, PERCUTANEOUS APPROACH: ICD-10-PCS | Performed by: ANESTHESIOLOGY

## 2024-10-25 PROCEDURE — 250N000011 HC RX IP 250 OP 636: Performed by: NURSE ANESTHETIST, CERTIFIED REGISTERED

## 2024-10-25 PROCEDURE — 370N000017 HC ANESTHESIA TECHNICAL FEE, PER MIN: Performed by: OBSTETRICS & GYNECOLOGY

## 2024-10-25 PROCEDURE — 250N000011 HC RX IP 250 OP 636: Performed by: OBSTETRICS & GYNECOLOGY

## 2024-10-25 PROCEDURE — 250N000011 HC RX IP 250 OP 636: Performed by: ANESTHESIOLOGY

## 2024-10-25 PROCEDURE — 258N000003 HC RX IP 258 OP 636: Performed by: OBSTETRICS & GYNECOLOGY

## 2024-10-25 PROCEDURE — 250N000009 HC RX 250: Performed by: OBSTETRICS & GYNECOLOGY

## 2024-10-25 PROCEDURE — 120N000001 HC R&B MED SURG/OB

## 2024-10-25 PROCEDURE — 258N000003 HC RX IP 258 OP 636: Performed by: NURSE ANESTHETIST, CERTIFIED REGISTERED

## 2024-10-25 PROCEDURE — 36415 COLL VENOUS BLD VENIPUNCTURE: CPT | Performed by: OBSTETRICS & GYNECOLOGY

## 2024-10-25 RX ORDER — ONDANSETRON 4 MG/1
4 TABLET, ORALLY DISINTEGRATING ORAL EVERY 6 HOURS PRN
Status: DISCONTINUED | OUTPATIENT
Start: 2024-10-25 | End: 2024-10-26 | Stop reason: HOSPADM

## 2024-10-25 RX ORDER — FENTANYL/ROPIVACAINE/NS/PF 2MCG/ML-.1
PLASTIC BAG, INJECTION (ML) EPIDURAL
Status: DISCONTINUED | OUTPATIENT
Start: 2024-10-25 | End: 2024-10-26 | Stop reason: HOSPADM

## 2024-10-25 RX ORDER — METOCLOPRAMIDE HYDROCHLORIDE 5 MG/ML
10 INJECTION INTRAMUSCULAR; INTRAVENOUS EVERY 6 HOURS PRN
Status: DISCONTINUED | OUTPATIENT
Start: 2024-10-25 | End: 2024-10-26 | Stop reason: HOSPADM

## 2024-10-25 RX ORDER — CEFAZOLIN SODIUM/WATER 3 G/30 ML
3 SYRINGE (ML) INTRAVENOUS
Status: COMPLETED | OUTPATIENT
Start: 2024-10-25 | End: 2024-10-26

## 2024-10-25 RX ORDER — MISOPROSTOL 200 UG/1
800 TABLET ORAL
Status: DISCONTINUED | OUTPATIENT
Start: 2024-10-25 | End: 2024-10-26 | Stop reason: HOSPADM

## 2024-10-25 RX ORDER — MORPHINE SULFATE 1 MG/ML
2 INJECTION, SOLUTION EPIDURAL; INTRATHECAL; INTRAVENOUS ONCE
Status: COMPLETED | OUTPATIENT
Start: 2024-10-26 | End: 2024-10-26

## 2024-10-25 RX ORDER — NALBUPHINE HYDROCHLORIDE 10 MG/ML
2.5-5 INJECTION INTRAMUSCULAR; INTRAVENOUS; SUBCUTANEOUS EVERY 6 HOURS PRN
Status: DISCONTINUED | OUTPATIENT
Start: 2024-10-25 | End: 2024-10-29 | Stop reason: HOSPADM

## 2024-10-25 RX ORDER — AZITHROMYCIN 500 MG/5ML
500 INJECTION, POWDER, LYOPHILIZED, FOR SOLUTION INTRAVENOUS
Status: COMPLETED | OUTPATIENT
Start: 2024-10-25 | End: 2024-10-26

## 2024-10-25 RX ORDER — LOPERAMIDE HYDROCHLORIDE 2 MG/1
4 CAPSULE ORAL
Status: DISCONTINUED | OUTPATIENT
Start: 2024-10-25 | End: 2024-10-26 | Stop reason: HOSPADM

## 2024-10-25 RX ORDER — OXYTOCIN/0.9 % SODIUM CHLORIDE 30/500 ML
100-340 PLASTIC BAG, INJECTION (ML) INTRAVENOUS CONTINUOUS PRN
OUTPATIENT
Start: 2024-10-25

## 2024-10-25 RX ORDER — LOPERAMIDE HYDROCHLORIDE 2 MG/1
2 CAPSULE ORAL
Status: DISCONTINUED | OUTPATIENT
Start: 2024-10-25 | End: 2024-10-26 | Stop reason: HOSPADM

## 2024-10-25 RX ORDER — FENTANYL CITRATE-0.9 % NACL/PF 10 MCG/ML
100 PLASTIC BAG, INJECTION (ML) INTRAVENOUS EVERY 5 MIN PRN
Status: DISCONTINUED | OUTPATIENT
Start: 2024-10-25 | End: 2024-10-26 | Stop reason: HOSPADM

## 2024-10-25 RX ORDER — LIDOCAINE 40 MG/G
CREAM TOPICAL
Status: DISCONTINUED | OUTPATIENT
Start: 2024-10-25 | End: 2024-10-26 | Stop reason: HOSPADM

## 2024-10-25 RX ORDER — OXYTOCIN/0.9 % SODIUM CHLORIDE 30/500 ML
340 PLASTIC BAG, INJECTION (ML) INTRAVENOUS CONTINUOUS PRN
Status: DISCONTINUED | OUTPATIENT
Start: 2024-10-25 | End: 2024-10-26 | Stop reason: HOSPADM

## 2024-10-25 RX ORDER — NALBUPHINE HYDROCHLORIDE 10 MG/ML
2.5-5 INJECTION INTRAMUSCULAR; INTRAVENOUS; SUBCUTANEOUS EVERY 6 HOURS PRN
Status: DISCONTINUED | OUTPATIENT
Start: 2024-10-25 | End: 2024-10-27

## 2024-10-25 RX ORDER — METHYLERGONOVINE MALEATE 0.2 MG/ML
200 INJECTION INTRAVENOUS
Status: DISCONTINUED | OUTPATIENT
Start: 2024-10-25 | End: 2024-10-26 | Stop reason: HOSPADM

## 2024-10-25 RX ORDER — CARBOPROST TROMETHAMINE 250 UG/ML
250 INJECTION, SOLUTION INTRAMUSCULAR
Status: DISCONTINUED | OUTPATIENT
Start: 2024-10-25 | End: 2024-10-26 | Stop reason: HOSPADM

## 2024-10-25 RX ORDER — FENTANYL CITRATE 50 UG/ML
50 INJECTION, SOLUTION INTRAMUSCULAR; INTRAVENOUS EVERY 30 MIN PRN
Status: DISCONTINUED | OUTPATIENT
Start: 2024-10-25 | End: 2024-10-26 | Stop reason: HOSPADM

## 2024-10-25 RX ORDER — LIDOCAINE HYDROCHLORIDE AND EPINEPHRINE 15; 5 MG/ML; UG/ML
INJECTION, SOLUTION EPIDURAL PRN
Status: DISCONTINUED | OUTPATIENT
Start: 2024-10-25 | End: 2024-10-26

## 2024-10-25 RX ORDER — METOCLOPRAMIDE 10 MG/1
10 TABLET ORAL EVERY 6 HOURS PRN
Status: DISCONTINUED | OUTPATIENT
Start: 2024-10-25 | End: 2024-10-26 | Stop reason: HOSPADM

## 2024-10-25 RX ORDER — OXYCODONE HYDROCHLORIDE 5 MG/1
5-10 TABLET ORAL ONCE
Status: COMPLETED | OUTPATIENT
Start: 2024-10-25 | End: 2024-10-25

## 2024-10-25 RX ORDER — MISOPROSTOL 200 UG/1
400 TABLET ORAL
Status: DISCONTINUED | OUTPATIENT
Start: 2024-10-25 | End: 2024-10-26 | Stop reason: HOSPADM

## 2024-10-25 RX ORDER — CITRIC ACID/SODIUM CITRATE 334-500MG
30 SOLUTION, ORAL ORAL
Status: COMPLETED | OUTPATIENT
Start: 2024-10-25 | End: 2024-10-25

## 2024-10-25 RX ORDER — ONDANSETRON 2 MG/ML
4 INJECTION INTRAMUSCULAR; INTRAVENOUS EVERY 6 HOURS PRN
Status: DISCONTINUED | OUTPATIENT
Start: 2024-10-25 | End: 2024-10-26 | Stop reason: HOSPADM

## 2024-10-25 RX ORDER — OXYTOCIN 10 [USP'U]/ML
10 INJECTION, SOLUTION INTRAMUSCULAR; INTRAVENOUS
Status: DISCONTINUED | OUTPATIENT
Start: 2024-10-25 | End: 2024-10-26 | Stop reason: HOSPADM

## 2024-10-25 RX ORDER — CEFAZOLIN SODIUM/WATER 3 G/30 ML
3 SYRINGE (ML) INTRAVENOUS SEE ADMIN INSTRUCTIONS
Status: DISCONTINUED | OUTPATIENT
Start: 2024-10-25 | End: 2024-10-26 | Stop reason: HOSPADM

## 2024-10-25 RX ORDER — OXYCODONE HYDROCHLORIDE 5 MG/1
5 TABLET ORAL ONCE
Status: COMPLETED | OUTPATIENT
Start: 2024-10-25 | End: 2024-10-25

## 2024-10-25 RX ORDER — PROCHLORPERAZINE MALEATE 10 MG
10 TABLET ORAL EVERY 6 HOURS PRN
Status: DISCONTINUED | OUTPATIENT
Start: 2024-10-25 | End: 2024-10-26 | Stop reason: HOSPADM

## 2024-10-25 RX ORDER — OXYTOCIN 10 [USP'U]/ML
10 INJECTION, SOLUTION INTRAMUSCULAR; INTRAVENOUS
OUTPATIENT
Start: 2024-10-25

## 2024-10-25 RX ORDER — TRANEXAMIC ACID 10 MG/ML
1 INJECTION, SOLUTION INTRAVENOUS EVERY 30 MIN PRN
Status: DISCONTINUED | OUTPATIENT
Start: 2024-10-25 | End: 2024-10-26 | Stop reason: HOSPADM

## 2024-10-25 RX ORDER — ACETAMINOPHEN 325 MG/1
975 TABLET ORAL ONCE
Status: COMPLETED | OUTPATIENT
Start: 2024-10-25 | End: 2024-10-26

## 2024-10-25 RX ADMIN — DINOPROSTONE 10 MG: 10 INSERT VAGINAL at 03:55

## 2024-10-25 RX ADMIN — Medication 2 MILLI-UNITS/MIN: at 20:59

## 2024-10-25 RX ADMIN — Medication 2 MILLI-UNITS/MIN: at 20:17

## 2024-10-25 RX ADMIN — OXYCODONE HYDROCHLORIDE 5 MG: 5 TABLET ORAL at 17:03

## 2024-10-25 RX ADMIN — Medication 3 G: at 23:59

## 2024-10-25 RX ADMIN — OXYCODONE HYDROCHLORIDE 5 MG: 5 TABLET ORAL at 14:42

## 2024-10-25 RX ADMIN — ACETAMINOPHEN 650 MG: 325 TABLET, FILM COATED ORAL at 13:31

## 2024-10-25 RX ADMIN — LABETALOL HYDROCHLORIDE 20 MG: 5 INJECTION, SOLUTION INTRAVENOUS at 21:36

## 2024-10-25 RX ADMIN — Medication: at 20:37

## 2024-10-25 RX ADMIN — LIDOCAINE HYDROCHLORIDE,EPINEPHRINE BITARTRATE 3 ML: 15; .005 INJECTION, SOLUTION EPIDURAL; INFILTRATION; INTRACAUDAL; PERINEURAL at 20:32

## 2024-10-25 RX ADMIN — SODIUM CHLORIDE, POTASSIUM CHLORIDE, SODIUM LACTATE AND CALCIUM CHLORIDE 250 ML: 600; 310; 30; 20 INJECTION, SOLUTION INTRAVENOUS at 18:58

## 2024-10-25 RX ADMIN — ACETAMINOPHEN 650 MG: 325 TABLET, FILM COATED ORAL at 09:31

## 2024-10-25 RX ADMIN — PHENYLEPHRINE HYDROCHLORIDE 0.5 MCG/KG/MIN: 10 INJECTION INTRAVENOUS at 23:59

## 2024-10-25 RX ADMIN — Medication 2 MILLI-UNITS/MIN: at 21:00

## 2024-10-25 RX ADMIN — SODIUM CITRATE AND CITRIC ACID MONOHYDRATE 30 ML: 500; 334 SOLUTION ORAL at 23:41

## 2024-10-25 RX ADMIN — ACETAMINOPHEN 650 MG: 325 TABLET, FILM COATED ORAL at 23:41

## 2024-10-25 RX ADMIN — LIDOCAINE HYDROCHLORIDE,EPINEPHRINE BITARTRATE 2 ML: 15; .005 INJECTION, SOLUTION EPIDURAL; INFILTRATION; INTRACAUDAL; PERINEURAL at 20:33

## 2024-10-25 NOTE — PROGRESS NOTES
OB NOTE    Significant back pain  FHT: CAT 1  Cx: Unchanged  Reviewed options  MS for sleep  Pit off, possible Cervadil    Mickey Jeronimo MD

## 2024-10-25 NOTE — PLAN OF CARE
"  Problem: Labor  Goal: Hemostasis  Outcome: Progressing   Goal Outcome Evaluation:  /64 (BP Location: Left arm, Patient Position: Semi-Jain's, Cuff Size: Adult Large)   Pulse 96   Temp 98.5  F (36.9  C) (Oral)   Resp 16   Ht 1.651 m (5' 5\")   Wt 129.7 kg (286 lb)   LMP 02/06/2024 (Approximate)   SpO2 97%   BMI 47.59 kg/m      Patient's vital signs stable at this point.  No severe range blood pressures.  RN will continue to monitor blood pressures per hypertension protocols.    Sue Kaiser RN on 10/24/2024 at 11:12 PM                        "

## 2024-10-25 NOTE — PROGRESS NOTES
Labor Progress Note    Assessment/Plan  29 year old  at 37w3d gestational age admitted for IOL secondary to GHTN.     - Continue to monitor FHR  - Cervidil vagina insert placed (already completed 12 doses of misoprostol).   -Tylenol for pain control   - Anticipate     Subjective  Patient has complained of a periorbital headache this morning. Otherwise no complaints.     Objective  Vital signs in last 24 hours  Temp:  [98.1  F (36.7  C)-98.5  F (36.9  C)] 98.3  F (36.8  C)  Pulse:  [] 104  Resp:  [16-20] 18  BP: (119-135)/(62-80) 127/73  SpO2:  [94 %-98 %] 94 %      Physical Exam  General:   Abd: Gravid, soft, nontender  Cervix:deferred   FHR: Baseline 150/moderate variability/+ accels; Category 1 tracing  [place]: Contractions Q 4-5 min  Extremities: Mild peripheral edema    Pertinent Labs   Lab Results   Component Value Date    ABORH O POS 10/23/2024    HGB 11.0 10/23/2024    HGB 11.9 2021        Patient discussed with attending physician, Isidro Anglin OB who agrees with this plan.     Tori Amin MD PGY1 10/24/2024  [facility]

## 2024-10-25 NOTE — PLAN OF CARE
Problem: Adult Inpatient Plan of Care  Goal: Optimal Comfort and Wellbeing  Intervention: Provide Person-Centered Care  Recent Flowsheet Documentation  Taken 10/24/2024 2000 by Sue Kaiser, RN  Trust Relationship/Rapport:   choices provided   emotional support provided   empathic listening provided   questions encouraged   thoughts/feelings acknowledged   Goal Outcome Evaluation:       Patient has been resting comfortably since getting Morphine and Vistaril.      Sue Kaiser RN on 10/25/2024 at 2:26 AM

## 2024-10-25 NOTE — PROGRESS NOTES
Patient received morphine and vistaril for sleep.  Patient on Asha for 25 minutes, per  it is ok for patient to come off monitor to sleep.  Will re-assess plan for further ripening or re-starting Oxytocin.      Sue Kaiser RN on 10/24/2024 at 11:31 PM

## 2024-10-25 NOTE — PROGRESS NOTES
Pt given PO tylenol X2 for c/o headache without any relief. Dr. Kandace maxr, order for 5-10mg oxycodone obtained. Pt drinking caffeine and reports no relief. Pt given 5mg oxycodone.       Jamaica Ray RN

## 2024-10-26 PROBLEM — Z98.891 S/P PRIMARY LOW TRANSVERSE C-SECTION: Status: ACTIVE | Noted: 2024-10-26

## 2024-10-26 LAB — HGB BLD-MCNC: 10.6 G/DL (ref 11.7–15.7)

## 2024-10-26 PROCEDURE — 250N000011 HC RX IP 250 OP 636: Performed by: ANESTHESIOLOGY

## 2024-10-26 PROCEDURE — 36415 COLL VENOUS BLD VENIPUNCTURE: CPT | Performed by: OBSTETRICS & GYNECOLOGY

## 2024-10-26 PROCEDURE — 250N000011 HC RX IP 250 OP 636

## 2024-10-26 PROCEDURE — 250N000011 HC RX IP 250 OP 636: Performed by: OBSTETRICS & GYNECOLOGY

## 2024-10-26 PROCEDURE — 250N000009 HC RX 250: Performed by: NURSE ANESTHETIST, CERTIFIED REGISTERED

## 2024-10-26 PROCEDURE — 250N000011 HC RX IP 250 OP 636: Performed by: INTERNAL MEDICINE

## 2024-10-26 PROCEDURE — 250N000009 HC RX 250

## 2024-10-26 PROCEDURE — 360N000076 HC SURGERY LEVEL 3, PER MIN: Performed by: OBSTETRICS & GYNECOLOGY

## 2024-10-26 PROCEDURE — 250N000011 HC RX IP 250 OP 636: Performed by: NURSE ANESTHETIST, CERTIFIED REGISTERED

## 2024-10-26 PROCEDURE — 85018 HEMOGLOBIN: CPT | Performed by: OBSTETRICS & GYNECOLOGY

## 2024-10-26 PROCEDURE — 999N000249 HC STATISTIC C-SECTION ON UNIT

## 2024-10-26 PROCEDURE — 258N000003 HC RX IP 258 OP 636: Performed by: NURSE ANESTHETIST, CERTIFIED REGISTERED

## 2024-10-26 PROCEDURE — 250N000013 HC RX MED GY IP 250 OP 250 PS 637: Performed by: OBSTETRICS & GYNECOLOGY

## 2024-10-26 PROCEDURE — 120N000001 HC R&B MED SURG/OB

## 2024-10-26 RX ORDER — DEXAMETHASONE SODIUM PHOSPHATE 10 MG/ML
INJECTION, SOLUTION INTRAMUSCULAR; INTRAVENOUS PRN
Status: DISCONTINUED | OUTPATIENT
Start: 2024-10-26 | End: 2024-10-26

## 2024-10-26 RX ORDER — MISOPROSTOL 200 UG/1
800 TABLET ORAL
Status: DISCONTINUED | OUTPATIENT
Start: 2024-10-26 | End: 2024-10-29 | Stop reason: HOSPADM

## 2024-10-26 RX ORDER — LIDOCAINE 40 MG/G
CREAM TOPICAL
Status: DISCONTINUED | OUTPATIENT
Start: 2024-10-26 | End: 2024-10-29 | Stop reason: HOSPADM

## 2024-10-26 RX ORDER — NALOXONE HYDROCHLORIDE 0.4 MG/ML
0.1 INJECTION, SOLUTION INTRAMUSCULAR; INTRAVENOUS; SUBCUTANEOUS
Status: DISCONTINUED | OUTPATIENT
Start: 2024-10-26 | End: 2024-10-29 | Stop reason: HOSPADM

## 2024-10-26 RX ORDER — ONDANSETRON 2 MG/ML
4 INJECTION INTRAMUSCULAR; INTRAVENOUS EVERY 30 MIN PRN
Status: DISCONTINUED | OUTPATIENT
Start: 2024-10-26 | End: 2024-10-26

## 2024-10-26 RX ORDER — SODIUM PHOSPHATE,MONO-DIBASIC 19G-7G/118
1 ENEMA (ML) RECTAL DAILY PRN
Status: DISCONTINUED | OUTPATIENT
Start: 2024-10-28 | End: 2024-10-29 | Stop reason: HOSPADM

## 2024-10-26 RX ORDER — DIPHENHYDRAMINE HYDROCHLORIDE 50 MG/ML
25 INJECTION INTRAMUSCULAR; INTRAVENOUS ONCE
Status: COMPLETED | OUTPATIENT
Start: 2024-10-26 | End: 2024-10-26

## 2024-10-26 RX ORDER — LOPERAMIDE HYDROCHLORIDE 2 MG/1
4 CAPSULE ORAL
Status: DISCONTINUED | OUTPATIENT
Start: 2024-10-26 | End: 2024-10-29 | Stop reason: HOSPADM

## 2024-10-26 RX ORDER — HYDROCORTISONE 25 MG/G
CREAM TOPICAL 3 TIMES DAILY PRN
Status: DISCONTINUED | OUTPATIENT
Start: 2024-10-26 | End: 2024-10-29 | Stop reason: HOSPADM

## 2024-10-26 RX ORDER — CARBOPROST TROMETHAMINE 250 UG/ML
250 INJECTION, SOLUTION INTRAMUSCULAR
Status: DISCONTINUED | OUTPATIENT
Start: 2024-10-26 | End: 2024-10-29 | Stop reason: HOSPADM

## 2024-10-26 RX ORDER — HYDROMORPHONE HCL IN WATER/PF 6 MG/30 ML
0.4 PATIENT CONTROLLED ANALGESIA SYRINGE INTRAVENOUS EVERY 5 MIN PRN
Status: DISCONTINUED | OUTPATIENT
Start: 2024-10-26 | End: 2024-10-29 | Stop reason: HOSPADM

## 2024-10-26 RX ORDER — MISOPROSTOL 200 UG/1
400 TABLET ORAL
Status: DISCONTINUED | OUTPATIENT
Start: 2024-10-26 | End: 2024-10-29 | Stop reason: HOSPADM

## 2024-10-26 RX ORDER — TRANEXAMIC ACID 10 MG/ML
1 INJECTION, SOLUTION INTRAVENOUS EVERY 30 MIN PRN
Status: DISCONTINUED | OUTPATIENT
Start: 2024-10-26 | End: 2024-10-29 | Stop reason: HOSPADM

## 2024-10-26 RX ORDER — LIDOCAINE HCL/EPINEPHRINE/PF 2%-1:200K
VIAL (ML) INJECTION PRN
Status: DISCONTINUED | OUTPATIENT
Start: 2024-10-26 | End: 2024-10-26

## 2024-10-26 RX ORDER — AMOXICILLIN 250 MG
1 CAPSULE ORAL 2 TIMES DAILY
Status: DISCONTINUED | OUTPATIENT
Start: 2024-10-26 | End: 2024-10-29 | Stop reason: HOSPADM

## 2024-10-26 RX ORDER — OXYCODONE HYDROCHLORIDE 5 MG/1
5 TABLET ORAL EVERY 4 HOURS PRN
Status: DISCONTINUED | OUTPATIENT
Start: 2024-10-26 | End: 2024-10-29 | Stop reason: HOSPADM

## 2024-10-26 RX ORDER — OXYTOCIN 10 [USP'U]/ML
10 INJECTION, SOLUTION INTRAMUSCULAR; INTRAVENOUS
Status: DISCONTINUED | OUTPATIENT
Start: 2024-10-26 | End: 2024-10-29 | Stop reason: HOSPADM

## 2024-10-26 RX ORDER — AMOXICILLIN 250 MG
2 CAPSULE ORAL 2 TIMES DAILY
Status: DISCONTINUED | OUTPATIENT
Start: 2024-10-26 | End: 2024-10-29 | Stop reason: HOSPADM

## 2024-10-26 RX ORDER — BISACODYL 10 MG
10 SUPPOSITORY, RECTAL RECTAL DAILY PRN
Status: DISCONTINUED | OUTPATIENT
Start: 2024-10-28 | End: 2024-10-29 | Stop reason: HOSPADM

## 2024-10-26 RX ORDER — ONDANSETRON 4 MG/1
4 TABLET, ORALLY DISINTEGRATING ORAL EVERY 6 HOURS PRN
Status: DISCONTINUED | OUTPATIENT
Start: 2024-10-26 | End: 2024-10-29 | Stop reason: HOSPADM

## 2024-10-26 RX ORDER — IBUPROFEN 800 MG/1
800 TABLET, FILM COATED ORAL EVERY 6 HOURS
Status: DISCONTINUED | OUTPATIENT
Start: 2024-10-27 | End: 2024-10-29 | Stop reason: HOSPADM

## 2024-10-26 RX ORDER — METOCLOPRAMIDE 10 MG/1
10 TABLET ORAL EVERY 6 HOURS PRN
Status: DISCONTINUED | OUTPATIENT
Start: 2024-10-26 | End: 2024-10-29 | Stop reason: HOSPADM

## 2024-10-26 RX ORDER — FENTANYL CITRATE 50 UG/ML
25 INJECTION, SOLUTION INTRAMUSCULAR; INTRAVENOUS EVERY 5 MIN PRN
Status: DISCONTINUED | OUTPATIENT
Start: 2024-10-26 | End: 2024-10-27

## 2024-10-26 RX ORDER — SIMETHICONE 80 MG
80 TABLET,CHEWABLE ORAL 4 TIMES DAILY PRN
Status: DISCONTINUED | OUTPATIENT
Start: 2024-10-26 | End: 2024-10-29 | Stop reason: HOSPADM

## 2024-10-26 RX ORDER — ACETAMINOPHEN 325 MG/1
975 TABLET ORAL EVERY 6 HOURS
Status: DISCONTINUED | OUTPATIENT
Start: 2024-10-26 | End: 2024-10-29 | Stop reason: HOSPADM

## 2024-10-26 RX ORDER — FENTANYL CITRATE 50 UG/ML
50 INJECTION, SOLUTION INTRAMUSCULAR; INTRAVENOUS EVERY 5 MIN PRN
Status: DISCONTINUED | OUTPATIENT
Start: 2024-10-26 | End: 2024-10-27

## 2024-10-26 RX ORDER — OXYTOCIN/0.9 % SODIUM CHLORIDE 30/500 ML
PLASTIC BAG, INJECTION (ML) INTRAVENOUS CONTINUOUS PRN
Status: DISCONTINUED | OUTPATIENT
Start: 2024-10-26 | End: 2024-10-26

## 2024-10-26 RX ORDER — ENOXAPARIN SODIUM 100 MG/ML
40 INJECTION SUBCUTANEOUS EVERY 12 HOURS
Status: DISCONTINUED | OUTPATIENT
Start: 2024-10-26 | End: 2024-10-29 | Stop reason: HOSPADM

## 2024-10-26 RX ORDER — PROCHLORPERAZINE MALEATE 10 MG
10 TABLET ORAL EVERY 6 HOURS PRN
Status: DISCONTINUED | OUTPATIENT
Start: 2024-10-26 | End: 2024-10-29 | Stop reason: HOSPADM

## 2024-10-26 RX ORDER — DEXAMETHASONE SODIUM PHOSPHATE 4 MG/ML
4 INJECTION, SOLUTION INTRA-ARTICULAR; INTRALESIONAL; INTRAMUSCULAR; INTRAVENOUS; SOFT TISSUE
Status: DISCONTINUED | OUTPATIENT
Start: 2024-10-26 | End: 2024-10-27

## 2024-10-26 RX ORDER — METHYLERGONOVINE MALEATE 0.2 MG/ML
200 INJECTION INTRAVENOUS
Status: DISCONTINUED | OUTPATIENT
Start: 2024-10-26 | End: 2024-10-29 | Stop reason: HOSPADM

## 2024-10-26 RX ORDER — KETOROLAC TROMETHAMINE 30 MG/ML
30 INJECTION, SOLUTION INTRAMUSCULAR; INTRAVENOUS EVERY 6 HOURS
Status: COMPLETED | OUTPATIENT
Start: 2024-10-26 | End: 2024-10-26

## 2024-10-26 RX ORDER — LOPERAMIDE HYDROCHLORIDE 2 MG/1
2 CAPSULE ORAL
Status: DISCONTINUED | OUTPATIENT
Start: 2024-10-26 | End: 2024-10-29 | Stop reason: HOSPADM

## 2024-10-26 RX ORDER — DEXTROSE, SODIUM CHLORIDE, SODIUM LACTATE, POTASSIUM CHLORIDE, AND CALCIUM CHLORIDE 5; .6; .31; .03; .02 G/100ML; G/100ML; G/100ML; G/100ML; G/100ML
INJECTION, SOLUTION INTRAVENOUS CONTINUOUS
Status: DISCONTINUED | OUTPATIENT
Start: 2024-10-26 | End: 2024-10-28

## 2024-10-26 RX ORDER — METOCLOPRAMIDE HYDROCHLORIDE 5 MG/ML
10 INJECTION INTRAMUSCULAR; INTRAVENOUS EVERY 6 HOURS PRN
Status: DISCONTINUED | OUTPATIENT
Start: 2024-10-26 | End: 2024-10-29 | Stop reason: HOSPADM

## 2024-10-26 RX ORDER — ONDANSETRON 2 MG/ML
4 INJECTION INTRAMUSCULAR; INTRAVENOUS EVERY 6 HOURS PRN
Status: DISCONTINUED | OUTPATIENT
Start: 2024-10-26 | End: 2024-10-29 | Stop reason: HOSPADM

## 2024-10-26 RX ORDER — MODIFIED LANOLIN
OINTMENT (GRAM) TOPICAL
Status: DISCONTINUED | OUTPATIENT
Start: 2024-10-26 | End: 2024-10-29 | Stop reason: HOSPADM

## 2024-10-26 RX ORDER — ONDANSETRON 4 MG/1
4 TABLET, ORALLY DISINTEGRATING ORAL EVERY 30 MIN PRN
Status: DISCONTINUED | OUTPATIENT
Start: 2024-10-26 | End: 2024-10-26

## 2024-10-26 RX ORDER — HYDROMORPHONE HCL IN WATER/PF 6 MG/30 ML
0.2 PATIENT CONTROLLED ANALGESIA SYRINGE INTRAVENOUS EVERY 5 MIN PRN
Status: DISCONTINUED | OUTPATIENT
Start: 2024-10-26 | End: 2024-10-29 | Stop reason: HOSPADM

## 2024-10-26 RX ORDER — OXYTOCIN/0.9 % SODIUM CHLORIDE 30/500 ML
340 PLASTIC BAG, INJECTION (ML) INTRAVENOUS CONTINUOUS PRN
Status: DISCONTINUED | OUTPATIENT
Start: 2024-10-26 | End: 2024-10-29 | Stop reason: HOSPADM

## 2024-10-26 RX ORDER — ONDANSETRON 2 MG/ML
INJECTION INTRAMUSCULAR; INTRAVENOUS PRN
Status: DISCONTINUED | OUTPATIENT
Start: 2024-10-26 | End: 2024-10-26

## 2024-10-26 RX ADMIN — ACETAMINOPHEN 975 MG: 325 TABLET ORAL at 06:21

## 2024-10-26 RX ADMIN — DEXAMETHASONE SODIUM PHOSPHATE 4 MG: 10 INJECTION, SOLUTION INTRAMUSCULAR; INTRAVENOUS at 00:31

## 2024-10-26 RX ADMIN — KETOROLAC TROMETHAMINE 30 MG: 30 INJECTION, SOLUTION INTRAMUSCULAR at 08:57

## 2024-10-26 RX ADMIN — ONDANSETRON 4 MG: 2 INJECTION INTRAMUSCULAR; INTRAVENOUS at 00:05

## 2024-10-26 RX ADMIN — OXYCODONE 5 MG: 5 TABLET ORAL at 05:25

## 2024-10-26 RX ADMIN — Medication 300 ML/HR: at 00:26

## 2024-10-26 RX ADMIN — Medication 2 MG: at 00:27

## 2024-10-26 RX ADMIN — KETOROLAC TROMETHAMINE 30 MG: 30 INJECTION, SOLUTION INTRAMUSCULAR at 15:00

## 2024-10-26 RX ADMIN — SENNOSIDES AND DOCUSATE SODIUM 1 TABLET: 50; 8.6 TABLET ORAL at 08:57

## 2024-10-26 RX ADMIN — ACETAMINOPHEN 975 MG: 325 TABLET ORAL at 17:38

## 2024-10-26 RX ADMIN — DEXMEDETOMIDINE HYDROCHLORIDE 12 MCG: 100 INJECTION, SOLUTION INTRAVENOUS at 00:33

## 2024-10-26 RX ADMIN — NALBUPHINE HYDROCHLORIDE 2.5 MG: 10 INJECTION, SOLUTION INTRAMUSCULAR; INTRAVENOUS; SUBCUTANEOUS at 04:49

## 2024-10-26 RX ADMIN — OXYCODONE 5 MG: 5 TABLET ORAL at 14:34

## 2024-10-26 RX ADMIN — OXYCODONE 5 MG: 5 TABLET ORAL at 09:34

## 2024-10-26 RX ADMIN — LIDOCAINE HYDROCHLORIDE,EPINEPHRINE BITARTRATE 3 ML: 20; .005 INJECTION, SOLUTION EPIDURAL; INFILTRATION; INTRACAUDAL; PERINEURAL at 00:28

## 2024-10-26 RX ADMIN — KETOROLAC TROMETHAMINE 30 MG: 30 INJECTION, SOLUTION INTRAMUSCULAR at 21:39

## 2024-10-26 RX ADMIN — NALBUPHINE HYDROCHLORIDE 5 MG: 10 INJECTION, SOLUTION INTRAMUSCULAR; INTRAVENOUS; SUBCUTANEOUS at 13:51

## 2024-10-26 RX ADMIN — ENOXAPARIN SODIUM 40 MG: 40 INJECTION SUBCUTANEOUS at 13:59

## 2024-10-26 RX ADMIN — DIPHENHYDRAMINE HYDROCHLORIDE 25 MG: 50 INJECTION INTRAMUSCULAR; INTRAVENOUS at 07:23

## 2024-10-26 RX ADMIN — ACETAMINOPHEN 975 MG: 325 TABLET ORAL at 12:26

## 2024-10-26 RX ADMIN — Medication 500 MG: at 00:00

## 2024-10-26 RX ADMIN — KETOROLAC TROMETHAMINE 30 MG: 30 INJECTION, SOLUTION INTRAMUSCULAR at 02:24

## 2024-10-26 RX ADMIN — NALBUPHINE HYDROCHLORIDE 2.5 MG: 10 INJECTION, SOLUTION INTRAMUSCULAR; INTRAVENOUS; SUBCUTANEOUS at 04:13

## 2024-10-26 RX ADMIN — LIDOCAINE HYDROCHLORIDE,EPINEPHRINE BITARTRATE 15 ML: 20; .005 INJECTION, SOLUTION EPIDURAL; INFILTRATION; INTRACAUDAL; PERINEURAL at 00:02

## 2024-10-26 RX ADMIN — SENNOSIDES AND DOCUSATE SODIUM 1 TABLET: 50; 8.6 TABLET ORAL at 21:37

## 2024-10-26 RX ADMIN — Medication 100 ML/HR: at 03:45

## 2024-10-26 NOTE — PLAN OF CARE
Problem: Labor  Goal: Absence of Infection Signs and Symptoms  Outcome: Progressing     Problem: Hypertensive Disorders in Pregnancy  Goal: Patient-Fetal Stabilization  Outcome: Progressing     Problem: Labor  Goal: Hemostasis  Outcome: Progressing       VSS, BP stable throughout shift. Assessments WDL, reflexes present by diminished, absent clonus. Pt started on pit @1700. Pt up to 6miliu of pitocin. Baseline indeterminate at the 1900, fluid bolus given per MD order.  Pt repositioned. RN to continue to monitor FHR baseline and attempt repositioning.     Jamaica Ray RN

## 2024-10-26 NOTE — ANESTHESIA POSTPROCEDURE EVALUATION
Patient: Lucero SCHNEIDER Stone    Procedure: Procedure(s):   SECTION       Anesthesia Type:  Epidural    Note:  Disposition: Outpatient   Postop Pain Control: Uneventful            Sign Out: Well controlled pain   PONV: No   Neuro/Psych: Uneventful            Sign Out: Acceptable/Baseline neuro status   Airway/Respiratory: Uneventful            Sign Out: Acceptable/Baseline resp. status   CV/Hemodynamics: Uneventful            Sign Out: Acceptable CV status; No obvious hypovolemia; No obvious fluid overload   Other NRE: NONE   DID A NON-ROUTINE EVENT OCCUR? No    Event details/Postop Comments:  Patient recovering comfortably.        Last vitals:  Vitals:    10/26/24 0620 10/26/24 0930 10/26/24 1330   BP: 117/63 123/60 119/60   Pulse: 104 90 99   Resp: 18 16 18   Temp: 36.6  C (97.9  F) 36.8  C (98.2  F) 36.9  C (98.4  F)   SpO2: 94% 94% 95%       Electronically Signed By: Reza Cantrell DO  2024  2:46 PM

## 2024-10-26 NOTE — PROGRESS NOTES
Progress Note    Doing well. No complaints. No PIH symptoms. Pain controlled. Garrett removed. Has not voided. Has not had anything to eat or drink yet.     Temp:  [97.7  F (36.5  C)-98.5  F (36.9  C)] 97.9  F (36.6  C)  Pulse:  [] 104  Resp:  [16-18] 18  BP: (105-169)/() 117/63  SpO2:  [86 %-100 %] 94 %    NAD, AAO x 3  Abdomen soft, non acute  Dressing clean and dry  No c/c/e    Hgb: 12 --> 10.6    A/P: 30 yo POD #0 s/p primary C/S for maternal exhaustion and gestation hypertension  -- Routine post op care    Jillian Shahid MD, FACOG  (p) [phone]

## 2024-10-26 NOTE — PROCEDURES
PROCEDURE NOTE    NAME:Lucero Collier  : 1995   MRN: [id number]   GESTATIONAL AGE: 37w5d    ADMISSION DATE: 10/23/2024     PCP:  Cristal Beltran Ra     DATE OF SERVICE: 10/26/2024     PREOPERATIVE DIAGNOSIS:   29 year old y.o.  at 37w5d  Maternal exhaustion  Gestational hypertension     POSTOPERATIVE DIAGNOSIS:  Same    PROCEDURE: Low transverse  section      SURGEON: Megan Cota MD    ASSISTANT: Dr. Villavicencio    ANESTHESIA:  epidural    Delivery QBL (mL): 337    Specimen: None    DRAINS: Garrett catheter.    COMPLICATIONS: None    Procedure:  R/A/B/I of Ceserean delivery were d/w pt including infection, bleeding, blood transfusion, injury to intraabdominal organs , including bowel, bladder and uterus. Injury to baby were d/w patient. Remote chance of hysterectomy. Informed consent granted.      The patient was taken to the operating room where anesthesia was found to be adequate. She was then prepared and draped in the normal sterile fashion in the dorsal supine position with a leftward tilt. A Pfannensteil skin incision was then made with a scalpel and carried through to the underlying layer of fascia with sharp disection. The fascia was incised in the midline and incision was extended laterally with the Campbell scissor and stretch.  The superior aspect of the fascial incision was then grasped with the Kocher clamps, elevated, and the underlying rectus muscles were dissected off bluntly. This was repeated for inferior aspect of incision. The rectus muscles were then  in the midline, and the peritoneum was identified, and entered bluntly.  The peritoneal incision was then extended superiorly and inferiorly with stretch. The bladder blade was then inserted and the vesicouterine peritoneum identified, grasped with a pick-ups, and entered sharply with a Metzenbaum scissors. The incision was extended laterally and the bladder flap created digitally.  The bladder blade was removed and then  reinserted and the lower uterine segment was incised in a transverse fashion with a scalpel. The uterine incision was then extended laterally in a blunt fashion. The bladder blade was removed and the infant's head was delivered atraumatically. The cord was clamped and cut. The infant was handed off to the awaiting nursing team.  The placenta was then removed manually, the uterus was exteriorized, and cleared of all clots and debris. The uterus was replaced and an Boubacar-O retractor was placed. The uterine incision was repaired with 0-vicryl in a running, locked fashion. A second imbricating layer of 0-Monocryl was used to obtain excellent hemostasis. The retractor was removed. Inspection of bladder,uterus, and rectus noted to have hemostasis.  Hemostasis assured on rectus.  The fascia was reapproximated with looped 0-PDS in a running fashion. The subcutaneous area was irrigated and hemostasis assured. It was closed with 2-0 Plain.  The skin was closed with 4-0 Monocryl.     The patient did well and went to the recovery room in stable condition.  Sponge and sharp counts were correct.     Megan Cota MD 10/26/2024 1:14 AM

## 2024-10-26 NOTE — ANESTHESIA CARE TRANSFER NOTE
Patient: Lucero Collier    Procedure: Procedure(s):   SECTION       Diagnosis: Other (Comment)  Diagnosis Additional Information: No value filed.    Anesthesia Type:   Epidural     Note:    Oropharynx: oropharynx clear of all foreign objects  Level of Consciousness: awake  Oxygen Supplementation: room air    Independent Airway: airway patency satisfactory and stable  Dentition: dentition unchanged  Vital Signs Stable: post-procedure vital signs reviewed and stable  Report to RN Given: handoff report given  Patient transferred to: Labor and Delivery    Handoff Report: Identifed the Patient, Identified the Reponsible Provider, Reviewed the pertinent medical history, Discussed the surgical course, Reviewed Intra-OP anesthesia mangement and issues during anesthesia, Set expectations for post-procedure period and Allowed opportunity for questions and acknowledgement of understanding      Vitals:  Vitals Value Taken Time   /61 10/26/24 0118   Temp 36.5  C (97.7  F) 10/26/24 0118   Pulse 100 10/26/24 0118   Resp 16 10/26/24 0118   SpO2 97 % 10/26/24 0118       Electronically Signed By: SHELBY Krishna CRNA  2024  1:19 AM

## 2024-10-26 NOTE — PLAN OF CARE
Goal Outcome Evaluation:       Vitals stable. Fundus firm and bleeding scant. Up independently in room. Pain controlled with Oxycodone PRN and scheduled Tylenol/Ibuprofen. Incision bandage clean, dry and intact. Bonding well with infant.       Problem: Postpartum ( Delivery)  Goal: Optimal Pain Control and Function  Outcome: Progressing     Problem: Postpartum ( Delivery)  Goal: Absence of Infection Signs and Symptoms  Outcome: Progressing

## 2024-10-26 NOTE — ANESTHESIA PREPROCEDURE EVALUATION
Anesthesia Pre-Procedure Evaluation    Patient: Lucero SCHNEIDER Stone   MRN: [id number] : 1995        Procedure :           Past Medical History:   Diagnosis Date    ADHD     on Daytrana and clonidine    Adverse reaction to pertussis vaccine     Allergies     daily Claritin    Anxiety     Chronic headaches     Complex partial seizures (H)     Constipation     Depression     Depressive disorder     Developmental delay     Epilepsy (H)     GERD (gastroesophageal reflux disease)     UGI 3/07    History of tonsillectomy     Hypoglycemia     Endocrine eval neg 10/00    Kidney stone      detected on CT scan; resolved     Myopia     Obesity     Sinusitis, chronic     Sleep disorder     Since ; sleep clinic evaluation  Dr. Ramesh     Smoking     Spells     Syncope     Urinary tract infection     nl renal US & VCUG       Past Surgical History:   Procedure Laterality Date    ENDOSCOPY UPPER WITH PANCREATIC STIMULATION      Esophagitis     NISSEN FUNDOPLICATION      Dr. Meneses    NISSEN FUNDOPLICATION      PE TUBES  3/98    TONSILLECTOMY & ADENOIDECTOMY        No Known Allergies   Social History     Tobacco Use    Smoking status: Former     Types: Cigarettes    Smokeless tobacco: Never   Substance Use Topics    Alcohol use: Not Currently     Comment: Alcoholic Drinks/day: Occasional      Wt Readings from Last 1 Encounters:   10/23/24 129.7 kg (286 lb)        Anesthesia Evaluation   Pt has had prior anesthetic.     No history of anesthetic complications       ROS/MED HX  ENT/Pulmonary: Comment: Tobacco abuse     (+)     BINA risk factors,                                   Neurologic: Comment: Hx of epilepsy, last seizure over a year ago  Migraines          Cardiovascular:     (+)  - - PIH  -  - -                                      METS/Exercise Tolerance:     Hematologic:     (+)      anemia,          Musculoskeletal:       GI/Hepatic:     (+) GERD,                    Renal/Genitourinary:       Endo:     (+)               Obesity,       Psychiatric/Substance Use:     (+) psychiatric history anxiety and depression       Infectious Disease:       Malignancy:       Other:            Physical Exam    Airway        Mallampati: III   TM distance: > 3 FB   Neck ROM: full   Mouth opening: > 3 cm    Respiratory Devices and Support         Dental  no notable dental history         Cardiovascular   cardiovascular exam normal          Pulmonary   pulmonary exam normal                OUTSIDE LABS:  CBC:   Lab Results   Component Value Date    WBC 11.3 (H) 10/23/2024    WBC 12.1 (H) 10/18/2024    HGB 11.0 (L) 10/23/2024    HGB 11.3 (L) 10/23/2024    HCT 33.2 (L) 10/23/2024    HCT 33.4 (L) 10/18/2024     10/23/2024     10/18/2024     BMP:   Lab Results   Component Value Date     10/23/2024     10/18/2024    POTASSIUM 3.6 10/23/2024    POTASSIUM 3.7 10/18/2024    CHLORIDE 107 10/23/2024    CHLORIDE 106 10/18/2024    CO2 19 (L) 10/23/2024    CO2 16 (L) 10/18/2024    BUN 5.0 (L) 10/23/2024    BUN 4.3 (L) 10/18/2024    CR 0.47 (L) 10/23/2024    CR 0.36 (L) 10/18/2024     (H) 10/23/2024    GLC 81 10/18/2024     COAGS:   Lab Results   Component Value Date    PTT 29 10/18/2024    INR 0.93 10/18/2024     POC:   Lab Results   Component Value Date    HCG Negative 05/18/2022    HCGS Negative 10/15/2023     HEPATIC:   Lab Results   Component Value Date    ALBUMIN 3.4 (L) 10/23/2024    PROTTOTAL 6.1 (L) 10/23/2024    ALT 10 10/23/2024    AST 16 10/23/2024    ALKPHOS 106 10/23/2024    BILITOTAL 0.2 10/23/2024     OTHER:   Lab Results   Component Value Date    A1C 5.3 04/29/2024    HERMES 8.6 (L) 10/23/2024    MAG 2.3 05/16/2018    LIPASE 16 06/03/2024    TSH 1.70 01/25/2023    CRP 0.5 05/01/2022       Anesthesia Plan    ASA Status:  3       Anesthesia Type: Epidural.              Consents    Anesthesia Plan(s) and associated risks, benefits, and realistic alternatives  discussed. Questions answered and patient/representative(s) expressed understanding.     - Discussed:     - Discussed with:  Patient            Postoperative Care            Comments:    Other Comments: Patient requests labor epidural.  Chart reviewed, including labs.  I reviewed the options and risks with the patient, including but not limited to: bleeding, infection, damage to tissues under the skin (nerves, muscles, blood vessels), hypotension, headache, and epidural failure.  The patient's questions were answered and the consent was signed. The patient agrees to elective epidural placement.    Plan to use epidural for c section secondary to maternal exhaustion            neg OB ROS.      Anthony Olson MD    I have reviewed the pertinent notes and labs in the chart from the past 30 days and (re)examined the patient.  Any updates or changes from those notes are reflected in this note.           # Hypoalbuminemia: Lowest albumin = 3.4 g/dL at 10/23/2024  8:25 AM, will monitor as appropriate     # Hypertension: Noted on problem list

## 2024-10-26 NOTE — PROGRESS NOTES
"[facility]    Patient has been pushing since about 10 pm.  She is requesting  section because she \"cannot do it anymore.\"  Patient is not pushing well and head is still 0 station. Caput is forming.   Risks, benefits and alternatives have been discussed with the patient.  Reviewed risks of bleeding, infection, and damage to surrounding organs.  Patient agrees to proceed. Consent signed.    Anesthesia notified.  Pitocin to be turned off. Will proceed as soon as able.    Megan Cota MD 10/25/2024 11:31 PM        "

## 2024-10-26 NOTE — PLAN OF CARE
Problem: Postpartum ( Delivery)  Goal: Hemostasis  Outcome: Progressing     Problem: Postpartum ( Delivery)  Goal: Optimal Pain Control and Function  Outcome: Progressing  Intervention: Prevent or Manage Pain  Recent Flowsheet Documentation  Taken 10/26/2024 0857 by Lilo Contreras RN  Pain Management Interventions:   medication (see MAR)   heat applied     Problem: Postpartum ( Delivery)  Goal: Effective Urinary Elimination  Outcome: Progressing     VSS. Pain is being managed with scheduled and PRN medications. Bleeding is WNL. Fundus firm and midline. Incision covered with silver dressing. Dressing got wet after pt showered this afternoon. Per Dr. Shahid, RN to replace dressing. Dressing replaced and is now clean, dry and intact. Pt is bonding well with baby. Pt would like to breastfeed and/or pump. Breast pump is in the room with supplies and pt will call out when she is ready to get set up to pump for the first time. Pt up independently in the room. Pt has voided and needs one more measured void.

## 2024-10-26 NOTE — ANESTHESIA PROCEDURE NOTES
Epidural catheter Procedure Note    Pre-Procedure   Staff -        Anesthesiologist:  Anthony Olson MD       Performed By: anesthesiologist       Location: OB       Procedure Start/Stop Times: 10/25/2024 8:25 PM and 10/25/2024 8:33 PM       Pre-Anesthestic Checklist: patient identified, IV checked, risks and benefits discussed, informed consent, monitors and equipment checked, pre-op evaluation, at physician/surgeon's request and post-op pain management  Timeout:       Correct Patient: Yes        Correct Procedure: Yes        Correct Site: Yes        Correct Position: Yes   Procedure Documentation  Procedure: epidural catheter       Patient Position: sitting       Patient Prep/Sterile Barriers: sterile gloves, mask       Skin prep: Chloraprep       Local skin infiltrated with 3 mL of 1% lidocaine.        Insertion Site: L3-4. (midline approach).       Technique: LORT air        Needle type: Zhaopin.       Needle Gauge: 18.        Needle Length (Inches): 3.5        Catheter: 20 G.          Catheter threaded easily.         5.5 cm epidural space.         Threaded 14.5 cm at skin.         # of attempts: 1 and  # of redirects:  0    Assessment/Narrative         Paresthesias: No.       Test dose of 3 mL lidocaine 1.5% w/ 1:200,000 epinephrine at.         Test dose negative, 3 minutes after injection, for signs of intravascular, subdural, or intrathecal injection.       Insertion/Infusion Method: LORT air       No aspiration negative for Heme or CSF via Epidural Catheter.    Medication(s) Administered   Medication Administration Time: 10/25/2024 8:25 PM     Comments:  Single pass epidural. Crisp loss of resistance at 9 cm. Catheter threaded easily 5.5 cm into the epidural space. Negative aspiration for heme and CSF. Negative test dose. No signs of LAST. No pain or paresthesias upon placement. No complications.    Prior to leaving room, patient reported much improved pain control.        FOR [facility] ([facility] - [place], [place])  "ONLY:   Pain Team Contact information: please page the Pain Team Via [facility]. Search \"Pain\". During daytime hours, please page the attending first. At night please page the resident first.      "

## 2024-10-27 PROCEDURE — 250N000013 HC RX MED GY IP 250 OP 250 PS 637: Performed by: OBSTETRICS & GYNECOLOGY

## 2024-10-27 PROCEDURE — 250N000011 HC RX IP 250 OP 636: Performed by: OBSTETRICS & GYNECOLOGY

## 2024-10-27 PROCEDURE — 120N000001 HC R&B MED SURG/OB

## 2024-10-27 RX ORDER — LABETALOL 100 MG/1
100 TABLET, FILM COATED ORAL EVERY 12 HOURS SCHEDULED
Status: DISCONTINUED | OUTPATIENT
Start: 2024-10-27 | End: 2024-10-29 | Stop reason: HOSPADM

## 2024-10-27 RX ADMIN — ENOXAPARIN SODIUM 40 MG: 40 INJECTION SUBCUTANEOUS at 01:27

## 2024-10-27 RX ADMIN — ENOXAPARIN SODIUM 40 MG: 40 INJECTION SUBCUTANEOUS at 13:53

## 2024-10-27 RX ADMIN — IBUPROFEN 800 MG: 800 TABLET ORAL at 20:06

## 2024-10-27 RX ADMIN — SENNOSIDES AND DOCUSATE SODIUM 1 TABLET: 50; 8.6 TABLET ORAL at 08:09

## 2024-10-27 RX ADMIN — IBUPROFEN 800 MG: 800 TABLET ORAL at 08:09

## 2024-10-27 RX ADMIN — IBUPROFEN 800 MG: 800 TABLET ORAL at 13:53

## 2024-10-27 RX ADMIN — LABETALOL HYDROCHLORIDE 100 MG: 100 TABLET, FILM COATED ORAL at 20:05

## 2024-10-27 RX ADMIN — ACETAMINOPHEN 975 MG: 325 TABLET ORAL at 12:46

## 2024-10-27 RX ADMIN — ACETAMINOPHEN 975 MG: 325 TABLET ORAL at 18:14

## 2024-10-27 RX ADMIN — OXYCODONE 5 MG: 5 TABLET ORAL at 17:31

## 2024-10-27 RX ADMIN — SENNOSIDES AND DOCUSATE SODIUM 2 TABLET: 50; 8.6 TABLET ORAL at 20:05

## 2024-10-27 RX ADMIN — ACETAMINOPHEN 975 MG: 325 TABLET ORAL at 05:57

## 2024-10-27 RX ADMIN — IBUPROFEN 800 MG: 800 TABLET ORAL at 01:28

## 2024-10-27 RX ADMIN — ACETAMINOPHEN 975 MG: 325 TABLET ORAL at 00:56

## 2024-10-27 RX ADMIN — OXYCODONE 5 MG: 5 TABLET ORAL at 21:31

## 2024-10-27 NOTE — DISCHARGE INSTRUCTIONS
Warning Signs after Having a Baby    Keep this paper on your fridge or somewhere else where you can see it.    Call your provider if you have any of these symptoms up to 12 weeks after having your baby.    Thoughts of hurting yourself or your baby  Pain in your chest or trouble breathing  Severe headache not helped by pain medicine  Eyesight concerns (blurry vision, seeing spots or flashes of light, other changes to eyesight)  Fainting, shaking or other signs of a seizure    Call 9-1-1 if you feel that it is an emergency.     The symptoms below can happen to anyone after giving birth. They can be very serious. Call your provider if you have any of these warning signs.    My provider s phone number: _______________________    Losing too much blood (hemorrhage)    Call your provider if you soak through a pad in less than an hour or pass blood clots bigger than a golf ball. These may be signs that you are bleeding too much.    Blood clots in the legs or lungs    After you give birth, your body naturally clots its blood to help prevent blood loss. Sometimes this increased clotting can happen in other areas of the body, like the legs or lungs. This can block your blood flow and be very dangerous.     Call your provider if you:  Have a red, swollen spot on the back of your leg that is warm or painful when you touch it.   Are coughing up blood.     Infection    Call your provider if you have any of these symptoms:  Fever of 100.4 F (38 C) or higher.  Pain or redness around your stitches if you had an incision.   Any yellow, white, or green fluid coming from places where you had stitches or surgery.    Mood Problems (postpartum depression)    Many people feel sad or have mood changes after having a baby. But for some people, these mood swings are worse.     Call your provider right away if you feel so anxious or nervous that you can't care for yourself or your baby.    Preeclampsia (high blood pressure)    Even if you  didn't have high blood pressure when you were pregnant, you are at risk for the high blood pressure disease called preeclampsia. This risk can last up to 12 weeks after giving birth.     Call your provider if you have:   Pain on your right side under your rib cage  Sudden swelling in the hands and face    Remember: You know your body. If something doesn't feel right, get medical help.     For informational purposes only. Not to replace the advice of your health care provider. Copyright 2020 [facility]. All rights reserved. Clinically reviewed by Emily Fonseca, RNC-OB, MSN. adQ [id number] - Rev 02/23.

## 2024-10-27 NOTE — PROGRESS NOTES
"Postpartum Day 1:     Subjective:  The patient feels well. The patient has no emotional concerns. Pain is well controlled with current medications. The patient is ambulating well. She is voiding and passing gas.The amount and color of the lochia is appropriate for the duration of recovery, patient denies clots. The baby is well.    Objective   The patient has a blood pressure which is within the normal range. Urinary output is adequate.     Exam:   /74 (BP Location: Right arm, Patient Position: Semi-Jain's, Cuff Size: Adult Large)   Pulse 86   Temp 98.9  F (37.2  C) (Oral)   Resp 17   Ht 1.651 m (5' 5\")   Wt 103.6 kg (228 lb 6.4 oz)   LMP 2024 (Approximate)   SpO2 95%   Breastfeeding Unknown   BMI 38.01 kg/m    General: NAD, alert and orientated   Abdomen: soft, NT   Fundus: firm, nontender  Incision: covered, C/D/I  Ext: no pain     Lab Results   Component Value Date    HGB 10.6 (L) 10/26/2024       Impression:   Normal postpartum course, POD#1 LTCS secondary to failed induction for GHTN  Acute blood loss anemia    Plan:   ACUTE BLOOD LOSS ANEMIA  - patients vital signs stable  - patient asymptomatic   - component of drop likely due to dilution with IV fluids being administered   GHTN  - BP stable not on medication  POSTOP  - DC catheter  - switch to oral pain meds  - encourage ambulation   - Continue current care.      Gissel Whitt, DO on 10/27/2024 at 7:11 AM    "

## 2024-10-27 NOTE — PLAN OF CARE
Goal Outcome Evaluation:  Problem: Postpartum ( Delivery)  Goal: Absence of Infection Signs and Symptoms  Outcome: Progressing     Problem: Postpartum ( Delivery)  Goal: Optimal Pain Control and Function  Outcome: Progressing       Vitals, fundal assessment, and lochia wdl. Denies pre-eclampsia symptoms. Normal reflexes. No clonus. Bonding well with infant. Ambulating and voiding independently. Encouraged patient to drink more fluids. Pain is being managed by ibuprofen, tylenol, and toradol. Encouraged patient to pump q2-3 hours and hand express to initiate lactation.      Adriana Castle RN

## 2024-10-27 NOTE — PLAN OF CARE
Problem: Postpartum ( Delivery)  Goal: Hemostasis  Outcome: Progressing     Problem: Postpartum ( Delivery)  Goal: Optimal Pain Control and Function  Outcome: Progressing     Problem: Postpartum ( Delivery)  Goal: Absence of Infection Signs and Symptoms  Outcome: Progressing     VSS. Pain is being managed with scheduled Tylenol and Ibuprofen. Bleeding is WNL. Fundus firm and midline. Incision is covered with silver dressing that is to stay on for 7 days, per Dr. Whitt. Pt is bonding well with baby. RN encouraged regular pumping in the room. RN also encouraged pt to call for assistance when attempting to breastfeed.

## 2024-10-27 NOTE — PROVIDER NOTIFICATION
10/27/24 1800   Vital Signs   Temp 98.4  F (36.9  C)   Temp src Oral   Resp 16   Pulse 99   Pulse Rate Source Monitor   BP (!) 140/82   BP Location Left arm   Patient Position Semi-Jain's   Cuff Size Adult Large   Oxygen Therapy   SpO2 97 %   O2 Device None (Room air)       Notified Dr. Whitt of elevated pressure x2. Plan is to start Labetalol. Dr. Whitt will put orders in.

## 2024-10-28 LAB
ALBUMIN SERPL BCG-MCNC: 3.2 G/DL (ref 3.5–5.2)
ALP SERPL-CCNC: 82 U/L (ref 40–150)
ALT SERPL W P-5'-P-CCNC: 15 U/L (ref 0–50)
ANION GAP SERPL CALCULATED.3IONS-SCNC: 13 MMOL/L (ref 7–15)
AST SERPL W P-5'-P-CCNC: 32 U/L (ref 0–45)
BILIRUB SERPL-MCNC: 0.2 MG/DL
BUN SERPL-MCNC: 8.9 MG/DL (ref 6–20)
CALCIUM SERPL-MCNC: 8.3 MG/DL (ref 8.8–10.4)
CHLORIDE SERPL-SCNC: 108 MMOL/L (ref 98–107)
CREAT SERPL-MCNC: 0.55 MG/DL (ref 0.51–0.95)
EGFRCR SERPLBLD CKD-EPI 2021: >90 ML/MIN/1.73M2
ERYTHROCYTE [DISTWIDTH] IN BLOOD BY AUTOMATED COUNT: 13.9 % (ref 10–15)
GLUCOSE SERPL-MCNC: 79 MG/DL (ref 70–99)
HCO3 SERPL-SCNC: 20 MMOL/L (ref 22–29)
HCT VFR BLD AUTO: 26.8 % (ref 35–47)
HGB BLD-MCNC: 8.8 G/DL (ref 11.7–15.7)
MCH RBC QN AUTO: 27.3 PG (ref 26.5–33)
MCHC RBC AUTO-ENTMCNC: 32.8 G/DL (ref 31.5–36.5)
MCV RBC AUTO: 83 FL (ref 78–100)
PLATELET # BLD AUTO: 235 10E3/UL (ref 150–450)
POTASSIUM SERPL-SCNC: 3.5 MMOL/L (ref 3.4–5.3)
PROT SERPL-MCNC: 5.7 G/DL (ref 6.4–8.3)
RBC # BLD AUTO: 3.22 10E6/UL (ref 3.8–5.2)
SODIUM SERPL-SCNC: 141 MMOL/L (ref 135–145)
WBC # BLD AUTO: 7.7 10E3/UL (ref 4–11)

## 2024-10-28 PROCEDURE — 85014 HEMATOCRIT: CPT

## 2024-10-28 PROCEDURE — 250N000013 HC RX MED GY IP 250 OP 250 PS 637: Performed by: OBSTETRICS & GYNECOLOGY

## 2024-10-28 PROCEDURE — 84155 ASSAY OF PROTEIN SERUM: CPT

## 2024-10-28 PROCEDURE — 250N000011 HC RX IP 250 OP 636: Performed by: OBSTETRICS & GYNECOLOGY

## 2024-10-28 PROCEDURE — 120N000001 HC R&B MED SURG/OB

## 2024-10-28 PROCEDURE — 36415 COLL VENOUS BLD VENIPUNCTURE: CPT

## 2024-10-28 RX ORDER — HYDROXYZINE HYDROCHLORIDE 25 MG/1
25 TABLET, FILM COATED ORAL EVERY 6 HOURS PRN
Status: DISCONTINUED | OUTPATIENT
Start: 2024-10-28 | End: 2024-10-29 | Stop reason: HOSPADM

## 2024-10-28 RX ORDER — HYDROXYZINE HYDROCHLORIDE 25 MG/1
50 TABLET, FILM COATED ORAL EVERY 6 HOURS PRN
Status: DISCONTINUED | OUTPATIENT
Start: 2024-10-28 | End: 2024-10-29 | Stop reason: HOSPADM

## 2024-10-28 RX ADMIN — OXYCODONE 5 MG: 5 TABLET ORAL at 02:42

## 2024-10-28 RX ADMIN — ENOXAPARIN SODIUM 40 MG: 40 INJECTION SUBCUTANEOUS at 02:42

## 2024-10-28 RX ADMIN — ACETAMINOPHEN 975 MG: 325 TABLET ORAL at 06:04

## 2024-10-28 RX ADMIN — SENNOSIDES AND DOCUSATE SODIUM 2 TABLET: 50; 8.6 TABLET ORAL at 09:02

## 2024-10-28 RX ADMIN — OXYCODONE 5 MG: 5 TABLET ORAL at 10:55

## 2024-10-28 RX ADMIN — IBUPROFEN 800 MG: 800 TABLET ORAL at 09:02

## 2024-10-28 RX ADMIN — OXYCODONE 5 MG: 5 TABLET ORAL at 06:46

## 2024-10-28 RX ADMIN — IBUPROFEN 800 MG: 800 TABLET ORAL at 21:01

## 2024-10-28 RX ADMIN — SENNOSIDES AND DOCUSATE SODIUM 2 TABLET: 50; 8.6 TABLET ORAL at 21:01

## 2024-10-28 RX ADMIN — LABETALOL HYDROCHLORIDE 100 MG: 100 TABLET, FILM COATED ORAL at 21:01

## 2024-10-28 RX ADMIN — ACETAMINOPHEN 975 MG: 325 TABLET ORAL at 12:51

## 2024-10-28 RX ADMIN — ENOXAPARIN SODIUM 40 MG: 40 INJECTION SUBCUTANEOUS at 15:21

## 2024-10-28 RX ADMIN — IBUPROFEN 800 MG: 800 TABLET ORAL at 02:42

## 2024-10-28 RX ADMIN — IBUPROFEN 800 MG: 800 TABLET ORAL at 15:21

## 2024-10-28 RX ADMIN — ACETAMINOPHEN 975 MG: 325 TABLET ORAL at 19:01

## 2024-10-28 RX ADMIN — ACETAMINOPHEN 975 MG: 325 TABLET ORAL at 00:09

## 2024-10-28 RX ADMIN — LABETALOL HYDROCHLORIDE 100 MG: 100 TABLET, FILM COATED ORAL at 09:03

## 2024-10-28 NOTE — PLAN OF CARE
Goal Outcome Evaluation:       Pt bonding with baby and vitals stable. Her fundus is midline and firm without massage and bleeding is scant. Her incision is covered with silver dressing which is clean, dry, intact. Her pain is well managed with scheduled tylenol and ibuprofen as well as prn Oxycodone x1. She has been formula feeding baby this shift.     Problem: Postpartum ( Delivery)  Goal: Optimal Pain Control and Function  10/28/2024 1531 by Monique Kidd RN  Outcome: Progressing  10/28/2024 09 by Monique Kidd RN  Outcome: Progressing  Intervention: Prevent or Manage Pain  Recent Flowsheet Documentation  Taken 10/28/2024 1518 by Monique Kidd RN  Pain Management Interventions:   medication (see MAR)   heat applied  Taken 10/28/2024 08 by Monique Kidd RN  Pain Management Interventions:   medication (see MAR)   heat applied     Problem: Postpartum ( Delivery)  Goal: Absence of Infection Signs and Symptoms  10/28/2024 1531 by Monique Kidd RN  Outcome: Progressing  10/28/2024 0921 by Monique Kidd RN  Outcome: Progressing     Problem: Postpartum ( Delivery)  Goal: Successful Parent Role Transition  10/28/2024 1531 by Monique Kidd RN  Outcome: Progressing  10/28/2024 09 by Monique Kidd RN  Outcome: Progressing     Problem: Adult Inpatient Plan of Care  Goal: Optimal Comfort and Wellbeing  Intervention: Monitor Pain and Promote Comfort  Recent Flowsheet Documentation  Taken 10/28/2024 1518 by Monique Kidd RN  Pain Management Interventions:   medication (see MAR)   heat applied  Taken 10/28/2024 08 by Monique Kidd RN  Pain Management Interventions:   medication (see MAR)   heat applied  Intervention: Provide Person-Centered Care  Recent Flowsheet Documentation  Taken 10/28/2024 1518 by Monique Kidd RN  Trust Relationship/Rapport:   care explained   thoughts/feelings acknowledged   questions encouraged   emotional support provided   empathic listening  provided   questions answered   reassurance provided  Taken 10/28/2024 0857 by Monique Kidd, RN  Trust Relationship/Rapport:   care explained   thoughts/feelings acknowledged   questions encouraged   emotional support provided   empathic listening provided   questions answered   reassurance provided

## 2024-10-28 NOTE — PROGRESS NOTES
Obstetrics Post-Op Progress Note         Assessment and Plan:    Assessment: Lucero Collier is a 29 year old  Post-operative day #2 s/p Low transverse primary  section doing well.     L&D complications: Gestational hypertension          Plan:   Continue to monitor BP, labetalol 100 mg BID started last night  Recheck CBC and CMP  Ambulation encouraged  Monitor wound for signs of infection  Pain control measures as needed, atarax added  Reportable signs and symptoms dicussed with the patient  Start iron supplementation on discharge  Anticipate discharge tomorrow           Interval History:   Doing well.  Pain is well-controlled.  No fevers.  No history of wound drainage, warmth or significant erythema.  Good appetite.  Denies chest pain, shortness of breath, nausea or vomiting.  Ambulatory.  Breastfeeding well. Reports mild HA today. Denies visual disturbances, epigastric pain, RUQ pain.     C/S for maternal exhaustion/failure to descend  Bps trending up over the last 24 hours. Labetalol 100 mg BID added.             Physical Exam:   Temp: 98.3  F (36.8  C) Temp src: Oral BP: 137/67 Pulse: 114   Resp: 17 SpO2: 95 % O2 Device: None (Room air)    Vitals:    10/23/24 0826 10/27/24 0132   Weight: 129.7 kg (286 lb) 103.6 kg (228 lb 6.4 oz)     Vital Signs with Ranges  Temp:  [97.9  F (36.6  C)-98.4  F (36.9  C)] 98.3  F (36.8  C)  Pulse:  [] 114  Resp:  [15-17] 17  BP: (132-142)/(67-83) 137/67  SpO2:  [95 %-97 %] 95 %  No intake/output data recorded.    Uterine fundus is firm, non-tender and at the level of the umbilicus  Incision C/D/I, mepilex on  Extremities Non-tender  +3 bilateral pedal edema  +2 patellar reflexes bilaterally  No clonus bilaterally            Data:   No results found for this or any previous visit (from the past 24 hours).  Recent Labs   Lab Test 10/23/24  0808   AS Negative     Recent Labs   Lab Test 10/26/24  0611 10/25/24  2340   HGB 10.6* 12.0     Recent Labs   Lab Test  04/29/24  1207   KALINGG Positive       Jessica Moritz, APRN CNP

## 2024-10-29 VITALS
TEMPERATURE: 98.1 F | OXYGEN SATURATION: 100 % | DIASTOLIC BLOOD PRESSURE: 76 MMHG | HEIGHT: 65 IN | RESPIRATION RATE: 16 BRPM | HEART RATE: 91 BPM | WEIGHT: 284 LBS | SYSTOLIC BLOOD PRESSURE: 132 MMHG | BODY MASS INDEX: 47.32 KG/M2

## 2024-10-29 PROBLEM — O13.9 GESTATIONAL HYPERTENSION: Status: ACTIVE | Noted: 2024-10-29

## 2024-10-29 LAB — PLATELET # BLD AUTO: 243 10E3/UL (ref 150–450)

## 2024-10-29 PROCEDURE — 85049 AUTOMATED PLATELET COUNT: CPT | Performed by: OBSTETRICS & GYNECOLOGY

## 2024-10-29 PROCEDURE — 36415 COLL VENOUS BLD VENIPUNCTURE: CPT | Performed by: OBSTETRICS & GYNECOLOGY

## 2024-10-29 PROCEDURE — 250N000013 HC RX MED GY IP 250 OP 250 PS 637: Performed by: OBSTETRICS & GYNECOLOGY

## 2024-10-29 PROCEDURE — 250N000011 HC RX IP 250 OP 636: Performed by: OBSTETRICS & GYNECOLOGY

## 2024-10-29 RX ORDER — FERROUS SULFATE 325(65) MG
325 TABLET ORAL
Qty: 90 TABLET | Refills: 0 | Status: SHIPPED | OUTPATIENT
Start: 2024-10-29

## 2024-10-29 RX ORDER — IBUPROFEN 800 MG/1
800 TABLET, FILM COATED ORAL EVERY 6 HOURS PRN
Qty: 90 TABLET | Refills: 0 | Status: SHIPPED | OUTPATIENT
Start: 2024-10-29

## 2024-10-29 RX ORDER — OXYCODONE HYDROCHLORIDE 5 MG/1
5 TABLET ORAL EVERY 4 HOURS PRN
Qty: 10 TABLET | Refills: 0 | Status: SHIPPED | OUTPATIENT
Start: 2024-10-29

## 2024-10-29 RX ORDER — ACETAMINOPHEN 325 MG/1
325-650 TABLET ORAL EVERY 4 HOURS PRN
Qty: 90 TABLET | Refills: 0 | Status: SHIPPED | OUTPATIENT
Start: 2024-10-29

## 2024-10-29 RX ORDER — LABETALOL 100 MG/1
100 TABLET, FILM COATED ORAL EVERY 12 HOURS
Qty: 60 TABLET | Refills: 0 | Status: SHIPPED | OUTPATIENT
Start: 2024-10-29 | End: 2024-11-02 | Stop reason: DRUGHIGH

## 2024-10-29 RX ADMIN — IBUPROFEN 800 MG: 800 TABLET ORAL at 08:52

## 2024-10-29 RX ADMIN — OXYCODONE 5 MG: 5 TABLET ORAL at 01:13

## 2024-10-29 RX ADMIN — ENOXAPARIN SODIUM 40 MG: 40 INJECTION SUBCUTANEOUS at 01:13

## 2024-10-29 RX ADMIN — ACETAMINOPHEN 975 MG: 325 TABLET ORAL at 08:05

## 2024-10-29 RX ADMIN — LABETALOL HYDROCHLORIDE 100 MG: 100 TABLET, FILM COATED ORAL at 08:05

## 2024-10-29 RX ADMIN — IBUPROFEN 800 MG: 800 TABLET ORAL at 03:09

## 2024-10-29 RX ADMIN — OXYCODONE 5 MG: 5 TABLET ORAL at 13:25

## 2024-10-29 RX ADMIN — ACETAMINOPHEN 975 MG: 325 TABLET ORAL at 13:23

## 2024-10-29 RX ADMIN — ACETAMINOPHEN 975 MG: 325 TABLET ORAL at 01:13

## 2024-10-29 RX ADMIN — OXYCODONE 5 MG: 5 TABLET ORAL at 06:00

## 2024-10-29 RX ADMIN — SENNOSIDES AND DOCUSATE SODIUM 2 TABLET: 50; 8.6 TABLET ORAL at 08:05

## 2024-10-29 RX ADMIN — ENOXAPARIN SODIUM 40 MG: 40 INJECTION SUBCUTANEOUS at 13:23

## 2024-10-29 ASSESSMENT — ACTIVITIES OF DAILY LIVING (ADL)
ADLS_ACUITY_SCORE: 0

## 2024-10-29 NOTE — LACTATION NOTE
This note was copied from a baby's chart.  Lactation visit for mom Lucero and 3 day old term  boy Jose L delivered via C/section. 1st baby for mom.  Lucero mostly pumping and feeding baby EBM/formula per bottle but reports that she has had success with baby at breast.    Is using Medela Symphony pump and found that 21 mm size flange felt better and yielded more milk.  Got 65 mls with last session.    Encouraged frequent breastfeeding/pumping with minimum of 8 times in 24 hours.  Encouraged putting baby to breast before offering bottle so that he hones that skill.      Reviewed breastfeeding section of patient education booklet.    Mom has Medela pump at home and plans to continue using 21 mm size flanges.      Planning on discharge later today.

## 2024-10-29 NOTE — PLAN OF CARE
Problem: Adult Inpatient Plan of Care  Goal: Optimal Comfort and Wellbeing  Intervention: Monitor Pain and Promote Comfort  Recent Flowsheet Documentation  Taken 10/29/2024 0600 by Ayde Reyna, RN  Pain Management Interventions:   medication (see MAR)   cold applied   splinting  Taken 10/29/2024 0307 by Ayde Reyna, RN  Pain Management Interventions:   medication (see MAR)   cold applied   splinting  Taken 10/29/2024 0112 by Ayde Reyna, RN  Pain Management Interventions:   medication (see MAR)   cold applied   splinting     Goal Outcome Evaluation:       VSS on RA. BP WDL on BID Labetelol. Denies presence of preeclampsia symptoms. Fundus firm, midline, and at the umbilicus. Lochia WDL. Silver dressing WDL with no drainage noted. Independently ambulates and voiding. Pain controlled with scheduled and PRN medications (see MAR), as well as nonpharmacological interventions. Patient is bonding well with infant and asking appropriate questions.

## 2024-10-29 NOTE — PROGRESS NOTES
Outreach Note for CHRIS Collier  [id number]  1995    Discharge follow-up plan discussed with patient, needs assessed. Pt requests all follow-up through clinic/physician, declines home care visit, unless medically indicated and ordered by physician, and declines follow-up phone call.   No further needs identified at this time.

## 2024-10-29 NOTE — DISCHARGE SUMMARY
Discharge Summary    Lucero Collier MRN# [id number]   Age: 29 year old YOB: 1995     Date of Admission:  10/23/2024  Date of Discharge::  10/29/2024  Admitting Physician:  Gissel Whitt DO  Discharge Physician:  SHELBY Byrne CNP     Home clinic: [facility], [place] [place] [facility]          Admission Diagnoses:   Encounter for induction of labor  Gestational hypertension  Intrauterine pregnancy at 37w5d          Discharge Diagnosis:   Same  Acute blood loss anemia, asymptomatic  S/p  delivery          Procedures:     Procedure(s): Low transverse primary  delivery via Pfannenstiel incision  No additional procedures performed              Medications Prior to Admission:     Medications Prior to Admission   Medication Sig Dispense Refill Last Dose/Taking    cetirizine (ZYRTEC) 10 MG tablet TAKE ONE TABLET BY MOUTH NIGHTLY FOR 30 DAYS   10/22/2024    pimecrolimus (ELIDEL) 1 % cream Apply topically 2 times daily Ok to use on face 60 g 3 10/22/2024    Prenatal Vit-Fe Fumarate-FA (PRENATAL MULTIVITAMIN  PLUS IRON) 27-1 MG TABS Take 1 tablet by mouth daily.   10/22/2024    promethazine (PHENERGAN) 25 MG tablet Take 1 tablet by mouth every 4 hours as needed   10/22/2024    omeprazole (PRILOSEC) 20 MG DR capsule Take 1 capsule (20 mg) by mouth daily 30 capsule 1 Unknown             Discharge Medications:        Review of your medicines        START taking        Dose / Directions   acetaminophen 325 MG tablet  Commonly known as: TYLENOL      Dose: 325-650 mg  Take 1-2 tablets (325-650 mg) by mouth every 4 hours as needed for mild pain.  Quantity: 90 tablet  Refills: 0     ferrous sulfate 325 (65 Fe) MG tablet  Commonly known as: FEROSUL  Used for: Anemia due to blood loss, acute      Dose: 325 mg  Take 1 tablet (325 mg) by mouth daily (with breakfast).  Quantity: 90 tablet  Refills: 0     ibuprofen 800 MG tablet  Commonly known as: ADVIL/MOTRIN      Dose: 800 mg  Take 1 tablet (800 mg)  by mouth every 6 hours as needed for moderate pain.  Quantity: 90 tablet  Refills: 0     labetalol 100 MG tablet  Commonly known as: NORMODYNE  Used for: Hypertension, unspecified type      Dose: 100 mg  Take 1 tablet (100 mg) by mouth every 12 hours.  Quantity: 60 tablet  Refills: 0     oxyCODONE 5 MG tablet  Commonly known as: ROXICODONE      Dose: 5 mg  Take 1 tablet (5 mg) by mouth every 4 hours as needed for pain.  Quantity: 10 tablet  Refills: 0            CONTINUE these medicines which have NOT CHANGED        Dose / Directions   cetirizine 10 MG tablet  Commonly known as: zyrTEC      TAKE ONE TABLET BY MOUTH NIGHTLY FOR 30 DAYS  Refills: 0     omeprazole 20 MG DR capsule  Commonly known as: PriLOSEC  Used for: Gastroesophageal reflux disease without esophagitis      Dose: 20 mg  Take 1 capsule (20 mg) by mouth daily  Quantity: 30 capsule  Refills: 1     pimecrolimus 1 % external cream  Commonly known as: ELIDEL  Used for: Atopic dermatitis      Apply topically 2 times daily Ok to use on face  Quantity: 60 g  Refills: 3     prenatal multivitamin  plus iron 27-1 MG Tabs  Indication: Pregnancy      Dose: 1 tablet  Take 1 tablet by mouth daily.  Refills: 0     promethazine 25 MG tablet  Commonly known as: PHENERGAN      Dose: 1 tablet  Take 1 tablet by mouth every 4 hours as needed  Refills: 0               Where to get your medicines        These medications were sent to [facility] - [place], [place] - [address]   [id number] [facility] [address]      Phone: [phone]   acetaminophen 325 MG tablet  ferrous sulfate 325 (65 Fe) MG tablet  ibuprofen 800 MG tablet  labetalol 100 MG tablet       Some of these will need a paper prescription and others can be bought over the counter. Ask your nurse if you have questions.    Bring a paper prescription for each of these medications  oxyCODONE 5 MG tablet               Consultations:   None          Brief History of Labor:   Lucero Collier is a 29 year old  " who presented for induction due to gestational hypertension.  She progressed adequately but delivered via  due to maternal exhaustion.            Hospital Course:   The patient's hospital course was unremarkable.  She recovered as anticipated and experienced no post-operative complications.  On discharge, her pain was well controlled. Vaginal bleeding is similar to peak menstrual flow.  Voiding without difficulty.  Ambulating well and tolerating a normal diet.  No fever or significant wound drainage.  Breastfeeding.  Infant is stable.  She was discharged on post-partum day #3. She will resume her prenatal with iron as well as an oral iron supplement.      Post-partum hemoglobin:   Hemoglobin   Date Value Ref Range Status   10/28/2024 8.8 (L) 11.7 - 15.7 g/dL Final   2021 11.9 11.7 - 15.7 g/dL Final       Day of discharge assessment:   /76 (BP Location: Left arm, Patient Position: Semi-Jain's, Cuff Size: Adult Large)   Pulse 91   Temp 98.1  F (36.7  C) (Oral)   Resp 16   Ht 1.651 m (5' 5\")   Wt 128.8 kg (284 lb)   LMP 2024 (Approximate)   SpO2 100%   Breastfeeding Unknown   BMI 47.26 kg/m    Abdomen: Soft, fundus firm, incision covered, dressing intact.           Discharge Instructions and Follow-Up:     Discharge diet: Regular   Discharge activity: Your activity upon discharge: no lifting >15 lbs or strenuous exercise for 6 weeks, no driving for 2 weeks or until you can slam on the breaks w/o pain, nothing in the vagina for 6 weeks (no tampons, intercourse, douching).    Discharge follow-up: 3-5 days for BP check, 2 weeks for incision check  6 weeks for postpartum visit.   Wound care: Remove bandage 7 days after surgery.   Keep incision clean and dry.           Discharge Disposition:     Discharged to home      Attestation:  I have reviewed today's vital signs, notes, medications, labs and imaging.  Amount of time performed on this discharge summary: 15 minutes.  Total " time: 25 minutes    SHELBY Byrne [facility]  [facility]  [phone]

## 2024-10-29 NOTE — PLAN OF CARE
Problem: Postpartum ( Delivery)  Goal: Hemostasis  Outcome: Progressing     Problem: Postpartum ( Delivery)  Goal: Optimal Pain Control and Function  Outcome: Progressing  Intervention: Prevent or Manage Pain  Recent Flowsheet Documentation  Taken 10/29/2024 0805 by Shruthi Chand RN  Pain Management Interventions:   medication (see MAR)   cold applied   splinting  Perineal Care: perineal hygiene encouraged   Goal Outcome Evaluation:    Assessment: VSS. Bleeding light w/o clots. Fundus firm, midline, at umbilicus. Pain well controlled per pt with scheduled meds & ice. BP assessment wnl. Dressing UTV, no drainage.    Fdgs: Continues to work on pumping and bottling.     Bonding well with infant. Questions encouraged and answered.

## 2024-10-31 ENCOUNTER — PATIENT OUTREACH (OUTPATIENT)
Dept: CARE COORDINATION | Facility: CLINIC | Age: 29
End: 2024-10-31
Payer: COMMERCIAL

## 2024-10-31 NOTE — PROGRESS NOTES
[facility]:   [facility] Contact  [facility]/Voicemail     Clinical Data: Post-Discharge Outreach     Outreach attempted x 2.  Left message on patient's voicemail, providing [facility]'s central phone number of [phone] ([phone]) for questions/concerns and/or to schedule an appt with an [facility] provider, if they do not have a PCP.      Plan:  [facility] will do no further outreaches at this time.       Marta Hughes  Community Health Worker  [facility], [facility]  [phone]      *Connected Care Resource Team does NOT follow patient ongoing. Referrals are identified based on internal discharge reports and the outreach is to ensure patient has an understanding of their discharge instructions.

## 2024-11-02 ENCOUNTER — HOSPITAL ENCOUNTER (EMERGENCY)
Facility: CLINIC | Age: 29
Discharge: HOME | End: 2024-11-02
Attending: EMERGENCY MEDICINE | Admitting: EMERGENCY MEDICINE
Payer: COMMERCIAL

## 2024-11-02 VITALS
DIASTOLIC BLOOD PRESSURE: 85 MMHG | TEMPERATURE: 98.2 F | SYSTOLIC BLOOD PRESSURE: 161 MMHG | OXYGEN SATURATION: 96 % | HEART RATE: 68 BPM | RESPIRATION RATE: 18 BRPM

## 2024-11-02 LAB
ALBUMIN MFR UR ELPH: 12.7 MG/DL
ALBUMIN SERPL BCG-MCNC: 3.5 G/DL (ref 3.5–5.2)
ALP SERPL-CCNC: 73 U/L (ref 40–150)
ALT SERPL W P-5'-P-CCNC: 17 U/L (ref 0–50)
ANION GAP SERPL CALCULATED.3IONS-SCNC: 12 MMOL/L (ref 7–15)
AST SERPL W P-5'-P-CCNC: 20 U/L (ref 0–45)
BASOPHILS # BLD AUTO: 0 10E3/UL (ref 0–0.2)
BASOPHILS NFR BLD AUTO: 1 %
BILIRUB SERPL-MCNC: 0.2 MG/DL
BUN SERPL-MCNC: 13.1 MG/DL (ref 6–20)
CALCIUM SERPL-MCNC: 8.5 MG/DL (ref 8.8–10.4)
CHLORIDE SERPL-SCNC: 109 MMOL/L (ref 98–107)
CREAT SERPL-MCNC: 0.7 MG/DL (ref 0.51–0.95)
CREAT UR-MCNC: 93.3 MG/DL
EGFRCR SERPLBLD CKD-EPI 2021: >90 ML/MIN/1.73M2
EOSINOPHIL # BLD AUTO: 0.3 10E3/UL (ref 0–0.7)
EOSINOPHIL NFR BLD AUTO: 4 %
ERYTHROCYTE [DISTWIDTH] IN BLOOD BY AUTOMATED COUNT: 13.4 % (ref 10–15)
GLUCOSE SERPL-MCNC: 85 MG/DL (ref 70–99)
HCO3 SERPL-SCNC: 20 MMOL/L (ref 22–29)
HCT VFR BLD AUTO: 29.5 % (ref 35–47)
HGB BLD-MCNC: 9.9 G/DL (ref 11.7–15.7)
IMM GRANULOCYTES # BLD: 0 10E3/UL
IMM GRANULOCYTES NFR BLD: 1 %
LDH SERPL L TO P-CCNC: 225 U/L (ref 0–250)
LYMPHOCYTES # BLD AUTO: 1.9 10E3/UL (ref 0.8–5.3)
LYMPHOCYTES NFR BLD AUTO: 26 %
MCH RBC QN AUTO: 27.5 PG (ref 26.5–33)
MCHC RBC AUTO-ENTMCNC: 33.6 G/DL (ref 31.5–36.5)
MCV RBC AUTO: 82 FL (ref 78–100)
MONOCYTES # BLD AUTO: 0.7 10E3/UL (ref 0–1.3)
MONOCYTES NFR BLD AUTO: 9 %
NEUTROPHILS # BLD AUTO: 4.5 10E3/UL (ref 1.6–8.3)
NEUTROPHILS NFR BLD AUTO: 60 %
NRBC # BLD AUTO: 0 10E3/UL
NRBC BLD AUTO-RTO: 0 /100
PLATELET # BLD AUTO: 387 10E3/UL (ref 150–450)
POTASSIUM SERPL-SCNC: 3.7 MMOL/L (ref 3.4–5.3)
PROT SERPL-MCNC: 6.1 G/DL (ref 6.4–8.3)
PROT/CREAT 24H UR: 0.14 MG/MG CR (ref 0–0.2)
RBC # BLD AUTO: 3.6 10E6/UL (ref 3.8–5.2)
SODIUM SERPL-SCNC: 141 MMOL/L (ref 135–145)
WBC # BLD AUTO: 7.4 10E3/UL (ref 4–11)

## 2024-11-02 PROCEDURE — 85025 COMPLETE CBC W/AUTO DIFF WBC: CPT | Performed by: PHYSICIAN ASSISTANT

## 2024-11-02 PROCEDURE — 250N000013 HC RX MED GY IP 250 OP 250 PS 637: Performed by: PHYSICIAN ASSISTANT

## 2024-11-02 PROCEDURE — 99283 EMERGENCY DEPT VISIT LOW MDM: CPT

## 2024-11-02 PROCEDURE — 82247 BILIRUBIN TOTAL: CPT | Performed by: PHYSICIAN ASSISTANT

## 2024-11-02 PROCEDURE — 84156 ASSAY OF PROTEIN URINE: CPT | Performed by: PHYSICIAN ASSISTANT

## 2024-11-02 PROCEDURE — 36415 COLL VENOUS BLD VENIPUNCTURE: CPT | Performed by: PHYSICIAN ASSISTANT

## 2024-11-02 PROCEDURE — 83615 LACTATE (LD) (LDH) ENZYME: CPT | Performed by: PHYSICIAN ASSISTANT

## 2024-11-02 RX ORDER — LABETALOL 100 MG/1
300 TABLET, FILM COATED ORAL ONCE
Status: COMPLETED | OUTPATIENT
Start: 2024-11-02 | End: 2024-11-02

## 2024-11-02 RX ORDER — LABETALOL HYDROCHLORIDE 5 MG/ML
20-80 INJECTION, SOLUTION INTRAVENOUS EVERY 10 MIN PRN
Status: DISCONTINUED | OUTPATIENT
Start: 2024-11-02 | End: 2024-11-03 | Stop reason: HOSPADM

## 2024-11-02 RX ORDER — LABETALOL 300 MG/1
300 TABLET, FILM COATED ORAL 3 TIMES DAILY
Qty: 90 TABLET | Refills: 0 | Status: SHIPPED | OUTPATIENT
Start: 2024-11-02 | End: 2024-11-02

## 2024-11-02 RX ORDER — LABETALOL 100 MG/1
300 TABLET, FILM COATED ORAL 3 TIMES DAILY
Qty: 270 TABLET | Refills: 0 | Status: SHIPPED | OUTPATIENT
Start: 2024-11-02 | End: 2024-12-02

## 2024-11-02 RX ADMIN — LABETALOL HYDROCHLORIDE 300 MG: 100 TABLET, FILM COATED ORAL at 21:50

## 2024-11-02 ASSESSMENT — ACTIVITIES OF DAILY LIVING (ADL)
ADLS_ACUITY_SCORE: 0
ADLS_ACUITY_SCORE: 0

## 2024-11-02 ASSESSMENT — COLUMBIA-SUICIDE SEVERITY RATING SCALE - C-SSRS
2. HAVE YOU ACTUALLY HAD ANY THOUGHTS OF KILLING YOURSELF IN THE PAST MONTH?: NO
1. IN THE PAST MONTH, HAVE YOU WISHED YOU WERE DEAD OR WISHED YOU COULD GO TO SLEEP AND NOT WAKE UP?: NO
6. HAVE YOU EVER DONE ANYTHING, STARTED TO DO ANYTHING, OR PREPARED TO DO ANYTHING TO END YOUR LIFE?: NO

## 2024-11-03 NOTE — DISCHARGE INSTRUCTIONS
Your laboratory workup here was reassuring.  OB has recommended increasing your labetalol to 300 mg 3 times a day.  I have provided you with a prescription for this.  OB would like you to follow-up with them on Monday.  They should be reaching out to you, but it would not be a bad idea for you to call the clinic first thing on Monday morning.  For any new or worsening symptoms before then, do not hesitate to return to the ER.

## 2024-11-03 NOTE — ED PROVIDER NOTES
I, Shanel Funes have reviewed the documentation, personally taken the patient's history, performed an exam and agree with the physical finds, diagnosis and management plan.    HPI:  30 y/o f s/p  8d ago here. Htn during preg but no on meds, inpt started on labetolol 100 bid. Bp high yest increased to 200 bid.  Today bp high and told to come in. No symptoms other than ongoing peripheral edema that isnt changed.     Physical Exam: Pleasant female in no acute distress.  Hypertensive in the 150s on my exam.  No visual changes on examination, no visual field cut.  Neurologically intact.  Moving all 4 extremities, ambulating without difficulty.   incision clean dry intact, no abdominal pain.  She has 1+ edema at the sock line    MDM: Postpartum hypertension, postpartum preeclampsia, help syndrome    ED Course/workup: Laboratory workup unremarkable.  Blood pressures mostly in the 150s.  Case discussed with OB/GYN, recommend increasing her labetalol from 200 mg twice daily to 300 mg 3 times daily, given first dose here.  Encouraged her to monitor blood pressures at home and warning signs return to ED discussed      ED Course as of 24   Sat  Hemoglobin(!): 9.9  Similar to prev         Final Diagnosis:     ICD-10-CM    1. Postpartum hypertension  O16.5           I personally saw the patient and performed a substantive portion of the visit including all aspects of the medical decision making.  I personally made/approved the management plan and take responsibility for the patient management.     MD Marin Monae, Shanel PARK MD  24

## 2024-11-03 NOTE — ED NOTES
MD in room for systolic reading of 165; she is agreeable with holding the IV labetolol and continuing with the PO meds and discharge with outpt follow up. Pt and parent are also agreeable with plan

## 2024-11-03 NOTE — ED NOTES
Pt resting quietly in stretcher with baby, with mom at bedside.She is A&O, agreeable with plan for labs and serial BP monitoring. Side rails up with seizure pads placed as precaution; call light is within reach. Pt is in no acute distress at this time; was ambulatory independently to the bathroom earlier.

## 2024-11-03 NOTE — ED TRIAGE NOTES
Pt presents with HTN after  delivery on 10/26/24. Pt reports taking labetalol daily. Pt reports SBP in the 160s at home. Pt denies abd pain, headache, shortness or breath, increased swelling or vision changes. Pt does report feeling dizzy PTA. ABCs intact. GCS 15.      Triage Assessment (Adult)       Row Name 24 [id number]          Triage Assessment    Airway WDL WDL        Respiratory WDL    Respiratory WDL WDL        Skin Circulation/Temperature WDL    Skin Circulation/Temperature WDL WDL        Cardiac WDL    Cardiac WDL WDL        Peripheral/Neurovascular WDL    Peripheral Neurovascular WDL WDL        Cognitive/Neuro/Behavioral WDL    Cognitive/Neuro/Behavioral WDL WDL

## 2024-11-03 NOTE — ED PROVIDER NOTES
EMERGENCY DEPARTMENT ENCOUNTER   NAME: Lucero Collier ; AGE: 29 year old female ; YOB: 1995 ; MRN: [id number] ; PCP: Cristal Beltran Ra     Evaluation Date & Time: 2024  8:49 PM    ED Provider: Lidia Mckeon PA-C    CHIEF COMPLAINT     Postpartum Complications      FINAL ASSESSMENT       ICD-10-CM    1. Postpartum hypertension  O16.5           ED COURSE, MEDICAL DECISION MAKING, PLAN     ED course     8:56 PM I evaluated the patient  9:04 PM Staffed with Dr. Shanel Funes  9:44 PM Spoke with RICARDO Haque.   9:48 PM Updated the patient. Discharge and follow up plans discussed. RN to discharge.   ______________________________________________________________________    Reason for visit: Lucero Collier is a 29 year old female with s/p  on 10/26, HTN, epilepsy, GERD, migraine headaches, anxiety, ADHD presenting for elevated blood pressures after her  on 10/26. Was HTN during pregnancy and started on Labetalol while in the hospital. Started 200mg bid yesterday. Still elevated pressures. Mild lightheadedness otherwise feels fine.     Exam positives: Healing CS scar. B/l non-pitting foot swelling. The rest of the exam is benign. BP elevated to 186/89 otherwise vitally normal.     Labs: Reassuring labs. Hgb is low at 9.9, but this is actually up from 8.8 five days ago.     EKG: /    Imaging: /    Consults: Spoke with RICARDO Haque. Recommended increasing Labetalol to 300mg TID and following up in clinic on Monday.     Interventions/recheck: Pt given a dose of Labetalol 300mg here shortly prior to discharge. BP on discharge at 161/85.     Assessment: Patient coming in with postpartum hypertension.  Thankfully labs are reassuring without suggestion of pre-eclampsia or HELLP syndrome.   No evidence of intracranial pathology without neurological deficits, headache, dizziness, vision changes, etc.   No evidence of systemic infection.   Overall, no red flag s/s present to suggest an  acutely serious or life threatening condition that would necessitate hospitalization.     Plan: Increase labetalol to 300mg TID. Continue to monitor pressures. Follow up with OB on Monday.   Indications for re-evaluation in the ER discussed.   Patient/parent/guardian understanding and agreeable with the plan and will discharge to home in good condition.       *All pertinent lab & imaging studies independently reviewed. (See chart for details)   *Discussed the results of all the tests and plan with patient and family/guardians.       HISTORY OF PRESENT ILLNESS   Patient information was obtained from: Patient   Use of Intrepreter: None     Pertinent past medical history: s/p  on 10/26, HTN, epilepsy, GERD, migraine headaches, anxiety, ADHD    Lucero Collier is here by means of walk in  with family for evaluation of postpartum HTN.     The patient reports she had a  delivery on 10/26/2024. She was hypertensive during her pregnancy, but was not started on meds. During labor and delivery she continued to be hypertensive and was started on Labetalol 100mg bid.     She reports her systolic blood pressure was near the 160s yesterday. She called the triage line and was advised to increase her Labetalol to 200 mg 2 times a day. Her systolic blood pressure remained near 160s today, thus she came to the ER as advised.     She endorses mild lightheadedness, but otherwise feels fine. Her appetite has been fine. Her bowel and bladder habits have been normal. No complaints of numbness/tingling, shortness of breath, new swelling.     No other concerns.     MEDICAL HISTORY     Past Medical History:   Diagnosis Date    ADHD     Adverse reaction to pertussis vaccine     Allergies     Anxiety     Chronic headaches     Complex partial seizures (H)     Constipation     Depression     Depressive disorder     Developmental delay     Epilepsy (H)     GERD (gastroesophageal reflux disease)     History of tonsillectomy      Hypoglycemia     Kidney stone     Myopia     Obesity     Sinusitis, chronic     Sleep disorder     Smoking     Spells     Syncope     Urinary tract infection        Past Surgical History:   Procedure Laterality Date     SECTION N/A 10/26/2024    Procedure:  SECTION;  Surgeon: Megan Cota MD;  Location: [facility] OR    ENDOSCOPY UPPER WITH PANCREATIC STIMULATION      Esophagitis     NISSEN FUNDOPLICATION      Dr. Meneses    NISSEN FUNDOPLICATION      PE TUBES  3/98    TONSILLECTOMY & ADENOIDECTOMY         Family History   Problem Relation Age of Onset    Diabetes Maternal Grandmother     Hypertension Maternal Grandmother     Diabetes Paternal Grandmother     Cardiovascular Paternal Grandmother     Breast Cancer Paternal Grandmother 61        right side breast cancer    Genetic Disorder Other         nephew.-genetic defects     Obesity Mother     Supraventricular tachycardia Mother     Family History Negative Father        Social History     Tobacco Use    Smoking status: Former     Types: Cigarettes    Smokeless tobacco: Never   Vaping Use    Vaping status: Every Day    Substances: Nicotine   Substance Use Topics    Alcohol use: Not Currently     Comment: Alcoholic Drinks/day: Occasional    Drug use: No     Types: Oxycodone       labetalol (NORMODYNE) 100 MG tablet  acetaminophen (TYLENOL) 325 MG tablet  cetirizine (ZYRTEC) 10 MG tablet  ferrous sulfate (FEROSUL) 325 (65 Fe) MG tablet  ibuprofen (ADVIL/MOTRIN) 800 MG tablet  omeprazole (PRILOSEC) 20 MG DR capsule  oxyCODONE (ROXICODONE) 5 MG tablet  pimecrolimus (ELIDEL) 1 % cream  Prenatal Vit-Fe Fumarate-FA (PRENATAL MULTIVITAMIN  PLUS IRON) 27-1 MG TABS  promethazine (PHENERGAN) 25 MG tablet          PHYSICAL EXAM     First Vitals:  Patient Vitals for the past 24 hrs:   BP Temp Temp src Pulse Resp SpO2   24 2200 (!) 161/85 -- -- -- -- --   24 2145 (!) 165/90 -- -- -- -- --   24 2130 (!) 156/83 -- -- --  -- --   24 (!) 152/78 -- -- -- -- --   24 2100 (!) 158/86 -- -- 68 -- 96 %   24 (!) 186/89 98.2  F (36.8  C) Oral 79 18 98 %       PHYSICAL EXAM:   Constitutional: No acute distress.  Neuro: Awake and alert. No focal deficits.  Psych: Calm and cooperative.  Eyes: PERRL. EOMI. Conjunctivae clear.   Mouth: Pink and moist.   Cardio: Regular rate. Adequate perfusion to extremities. Regular rhythm. No murmurs.  Pulmonary: Oxygenating well on RA. No labored breathing. CTA b/l.  Abdomen: Healing  scar to lower abdomen. Mild tenderness directly over the surgical site, otherwise no abdominal pain. Soft. Non-distended and no rigidity. No rebound or guarding.   Upper extremities: Moves freely. No edema. Distal pulses intact. Sensations intact.   Lower extremities: Non pitting edema to both feet. Moves freely. Distal pulses intact. Sensations intact.   Skin: Natural color, warm, dry, intact.       RESULTS     LAB:  All pertinent labs reviewed and interpreted  Labs Ordered and Resulted from Time of ED Arrival to Time of ED Departure   COMPREHENSIVE METABOLIC PANEL - Abnormal       Result Value    Sodium 141      Potassium 3.7      Carbon Dioxide (CO2) 20 (*)     Anion Gap 12      Urea Nitrogen 13.1      Creatinine 0.70      GFR Estimate >90      Calcium 8.5 (*)     Chloride 109 (*)     Glucose 85      Alkaline Phosphatase 73      AST 20      ALT 17      Protein Total 6.1 (*)     Albumin 3.5      Bilirubin Total 0.2     CBC WITH PLATELETS AND DIFFERENTIAL - Abnormal    WBC Count 7.4      RBC Count 3.60 (*)     Hemoglobin 9.9 (*)     Hematocrit 29.5 (*)     MCV 82      MCH 27.5      MCHC 33.6      RDW 13.4      Platelet Count 387      % Neutrophils 60      % Lymphocytes 26      % Monocytes 9      % Eosinophils 4      % Basophils 1      % Immature Granulocytes 1      NRBCs per 100 WBC 0      Absolute Neutrophils 4.5      Absolute Lymphocytes 1.9      Absolute Monocytes 0.7      Absolute  Eosinophils 0.3      Absolute Basophils 0.0      Absolute Immature Granulocytes 0.0      Absolute NRBCs 0.0     LACTATE DEHYDROGENASE - Normal    Lactate Dehydrogenase 225     PROTEIN RANDOM URINE    Total Protein Urine mg/dL 12.7      Total Protein Urine mg/mg Creat 0.14      Creatinine Urine mg/dL 93.3         RADIOLOGY:  No orders to display       ECG:  N/A      PROCEDURES     None           FINAL IMPRESSION:    ICD-10-CM    1. Postpartum hypertension  O16.5           MEDICATIONS GIVEN IN THE EMERGENCY DEPARTMENT:  Medications   labetalol (NORMODYNE/TRANDATE) injection 20-80 mg (has no administration in time range)   labetalol (NORMODYNE) tablet 300 mg (300 mg Oral $Given 11/2/24 5165)       NEW PRESCRIPTIONS STARTED AT TODAY'S ED VISIT:  Discharge Medication List as of 11/2/2024 10:08 PM               MEDICAL DECISION MAKING:  Medical Decision Making  Obtained supplemental history:Supplemental history obtained?: No  Reviewed external records: External records reviewed?: Documented in chart  Care impacted by chronic illness:Mental Health and Other: chronic headaches  Did you consider but not order tests?: Work up considered but not performed and documented in chart, if applicable  Did you interpret images independently?: Independent interpretation of ECG and images noted in documentation, when applicable.  Consultation discussion with other provider:Did you involve another provider (consultant, MH, pharmacy, etc.)?: I discussed the care with another health care provider, see documentation for details.  Discharge. I prescribed additional prescription strength medication(s) as charted. I considered admission, but ultimately discharged patient with normal labs and in consult with OB.    MIPS: Not Applicable      CRITICAL CARE:  N/A           Brien MEJIA, am serving as a scribe to document services personally performed by Lidia Mckeon PA-C, based on my observation and the provider's statements to me. Lidia MEJIA  Mike KEY attest that Brien Morel is acting in a scribe capacity, has observed my performance of the services and has documented them in accordance with my direction.     Some or all of this documentation has been completed using dictation software and mild grammatical errors may be present. Please contact me with any concerns regarding this.       Lidia Mckeon PA-C  Emergency Medicine   [facility] EMERGENCY ROOM       Lidia Mckeon PA-C  11/02/24 9945

## 2024-11-09 ENCOUNTER — HEALTH MAINTENANCE LETTER (OUTPATIENT)
Age: 29
End: 2024-11-09

## 2024-11-22 ENCOUNTER — LAB REQUISITION (OUTPATIENT)
Dept: LAB | Facility: CLINIC | Age: 29
End: 2024-11-22
Payer: COMMERCIAL

## 2024-11-22 DIAGNOSIS — O13.9 GESTATIONAL (PREGNANCY-INDUCED) HYPERTENSION WITHOUT SIGNIFICANT PROTEINURIA, UNSPECIFIED TRIMESTER: ICD-10-CM

## 2024-11-22 PROCEDURE — 82728 ASSAY OF FERRITIN: CPT | Mod: ORL | Performed by: STUDENT IN AN ORGANIZED HEALTH CARE EDUCATION/TRAINING PROGRAM

## 2024-11-22 NOTE — PROGRESS NOTES
Patient has appt for STD testing, needs some sort of visit for this.    Iris Rand PA-C    
Patient seen 6/20/2019 by DANIELA.    Regina Gerardo RN    
Voice mail box full. Sent [facility] message concerning need for appt before labs can be done.    Regina Gerardo RN    
no abrasions, no jaundice, no lesions, no pruritis, and no rashes.

## 2024-11-23 LAB — FERRITIN SERPL-MCNC: 65 NG/ML (ref 6–175)

## 2024-12-05 ENCOUNTER — TELEPHONE (OUTPATIENT)
Dept: FAMILY MEDICINE | Facility: CLINIC | Age: 29
End: 2024-12-05

## 2024-12-05 ENCOUNTER — OFFICE VISIT (OUTPATIENT)
Dept: FAMILY MEDICINE | Facility: CLINIC | Age: 29
End: 2024-12-05
Payer: COMMERCIAL

## 2024-12-05 VITALS
BODY MASS INDEX: 43.75 KG/M2 | TEMPERATURE: 98.2 F | WEIGHT: 262.6 LBS | HEART RATE: 76 BPM | DIASTOLIC BLOOD PRESSURE: 82 MMHG | SYSTOLIC BLOOD PRESSURE: 121 MMHG | OXYGEN SATURATION: 97 % | HEIGHT: 65 IN | RESPIRATION RATE: 21 BRPM

## 2024-12-05 DIAGNOSIS — F90.2 ATTENTION DEFICIT HYPERACTIVITY DISORDER (ADHD), COMBINED TYPE: ICD-10-CM

## 2024-12-05 DIAGNOSIS — G40.909 NONINTRACTABLE EPILEPSY WITHOUT STATUS EPILEPTICUS, UNSPECIFIED EPILEPSY TYPE (H): ICD-10-CM

## 2024-12-05 DIAGNOSIS — L70.0 ACNE VULGARIS: Primary | ICD-10-CM

## 2024-12-05 DIAGNOSIS — L70.0 ACNE VULGARIS: ICD-10-CM

## 2024-12-05 RX ORDER — HYDROXYZINE PAMOATE 25 MG/1
25 CAPSULE ORAL
COMMUNITY
Start: 2024-11-22

## 2024-12-05 RX ORDER — DEXTROAMPHETAMINE SACCHARATE, AMPHETAMINE ASPARTATE MONOHYDRATE, DEXTROAMPHETAMINE SULFATE AND AMPHETAMINE SULFATE 7.5; 7.5; 7.5; 7.5 MG/1; MG/1; MG/1; MG/1
30 CAPSULE, EXTENDED RELEASE ORAL DAILY
Qty: 30 CAPSULE | Refills: 0 | Status: SHIPPED | OUTPATIENT
Start: 2025-02-03

## 2024-12-05 RX ORDER — CLINDAMYCIN PHOSPHATE, BENZOYL PEROXIDE 25; 10 MG/G; MG/G
GEL TOPICAL
Qty: 50 G | Refills: 1 | Status: SHIPPED | OUTPATIENT
Start: 2024-12-05

## 2024-12-05 RX ORDER — DEXTROAMPHETAMINE SACCHARATE, AMPHETAMINE ASPARTATE, DEXTROAMPHETAMINE SULFATE AND AMPHETAMINE SULFATE 1.25; 1.25; 1.25; 1.25 MG/1; MG/1; MG/1; MG/1
5 TABLET ORAL DAILY
Qty: 30 TABLET | Refills: 0 | Status: SHIPPED | OUTPATIENT
Start: 2025-01-04

## 2024-12-05 RX ORDER — TRIAMCINOLONE ACETONIDE 1 MG/G
CREAM TOPICAL 2 TIMES DAILY
Status: CANCELLED | OUTPATIENT
Start: 2024-12-05

## 2024-12-05 RX ORDER — DEXTROAMPHETAMINE SACCHARATE, AMPHETAMINE ASPARTATE, DEXTROAMPHETAMINE SULFATE AND AMPHETAMINE SULFATE 1.25; 1.25; 1.25; 1.25 MG/1; MG/1; MG/1; MG/1
5 TABLET ORAL DAILY
Qty: 30 TABLET | Refills: 0 | Status: SHIPPED | OUTPATIENT
Start: 2024-12-05

## 2024-12-05 RX ORDER — DEXTROAMPHETAMINE SACCHARATE, AMPHETAMINE ASPARTATE, DEXTROAMPHETAMINE SULFATE AND AMPHETAMINE SULFATE 1.25; 1.25; 1.25; 1.25 MG/1; MG/1; MG/1; MG/1
5 TABLET ORAL DAILY
Qty: 30 TABLET | Refills: 0 | Status: SHIPPED | OUTPATIENT
Start: 2025-02-03

## 2024-12-05 RX ORDER — DEXTROAMPHETAMINE SACCHARATE, AMPHETAMINE ASPARTATE MONOHYDRATE, DEXTROAMPHETAMINE SULFATE AND AMPHETAMINE SULFATE 7.5; 7.5; 7.5; 7.5 MG/1; MG/1; MG/1; MG/1
30 CAPSULE, EXTENDED RELEASE ORAL DAILY
Qty: 30 CAPSULE | Refills: 0 | Status: SHIPPED | OUTPATIENT
Start: 2024-12-05

## 2024-12-05 RX ORDER — ESCITALOPRAM OXALATE 20 MG/1
20 TABLET ORAL DAILY
COMMUNITY

## 2024-12-05 RX ORDER — NIFEDIPINE 30 MG
30 TABLET, EXTENDED RELEASE ORAL DAILY
COMMUNITY
Start: 2024-11-07

## 2024-12-05 RX ORDER — DEXTROAMPHETAMINE SACCHARATE, AMPHETAMINE ASPARTATE MONOHYDRATE, DEXTROAMPHETAMINE SULFATE AND AMPHETAMINE SULFATE 7.5; 7.5; 7.5; 7.5 MG/1; MG/1; MG/1; MG/1
30 CAPSULE, EXTENDED RELEASE ORAL DAILY
Qty: 30 CAPSULE | Refills: 0 | Status: SHIPPED | OUTPATIENT
Start: 2025-01-04

## 2024-12-05 ASSESSMENT — PATIENT HEALTH QUESTIONNAIRE - PHQ9
SUM OF ALL RESPONSES TO PHQ QUESTIONS 1-9: 14
SUM OF ALL RESPONSES TO PHQ QUESTIONS 1-9: 14
10. IF YOU CHECKED OFF ANY PROBLEMS, HOW DIFFICULT HAVE THESE PROBLEMS MADE IT FOR YOU TO DO YOUR WORK, TAKE CARE OF THINGS AT HOME, OR GET ALONG WITH OTHER PEOPLE: SOMEWHAT DIFFICULT

## 2024-12-05 ASSESSMENT — ANXIETY QUESTIONNAIRES
4. TROUBLE RELAXING: MORE THAN HALF THE DAYS
GAD7 TOTAL SCORE: 12
IF YOU CHECKED OFF ANY PROBLEMS ON THIS QUESTIONNAIRE, HOW DIFFICULT HAVE THESE PROBLEMS MADE IT FOR YOU TO DO YOUR WORK, TAKE CARE OF THINGS AT HOME, OR GET ALONG WITH OTHER PEOPLE: SOMEWHAT DIFFICULT
GAD7 TOTAL SCORE: 12
1. FEELING NERVOUS, ANXIOUS, OR ON EDGE: MORE THAN HALF THE DAYS
8. IF YOU CHECKED OFF ANY PROBLEMS, HOW DIFFICULT HAVE THESE MADE IT FOR YOU TO DO YOUR WORK, TAKE CARE OF THINGS AT HOME, OR GET ALONG WITH OTHER PEOPLE?: SOMEWHAT DIFFICULT
5. BEING SO RESTLESS THAT IT IS HARD TO SIT STILL: MORE THAN HALF THE DAYS
3. WORRYING TOO MUCH ABOUT DIFFERENT THINGS: MORE THAN HALF THE DAYS
GAD7 TOTAL SCORE: 12
6. BECOMING EASILY ANNOYED OR IRRITABLE: NOT AT ALL
7. FEELING AFRAID AS IF SOMETHING AWFUL MIGHT HAPPEN: MORE THAN HALF THE DAYS
7. FEELING AFRAID AS IF SOMETHING AWFUL MIGHT HAPPEN: MORE THAN HALF THE DAYS
2. NOT BEING ABLE TO STOP OR CONTROL WORRYING: MORE THAN HALF THE DAYS

## 2024-12-05 NOTE — PROGRESS NOTES
Assessment & Plan     Attention deficit hyperactivity disorder (ADHD), combined type  Patient would like to restart her Adderall now that she has delivered. Refills of dosing she was previously on provided. Follow up with PCP in 3 months.  Patient is not breastfeeding.  - amphetamine-dextroamphetamine (ADDERALL) 5 MG tablet; Take 1 tablet (5 mg) by mouth daily.  - amphetamine-dextroamphetamine (ADDERALL XR) 30 MG 24 hr capsule; Take 1 capsule (30 mg) by mouth daily.  - amphetamine-dextroamphetamine (ADDERALL) 5 MG tablet; Take 1 tablet (5 mg) by mouth daily.  - amphetamine-dextroamphetamine (ADDERALL XR) 30 MG 24 hr capsule; Take 1 capsule (30 mg) by mouth daily.  - amphetamine-dextroamphetamine (ADDERALL XR) 30 MG 24 hr capsule; Take 1 capsule (30 mg) by mouth daily.  - amphetamine-dextroamphetamine (ADDERALL) 5 MG tablet; Take 1 tablet (5 mg) by mouth daily.    Postpartum hypertension  Being monitored by OB/GYN currently taking nifedipine. Well controlled today.    Nonintractable epilepsy without status epilepticus, unspecified epilepsy type (H)  History of epilepsy as a child but has been seizure free for 5 years. Form for department of vehicles signed and completed today.   She is no longer seeing neurology and not taking any medications for seizure prevention.    Acne vulgaris  Patient reports this has been helpful for her acne. Refilled today.  - clindamycin phos-benzoyl perox (ACANYA) 1.2-2.5 % external gel; Use once daily      Subjective   Lucero is a 29 year old, presenting for the following health issues:  Forms (DMV form) and Medication Update (Hydroxyzine 25mg, nifedipine 30mg, lexapro 10mg)      12/5/2024    10:23 AM   Additional Questions   Roomed by Radha   Accompanied by Self and son 5 weeks old         12/5/2024   Forms   Any forms needing to be completed Yes        History of Present Illness       Reason for visit:  Seizures    She eats 0-1 servings of fruits and vegetables daily.She consumes 4  "sweetened beverage(s) daily.She exercises with enough effort to increase her heart rate 9 or less minutes per day.  She exercises with enough effort to increase her heart rate 3 or less days per week.   She is taking medications regularly.     Patient reports she has not had a seizure for 5 years   She is not currently seeing neurology and not currently taking any medications for seizure.        Objective    /82 (BP Location: Left arm, Patient Position: Sitting, Cuff Size: Adult Large)   Pulse 76   Temp 98.2  F (36.8  C) (Oral)   Resp 21   Ht 1.651 m (5' 5\")   Wt 119.1 kg (262 lb 9.6 oz)   LMP 02/06/2024 (Approximate)   SpO2 97%   Breastfeeding No   BMI 43.70 kg/m    Body mass index is 43.7 kg/m .  Physical Exam   GENERAL: No acute distress  HEENT: Normocephalic  NEURO: Alert and non-focal          Signed Electronically by: Martha Myrick PA-C    "

## 2024-12-05 NOTE — TELEPHONE ENCOUNTER
Prior Authorization Retail Medication Request    Medication/Dose: clindamycin phos-benzoyl perox (ACANYA) 1.2-2.5 % external gel   Diagnosis and ICD code (if different than what is on RX):    New/renewal/insurance change PA/secondary ins. PA:  Previously Tried and Failed:    Rationale:      Miller: [id number]    Ila Ramirez TC

## 2024-12-10 NOTE — TELEPHONE ENCOUNTER
PA Initiation    Medication: CLINDAMYCIN PHOS-BENZOYL PEROX 1.2-2.5 % EX GEL  Insurance Company: [employer] ([facility]) - Phone [phone] Fax [phone]  Pharmacy Filling the Rx: [id number] - [place] - [id number] [facility]  Filling Pharmacy Phone: [phone]  Filling Pharmacy Fax: [phone]  Start Date: 12/11/2024

## 2024-12-16 NOTE — TELEPHONE ENCOUNTER
PRIOR AUTHORIZATION DENIED    Medication: CLINDAMYCIN PHOS-BENZOYL PEROX 1.2-2.5 % EX GEL  Insurance Company: [employer] ([facility]) - Phone [phone] Fax [phone]  Denial Date: 12/12/2024  Denial Reason(s): patient must try/fail 2 preferred meds:   Several preferred meds            Appeal Information:     Patient Notified: NO

## 2024-12-17 RX ORDER — CLINDAMYCIN AND BENZOYL PEROXIDE 10; 50 MG/G; MG/G
GEL TOPICAL 2 TIMES DAILY
Qty: 50 G | Refills: 1 | Status: SHIPPED | OUTPATIENT
Start: 2024-12-17

## 2024-12-17 NOTE — TELEPHONE ENCOUNTER
Outgoing call to patient. Attempt # 1. Left message to call back any nurse.    Tamara Estrada RN on 12/17/2024 at 8:26 AM

## 2024-12-17 NOTE — TELEPHONE ENCOUNTER
Switched to one of the formulary options. Please let patient know it is very similar to what I had sent but higher potency of the benzoyl peroxide (5% instead of 2.5%)

## 2024-12-18 NOTE — TELEPHONE ENCOUNTER
Attempt #2  Left message to return call to clinic and ask to speak with any triage nurse.     Mychart message sent.     JOSE TurnerN, RN  [facility]

## 2024-12-19 NOTE — TELEPHONE ENCOUNTER
Attempt x 3. Called pt and left a message to call back to [phone] and to ask to speak to a nurse.     When pt calls back,     Relay message from provider below.     Pt viewed [employer] message. Closing encounter.     Rosalee CESPEDES RN   Clinic RN  [facility]

## 2025-02-27 ENCOUNTER — PATIENT OUTREACH (OUTPATIENT)
Dept: CARE COORDINATION | Facility: CLINIC | Age: 30
End: 2025-02-27

## 2025-03-06 ENCOUNTER — ALLIED HEALTH/NURSE VISIT (OUTPATIENT)
Dept: FAMILY MEDICINE | Facility: CLINIC | Age: 30
End: 2025-03-06
Payer: COMMERCIAL

## 2025-03-06 VITALS — DIASTOLIC BLOOD PRESSURE: 70 MMHG | SYSTOLIC BLOOD PRESSURE: 130 MMHG

## 2025-03-06 DIAGNOSIS — F90.2 ATTENTION DEFICIT HYPERACTIVITY DISORDER (ADHD), COMBINED TYPE: ICD-10-CM

## 2025-03-06 RX ORDER — NIFEDIPINE 30 MG
30 TABLET, EXTENDED RELEASE ORAL DAILY
Status: CANCELLED | OUTPATIENT
Start: 2025-03-06

## 2025-03-06 RX ORDER — DEXTROAMPHETAMINE SACCHARATE, AMPHETAMINE ASPARTATE MONOHYDRATE, DEXTROAMPHETAMINE SULFATE AND AMPHETAMINE SULFATE 7.5; 7.5; 7.5; 7.5 MG/1; MG/1; MG/1; MG/1
30 CAPSULE, EXTENDED RELEASE ORAL DAILY
Qty: 30 CAPSULE | Refills: 0 | Status: CANCELLED | OUTPATIENT
Start: 2025-03-06

## 2025-03-06 NOTE — PROGRESS NOTES
Lucero Collier is a 29 year old patient who comes in today for a Blood Pressure check.  Initial BP:  /70 (BP Location: Left arm, Patient Position: Chair, Cuff Size: Adult Large)      Data Unavailable  Disposition: results routed to provider, made an appt on 3/7/25  Meliza Mejia CMA

## 2025-03-07 DIAGNOSIS — L70.0 ACNE VULGARIS: ICD-10-CM

## 2025-03-10 ENCOUNTER — PATIENT OUTREACH (OUTPATIENT)
Dept: CARE COORDINATION | Facility: CLINIC | Age: 30
End: 2025-03-10
Payer: COMMERCIAL

## 2025-03-10 RX ORDER — CLINDAMYCIN AND BENZOYL PEROXIDE 10; 50 MG/G; MG/G
GEL TOPICAL 2 TIMES DAILY
Qty: 50 G | Refills: 1 | Status: SHIPPED | OUTPATIENT
Start: 2025-03-10

## 2025-03-11 ENCOUNTER — LAB REQUISITION (OUTPATIENT)
Dept: LAB | Facility: CLINIC | Age: 30
End: 2025-03-11
Payer: COMMERCIAL

## 2025-03-11 DIAGNOSIS — R82.90 UNSPECIFIED ABNORMAL FINDINGS IN URINE: ICD-10-CM

## 2025-03-11 PROCEDURE — 87086 URINE CULTURE/COLONY COUNT: CPT | Mod: ORL | Performed by: NURSE PRACTITIONER

## 2025-03-13 LAB — BACTERIA UR CULT: NORMAL

## 2025-03-19 ENCOUNTER — TELEPHONE (OUTPATIENT)
Dept: ORTHOPEDICS | Facility: CLINIC | Age: 30
End: 2025-03-19

## 2025-03-19 NOTE — TELEPHONE ENCOUNTER
Outbound call to patient.    Lucero has scheduled and rescheduled her clinic appointment with Osman duncan PA-C 4 times.    03/13/2025 at 0920  03/13/2025 at 1340  03/19/2025 at 1040  03/19/2025 at 1120    Rescheduled to 03/20/2025 at 1300.    Left message regarding canceling and rescheduling appointments.    [phone] number left to call to help find appointment date and time that works best for patient.    Myranda Key MA

## 2025-03-19 NOTE — PROGRESS NOTES
ASSESSMENT & PLAN         Today we discussed the underlying etiology/pathology of patient's   1. Inflammation of joint of right shoulder region    2. Attention deficit hyperactivity disorder (ADHD), combined type        Today a shared decision making model was used. The patient's values and choices were respected. The following information represents what was discussed and decided upon by the provider and the patient.  -We discussed the patient is dealing with some chronic right shoulder discomfort which correlates to the onset of delivery of her child with increased physical demands with her dominant right upper extremity  - We reviewed her past medical history with patient stating she has previously been treated at [facility] for previous right shoulder concerns and had a corticosteroid injection and formal physical therapy done a couple years ago with good overall improvement until recent acute symptoms  - We reviewed her shoulder x-rays today which show no evidence of arthritis, fracture, dislocation or other abnormality  - Patient has generalized soreness around the proximal right shoulder girdle consistent with inflammatory condition likely due to overuse with slight decreased motion but no evidence of adhesive capsulitis  - We discussed patient's been using Tylenol consistently without relief and does have leftover ibuprofen 800 mg tablets from previous  which she uses 1 tablet a week.  We discussed that with an inflammatory condition Tylenol is unlikely to be beneficial.  --We discussed indications for use of additional topical medications, consideration of oral medications between NSAIDs versus oral steroids, consideration for repeat corticosteroid injection and utilization of reengagement with physical therapy.  - At this time patient would like to try oral prednisone.  We discussed risk and benefits of this medicine including potential aggravation of underlying ADHD symptoms.  Patient  understood side effects related to soft tissues and bones.  Patient to take 1 tablet twice daily for the next 7 days.  If patient has significant sleep disturbance with evening dose patient could consider taking both tablets in the morning to try to get farther away from her sleep cycle if needed.  - Patient will be referred to physical therapy to work on right shoulder reconditioning program.  If patient does not improve with oral medication and at least 3 visits of formal physical therapy patient should come back for repeat assessment for consideration of corticosteroid injection for the shoulder.  -Patient should utilize heat and ice to her shoulder to help decrease inflammation and pain.  She also should think about modifying activities that require activities above shoulder height or prolonged carrying of objects such as car carriers and holding her child with her right arm as these are likely to be aggravating    - Appointment line phone number is [[phone]]     Osman Sanchez PA-C  [facility]    This note was completed in part using a voice recognition software, any grammatical or context distortion are unintentional and inherent to the software.     You have been referred to physical or occupational therapy.  At any time you can contact the scheduling department [phone] to arrange your own appointments but an [facility]  will call you to coordinate your care as prescribed by your provider within the next 2 business days. If you don't hear from a representative within 3 business days, please call [phone].   Please be aware that coverage of these services is subject to the terms and limitations of your health insurance plan.  Call member services at your health plan with any benefit or coverage questions.              SUBJECTIVE  Lucero Collier is a/an 29 year old female who is seen in consultation at the request of  Mohini Beavers C.N.P. for  evaluation of right shoulder pain. The patient is seen with their son, Jose L-5 mo.    Expanded HPI: Patient had chronic right shoulder pain a few years ago treated at some orthopedics with corticosteroid injection and formal PT done [place] with good relief of her condition.  Patient has a new infant and symptoms began more acutely with delivery of her child.  She is right arm dominant.  Pain is localized largely to the proximal shoulder girdle including anterior, lateral shoulder with no symptoms below the elbow.  No numbness or tingling.  Patient's been using Tylenol on a regular basis without relief and has leftover ibuprofen 800 mg tablet from  which she is using 1 pill/week.  She has difficulty with activities such as brushing/combing her hair as well as discomfort sleeping on her right shoulder.  As well as discomfort sleeping on her right shoulder.    Onset: 2-3 years(s) ago. 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  Trying to change a resident, when she rolled the patient over, she heard a pop.  Went to [facility] for this.  Went to PT for her right shoulder and she also received a cortisone shot.  Since 2025 with delivery of her child and increased physical demands, it has reoccurred   Location of Pain: right shoulder, anterior, lateral.  Pain radiates down the lateral upper arm at times  Rating of Pain at worst: 7/10  Rating of Pain Currently: 5/10  Worsened by: brushing hair, grasping or carrying objects.  Lifting arm above shoulder.  Better with: rest and no activity  Treatments tried: rest/activity avoidance, heat, Tylenol, ibuprofen, home exercises, physical therapy (unsure on how many visits), and corticosteroid injection (most recent date:  at [facility]) that provided month(s) of relief  Quality: sharp, stabbing  Associated symptoms: no distal numbness or tingling; denies swelling or warmth  Orthopedic history related to this area/condition: NO  Relevant  surgical history for this area: NO  Social history: social history: works at a group home.  Right handed.  Loves to be with her family.    Past Medical History:   Diagnosis Date    ADHD     on Daytrana and clonidine    Adverse reaction to pertussis vaccine     Allergies     daily Claritin    Anxiety     Chronic headaches     Complex partial seizures (H)     Constipation     Depression     Depressive disorder     Developmental delay     Epilepsy (H)     GERD (gastroesophageal reflux disease)     UGI 3/07    History of tonsillectomy     Hypoglycemia     Endocrine eval neg 10/00    Kidney stone     6/02 detected on CT scan; resolved 12/04    Myopia     Obesity     Sinusitis, chronic     Sleep disorder     Since 2000; sleep clinic evaluation 4/00 Dr. Ramesh     Smoking     Spells     Syncope     Urinary tract infection 11/99    nl renal US & VCUG 11/99     Social History     Socioeconomic History    Marital status: Single   Tobacco Use    Smoking status: Former     Types: Cigarettes     Passive exposure: Past    Smokeless tobacco: Never   Vaping Use    Vaping status: Former    Substances: Nicotine   Substance and Sexual Activity    Alcohol use: Not Currently     Comment: Alcoholic Drinks/day: Occasional    Drug use: No     Types: Oxycodone    Sexual activity: Yes     Partners: Male     Social Drivers of Health     Financial Resource Strain: Low Risk  (10/23/2024)    Financial Resource Strain     Within the past 12 months, have you or your family members you live with been unable to get utilities (heat, electricity) when it was really needed?: No   Food Insecurity: Low Risk  (10/23/2024)    Food Insecurity     Within the past 12 months, did you worry that your food would run out before you got money to buy more?: No     Within the past 12 months, did the food you bought just not last and you didn t have money to get more?: No   Transportation Needs: Low Risk  (10/23/2024)    Transportation Needs     Within the past 12  months, has lack of transportation kept you from medical appointments, getting your medicines, non-medical meetings or appointments, work, or from getting things that you need?: No   Interpersonal Safety: Low Risk  (10/23/2024)    Interpersonal Safety     Do you feel physically and emotionally safe where you currently live?: Yes     Within the past 12 months, have you been hit, slapped, kicked or otherwise physically hurt by someone?: No     Within the past 12 months, have you been humiliated or emotionally abused in other ways by your partner or ex-partner?: No   Housing Stability: Low Risk  (10/23/2024)    Housing Stability     Do you have housing? : Yes     Are you worried about losing your housing?: No         Patient's past medical, surgical, social, and family histories were personally reviewed today and no changes are noted.    REVIEW OF SYSTEMS:  10 point ROS is negative other than symptoms noted above in HPI, Past Medical History or as stated below  Constitutional: NEGATIVE for fever, chills, change in weight  Skin: NEGATIVE for worrisome rashes, moles or lesions  GI/: NEGATIVE for bowel or bladder changes  Neuro: NEGATIVE for weakness, dizziness or paresthesias    OBJECTIVE:  Vital signs as noted in EPIC for 3/12/2025  General: healthy, alert and in no distress  HEENT: no scleral icterus or conjunctival erythema  Skin: no suspicious lesions or rash. No jaundice.  CV: no pedal edema  Resp: normal respiratory effort without conversational dyspnea   Psych: normal mood and affect  Neuro: Normal light sensory exam of lower extremity      MSK:  Exam shows a well-nourished 29-year-old female who ambulates full weightbearing without assistive device.  She has her 5-month-old son with her in a car carrier.  Patient is alert and orientated x 3.  Patient has tattoos noted throughout both upper extremities.  Examination of the shoulder show no obvious bruising, no swelling and ecchymosis.  Patient is nontender at  the sternum and SC joints bilaterally.  Slight tenderness noted at the mid clavicle level moving laterally towards the AC joint.  Mild tenderness on the right AC joint.  There is global tenderness noted over the anterior subacromial space, proximal bicipital groove, lateral deltoid and subdeltoid bursa.  No pain at the deltoid tuberosity or distally.  She has full range of motion of the elbow without pain or discomfort.  She lacks forward flexion of the shoulder by approximately 20 degrees on the right and can only abduct to 90 degrees on the right with internal rotation to the T10 spinous process all generating generalized right shoulder discomfort.  No clicking or popping noted.  Negative crank test.  She has pain with testing of the biceps with resisted elbow flexion.  No evidence of bicipital subluxation with motion.  She does have intact rotator cuff tone in all planes but has increased symptoms with empty can positioning as well as impingement testing on the right.  She is neurovascularly intact throughout all dermatomes of the right upper extremity.  She has acceptable range of motion of the C-spine.          RADIOLOGY AND LABORATORY:   Personal Independent visualization of the below images done today:  Three-view x-ray of the right shoulder are obtained and reviewed today.  Joint spaces maintained.  No evidence of fracture or dislocation.  Normal x-ray    Patient's conditions were thoroughly discussed during today's clinical visit with total time reviewing patient's previous medical records/history/radiology, face-to-face examination and discussion and plan of care with the patient and documentation being 40 minutes on today's clinical visit  Osman Sanchez PA-C  [facility]    This note was completed in part using a voice recognition software, any grammatical or context distortion are unintentional and inherent to the software.

## 2025-03-20 ENCOUNTER — OFFICE VISIT (OUTPATIENT)
Dept: ORTHOPEDICS | Facility: CLINIC | Age: 30
End: 2025-03-20
Attending: NURSE PRACTITIONER
Payer: COMMERCIAL

## 2025-03-20 ENCOUNTER — ANCILLARY PROCEDURE (OUTPATIENT)
Dept: GENERAL RADIOLOGY | Facility: CLINIC | Age: 30
End: 2025-03-20
Attending: PHYSICIAN ASSISTANT
Payer: COMMERCIAL

## 2025-03-20 VITALS — BODY MASS INDEX: 43.77 KG/M2 | HEIGHT: 65 IN

## 2025-03-20 DIAGNOSIS — F90.2 ATTENTION DEFICIT HYPERACTIVITY DISORDER (ADHD), COMBINED TYPE: ICD-10-CM

## 2025-03-20 DIAGNOSIS — M19.011 INFLAMMATION OF JOINT OF RIGHT SHOULDER REGION: Primary | ICD-10-CM

## 2025-03-20 DIAGNOSIS — G89.29 CHRONIC RIGHT SHOULDER PAIN: ICD-10-CM

## 2025-03-20 DIAGNOSIS — M25.511 CHRONIC RIGHT SHOULDER PAIN: ICD-10-CM

## 2025-03-20 RX ORDER — NIFEDIPINE 60 MG/1
1 TABLET, EXTENDED RELEASE ORAL
COMMUNITY
Start: 2025-01-31

## 2025-03-20 RX ORDER — PREDNISONE 20 MG/1
20 TABLET ORAL 2 TIMES DAILY
Qty: 14 TABLET | Refills: 0 | Status: SHIPPED | OUTPATIENT
Start: 2025-03-20

## 2025-03-20 RX ORDER — METOPROLOL TARTRATE 25 MG/1
25 TABLET, FILM COATED ORAL PRN
COMMUNITY
Start: 2025-03-08

## 2025-03-20 NOTE — PATIENT INSTRUCTIONS
Thank you for allowing me to be part of your care team. My personal goal for your visit today is that you felt that I listened to you, you understood your diagnosis and treatment options and our staff/clinic met your expectations. We strive to provide you excellent care.  If you felt like your expectations were not met at your visit today or if you have further questions about your visit or care, please send me a Surfingbird[employer] message and I would be happy answer any questions or listen to your feedback.  If you do not have MyChart, you can call [phone] to speak with me.      If advanced imaging (MRI, CT scan) or blood work was ordered at your appointment today, I will contact you as we discussed today either by telephone call or [employer] message within 48 hours after your advanced imaging testing has been completed or when ALL your lab results are available to review/discuss with you.       Today we discussed the underlying etiology/pathology of patient's   1. Inflammation of joint of right shoulder region    2. Attention deficit hyperactivity disorder (ADHD), combined type        Today a shared decision making model was used. The patient's values and choices were respected. The following information represents what was discussed and decided upon by the provider and the patient.  -We discussed the patient is dealing with some chronic right shoulder discomfort which correlates to the onset of delivery of her child with increased physical demands with her dominant right upper extremity  - We reviewed her past medical history with patient stating she has previously been treated at [facility] for previous right shoulder concerns and had a corticosteroid injection and formal physical therapy done a couple years ago with good overall improvement until recent acute symptoms  - We reviewed her shoulder x-rays today which show no evidence of arthritis, fracture, dislocation or other abnormality  - Patient has  generalized soreness around the proximal right shoulder girdle consistent with inflammatory condition likely due to overuse with slight decreased motion but no evidence of adhesive capsulitis  - We discussed patient's been using Tylenol consistently without relief and does have leftover ibuprofen 800 mg tablets from previous  which she uses 1 tablet a week.  We discussed that with an inflammatory condition Tylenol is unlikely to be beneficial.  --We discussed indications for use of additional topical medications, consideration of oral medications between NSAIDs versus oral steroids, consideration for repeat corticosteroid injection and utilization of reengagement with physical therapy.  - At this time patient would like to try oral prednisone.  We discussed risk and benefits of this medicine including potential aggravation of underlying ADHD symptoms.  Patient understood side effects related to soft tissues and bones.  Patient to take 1 tablet twice daily for the next 7 days.  If patient has significant sleep disturbance with evening dose patient could consider taking both tablets in the morning to try to get farther away from her sleep cycle if needed.  - Patient will be referred to physical therapy to work on right shoulder reconditioning program.  If patient does not improve with oral medication and at least 3 visits of formal physical therapy patient should come back for repeat assessment for consideration of corticosteroid injection for the shoulder.  -Patient should utilize heat and ice to her shoulder to help decrease inflammation and pain.  She also should think about modifying activities that require activities above shoulder height or prolonged carrying of objects such as car carriers and holding her child with her right arm as these are likely to be aggravating    - Appointment line phone number is [[phone]]     Osman Sanchez PA-C  [facility]    This note was  completed in part using a voice recognition software, any grammatical or context distortion are unintentional and inherent to the software.     You have been referred to physical or occupational therapy.  At any time you can contact the scheduling department [phone] to arrange your own appointments but an [facility]  will call you to coordinate your care as prescribed by your provider within the next 2 business days. If you don't hear from a representative within 3 business days, please call [phone].   Please be aware that coverage of these services is subject to the terms and limitations of your health insurance plan.  Call member services at your health plan with any benefit or coverage questions.

## 2025-03-20 NOTE — LETTER
3/20/2025      Lucero Collier  [address] [place] [id number]      Dear Colleague,    Thank you for referring your patient, Lucero Collier, to the [facility]. Please see a copy of my visit note below.    ASSESSMENT & PLAN         Today we discussed the underlying etiology/pathology of patient's   1. Inflammation of joint of right shoulder region    2. Attention deficit hyperactivity disorder (ADHD), combined type        Today a shared decision making model was used. The patient's values and choices were respected. The following information represents what was discussed and decided upon by the provider and the patient.  -We discussed the patient is dealing with some chronic right shoulder discomfort which correlates to the onset of delivery of her child with increased physical demands with her dominant right upper extremity  - We reviewed her past medical history with patient stating she has previously been treated at [facility] for previous right shoulder concerns and had a corticosteroid injection and formal physical therapy done a couple years ago with good overall improvement until recent acute symptoms  - We reviewed her shoulder x-rays today which show no evidence of arthritis, fracture, dislocation or other abnormality  - Patient has generalized soreness around the proximal right shoulder girdle consistent with inflammatory condition likely due to overuse with slight decreased motion but no evidence of adhesive capsulitis  - We discussed patient's been using Tylenol consistently without relief and does have leftover ibuprofen 800 mg tablets from previous  which she uses 1 tablet a week.  We discussed that with an inflammatory condition Tylenol is unlikely to be beneficial.  --We discussed indications for use of additional topical medications, consideration of oral medications between NSAIDs versus oral steroids, consideration for repeat  corticosteroid injection and utilization of reengagement with physical therapy.  - At this time patient would like to try oral prednisone.  We discussed risk and benefits of this medicine including potential aggravation of underlying ADHD symptoms.  Patient understood side effects related to soft tissues and bones.  Patient to take 1 tablet twice daily for the next 7 days.  If patient has significant sleep disturbance with evening dose patient could consider taking both tablets in the morning to try to get farther away from her sleep cycle if needed.  - Patient will be referred to physical therapy to work on right shoulder reconditioning program.  If patient does not improve with oral medication and at least 3 visits of formal physical therapy patient should come back for repeat assessment for consideration of corticosteroid injection for the shoulder.  -Patient should utilize heat and ice to her shoulder to help decrease inflammation and pain.  She also should think about modifying activities that require activities above shoulder height or prolonged carrying of objects such as car carriers and holding her child with her right arm as these are likely to be aggravating    - Appointment line phone number is [[phone]]     Osman Sanchez PA-C  [facility]    This note was completed in part using a voice recognition software, any grammatical or context distortion are unintentional and inherent to the software.     You have been referred to physical or occupational therapy.  At any time you can contact the scheduling department [phone] to arrange your own appointments but an [facility]  will call you to coordinate your care as prescribed by your provider within the next 2 business days. If you don't hear from a representative within 3 business days, please call [phone].   Please be aware that coverage of these services is subject to the terms and limitations of  your health insurance plan.  Call member services at your health plan with any benefit or coverage questions.              SUBJECTIVE  Lucero Collier is a/an 29 year old female who is seen in consultation at the request of  Mohini Beavers C.N.P. for evaluation of right shoulder pain. The patient is seen with their son, Jose L-5 mo.    Expanded HPI: Patient had chronic right shoulder pain a few years ago treated at some orthopedics with corticosteroid injection and formal PT done [facility] with good relief of her condition.  Patient has a new infant and symptoms began more acutely with delivery of her child.  She is right arm dominant.  Pain is localized largely to the proximal shoulder girdle including anterior, lateral shoulder with no symptoms below the elbow.  No numbness or tingling.  Patient's been using Tylenol on a regular basis without relief and has leftover ibuprofen 800 mg tablet from  which she is using 1 pill/week.  She has difficulty with activities such as brushing/combing her hair as well as discomfort sleeping on her right shoulder.  As well as discomfort sleeping on her right shoulder.    Onset: 2-3 years(s) ago. 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  Trying to change a resident, when she rolled the patient over, she heard a pop.  Went to [facility] for this.  Went to PT for her right shoulder and she also received a cortisone shot.  Since 2025 with delivery of her child and increased physical demands, it has reoccurred   Location of Pain: right shoulder, anterior, lateral.  Pain radiates down the lateral upper arm at times  Rating of Pain at worst: 7/10  Rating of Pain Currently: 5/10  Worsened by: brushing hair, grasping or carrying objects.  Lifting arm above shoulder.  Better with: rest and no activity  Treatments tried: rest/activity avoidance, heat, Tylenol, ibuprofen, home exercises, physical therapy (unsure on how many visits), and  corticosteroid injection (most recent date: 2022 at [facility]) that provided month(s) of relief  Quality: sharp, stabbing  Associated symptoms: no distal numbness or tingling; denies swelling or warmth  Orthopedic history related to this area/condition: NO  Relevant surgical history for this area: NO  Social history: social history: works at a group home.  Right handed.  Loves to be with her family.    Past Medical History:   Diagnosis Date     ADHD     on Daytrana and clonidine     Adverse reaction to pertussis vaccine      Allergies     daily Claritin     Anxiety      Chronic headaches      Complex partial seizures (H)      Constipation      Depression      Depressive disorder      Developmental delay      Epilepsy (H)      GERD (gastroesophageal reflux disease)     UGI 3/07     History of tonsillectomy      Hypoglycemia     Endocrine eval neg 10/00     Kidney stone     6/02 detected on CT scan; resolved 12/04     Myopia      Obesity      Sinusitis, chronic      Sleep disorder     Since 2000; sleep clinic evaluation 4/00 Dr. Ramesh      Smoking      Spells      Syncope      Urinary tract infection 11/99    nl renal US & VCUG 11/99     Social History     Socioeconomic History     Marital status: Single   Tobacco Use     Smoking status: Former     Types: Cigarettes     Passive exposure: Past     Smokeless tobacco: Never   Vaping Use     Vaping status: Former     Substances: Nicotine   Substance and Sexual Activity     Alcohol use: Not Currently     Comment: Alcoholic Drinks/day: Occasional     Drug use: No     Types: Oxycodone     Sexual activity: Yes     Partners: Male     Social Drivers of Health     Financial Resource Strain: Low Risk  (10/23/2024)    Financial Resource Strain      Within the past 12 months, have you or your family members you live with been unable to get utilities (heat, electricity) when it was really needed?: No   Food Insecurity: Low Risk  (10/23/2024)    Food Insecurity      Within the past 12  months, did you worry that your food would run out before you got money to buy more?: No      Within the past 12 months, did the food you bought just not last and you didn t have money to get more?: No   Transportation Needs: Low Risk  (10/23/2024)    Transportation Needs      Within the past 12 months, has lack of transportation kept you from medical appointments, getting your medicines, non-medical meetings or appointments, work, or from getting things that you need?: No   Interpersonal Safety: Low Risk  (10/23/2024)    Interpersonal Safety      Do you feel physically and emotionally safe where you currently live?: Yes      Within the past 12 months, have you been hit, slapped, kicked or otherwise physically hurt by someone?: No      Within the past 12 months, have you been humiliated or emotionally abused in other ways by your partner or ex-partner?: No   Housing Stability: Low Risk  (10/23/2024)    Housing Stability      Do you have housing? : Yes      Are you worried about losing your housing?: No         Patient's past medical, surgical, social, and family histories were personally reviewed today and no changes are noted.    REVIEW OF SYSTEMS:  10 point ROS is negative other than symptoms noted above in HPI, Past Medical History or as stated below  Constitutional: NEGATIVE for fever, chills, change in weight  Skin: NEGATIVE for worrisome rashes, moles or lesions  GI/: NEGATIVE for bowel or bladder changes  Neuro: NEGATIVE for weakness, dizziness or paresthesias    OBJECTIVE:  Vital signs as noted in EPIC for 3/12/2025  General: healthy, alert and in no distress  HEENT: no scleral icterus or conjunctival erythema  Skin: no suspicious lesions or rash. No jaundice.  CV: no pedal edema  Resp: normal respiratory effort without conversational dyspnea   Psych: normal mood and affect  Neuro: Normal light sensory exam of lower extremity      MSK:  Exam shows a well-nourished 29-year-old female who ambulates full  weightbearing without assistive device.  She has her 5-month-old son with her in a car carrier.  Patient is alert and orientated x 3.  Patient has tattoos noted throughout both upper extremities.  Examination of the shoulder show no obvious bruising, no swelling and ecchymosis.  Patient is nontender at the sternum and SC joints bilaterally.  Slight tenderness noted at the mid clavicle level moving laterally towards the AC joint.  Mild tenderness on the right AC joint.  There is global tenderness noted over the anterior subacromial space, proximal bicipital groove, lateral deltoid and subdeltoid bursa.  No pain at the deltoid tuberosity or distally.  She has full range of motion of the elbow without pain or discomfort.  She lacks forward flexion of the shoulder by approximately 20 degrees on the right and can only abduct to 90 degrees on the right with internal rotation to the T10 spinous process all generating generalized right shoulder discomfort.  No clicking or popping noted.  Negative crank test.  She has pain with testing of the biceps with resisted elbow flexion.  No evidence of bicipital subluxation with motion.  She does have intact rotator cuff tone in all planes but has increased symptoms with empty can positioning as well as impingement testing on the right.  She is neurovascularly intact throughout all dermatomes of the right upper extremity.  She has acceptable range of motion of the C-spine.          RADIOLOGY AND LABORATORY:   Personal Independent visualization of the below images done today:  Three-view x-ray of the right shoulder are obtained and reviewed today.  Joint spaces maintained.  No evidence of fracture or dislocation.  Normal x-ray    Patient's conditions were thoroughly discussed during today's clinical visit with total time reviewing patient's previous medical records/history/radiology, face-to-face examination and discussion and plan of care with the patient and documentation being 40  minutes on today's clinical visit  Osman Sanchez PA-C  [facility]    This note was completed in part using a voice recognition software, any grammatical or context distortion are unintentional and inherent to the software.       Again, thank you for allowing me to participate in the care of your patient.        Sincerely,        Osman Sanchez PA-C    Electronically signed

## 2025-04-14 ENCOUNTER — MYC MEDICAL ADVICE (OUTPATIENT)
Dept: FAMILY MEDICINE | Facility: CLINIC | Age: 30
End: 2025-04-14
Payer: COMMERCIAL

## 2025-04-14 DIAGNOSIS — F90.2 ATTENTION DEFICIT HYPERACTIVITY DISORDER (ADHD), COMBINED TYPE: ICD-10-CM

## 2025-04-14 RX ORDER — DEXTROAMPHETAMINE SACCHARATE, AMPHETAMINE ASPARTATE MONOHYDRATE, DEXTROAMPHETAMINE SULFATE AND AMPHETAMINE SULFATE 7.5; 7.5; 7.5; 7.5 MG/1; MG/1; MG/1; MG/1
30 CAPSULE, EXTENDED RELEASE ORAL DAILY
Qty: 30 CAPSULE | Refills: 0 | Status: SHIPPED | OUTPATIENT
Start: 2025-05-06

## 2025-04-14 RX ORDER — DEXTROAMPHETAMINE SACCHARATE, AMPHETAMINE ASPARTATE, DEXTROAMPHETAMINE SULFATE AND AMPHETAMINE SULFATE 2.5; 2.5; 2.5; 2.5 MG/1; MG/1; MG/1; MG/1
10 TABLET ORAL DAILY
Qty: 30 TABLET | Refills: 0 | Status: SHIPPED | OUTPATIENT
Start: 2025-04-14

## 2025-04-14 NOTE — TELEPHONE ENCOUNTER
Routing to last ordering provider  to review pt's mychart request. Looks like April's Adderall refill may have accidentally been discontinued by a Sports Medicine provider.    Order pended. Please review and sign as appropriate.    Buffy Brown RN on 4/14/2025 at 9:37 AM

## 2025-06-09 ENCOUNTER — OFFICE VISIT (OUTPATIENT)
Dept: FAMILY MEDICINE | Facility: CLINIC | Age: 30
End: 2025-06-09
Payer: COMMERCIAL

## 2025-06-09 VITALS
HEIGHT: 65 IN | RESPIRATION RATE: 20 BRPM | DIASTOLIC BLOOD PRESSURE: 84 MMHG | BODY MASS INDEX: 45.63 KG/M2 | WEIGHT: 273.9 LBS | OXYGEN SATURATION: 95 % | SYSTOLIC BLOOD PRESSURE: 122 MMHG | HEART RATE: 91 BPM | TEMPERATURE: 97.8 F

## 2025-06-09 DIAGNOSIS — F33.0 MILD EPISODE OF RECURRENT MAJOR DEPRESSIVE DISORDER: ICD-10-CM

## 2025-06-09 DIAGNOSIS — F90.2 ATTENTION DEFICIT HYPERACTIVITY DISORDER (ADHD), COMBINED TYPE: ICD-10-CM

## 2025-06-09 DIAGNOSIS — B37.31 YEAST INFECTION OF THE VAGINA: ICD-10-CM

## 2025-06-09 DIAGNOSIS — F41.9 ANXIETY: ICD-10-CM

## 2025-06-09 DIAGNOSIS — N89.8 VAGINAL DISCHARGE: Primary | ICD-10-CM

## 2025-06-09 DIAGNOSIS — L70.0 ACNE VULGARIS: ICD-10-CM

## 2025-06-09 LAB
CLUE CELLS: ABNORMAL
TRICHOMONAS, WET PREP: ABNORMAL
WBC'S/HIGH POWER FIELD, WET PREP: ABNORMAL
YEAST, WET PREP: PRESENT

## 2025-06-09 PROCEDURE — 87210 SMEAR WET MOUNT SALINE/INK: CPT | Performed by: NURSE PRACTITIONER

## 2025-06-09 PROCEDURE — G2211 COMPLEX E/M VISIT ADD ON: HCPCS | Performed by: NURSE PRACTITIONER

## 2025-06-09 PROCEDURE — 99214 OFFICE O/P EST MOD 30 MIN: CPT | Performed by: NURSE PRACTITIONER

## 2025-06-09 RX ORDER — DEXTROAMPHETAMINE SACCHARATE, AMPHETAMINE ASPARTATE, DEXTROAMPHETAMINE SULFATE AND AMPHETAMINE SULFATE 2.5; 2.5; 2.5; 2.5 MG/1; MG/1; MG/1; MG/1
10 TABLET ORAL DAILY
Qty: 30 TABLET | Refills: 0 | Status: SHIPPED | OUTPATIENT
Start: 2025-07-09 | End: 2025-08-08

## 2025-06-09 RX ORDER — DEXTROAMPHETAMINE SACCHARATE, AMPHETAMINE ASPARTATE, DEXTROAMPHETAMINE SULFATE AND AMPHETAMINE SULFATE 2.5; 2.5; 2.5; 2.5 MG/1; MG/1; MG/1; MG/1
10 TABLET ORAL DAILY
Qty: 30 TABLET | Refills: 0 | Status: SHIPPED | OUTPATIENT
Start: 2025-08-08 | End: 2025-09-07

## 2025-06-09 RX ORDER — ESCITALOPRAM OXALATE 20 MG/1
20 TABLET ORAL DAILY
Qty: 90 TABLET | Refills: 3 | Status: SHIPPED | OUTPATIENT
Start: 2025-06-09

## 2025-06-09 RX ORDER — DEXTROAMPHETAMINE SACCHARATE, AMPHETAMINE ASPARTATE MONOHYDRATE, DEXTROAMPHETAMINE SULFATE AND AMPHETAMINE SULFATE 7.5; 7.5; 7.5; 7.5 MG/1; MG/1; MG/1; MG/1
30 CAPSULE, EXTENDED RELEASE ORAL DAILY
Qty: 30 CAPSULE | Refills: 0 | Status: SHIPPED | OUTPATIENT
Start: 2025-08-08 | End: 2025-09-07

## 2025-06-09 RX ORDER — HYDROXYZINE PAMOATE 25 MG/1
25 CAPSULE ORAL
Qty: 90 CAPSULE | Refills: 1 | Status: SHIPPED | OUTPATIENT
Start: 2025-06-09

## 2025-06-09 RX ORDER — CLINDAMYCIN AND BENZOYL PEROXIDE 10; 50 MG/G; MG/G
GEL TOPICAL 2 TIMES DAILY
Qty: 50 G | Refills: 1 | Status: SHIPPED | OUTPATIENT
Start: 2025-06-09

## 2025-06-09 RX ORDER — DEXTROAMPHETAMINE SACCHARATE, AMPHETAMINE ASPARTATE MONOHYDRATE, DEXTROAMPHETAMINE SULFATE AND AMPHETAMINE SULFATE 7.5; 7.5; 7.5; 7.5 MG/1; MG/1; MG/1; MG/1
30 CAPSULE, EXTENDED RELEASE ORAL DAILY
Qty: 30 CAPSULE | Refills: 0 | Status: SHIPPED | OUTPATIENT
Start: 2025-06-09 | End: 2025-07-09

## 2025-06-09 RX ORDER — DEXTROAMPHETAMINE SACCHARATE, AMPHETAMINE ASPARTATE, DEXTROAMPHETAMINE SULFATE AND AMPHETAMINE SULFATE 2.5; 2.5; 2.5; 2.5 MG/1; MG/1; MG/1; MG/1
10 TABLET ORAL DAILY
Qty: 30 TABLET | Refills: 0 | Status: SHIPPED | OUTPATIENT
Start: 2025-06-09 | End: 2025-07-09

## 2025-06-09 RX ORDER — NIFEDIPINE 60 MG/1
60 TABLET, EXTENDED RELEASE ORAL
Qty: 90 TABLET | Refills: 0 | Status: SHIPPED | OUTPATIENT
Start: 2025-06-09

## 2025-06-09 RX ORDER — DEXTROAMPHETAMINE SACCHARATE, AMPHETAMINE ASPARTATE MONOHYDRATE, DEXTROAMPHETAMINE SULFATE AND AMPHETAMINE SULFATE 7.5; 7.5; 7.5; 7.5 MG/1; MG/1; MG/1; MG/1
30 CAPSULE, EXTENDED RELEASE ORAL DAILY
Qty: 30 CAPSULE | Refills: 0 | Status: SHIPPED | OUTPATIENT
Start: 2025-07-09 | End: 2025-08-08

## 2025-06-09 RX ORDER — BUPROPION HYDROCHLORIDE 150 MG/1
150 TABLET ORAL EVERY MORNING
Qty: 90 TABLET | Refills: 0 | Status: SHIPPED | OUTPATIENT
Start: 2025-06-09

## 2025-06-09 RX ORDER — FLUCONAZOLE 150 MG/1
150 TABLET ORAL ONCE
Qty: 2 TABLET | Refills: 0 | Status: SHIPPED | OUTPATIENT
Start: 2025-06-09 | End: 2025-06-09

## 2025-06-09 ASSESSMENT — ANXIETY QUESTIONNAIRES
7. FEELING AFRAID AS IF SOMETHING AWFUL MIGHT HAPPEN: MORE THAN HALF THE DAYS
IF YOU CHECKED OFF ANY PROBLEMS ON THIS QUESTIONNAIRE, HOW DIFFICULT HAVE THESE PROBLEMS MADE IT FOR YOU TO DO YOUR WORK, TAKE CARE OF THINGS AT HOME, OR GET ALONG WITH OTHER PEOPLE: SOMEWHAT DIFFICULT
8. IF YOU CHECKED OFF ANY PROBLEMS, HOW DIFFICULT HAVE THESE MADE IT FOR YOU TO DO YOUR WORK, TAKE CARE OF THINGS AT HOME, OR GET ALONG WITH OTHER PEOPLE?: SOMEWHAT DIFFICULT
GAD7 TOTAL SCORE: 13
1. FEELING NERVOUS, ANXIOUS, OR ON EDGE: MORE THAN HALF THE DAYS
2. NOT BEING ABLE TO STOP OR CONTROL WORRYING: MORE THAN HALF THE DAYS
5. BEING SO RESTLESS THAT IT IS HARD TO SIT STILL: MORE THAN HALF THE DAYS
GAD7 TOTAL SCORE: 13
6. BECOMING EASILY ANNOYED OR IRRITABLE: MORE THAN HALF THE DAYS
3. WORRYING TOO MUCH ABOUT DIFFERENT THINGS: MORE THAN HALF THE DAYS
4. TROUBLE RELAXING: SEVERAL DAYS
7. FEELING AFRAID AS IF SOMETHING AWFUL MIGHT HAPPEN: MORE THAN HALF THE DAYS
GAD7 TOTAL SCORE: 13

## 2025-06-09 ASSESSMENT — PAIN SCALES - GENERAL: PAINLEVEL_OUTOF10: NO PAIN (0)

## 2025-06-09 NOTE — PROGRESS NOTES
"  Assessment & Plan     Vaginal discharge:  - Recurrent yeast infections; recent lab results confirm yeast infection, no bacterial vaginosis.  - Prescribed Diflucan (fluconazole) to be taken once, with a repeat dose in 3 days if needed. Advised to return if infections persist for further evaluation.    Attention deficit hyperactivity disorder (ADHD), combined type:  - Current medication adjustment has been helpful with no reported side effects.  - Continue current ADHD medication regimen.    Mild episode of recurrent major depressive disorder:  - Low mood persists despite current Lexapro dosage.  - Restart Wellbutrin in addition to Lexapro. Follow-up to assess effectiveness.  - Risks and side effects: Monitor blood pressure due to potential effects of Wellbutrin.    Anxiety:  - Anxiety, particularly at night.  Medication helpful.  - Continue hydroxyzine 25 mg as needed.    Postpartum hypertension:  - Currently managed with nifedipine 60 mg daily.  - Continue nifedipine 60 mg daily.    Yeast infection of the vagina:  - Confirmed yeast infection with recurrent episodes.  - Prescribed Diflucan (fluconazole) with instructions for repeat dosing if necessary. Advised to return if infections persist.    Acne vulgaris:  - Current cream formulation causing dryness.  Interested in gel.  - Prescribe clindamycin gel with benzoyl peroxide. Ensure correct formulation is dispensed.    Consent was obtained from the patient to use an AI documentation tool in the creation of this note.    The longitudinal plan of care for the diagnosis(es)/condition(s) as documented were addressed during this visit. Due to the added complexity in care, I will continue to support Lucero in the subsequent management and with ongoing continuity of care.        BMI  Estimated body mass index is 45.58 kg/m  as calculated from the following:    Height as of this encounter: 1.651 m (5' 5\").    Weight as of this encounter: 124.2 kg (273 lb 14.4 oz). "         Subjective   Lucero is a 29 year old, presenting for the following health issues:  Recheck Medication and Vaginal Problem (Yeast infection ?)        6/9/2025    10:41 AM   Additional Questions   Roomed by Melina CESPEDES MA   Accompanied by self         6/9/2025    10:41 AM   Patient Reported Additional Medications   Patient reports taking the following new medications none     Vaginal Problem     History of Present Illness       Reason for visit:  Medication   She is taking medications regularly.            ADHD Follow-Up (Adult)  Concerns: none  Changes since last visit: same .  Taking controlled (daily) medications as prescribed: Yes  Sleep: no problems  Adult ADHD Self-Reporting form given to patient?:  No  Currently in counseling: Yes    Medication Benefits:   Controlled symptoms: concentrate   Uncontrolled symptoms:  None    Medication Side Effects:  Reports:  none  Sleep Problems? no  ++++++++++++++++++++++++++++++++++++++++++++++++    Employer Concerns/Feedback: None  Coworker Concerns:   None  Home/Family Concerns: None    Vaginal Symptoms  Onset/Duration:  a week   Description:  Vaginal Discharge: curd-like normal    Itching (Pruritis): YES  Burning sensation:  YES  Odor: No  Accompanying Signs & Symptoms:  Urinary symptoms: No  Abdominal pain: No  Fever: No  History:   Sexually active: No  New Partner: No  Possibility of Pregnancy:  No  Recent antibiotic use: No  Previous vaginitis issues: YES  Precipitating or alleviating factors: None  Therapies tried and outcome: Monistat    History of Present Illness    Lucero Collier, 29 years old, female    - Adjusted ADHD medication during a previous visit with Mohini, found it helpful without side effects.  - Experiencing low mood, increased Lexapro dosage to 20 mg.  - History of postpartum depression.  - Bad anxiety at night, uses hydroxyzine 25 mg as needed for sleep.  - Previously prescribed metoprolol during pregnancy due to increased heart rate but never took  "it.  - History of epilepsy, referred to neurology but did not attend due to pregnancy timeline.  - Recurring vaginal dryness and itchiness; treated for yeast infection and bacterial vaginosis a couple of months ago.  - Yeast infections recurring almost monthly.  - Previous use of fluoxetine for mood without improvement; Lexapro found helpful.  - Tried Wellbutrin in the past for depression.           Review of Systems  Constitutional, HEENT, cardiovascular, pulmonary, gi and gu systems are negative, except as otherwise noted.      Objective    /84 (BP Location: Right arm, Patient Position: Sitting, Cuff Size: Adult Large)   Pulse 91   Temp 97.8  F (36.6  C) (Oral)   Resp 20   Ht 1.651 m (5' 5\")   Wt 124.2 kg (273 lb 14.4 oz)   LMP 06/03/2025   SpO2 95%   BMI 45.58 kg/m    Body mass index is 45.58 kg/m .  Physical Exam   GENERAL: alert and no distress  RESP: lungs clear to auscultation - no rales, rhonchi or wheezes  CV: regular rate and rhythm, normal S1 S2, no S3 or S4, no murmur, click or rub, no peripheral edema   PSYCH: mentation appears normal, affect normal/bright    Results for orders placed or performed in visit on 06/09/25 (from the past 24 hours)   Wet prep - lab collect    Specimen: Vagina; Swab   Result Value Ref Range    Trichomonas Absent Absent    Yeast Present (A) Absent    Clue Cells Absent Absent    WBCs/high power field 2+ (A) None           Signed Electronically by: SHELBY Sherwood CNP    "

## 2025-07-03 ENCOUNTER — RESULTS FOLLOW-UP (OUTPATIENT)
Dept: FAMILY MEDICINE | Facility: CLINIC | Age: 30
End: 2025-07-03

## 2025-07-03 ENCOUNTER — OFFICE VISIT (OUTPATIENT)
Dept: FAMILY MEDICINE | Facility: CLINIC | Age: 30
End: 2025-07-03
Payer: COMMERCIAL

## 2025-07-03 VITALS
WEIGHT: 266.2 LBS | HEIGHT: 65 IN | OXYGEN SATURATION: 98 % | BODY MASS INDEX: 44.35 KG/M2 | SYSTOLIC BLOOD PRESSURE: 122 MMHG | DIASTOLIC BLOOD PRESSURE: 80 MMHG | TEMPERATURE: 98.4 F | HEART RATE: 127 BPM | RESPIRATION RATE: 20 BRPM

## 2025-07-03 DIAGNOSIS — N89.8 VAGINAL DISCHARGE: Primary | ICD-10-CM

## 2025-07-03 DIAGNOSIS — L98.9 SKIN LESION: ICD-10-CM

## 2025-07-03 DIAGNOSIS — B37.31 YEAST INFECTION OF THE VAGINA: Primary | ICD-10-CM

## 2025-07-03 DIAGNOSIS — N94.6 MENSTRUAL CRAMP: ICD-10-CM

## 2025-07-03 LAB
ALBUMIN UR-MCNC: NEGATIVE MG/DL
APPEARANCE UR: CLEAR
BACTERIA #/AREA URNS HPF: ABNORMAL /HPF
BILIRUB UR QL STRIP: NEGATIVE
CLUE CELLS: ABNORMAL
COLOR UR AUTO: YELLOW
GLUCOSE UR STRIP-MCNC: NEGATIVE MG/DL
HGB UR QL STRIP: NEGATIVE
KETONES UR STRIP-MCNC: NEGATIVE MG/DL
LEUKOCYTE ESTERASE UR QL STRIP: ABNORMAL
MUCOUS THREADS #/AREA URNS LPF: PRESENT /LPF
NITRATE UR QL: NEGATIVE
PH UR STRIP: 7 [PH] (ref 5–8)
RBC #/AREA URNS AUTO: ABNORMAL /HPF
SP GR UR STRIP: 1.02 (ref 1–1.03)
SQUAMOUS #/AREA URNS AUTO: ABNORMAL /LPF
TRICHOMONAS, WET PREP: ABNORMAL
UROBILINOGEN UR STRIP-ACNC: 0.2 E.U./DL
WBC #/AREA URNS AUTO: ABNORMAL /HPF
WBC'S/HIGH POWER FIELD, WET PREP: ABNORMAL
YEAST, WET PREP: PRESENT

## 2025-07-03 RX ORDER — IBUPROFEN 800 MG/1
800 TABLET, FILM COATED ORAL EVERY 6 HOURS PRN
Qty: 90 TABLET | Refills: 1 | Status: SHIPPED | OUTPATIENT
Start: 2025-07-03

## 2025-07-03 RX ORDER — SULFAMETHOXAZOLE AND TRIMETHOPRIM 800; 160 MG/1; MG/1
1 TABLET ORAL 2 TIMES DAILY
Qty: 14 TABLET | Refills: 0 | Status: SHIPPED | OUTPATIENT
Start: 2025-07-03 | End: 2025-07-10

## 2025-07-03 RX ORDER — FLUCONAZOLE 150 MG/1
150 TABLET ORAL
Qty: 3 TABLET | Refills: 0 | Status: SHIPPED | OUTPATIENT
Start: 2025-07-03 | End: 2025-07-10

## 2025-07-03 NOTE — PROGRESS NOTES
"  Assessment & Plan     Vaginal discharge  Wet prep is positive for yeast. Will treat with 3x course of fluconazole. If not improved with this recommend follow up with gynecology.     - Wet prep - Clinic Collect  - Chlamydia trachomatis/Neisseria gonorrhoeae by PCR - Clinic Collect  - UA Macroscopic with reflex to Microscopic and Culture - Clinic Collect  - UA Microscopic with Reflex to Culture    Skin lesion  Discussed with patient I am concerned for hidradenitis suppurativa as cause of symptoms and multiple lesions and boils. Discussed treatment options including doxycycline, spironolactone and metformin. Discussed I don't recommend doxycycline with yeast infection history however spironolactone and metformin could be options for treatment. Patient declines today and will follow up with derm. May benefit from removal as likely sebaceous cyst.    If not improving over the next couple of days-recommend taking bactrim.    - Adult Dermatology  Referral; Future  - sulfamethoxazole-trimethoprim (BACTRIM DS) 800-160 MG tablet; Take 1 tablet by mouth 2 times daily for 7 days.    Menstrual cramp   - ibuprofen (ADVIL/MOTRIN) 800 MG tablet; Take 1 tablet (800 mg) by mouth every 6 hours as needed for moderate pain.          BMI  Estimated body mass index is 43.82 kg/m  as calculated from the following:    Height as of this encounter: 1.66 m (5' 5.35\").    Weight as of this encounter: 120.7 kg (266 lb 3.2 oz).             Kristin Barker is a 30 year old, presenting for the following health issues:  Derm Problem (Spot on back of right hip, for about a week. Heat packs aren't helping, hasn't tried anything else; also thinks she has a yeast infection (Just had one June 9th) )        7/3/2025    11:31 AM   Additional Questions   Roomed by JED WATKINS     History of Present Illness       Reason for visit:  Boil on lower right side back , possibly a yeast infection and birth control   She is taking medications regularly.    " "  Itching, burning, vaginal discharge (white clumpy). Patient reports will get yeast infections during periods. Patient did treat for yeast last month. Did seem to improve completely.    Boil on back x 1 week. Heating pads unhelpful-has not changed in size. Patient reports typically pops them at home.  Patient has not seen dermatology. Patient reports typically in thigh and groin area.                      Objective    /80   Pulse (!) 127   Temp 98.4  F (36.9  C) (Oral)   Resp 20   Ht 1.66 m (5' 5.35\")   Wt 120.7 kg (266 lb 3.2 oz)   LMP 06/30/2025 (Exact Date)   SpO2 98%   BMI 43.82 kg/m    Body mass index is 43.82 kg/m .  Physical Exam  Constitutional:       Appearance: Normal appearance.   Skin:         Neurological:      General: No focal deficit present.      Mental Status: She is alert and oriented to person, place, and time.   Psychiatric:         Mood and Affect: Mood normal.         Behavior: Behavior normal.            Results for orders placed or performed in visit on 07/03/25   UA Macroscopic with reflex to Microscopic and Culture - Clinic Collect     Status: Abnormal    Specimen: Urine, Clean Catch   Result Value Ref Range    Color Urine Yellow Colorless, Straw, Light Yellow, Yellow    Appearance Urine Clear Clear    Glucose Urine Negative Negative mg/dL    Bilirubin Urine Negative Negative    Ketones Urine Negative Negative mg/dL    Specific Gravity Urine 1.020 1.005 - 1.030    Blood Urine Negative Negative    pH Urine 7.0 5.0 - 8.0    Protein Albumin Urine Negative Negative mg/dL    Urobilinogen Urine 0.2 0.2, 1.0 E.U./dL    Nitrite Urine Negative Negative    Leukocyte Esterase Urine Trace (A) Negative   UA Microscopic with Reflex to Culture     Status: Abnormal   Result Value Ref Range    Bacteria Urine Moderate (A) None Seen /HPF    RBC Urine 0-2 0-2 /HPF /HPF    WBC Urine 5-10 (A) 0-5 /HPF /HPF    Squamous Epithelials Urine Few (A) None Seen /LPF    Mucus Urine Present (A) None Seen " /LPF    Narrative    Urine Culture not indicated   Wet prep - Clinic Collect     Status: Abnormal    Specimen: Vagina; Swab   Result Value Ref Range    Trichomonas Absent Absent    Yeast Present (A) Absent    Clue Cells Absent Absent    WBCs/high power field 1+ (A) None           Signed Electronically by: SHELBY Monk CNP

## 2025-07-07 ENCOUNTER — PATIENT OUTREACH (OUTPATIENT)
Dept: CARE COORDINATION | Facility: CLINIC | Age: 30
End: 2025-07-07
Payer: COMMERCIAL

## 2025-08-26 ENCOUNTER — TELEPHONE (OUTPATIENT)
Dept: DERMATOLOGY | Facility: CLINIC | Age: 30
End: 2025-08-26
Payer: COMMERCIAL

## 2025-08-29 DIAGNOSIS — L70.0 ACNE VULGARIS: ICD-10-CM

## 2025-09-02 RX ORDER — CLINDAMYCIN AND BENZOYL PEROXIDE 10; 50 MG/G; MG/G
GEL TOPICAL
Qty: 50 G | Refills: 1 | Status: SHIPPED | OUTPATIENT
Start: 2025-09-02

## (undated) DEVICE — DRSG STERI STRIP 1/2X4" R1547

## (undated) DEVICE — SOL WATER IRRIG 1000ML BOTTLE 2F7114

## (undated) DEVICE — DRSG FOAM MEPILEX BORDER SILVER 4X10 POSTOP 498450

## (undated) DEVICE — ELECTRODE PATIENT RETURN ADULT L10 FT 2 PLATE CORD 0855C

## (undated) DEVICE — UNDERPAD 30X30 BULK 949B10

## (undated) DEVICE — PAD INSERT MED PEACH 14X6.5 62550

## (undated) DEVICE — NEEDLE HYPO MAGELLAN SAFETY 22GA 1 1/2IN 8881850215

## (undated) DEVICE — BAG BIOHAZARD RED SMALL 24X23" A4823PR

## (undated) DEVICE — Device

## (undated) DEVICE — SUCTION TIP YANKAUER W/O VENT K86

## (undated) DEVICE — PACK MINOR SINGLE BASIN SSK3001

## (undated) DEVICE — SPONGE RAY-TEC 4X8" 7318

## (undated) DEVICE — SYR 20ML LL W/O NDL 302830

## (undated) DEVICE — CUSTOM PACK C-SECTION LHE

## (undated) DEVICE — SUCTION MANIFOLD NEPTUNE 2 SYS 1 PORT 702-025-000

## (undated) DEVICE — PREP CHLORAPREP 26ML TINTED HI-LITE ORANGE 930815

## (undated) DEVICE — SUTURE MONOCRYL+ 0 CT-1 UND 18 MCP946H

## (undated) DEVICE — DRESSING MEPILEX ADH 4INX10IN STRL LF 496455

## (undated) DEVICE — GLOVE BIOGEL PI ULTRATOUCH G SZ 6.5 42165

## (undated) DEVICE — GLOVE BIOGEL PI ULTRATOUCH G SZ 7.0 42170

## (undated) DEVICE — SU STRATAFIX PDS 0 CT-2 30CM SXPP1B405

## (undated) RX ORDER — MORPHINE SULFATE 1 MG/ML
INJECTION, SOLUTION EPIDURAL; INTRATHECAL; INTRAVENOUS
Status: DISPENSED
Start: 2024-10-26

## (undated) RX ORDER — DEXMEDETOMIDINE HYDROCHLORIDE 4 UG/ML
INJECTION, SOLUTION INTRAVENOUS
Status: DISPENSED
Start: 2024-10-26

## (undated) RX ORDER — DEXAMETHASONE SODIUM PHOSPHATE 4 MG/ML
INJECTION, SOLUTION INTRA-ARTICULAR; INTRALESIONAL; INTRAMUSCULAR; INTRAVENOUS; SOFT TISSUE
Status: DISPENSED
Start: 2024-10-26

## (undated) RX ORDER — LIDOCAINE HCL/EPINEPHRINE/PF 2%-1:200K
VIAL (ML) INJECTION
Status: DISPENSED
Start: 2024-10-25